# Patient Record
Sex: FEMALE | Race: WHITE | Employment: OTHER | URBAN - METROPOLITAN AREA
[De-identification: names, ages, dates, MRNs, and addresses within clinical notes are randomized per-mention and may not be internally consistent; named-entity substitution may affect disease eponyms.]

---

## 2017-05-24 ENCOUNTER — HOSPITAL ENCOUNTER (OUTPATIENT)
Dept: OTHER | Age: 67
Discharge: HOME | End: 2017-05-24
Attending: FAMILY MEDICINE | Admitting: FAMILY MEDICINE

## 2017-05-24 LAB
A/G RATIO: 1.3 (ref 1.1–2.2)
ALBUMIN SERPL-MCNC: 4.1 G/DL (ref 3.4–5)
ALP BLD-CCNC: 51 U/L (ref 40–129)
ALT SERPL-CCNC: 13 U/L (ref 10–40)
ANION GAP SERPL CALCULATED.3IONS-SCNC: 12 MMOL/L (ref 3–16)
AST SERPL-CCNC: 15 U/L (ref 15–37)
BASOPHILS ABSOLUTE: 0.1 K/UL (ref 0–0.2)
BASOPHILS RELATIVE PERCENT: 1 %
BILIRUB SERPL-MCNC: 0.3 MG/DL (ref 0–1)
BUN BLDV-MCNC: 18 MG/DL (ref 7–20)
CALCIUM SERPL-MCNC: 9 MG/DL (ref 8.3–10.6)
CHLORIDE BLD-SCNC: 99 MMOL/L (ref 99–110)
CHOLESTEROL, FASTING: 169 MG/DL (ref 0–199)
CO2: 29 MMOL/L (ref 21–32)
CREAT SERPL-MCNC: 0.5 MG/DL (ref 0.6–1.2)
CREATININE URINE: 88.9 MG/DL (ref 28–259)
EOSINOPHILS ABSOLUTE: 0.1 K/UL (ref 0–0.6)
EOSINOPHILS RELATIVE PERCENT: 2.3 %
ESTIMATED AVERAGE GLUCOSE: 142.7 MG/DL
GFR AFRICAN AMERICAN: >60
GFR NON-AFRICAN AMERICAN: >60
GLOBULIN: 3.1 G/DL
GLUCOSE BLD-MCNC: 119 MG/DL (ref 70–99)
HBA1C MFR BLD: 6.6 %
HCT VFR BLD CALC: 42 % (ref 36–48)
HDLC SERPL-MCNC: 81 MG/DL (ref 40–60)
HEMOGLOBIN: 13.6 G/DL (ref 12–16)
LDL CHOLESTEROL CALCULATED: 67 MG/DL
LYMPHOCYTES ABSOLUTE: 2.3 K/UL (ref 1–5.1)
LYMPHOCYTES RELATIVE PERCENT: 40.9 %
MCH RBC QN AUTO: 30.4 PG (ref 26–34)
MCHC RBC AUTO-ENTMCNC: 32.3 G/DL (ref 31–36)
MCV RBC AUTO: 94.2 FL (ref 80–100)
MICROALBUMIN UR-MCNC: <1.2 MG/DL
MICROALBUMIN/CREAT UR-RTO: NORMAL MG/G (ref 0–30)
MONOCYTES ABSOLUTE: 0.3 K/UL (ref 0–1.3)
MONOCYTES RELATIVE PERCENT: 5.8 %
NEUTROPHILS ABSOLUTE: 2.8 K/UL (ref 1.7–7.7)
NEUTROPHILS RELATIVE PERCENT: 50 %
PDW BLD-RTO: 13 % (ref 12.4–15.4)
PLATELET # BLD: 208 K/UL (ref 135–450)
PMV BLD AUTO: 8.7 FL (ref 5–10.5)
POTASSIUM SERPL-SCNC: 4.5 MMOL/L (ref 3.5–5.1)
RBC # BLD: 4.46 M/UL (ref 4–5.2)
SODIUM BLD-SCNC: 140 MMOL/L (ref 136–145)
TOTAL PROTEIN: 7.2 G/DL (ref 6.4–8.2)
TRIGLYCERIDE, FASTING: 104 MG/DL (ref 0–150)
TSH REFLEX: 3.56 UIU/ML (ref 0.27–4.2)
VLDLC SERPL CALC-MCNC: 21 MG/DL
WBC # BLD: 5.5 K/UL (ref 4–11)

## 2017-09-22 ENCOUNTER — HOSPITAL ENCOUNTER (OUTPATIENT)
Dept: MAMMOGRAPHY | Age: 67
Discharge: HOME | End: 2017-09-22
Attending: FAMILY MEDICINE | Admitting: FAMILY MEDICINE

## 2017-09-22 DIAGNOSIS — Z12.31 SCREENING MAMMOGRAM, ENCOUNTER FOR: ICD-10-CM

## 2017-11-22 ENCOUNTER — HOSPITAL ENCOUNTER (OUTPATIENT)
Dept: OTHER | Age: 67
Discharge: HOME | End: 2017-11-22
Attending: FAMILY MEDICINE | Admitting: FAMILY MEDICINE

## 2017-11-22 LAB
A/G RATIO: 1.5 (ref 1.1–2.2)
ALBUMIN SERPL-MCNC: 4.4 G/DL (ref 3.4–5)
ALP BLD-CCNC: 60 U/L (ref 40–129)
ALT SERPL-CCNC: 15 U/L (ref 10–40)
ANION GAP SERPL CALCULATED.3IONS-SCNC: 13 MMOL/L (ref 3–16)
AST SERPL-CCNC: 14 U/L (ref 15–37)
BASOPHILS ABSOLUTE: 0.1 K/UL (ref 0–0.2)
BASOPHILS RELATIVE PERCENT: 1.1 %
BILIRUB SERPL-MCNC: <0.2 MG/DL (ref 0–1)
BUN BLDV-MCNC: 19 MG/DL (ref 7–20)
CALCIUM SERPL-MCNC: 9.7 MG/DL (ref 8.3–10.6)
CHLORIDE BLD-SCNC: 101 MMOL/L (ref 99–110)
CHOLESTEROL, FASTING: 173 MG/DL (ref 0–199)
CO2: 29 MMOL/L (ref 21–32)
CREAT SERPL-MCNC: 0.6 MG/DL (ref 0.6–1.2)
CREATININE URINE: 167.7 MG/DL (ref 28–259)
EOSINOPHILS ABSOLUTE: 0.1 K/UL (ref 0–0.6)
EOSINOPHILS RELATIVE PERCENT: 2.2 %
ESTIMATED AVERAGE GLUCOSE: 134.1 MG/DL
GFR AFRICAN AMERICAN: >60
GFR NON-AFRICAN AMERICAN: >60
GLOBULIN: 2.9 G/DL
GLUCOSE FASTING: 124 MG/DL (ref 70–99)
HBA1C MFR BLD: 6.3 %
HCT VFR BLD CALC: 42.1 % (ref 36–48)
HDLC SERPL-MCNC: 84 MG/DL (ref 40–60)
HEMOGLOBIN: 14 G/DL (ref 12–16)
LDL CHOLESTEROL CALCULATED: 71 MG/DL
LYMPHOCYTES ABSOLUTE: 1.9 K/UL (ref 1–5.1)
LYMPHOCYTES RELATIVE PERCENT: 32.5 %
MCH RBC QN AUTO: 31.3 PG (ref 26–34)
MCHC RBC AUTO-ENTMCNC: 33.3 G/DL (ref 31–36)
MCV RBC AUTO: 93.9 FL (ref 80–100)
MICROALBUMIN UR-MCNC: 1.3 MG/DL
MICROALBUMIN/CREAT UR-RTO: 7.8 MG/G (ref 0–30)
MONOCYTES ABSOLUTE: 0.3 K/UL (ref 0–1.3)
MONOCYTES RELATIVE PERCENT: 5.6 %
NEUTROPHILS ABSOLUTE: 3.4 K/UL (ref 1.7–7.7)
NEUTROPHILS RELATIVE PERCENT: 58.6 %
PDW BLD-RTO: 13.4 % (ref 12.4–15.4)
PLATELET # BLD: 204 K/UL (ref 135–450)
PMV BLD AUTO: 8.6 FL (ref 5–10.5)
POTASSIUM SERPL-SCNC: 4.3 MMOL/L (ref 3.5–5.1)
RBC # BLD: 4.48 M/UL (ref 4–5.2)
SODIUM BLD-SCNC: 143 MMOL/L (ref 136–145)
TOTAL PROTEIN: 7.3 G/DL (ref 6.4–8.2)
TRIGLYCERIDE, FASTING: 89 MG/DL (ref 0–150)
TSH SERPL DL<=0.05 MIU/L-ACNC: 4.41 UIU/ML (ref 0.27–4.2)
VLDLC SERPL CALC-MCNC: 18 MG/DL
WBC # BLD: 5.8 K/UL (ref 4–11)

## 2018-06-14 ENCOUNTER — HOSPITAL ENCOUNTER (OUTPATIENT)
Dept: OTHER | Age: 68
Discharge: HOME | End: 2018-06-14
Attending: FAMILY MEDICINE | Admitting: FAMILY MEDICINE

## 2018-06-14 LAB
A/G RATIO: 1.4 (ref 1.1–2.2)
ALBUMIN SERPL-MCNC: 4 G/DL (ref 3.4–5)
ALP BLD-CCNC: 64 U/L (ref 40–129)
ALT SERPL-CCNC: 13 U/L (ref 10–40)
ANION GAP SERPL CALCULATED.3IONS-SCNC: 14 MMOL/L (ref 3–16)
AST SERPL-CCNC: 15 U/L (ref 15–37)
BASOPHILS ABSOLUTE: 0 K/UL (ref 0–0.2)
BASOPHILS RELATIVE PERCENT: 1 %
BILIRUB SERPL-MCNC: 0.3 MG/DL (ref 0–1)
BUN BLDV-MCNC: 15 MG/DL (ref 7–20)
CALCIUM SERPL-MCNC: 9.3 MG/DL (ref 8.3–10.6)
CHLORIDE BLD-SCNC: 102 MMOL/L (ref 99–110)
CHOLESTEROL, TOTAL: 154 MG/DL (ref 0–199)
CO2: 30 MMOL/L (ref 21–32)
CREAT SERPL-MCNC: 0.5 MG/DL (ref 0.6–1.2)
EOSINOPHILS ABSOLUTE: 0.2 K/UL (ref 0–0.6)
EOSINOPHILS RELATIVE PERCENT: 3.7 %
ESTIMATED AVERAGE GLUCOSE: 154.2 MG/DL
GFR AFRICAN AMERICAN: >60
GFR NON-AFRICAN AMERICAN: >60
GLOBULIN: 2.9 G/DL
GLUCOSE BLD-MCNC: 138 MG/DL (ref 70–99)
HBA1C MFR BLD: 7 %
HCT VFR BLD CALC: 39.9 % (ref 36–48)
HDLC SERPL-MCNC: 69 MG/DL (ref 40–60)
HEMOGLOBIN: 13.8 G/DL (ref 12–16)
LDL CHOLESTEROL CALCULATED: 52 MG/DL
LYMPHOCYTES ABSOLUTE: 1.6 K/UL (ref 1–5.1)
LYMPHOCYTES RELATIVE PERCENT: 35.4 %
MCH RBC QN AUTO: 31 PG (ref 26–34)
MCHC RBC AUTO-ENTMCNC: 34.5 G/DL (ref 31–36)
MCV RBC AUTO: 89.8 FL (ref 80–100)
MONOCYTES ABSOLUTE: 0.2 K/UL (ref 0–1.3)
MONOCYTES RELATIVE PERCENT: 5.3 %
NEUTROPHILS ABSOLUTE: 2.5 K/UL (ref 1.7–7.7)
NEUTROPHILS RELATIVE PERCENT: 54.6 %
PDW BLD-RTO: 12.6 % (ref 12.4–15.4)
PLATELET # BLD: 191 K/UL (ref 135–450)
PMV BLD AUTO: 8.3 FL (ref 5–10.5)
POTASSIUM SERPL-SCNC: 4.6 MMOL/L (ref 3.5–5.1)
RBC # BLD: 4.45 M/UL (ref 4–5.2)
SODIUM BLD-SCNC: 146 MMOL/L (ref 136–145)
TOTAL PROTEIN: 6.9 G/DL (ref 6.4–8.2)
TRIGL SERPL-MCNC: 163 MG/DL (ref 0–150)
TSH SERPL DL<=0.05 MIU/L-ACNC: 2.88 UIU/ML (ref 0.27–4.2)
VLDLC SERPL CALC-MCNC: 33 MG/DL
WBC # BLD: 4.5 K/UL (ref 4–11)

## 2018-09-25 ENCOUNTER — HOSPITAL ENCOUNTER (OUTPATIENT)
Dept: MAMMOGRAPHY | Age: 68
Discharge: HOME | End: 2018-09-25
Payer: MEDICARE

## 2018-09-25 DIAGNOSIS — Z12.39 SCREENING BREAST EXAMINATION: ICD-10-CM

## 2018-09-25 PROCEDURE — 77067 SCR MAMMO BI INCL CAD: CPT

## 2018-11-01 ENCOUNTER — HOSPITAL ENCOUNTER (OUTPATIENT)
Age: 68
Discharge: HOME | End: 2018-11-01
Payer: MEDICARE

## 2018-11-01 LAB
A/G RATIO: 1.7 (ref 1.1–2.2)
ALBUMIN SERPL-MCNC: 4.3 G/DL (ref 3.4–5)
ALP BLD-CCNC: 61 U/L (ref 40–129)
ALT SERPL-CCNC: 15 U/L (ref 10–40)
ANION GAP SERPL CALCULATED.3IONS-SCNC: 12 MMOL/L (ref 3–16)
AST SERPL-CCNC: 19 U/L (ref 15–37)
BASOPHILS ABSOLUTE: 0.1 K/UL (ref 0–0.2)
BASOPHILS RELATIVE PERCENT: 1.5 %
BILIRUB SERPL-MCNC: 0.3 MG/DL (ref 0–1)
BUN BLDV-MCNC: 13 MG/DL (ref 7–20)
CALCIUM SERPL-MCNC: 9.4 MG/DL (ref 8.3–10.6)
CHLORIDE BLD-SCNC: 103 MMOL/L (ref 99–110)
CHOLESTEROL, TOTAL: 178 MG/DL (ref 0–199)
CO2: 29 MMOL/L (ref 21–32)
CREAT SERPL-MCNC: 0.6 MG/DL (ref 0.6–1.2)
CREATININE URINE: 135.9 MG/DL (ref 28–259)
EOSINOPHILS ABSOLUTE: 0.1 K/UL (ref 0–0.6)
EOSINOPHILS RELATIVE PERCENT: 3.2 %
GFR AFRICAN AMERICAN: >60
GFR NON-AFRICAN AMERICAN: >60
GLOBULIN: 2.5 G/DL
GLUCOSE BLD-MCNC: 156 MG/DL (ref 70–99)
HCT VFR BLD CALC: 41.6 % (ref 36–48)
HDLC SERPL-MCNC: 72 MG/DL (ref 40–60)
HEMOGLOBIN: 13.9 G/DL (ref 12–16)
LDL CHOLESTEROL CALCULATED: 92 MG/DL
LYMPHOCYTES ABSOLUTE: 1.5 K/UL (ref 1–5.1)
LYMPHOCYTES RELATIVE PERCENT: 35.7 %
MCH RBC QN AUTO: 30.7 PG (ref 26–34)
MCHC RBC AUTO-ENTMCNC: 33.4 G/DL (ref 31–36)
MCV RBC AUTO: 91.8 FL (ref 80–100)
MICROALBUMIN UR-MCNC: 3.1 MG/DL
MICROALBUMIN/CREAT UR-RTO: 22.8 MG/G (ref 0–30)
MONOCYTES ABSOLUTE: 0.3 K/UL (ref 0–1.3)
MONOCYTES RELATIVE PERCENT: 7.4 %
NEUTROPHILS ABSOLUTE: 2.2 K/UL (ref 1.7–7.7)
NEUTROPHILS RELATIVE PERCENT: 52.2 %
PDW BLD-RTO: 13.4 % (ref 12.4–15.4)
PLATELET # BLD: 183 K/UL (ref 135–450)
PMV BLD AUTO: 9 FL (ref 5–10.5)
POTASSIUM SERPL-SCNC: 4.4 MMOL/L (ref 3.5–5.1)
RBC # BLD: 4.53 M/UL (ref 4–5.2)
SODIUM BLD-SCNC: 144 MMOL/L (ref 136–145)
TOTAL PROTEIN: 6.8 G/DL (ref 6.4–8.2)
TRIGL SERPL-MCNC: 69 MG/DL (ref 0–150)
VLDLC SERPL CALC-MCNC: 14 MG/DL
WBC # BLD: 4.2 K/UL (ref 4–11)

## 2018-11-01 PROCEDURE — 82043 UR ALBUMIN QUANTITATIVE: CPT

## 2018-11-01 PROCEDURE — 82570 ASSAY OF URINE CREATININE: CPT

## 2018-11-01 PROCEDURE — 80061 LIPID PANEL: CPT

## 2018-11-01 PROCEDURE — 36415 COLL VENOUS BLD VENIPUNCTURE: CPT

## 2018-11-01 PROCEDURE — 85025 COMPLETE CBC W/AUTO DIFF WBC: CPT

## 2018-11-01 PROCEDURE — 83036 HEMOGLOBIN GLYCOSYLATED A1C: CPT

## 2018-11-01 PROCEDURE — 80053 COMPREHEN METABOLIC PANEL: CPT

## 2018-11-02 LAB
ESTIMATED AVERAGE GLUCOSE: 151.3 MG/DL
HBA1C MFR BLD: 6.9 %

## 2019-05-04 ENCOUNTER — HOSPITAL ENCOUNTER (OUTPATIENT)
Age: 69
Discharge: HOME | End: 2019-05-04
Payer: MEDICARE

## 2019-05-04 LAB
A/G RATIO: 1.6 (ref 1.1–2.2)
ALBUMIN SERPL-MCNC: 4.2 G/DL (ref 3.4–5)
ALP BLD-CCNC: 53 U/L (ref 40–129)
ALT SERPL-CCNC: 15 U/L (ref 10–40)
ANION GAP SERPL CALCULATED.3IONS-SCNC: 9 MMOL/L (ref 3–16)
AST SERPL-CCNC: 18 U/L (ref 15–37)
BASOPHILS ABSOLUTE: 0.1 K/UL (ref 0–0.2)
BASOPHILS RELATIVE PERCENT: 1.4 %
BILIRUB SERPL-MCNC: 0.4 MG/DL (ref 0–1)
BUN BLDV-MCNC: 17 MG/DL (ref 7–20)
CALCIUM SERPL-MCNC: 9.1 MG/DL (ref 8.3–10.6)
CHLORIDE BLD-SCNC: 102 MMOL/L (ref 99–110)
CHOLESTEROL, TOTAL: 199 MG/DL (ref 0–199)
CO2: 30 MMOL/L (ref 21–32)
CREAT SERPL-MCNC: 0.5 MG/DL (ref 0.6–1.2)
CREATININE URINE: 138.3 MG/DL (ref 28–259)
EOSINOPHILS ABSOLUTE: 0.2 K/UL (ref 0–0.6)
EOSINOPHILS RELATIVE PERCENT: 3.9 %
GFR AFRICAN AMERICAN: >60
GFR NON-AFRICAN AMERICAN: >60
GLOBULIN: 2.7 G/DL
GLUCOSE BLD-MCNC: 128 MG/DL (ref 70–99)
HCT VFR BLD CALC: 40.8 % (ref 36–48)
HDLC SERPL-MCNC: 93 MG/DL (ref 40–60)
HEMOGLOBIN: 13.7 G/DL (ref 12–16)
LDL CHOLESTEROL CALCULATED: 97 MG/DL
LYMPHOCYTES ABSOLUTE: 1.8 K/UL (ref 1–5.1)
LYMPHOCYTES RELATIVE PERCENT: 42.2 %
MCH RBC QN AUTO: 31.4 PG (ref 26–34)
MCHC RBC AUTO-ENTMCNC: 33.5 G/DL (ref 31–36)
MCV RBC AUTO: 93.6 FL (ref 80–100)
MICROALBUMIN UR-MCNC: <1.2 MG/DL
MICROALBUMIN/CREAT UR-RTO: NORMAL MG/G (ref 0–30)
MONOCYTES ABSOLUTE: 0.3 K/UL (ref 0–1.3)
MONOCYTES RELATIVE PERCENT: 8 %
NEUTROPHILS ABSOLUTE: 1.9 K/UL (ref 1.7–7.7)
NEUTROPHILS RELATIVE PERCENT: 44.5 %
PDW BLD-RTO: 13.8 % (ref 12.4–15.4)
PLATELET # BLD: 193 K/UL (ref 135–450)
PMV BLD AUTO: 9.1 FL (ref 5–10.5)
POTASSIUM SERPL-SCNC: 4.4 MMOL/L (ref 3.5–5.1)
RBC # BLD: 4.35 M/UL (ref 4–5.2)
SODIUM BLD-SCNC: 141 MMOL/L (ref 136–145)
TOTAL PROTEIN: 6.9 G/DL (ref 6.4–8.2)
TRIGL SERPL-MCNC: 45 MG/DL (ref 0–150)
VLDLC SERPL CALC-MCNC: 9 MG/DL
WBC # BLD: 4.2 K/UL (ref 4–11)

## 2019-05-04 PROCEDURE — 80053 COMPREHEN METABOLIC PANEL: CPT

## 2019-05-04 PROCEDURE — 83036 HEMOGLOBIN GLYCOSYLATED A1C: CPT

## 2019-05-04 PROCEDURE — 85025 COMPLETE CBC W/AUTO DIFF WBC: CPT

## 2019-05-04 PROCEDURE — 36415 COLL VENOUS BLD VENIPUNCTURE: CPT

## 2019-05-04 PROCEDURE — 82570 ASSAY OF URINE CREATININE: CPT

## 2019-05-04 PROCEDURE — 80061 LIPID PANEL: CPT

## 2019-05-04 PROCEDURE — 82043 UR ALBUMIN QUANTITATIVE: CPT

## 2019-05-05 LAB
ESTIMATED AVERAGE GLUCOSE: 134.1 MG/DL
HBA1C MFR BLD: 6.3 %

## 2019-06-27 ENCOUNTER — HOSPITAL ENCOUNTER (EMERGENCY)
Age: 69
Discharge: HOME | End: 2019-06-27
Attending: EMERGENCY MEDICINE
Payer: MEDICARE

## 2019-06-27 ENCOUNTER — APPOINTMENT (OUTPATIENT)
Dept: GENERAL RADIOLOGY | Age: 69
End: 2019-06-27
Payer: MEDICARE

## 2019-06-27 VITALS
BODY MASS INDEX: 25.24 KG/M2 | OXYGEN SATURATION: 99 % | WEIGHT: 138 LBS | SYSTOLIC BLOOD PRESSURE: 119 MMHG | HEART RATE: 58 BPM | RESPIRATION RATE: 13 BRPM | TEMPERATURE: 97.9 F | DIASTOLIC BLOOD PRESSURE: 66 MMHG

## 2019-06-27 DIAGNOSIS — R42 LIGHTHEADEDNESS: Primary | ICD-10-CM

## 2019-06-27 DIAGNOSIS — F41.0 ANXIETY ATTACK: ICD-10-CM

## 2019-06-27 LAB
A/G RATIO: 1.7 (ref 1.1–2.2)
ALBUMIN SERPL-MCNC: 4.5 G/DL (ref 3.4–5)
ALP BLD-CCNC: 60 U/L (ref 40–129)
ALT SERPL-CCNC: 15 U/L (ref 10–40)
ANION GAP SERPL CALCULATED.3IONS-SCNC: 16 MMOL/L (ref 3–16)
AST SERPL-CCNC: 18 U/L (ref 15–37)
BASOPHILS ABSOLUTE: 0.1 K/UL (ref 0–0.2)
BASOPHILS RELATIVE PERCENT: 1.4 %
BILIRUB SERPL-MCNC: 0.4 MG/DL (ref 0–1)
BUN BLDV-MCNC: 13 MG/DL (ref 7–20)
CALCIUM SERPL-MCNC: 9.8 MG/DL (ref 8.3–10.6)
CHLORIDE BLD-SCNC: 100 MMOL/L (ref 99–110)
CO2: 24 MMOL/L (ref 21–32)
CREAT SERPL-MCNC: 0.6 MG/DL (ref 0.6–1.2)
EKG ATRIAL RATE: 70 BPM
EKG DIAGNOSIS: NORMAL
EKG P AXIS: 74 DEGREES
EKG P-R INTERVAL: 148 MS
EKG Q-T INTERVAL: 376 MS
EKG QRS DURATION: 74 MS
EKG QTC CALCULATION (BAZETT): 406 MS
EKG R AXIS: 41 DEGREES
EKG T AXIS: 41 DEGREES
EKG VENTRICULAR RATE: 70 BPM
EOSINOPHILS ABSOLUTE: 0.1 K/UL (ref 0–0.6)
EOSINOPHILS RELATIVE PERCENT: 1.8 %
GFR AFRICAN AMERICAN: >60
GFR NON-AFRICAN AMERICAN: >60
GLOBULIN: 2.7 G/DL
GLUCOSE BLD-MCNC: 122 MG/DL (ref 70–99)
GLUCOSE BLD-MCNC: 124 MG/DL (ref 70–99)
HCT VFR BLD CALC: 42.1 % (ref 36–48)
HEMOGLOBIN: 14.1 G/DL (ref 12–16)
LYMPHOCYTES ABSOLUTE: 2.5 K/UL (ref 1–5.1)
LYMPHOCYTES RELATIVE PERCENT: 51.2 %
MCH RBC QN AUTO: 31.1 PG (ref 26–34)
MCHC RBC AUTO-ENTMCNC: 33.5 G/DL (ref 31–36)
MCV RBC AUTO: 92.8 FL (ref 80–100)
MONOCYTES ABSOLUTE: 0.3 K/UL (ref 0–1.3)
MONOCYTES RELATIVE PERCENT: 6.5 %
NEUTROPHILS ABSOLUTE: 1.9 K/UL (ref 1.7–7.7)
NEUTROPHILS RELATIVE PERCENT: 39.1 %
PDW BLD-RTO: 13.8 % (ref 12.4–15.4)
PERFORMED ON: ABNORMAL
PLATELET # BLD: 198 K/UL (ref 135–450)
PMV BLD AUTO: 8.3 FL (ref 5–10.5)
POTASSIUM SERPL-SCNC: 3.3 MMOL/L (ref 3.5–5.1)
PRO-BNP: 100 PG/ML (ref 0–124)
RBC # BLD: 4.54 M/UL (ref 4–5.2)
SODIUM BLD-SCNC: 140 MMOL/L (ref 136–145)
TOTAL PROTEIN: 7.2 G/DL (ref 6.4–8.2)
TROPONIN: <0.01 NG/ML
WBC # BLD: 4.8 K/UL (ref 4–11)

## 2019-06-27 PROCEDURE — 80053 COMPREHEN METABOLIC PANEL: CPT

## 2019-06-27 PROCEDURE — 83880 ASSAY OF NATRIURETIC PEPTIDE: CPT

## 2019-06-27 PROCEDURE — 84484 ASSAY OF TROPONIN QUANT: CPT

## 2019-06-27 PROCEDURE — 85025 COMPLETE CBC W/AUTO DIFF WBC: CPT

## 2019-06-27 PROCEDURE — 99284 EMERGENCY DEPT VISIT MOD MDM: CPT

## 2019-06-27 PROCEDURE — 6370000000 HC RX 637 (ALT 250 FOR IP): Performed by: EMERGENCY MEDICINE

## 2019-06-27 PROCEDURE — 93010 ELECTROCARDIOGRAM REPORT: CPT | Performed by: INTERNAL MEDICINE

## 2019-06-27 PROCEDURE — 93005 ELECTROCARDIOGRAM TRACING: CPT | Performed by: EMERGENCY MEDICINE

## 2019-06-27 PROCEDURE — 71046 X-RAY EXAM CHEST 2 VIEWS: CPT

## 2019-06-27 RX ORDER — MELOXICAM 15 MG/1
15 TABLET ORAL DAILY
COMMUNITY
Start: 2019-05-17

## 2019-06-27 RX ORDER — LORAZEPAM 0.5 MG/1
0.5 TABLET ORAL 3 TIMES DAILY PRN
Qty: 12 TABLET | Refills: 0 | Status: SHIPPED | OUTPATIENT
Start: 2019-06-27 | End: 2019-07-27

## 2019-06-27 RX ORDER — LORAZEPAM 1 MG/1
1 TABLET ORAL ONCE
Status: COMPLETED | OUTPATIENT
Start: 2019-06-27 | End: 2019-06-27

## 2019-06-27 RX ADMIN — LORAZEPAM 1 MG: 1 TABLET ORAL at 09:14

## 2019-06-27 NOTE — ED PROVIDER NOTES
cardiologist shows. normal sinus rhythm with a rate of 70  Axis is   Normal  QTc is  normal  Intervals and Durations are unremarkable. No specific ST-T wave changes appreciated. No evidence of acute ischemia. No previous EKGs available for comparison    Radiology  XR CHEST STANDARD (2 VW)   Final Result   Mild diffuse interstitial prominence which may be related to mild edema.            Labs  Results for orders placed or performed during the hospital encounter of 06/27/19   CBC auto differential   Result Value Ref Range    WBC 4.8 4.0 - 11.0 K/uL    RBC 4.54 4.00 - 5.20 M/uL    Hemoglobin 14.1 12.0 - 16.0 g/dL    Hematocrit 42.1 36.0 - 48.0 %    MCV 92.8 80.0 - 100.0 fL    MCH 31.1 26.0 - 34.0 pg    MCHC 33.5 31.0 - 36.0 g/dL    RDW 13.8 12.4 - 15.4 %    Platelets 887 782 - 174 K/uL    MPV 8.3 5.0 - 10.5 fL    Neutrophils % 39.1 %    Lymphocytes % 51.2 %    Monocytes % 6.5 %    Eosinophils % 1.8 %    Basophils % 1.4 %    Neutrophils # 1.9 1.7 - 7.7 K/uL    Lymphocytes # 2.5 1.0 - 5.1 K/uL    Monocytes # 0.3 0.0 - 1.3 K/uL    Eosinophils # 0.1 0.0 - 0.6 K/uL    Basophils # 0.1 0.0 - 0.2 K/uL   Comprehensive metabolic panel   Result Value Ref Range    Sodium 140 136 - 145 mmol/L    Potassium 3.3 (L) 3.5 - 5.1 mmol/L    Chloride 100 99 - 110 mmol/L    CO2 24 21 - 32 mmol/L    Anion Gap 16 3 - 16    Glucose 122 (H) 70 - 99 mg/dL    BUN 13 7 - 20 mg/dL    CREATININE 0.6 0.6 - 1.2 mg/dL    GFR Non-African American >60 >60    GFR African American >60 >60    Calcium 9.8 8.3 - 10.6 mg/dL    Total Protein 7.2 6.4 - 8.2 g/dL    Alb 4.5 3.4 - 5.0 g/dL    Albumin/Globulin Ratio 1.7 1.1 - 2.2    Total Bilirubin 0.4 0.0 - 1.0 mg/dL    Alkaline Phosphatase 60 40 - 129 U/L    ALT 15 10 - 40 U/L    AST 18 15 - 37 U/L    Globulin 2.7 g/dL   Troponin   Result Value Ref Range    Troponin <0.01 <0.01 ng/mL   Brain Natriuretic Peptide   Result Value Ref Range    Pro- 0 - 124 pg/mL   POCT Glucose   Result Value Ref Range 138 lb (62.6 kg), SpO2 99 %. Disposition:  DISPOSITION Decision To Discharge 06/27/2019 10:36:48 AM      Patient Referrals:  Mary Ware DO  [facility] [address]  [address] [address]  [phone]    In 3 days  for re-evaluation    [facility]  [address] [facility]  [phone]    As needed, If symptoms worsen or new symptoms develop      Discharge Medications:  Discharge Medication List as of 6/27/2019 10:39 AM      START taking these medications    Details   LORazepam (ATIVAN) 0.5 MG tablet Take 1 tablet by mouth 3 times daily as needed for Anxiety for up to 30 days. , Disp-12 tablet, R-0Print             Discontinued Medications:  Discharge Medication List as of 6/27/2019 10:39 AM          This chart was generated using the [employer] dictation system. I created this record but it may contain dictation errors given the limitations of this technology.         Emmy Sanon MD  06/27/19 5296

## 2019-09-25 ENCOUNTER — HOSPITAL ENCOUNTER (OUTPATIENT)
Dept: MAMMOGRAPHY | Age: 69
Discharge: HOME | End: 2019-09-25
Payer: MEDICARE

## 2019-09-25 DIAGNOSIS — Z12.31 SCREENING MAMMOGRAM, ENCOUNTER FOR: ICD-10-CM

## 2019-09-25 PROCEDURE — 77063 BREAST TOMOSYNTHESIS BI: CPT

## 2019-10-24 ENCOUNTER — HOSPITAL ENCOUNTER (OUTPATIENT)
Age: 69
Discharge: HOME | End: 2019-10-24
Payer: MEDICARE

## 2019-10-24 LAB
A/G RATIO: 1.5 (ref 1.1–2.2)
ALBUMIN SERPL-MCNC: 4.2 G/DL (ref 3.4–5)
ALP BLD-CCNC: 54 U/L (ref 40–129)
ALT SERPL-CCNC: 14 U/L (ref 10–40)
ANION GAP SERPL CALCULATED.3IONS-SCNC: 12 MMOL/L (ref 3–16)
AST SERPL-CCNC: 18 U/L (ref 15–37)
BASOPHILS ABSOLUTE: 0.1 K/UL (ref 0–0.2)
BASOPHILS RELATIVE PERCENT: 1.3 %
BILIRUB SERPL-MCNC: 0.5 MG/DL (ref 0–1)
BUN BLDV-MCNC: 17 MG/DL (ref 7–20)
CALCIUM SERPL-MCNC: 9.5 MG/DL (ref 8.3–10.6)
CHLORIDE BLD-SCNC: 103 MMOL/L (ref 99–110)
CHOLESTEROL, TOTAL: 217 MG/DL (ref 0–199)
CO2: 30 MMOL/L (ref 21–32)
CREAT SERPL-MCNC: 0.6 MG/DL (ref 0.6–1.2)
EOSINOPHILS ABSOLUTE: 0.2 K/UL (ref 0–0.6)
EOSINOPHILS RELATIVE PERCENT: 3.8 %
ESTIMATED AVERAGE GLUCOSE: 125.5 MG/DL
GFR AFRICAN AMERICAN: >60
GFR NON-AFRICAN AMERICAN: >60
GLOBULIN: 2.8 G/DL
GLUCOSE BLD-MCNC: 101 MG/DL (ref 70–99)
HBA1C MFR BLD: 6 %
HCT VFR BLD CALC: 40.3 % (ref 36–48)
HDLC SERPL-MCNC: 116 MG/DL (ref 40–60)
HEMOGLOBIN: 13.6 G/DL (ref 12–16)
LDL CHOLESTEROL CALCULATED: 91 MG/DL
LYMPHOCYTES ABSOLUTE: 1.9 K/UL (ref 1–5.1)
LYMPHOCYTES RELATIVE PERCENT: 42.4 %
MCH RBC QN AUTO: 31.5 PG (ref 26–34)
MCHC RBC AUTO-ENTMCNC: 33.6 G/DL (ref 31–36)
MCV RBC AUTO: 93.7 FL (ref 80–100)
MONOCYTES ABSOLUTE: 0.3 K/UL (ref 0–1.3)
MONOCYTES RELATIVE PERCENT: 7.6 %
NEUTROPHILS ABSOLUTE: 2 K/UL (ref 1.7–7.7)
NEUTROPHILS RELATIVE PERCENT: 44.9 %
PDW BLD-RTO: 13.2 % (ref 12.4–15.4)
PLATELET # BLD: 184 K/UL (ref 135–450)
PMV BLD AUTO: 8.3 FL (ref 5–10.5)
POTASSIUM SERPL-SCNC: 4 MMOL/L (ref 3.5–5.1)
RBC # BLD: 4.3 M/UL (ref 4–5.2)
SODIUM BLD-SCNC: 145 MMOL/L (ref 136–145)
TOTAL PROTEIN: 7 G/DL (ref 6.4–8.2)
TRIGL SERPL-MCNC: 50 MG/DL (ref 0–150)
VLDLC SERPL CALC-MCNC: 10 MG/DL
WBC # BLD: 4.5 K/UL (ref 4–11)

## 2019-10-24 PROCEDURE — 83036 HEMOGLOBIN GLYCOSYLATED A1C: CPT

## 2019-10-24 PROCEDURE — 36415 COLL VENOUS BLD VENIPUNCTURE: CPT

## 2019-10-24 PROCEDURE — 80053 COMPREHEN METABOLIC PANEL: CPT

## 2019-10-24 PROCEDURE — 80061 LIPID PANEL: CPT

## 2019-10-24 PROCEDURE — 85025 COMPLETE CBC W/AUTO DIFF WBC: CPT

## 2020-03-25 ENCOUNTER — HOSPITAL ENCOUNTER (OUTPATIENT)
Age: 70
Discharge: HOME | End: 2020-03-25
Payer: MEDICARE

## 2020-03-25 LAB
A/G RATIO: 1.6 (ref 1.1–2.2)
ALBUMIN SERPL-MCNC: 4.2 G/DL (ref 3.4–5)
ALP BLD-CCNC: 53 U/L (ref 40–129)
ALT SERPL-CCNC: 16 U/L (ref 10–40)
ANION GAP SERPL CALCULATED.3IONS-SCNC: 10 MMOL/L (ref 3–16)
AST SERPL-CCNC: 18 U/L (ref 15–37)
BASOPHILS ABSOLUTE: 0 K/UL (ref 0–0.2)
BASOPHILS RELATIVE PERCENT: 1 %
BILIRUB SERPL-MCNC: 0.4 MG/DL (ref 0–1)
BUN BLDV-MCNC: 15 MG/DL (ref 7–20)
CALCIUM SERPL-MCNC: 9.4 MG/DL (ref 8.3–10.6)
CHLORIDE BLD-SCNC: 103 MMOL/L (ref 99–110)
CHOLESTEROL, TOTAL: 157 MG/DL (ref 0–199)
CO2: 30 MMOL/L (ref 21–32)
CREAT SERPL-MCNC: 0.6 MG/DL (ref 0.6–1.2)
EOSINOPHILS ABSOLUTE: 0.1 K/UL (ref 0–0.6)
EOSINOPHILS RELATIVE PERCENT: 3.7 %
ESTIMATED AVERAGE GLUCOSE: 128.4 MG/DL
GFR AFRICAN AMERICAN: >60
GFR NON-AFRICAN AMERICAN: >60
GLOBULIN: 2.6 G/DL
GLUCOSE BLD-MCNC: 115 MG/DL (ref 70–99)
HBA1C MFR BLD: 6.1 %
HCT VFR BLD CALC: 41.3 % (ref 36–48)
HDLC SERPL-MCNC: 93 MG/DL (ref 40–60)
HEMOGLOBIN: 13.8 G/DL (ref 12–16)
LDL CHOLESTEROL CALCULATED: 55 MG/DL
LYMPHOCYTES ABSOLUTE: 1.7 K/UL (ref 1–5.1)
LYMPHOCYTES RELATIVE PERCENT: 42.4 %
MCH RBC QN AUTO: 31.3 PG (ref 26–34)
MCHC RBC AUTO-ENTMCNC: 33.3 G/DL (ref 31–36)
MCV RBC AUTO: 93.9 FL (ref 80–100)
MONOCYTES ABSOLUTE: 0.3 K/UL (ref 0–1.3)
MONOCYTES RELATIVE PERCENT: 7.3 %
NEUTROPHILS ABSOLUTE: 1.8 K/UL (ref 1.7–7.7)
NEUTROPHILS RELATIVE PERCENT: 45.6 %
PDW BLD-RTO: 13.3 % (ref 12.4–15.4)
PLATELET # BLD: 180 K/UL (ref 135–450)
PMV BLD AUTO: 9.1 FL (ref 5–10.5)
POTASSIUM SERPL-SCNC: 3.9 MMOL/L (ref 3.5–5.1)
RBC # BLD: 4.4 M/UL (ref 4–5.2)
SODIUM BLD-SCNC: 143 MMOL/L (ref 136–145)
TOTAL PROTEIN: 6.8 G/DL (ref 6.4–8.2)
TRIGL SERPL-MCNC: 44 MG/DL (ref 0–150)
VITAMIN D 25-HYDROXY: 54.7 NG/ML
VLDLC SERPL CALC-MCNC: 9 MG/DL
WBC # BLD: 3.9 K/UL (ref 4–11)

## 2020-03-25 PROCEDURE — 80053 COMPREHEN METABOLIC PANEL: CPT

## 2020-03-25 PROCEDURE — 80061 LIPID PANEL: CPT

## 2020-03-25 PROCEDURE — 82306 VITAMIN D 25 HYDROXY: CPT

## 2020-03-25 PROCEDURE — 85025 COMPLETE CBC W/AUTO DIFF WBC: CPT

## 2020-03-25 PROCEDURE — 83036 HEMOGLOBIN GLYCOSYLATED A1C: CPT

## 2020-03-25 PROCEDURE — 36415 COLL VENOUS BLD VENIPUNCTURE: CPT

## 2020-08-22 ENCOUNTER — HOSPITAL ENCOUNTER (OUTPATIENT)
Age: 70
Discharge: HOME | End: 2020-08-22
Payer: MEDICARE

## 2020-08-22 LAB
A/G RATIO: 1.6 (ref 1.1–2.2)
ALBUMIN SERPL-MCNC: 4.5 G/DL (ref 3.4–5)
ALP BLD-CCNC: 53 U/L (ref 40–129)
ALT SERPL-CCNC: 9 U/L (ref 10–40)
ANION GAP SERPL CALCULATED.3IONS-SCNC: 6 MMOL/L (ref 3–16)
AST SERPL-CCNC: 21 U/L (ref 15–37)
BASOPHILS ABSOLUTE: 0.1 K/UL (ref 0–0.2)
BASOPHILS RELATIVE PERCENT: 1.2 %
BILIRUB SERPL-MCNC: 0.6 MG/DL (ref 0–1)
BUN BLDV-MCNC: 18 MG/DL (ref 7–20)
CALCIUM SERPL-MCNC: 9.7 MG/DL (ref 8.3–10.6)
CHLORIDE BLD-SCNC: 101 MMOL/L (ref 99–110)
CHOLESTEROL, TOTAL: 168 MG/DL (ref 0–199)
CO2: 33 MMOL/L (ref 21–32)
CREAT SERPL-MCNC: 0.6 MG/DL (ref 0.6–1.2)
CREATININE URINE: 133.5 MG/DL (ref 28–259)
EOSINOPHILS ABSOLUTE: 0.1 K/UL (ref 0–0.6)
EOSINOPHILS RELATIVE PERCENT: 2.9 %
GFR AFRICAN AMERICAN: >60
GFR NON-AFRICAN AMERICAN: >60
GLOBULIN: 2.8 G/DL
GLUCOSE BLD-MCNC: 128 MG/DL (ref 70–99)
HCT VFR BLD CALC: 40.6 % (ref 36–48)
HDLC SERPL-MCNC: 100 MG/DL (ref 40–60)
HEMOGLOBIN: 13.8 G/DL (ref 12–16)
LDL CHOLESTEROL CALCULATED: 57 MG/DL
LYMPHOCYTES ABSOLUTE: 1.6 K/UL (ref 1–5.1)
LYMPHOCYTES RELATIVE PERCENT: 33.3 %
MCH RBC QN AUTO: 31.6 PG (ref 26–34)
MCHC RBC AUTO-ENTMCNC: 33.9 G/DL (ref 31–36)
MCV RBC AUTO: 93.2 FL (ref 80–100)
MICROALBUMIN UR-MCNC: <1.2 MG/DL
MICROALBUMIN/CREAT UR-RTO: NORMAL MG/G (ref 0–30)
MONOCYTES ABSOLUTE: 0.3 K/UL (ref 0–1.3)
MONOCYTES RELATIVE PERCENT: 6.6 %
NEUTROPHILS ABSOLUTE: 2.6 K/UL (ref 1.7–7.7)
NEUTROPHILS RELATIVE PERCENT: 56 %
PDW BLD-RTO: 13.1 % (ref 12.4–15.4)
PLATELET # BLD: 169 K/UL (ref 135–450)
PMV BLD AUTO: 8.5 FL (ref 5–10.5)
POTASSIUM SERPL-SCNC: 4.1 MMOL/L (ref 3.5–5.1)
RBC # BLD: 4.35 M/UL (ref 4–5.2)
SODIUM BLD-SCNC: 140 MMOL/L (ref 136–145)
TOTAL PROTEIN: 7.3 G/DL (ref 6.4–8.2)
TRIGL SERPL-MCNC: 53 MG/DL (ref 0–150)
VITAMIN D 25-HYDROXY: 54.1 NG/ML
VLDLC SERPL CALC-MCNC: 11 MG/DL
WBC # BLD: 4.7 K/UL (ref 4–11)

## 2020-08-22 PROCEDURE — 85025 COMPLETE CBC W/AUTO DIFF WBC: CPT

## 2020-08-22 PROCEDURE — 80053 COMPREHEN METABOLIC PANEL: CPT

## 2020-08-22 PROCEDURE — 83036 HEMOGLOBIN GLYCOSYLATED A1C: CPT

## 2020-08-22 PROCEDURE — 82043 UR ALBUMIN QUANTITATIVE: CPT

## 2020-08-22 PROCEDURE — 36415 COLL VENOUS BLD VENIPUNCTURE: CPT

## 2020-08-22 PROCEDURE — 82570 ASSAY OF URINE CREATININE: CPT

## 2020-08-22 PROCEDURE — 80061 LIPID PANEL: CPT

## 2020-08-22 PROCEDURE — 82306 VITAMIN D 25 HYDROXY: CPT

## 2020-08-23 LAB
ESTIMATED AVERAGE GLUCOSE: 139.9 MG/DL
HBA1C MFR BLD: 6.5 %

## 2020-09-29 ENCOUNTER — HOSPITAL ENCOUNTER (OUTPATIENT)
Dept: MAMMOGRAPHY | Age: 70
Discharge: HOME | End: 2020-09-29
Payer: MEDICARE

## 2020-09-29 ENCOUNTER — TELEPHONE (OUTPATIENT)
Dept: MAMMOGRAPHY | Age: 70
End: 2020-09-29

## 2020-09-29 PROCEDURE — 77067 SCR MAMMO BI INCL CAD: CPT

## 2021-01-01 ENCOUNTER — HOSPITAL ENCOUNTER (OUTPATIENT)
Dept: MAMMOGRAPHY | Age: 71
Discharge: HOME | End: 2021-09-08
Payer: MEDICARE

## 2021-01-01 ENCOUNTER — HOSPITAL ENCOUNTER (OUTPATIENT)
Age: 71
Discharge: HOME | End: 2021-03-08
Payer: MEDICARE

## 2021-01-01 ENCOUNTER — HOSPITAL ENCOUNTER (OUTPATIENT)
Age: 71
Discharge: HOME | End: 2021-10-02
Payer: MEDICARE

## 2021-01-01 ENCOUNTER — HOSPITAL ENCOUNTER (OUTPATIENT)
Dept: GENERAL RADIOLOGY | Age: 71
Discharge: HOME | End: 2021-05-26
Payer: MEDICARE

## 2021-01-01 DIAGNOSIS — Z78.0 POST-MENOPAUSAL: ICD-10-CM

## 2021-01-01 DIAGNOSIS — Z12.31 SCREENING MAMMOGRAM, ENCOUNTER FOR: ICD-10-CM

## 2021-01-01 LAB
A/G RATIO: 1.5 (ref 1.1–2.2)
A/G RATIO: 1.6 (ref 1.1–2.2)
ALBUMIN SERPL-MCNC: 4.1 G/DL (ref 3.4–5)
ALBUMIN SERPL-MCNC: 4.4 G/DL (ref 3.4–5)
ALP BLD-CCNC: 53 U/L (ref 40–129)
ALP BLD-CCNC: 62 U/L (ref 40–129)
ALT SERPL-CCNC: 13 U/L (ref 10–40)
ALT SERPL-CCNC: 15 U/L (ref 10–40)
ANION GAP SERPL CALCULATED.3IONS-SCNC: 19 MMOL/L (ref 3–16)
ANION GAP SERPL CALCULATED.3IONS-SCNC: 9 MMOL/L (ref 3–16)
AST SERPL-CCNC: 19 U/L (ref 15–37)
AST SERPL-CCNC: 24 U/L (ref 15–37)
BASOPHILS ABSOLUTE: 0.1 K/UL (ref 0–0.2)
BASOPHILS RELATIVE PERCENT: 1.3 %
BILIRUB SERPL-MCNC: 0.4 MG/DL (ref 0–1)
BILIRUB SERPL-MCNC: 0.4 MG/DL (ref 0–1)
BUN BLDV-MCNC: 13 MG/DL (ref 7–20)
BUN BLDV-MCNC: 14 MG/DL (ref 7–20)
CALCIUM SERPL-MCNC: 9.5 MG/DL (ref 8.3–10.6)
CALCIUM SERPL-MCNC: 9.5 MG/DL (ref 8.3–10.6)
CHLORIDE BLD-SCNC: 103 MMOL/L (ref 99–110)
CHLORIDE BLD-SCNC: 105 MMOL/L (ref 99–110)
CHOLESTEROL, TOTAL: 155 MG/DL (ref 0–199)
CHOLESTEROL, TOTAL: 157 MG/DL (ref 0–199)
CO2: 24 MMOL/L (ref 21–32)
CO2: 29 MMOL/L (ref 21–32)
CREAT SERPL-MCNC: 0.6 MG/DL (ref 0.6–1.2)
CREAT SERPL-MCNC: 0.6 MG/DL (ref 0.6–1.2)
EOSINOPHILS ABSOLUTE: 0.2 K/UL (ref 0–0.6)
EOSINOPHILS RELATIVE PERCENT: 3.1 %
ESTIMATED AVERAGE GLUCOSE: 134.1 MG/DL
ESTIMATED AVERAGE GLUCOSE: 134.1 MG/DL
GFR AFRICAN AMERICAN: >60
GFR AFRICAN AMERICAN: >60
GFR NON-AFRICAN AMERICAN: >60
GFR NON-AFRICAN AMERICAN: >60
GLOBULIN: 2.8 G/DL
GLOBULIN: 2.8 G/DL
GLUCOSE BLD-MCNC: 108 MG/DL (ref 70–99)
GLUCOSE BLD-MCNC: 117 MG/DL (ref 70–99)
HBA1C MFR BLD: 6.3 %
HBA1C MFR BLD: 6.3 %
HCT VFR BLD CALC: 42.6 % (ref 36–48)
HDLC SERPL-MCNC: 83 MG/DL (ref 40–60)
HDLC SERPL-MCNC: 91 MG/DL (ref 40–60)
HEMOGLOBIN: 14.2 G/DL (ref 12–16)
LDL CHOLESTEROL CALCULATED: 52 MG/DL
LDL CHOLESTEROL CALCULATED: 62 MG/DL
LYMPHOCYTES ABSOLUTE: 1.5 K/UL (ref 1–5.1)
LYMPHOCYTES RELATIVE PERCENT: 31.7 %
MCH RBC QN AUTO: 30.5 PG (ref 26–34)
MCHC RBC AUTO-ENTMCNC: 33.3 G/DL (ref 31–36)
MCV RBC AUTO: 91.8 FL (ref 80–100)
MONOCYTES ABSOLUTE: 0.3 K/UL (ref 0–1.3)
MONOCYTES RELATIVE PERCENT: 6.6 %
NEUTROPHILS ABSOLUTE: 2.8 K/UL (ref 1.7–7.7)
NEUTROPHILS RELATIVE PERCENT: 57.3 %
PDW BLD-RTO: 13.1 % (ref 12.4–15.4)
PLATELET # BLD: 197 K/UL (ref 135–450)
PMV BLD AUTO: 9.6 FL (ref 5–10.5)
POTASSIUM SERPL-SCNC: 4.1 MMOL/L (ref 3.5–5.1)
POTASSIUM SERPL-SCNC: 4.3 MMOL/L (ref 3.5–5.1)
RBC # BLD: 4.64 M/UL (ref 4–5.2)
SODIUM BLD-SCNC: 141 MMOL/L (ref 136–145)
SODIUM BLD-SCNC: 148 MMOL/L (ref 136–145)
TOTAL PROTEIN: 6.9 G/DL (ref 6.4–8.2)
TOTAL PROTEIN: 7.2 G/DL (ref 6.4–8.2)
TRIGL SERPL-MCNC: 59 MG/DL (ref 0–150)
TRIGL SERPL-MCNC: 60 MG/DL (ref 0–150)
TSH REFLEX FT4: 2.4 UIU/ML (ref 0.27–4.2)
VITAMIN D 25-HYDROXY: 50.8 NG/ML
VITAMIN D 25-HYDROXY: 51.4 NG/ML
VLDLC SERPL CALC-MCNC: 12 MG/DL
VLDLC SERPL CALC-MCNC: 12 MG/DL
WBC # BLD: 4.9 K/UL (ref 4–11)

## 2021-01-01 PROCEDURE — 80061 LIPID PANEL: CPT

## 2021-01-01 PROCEDURE — 83036 HEMOGLOBIN GLYCOSYLATED A1C: CPT

## 2021-01-01 PROCEDURE — 36415 COLL VENOUS BLD VENIPUNCTURE: CPT

## 2021-01-01 PROCEDURE — 80053 COMPREHEN METABOLIC PANEL: CPT

## 2021-01-01 PROCEDURE — 82306 VITAMIN D 25 HYDROXY: CPT

## 2021-01-01 PROCEDURE — 84443 ASSAY THYROID STIM HORMONE: CPT

## 2021-01-01 PROCEDURE — 77063 BREAST TOMOSYNTHESIS BI: CPT

## 2021-01-01 PROCEDURE — 77080 DXA BONE DENSITY AXIAL: CPT

## 2021-01-01 PROCEDURE — 85025 COMPLETE CBC W/AUTO DIFF WBC: CPT

## 2022-01-01 ENCOUNTER — APPOINTMENT (OUTPATIENT)
Dept: GENERAL RADIOLOGY | Age: 72
DRG: 004 | End: 2022-01-01
Payer: MEDICARE

## 2022-01-01 ENCOUNTER — APPOINTMENT (OUTPATIENT)
Dept: VASCULAR LAB | Age: 72
DRG: 004 | End: 2022-01-01
Payer: MEDICARE

## 2022-01-01 ENCOUNTER — HOSPITAL ENCOUNTER (INPATIENT)
Age: 72
LOS: 38 days | DRG: 004 | End: 2022-02-19
Attending: STUDENT IN AN ORGANIZED HEALTH CARE EDUCATION/TRAINING PROGRAM | Admitting: INTERNAL MEDICINE
Payer: MEDICARE

## 2022-01-01 ENCOUNTER — ANESTHESIA EVENT (OUTPATIENT)
Dept: OPERATING ROOM | Age: 72
DRG: 004 | End: 2022-01-01
Payer: MEDICARE

## 2022-01-01 ENCOUNTER — APPOINTMENT (OUTPATIENT)
Dept: CT IMAGING | Age: 72
DRG: 004 | End: 2022-01-01
Payer: MEDICARE

## 2022-01-01 ENCOUNTER — APPOINTMENT (OUTPATIENT)
Dept: INTERVENTIONAL RADIOLOGY/VASCULAR | Age: 72
DRG: 004 | End: 2022-01-01
Payer: MEDICARE

## 2022-01-01 ENCOUNTER — ANESTHESIA (OUTPATIENT)
Dept: OPERATING ROOM | Age: 72
DRG: 004 | End: 2022-01-01
Payer: MEDICARE

## 2022-01-01 VITALS — OXYGEN SATURATION: 98 % | SYSTOLIC BLOOD PRESSURE: 123 MMHG | DIASTOLIC BLOOD PRESSURE: 59 MMHG

## 2022-01-01 VITALS
WEIGHT: 240.3 LBS | HEIGHT: 63 IN | BODY MASS INDEX: 42.58 KG/M2 | TEMPERATURE: 99.2 F | DIASTOLIC BLOOD PRESSURE: 68 MMHG | OXYGEN SATURATION: 93 % | SYSTOLIC BLOOD PRESSURE: 149 MMHG

## 2022-01-01 DIAGNOSIS — U07.1 PNEUMONIA DUE TO COVID-19 VIRUS: ICD-10-CM

## 2022-01-01 DIAGNOSIS — J12.82 PNEUMONIA DUE TO COVID-19 VIRUS: ICD-10-CM

## 2022-01-01 DIAGNOSIS — J96.01 ACUTE RESPIRATORY FAILURE WITH HYPOXIA (HCC): Primary | ICD-10-CM

## 2022-01-01 LAB
A/G RATIO: 0.8 (ref 1.1–2.2)
A/G RATIO: 0.9 (ref 1.1–2.2)
A/G RATIO: 1 (ref 1.1–2.2)
A/G RATIO: 1.2 (ref 1.1–2.2)
ABO/RH: NORMAL
ALBUMIN SERPL-MCNC: 1.7 G/DL (ref 3.4–5)
ALBUMIN SERPL-MCNC: 2.1 G/DL (ref 3.4–5)
ALBUMIN SERPL-MCNC: 2.4 G/DL (ref 3.4–5)
ALBUMIN SERPL-MCNC: 3.1 G/DL (ref 3.4–5)
ALBUMIN SERPL-MCNC: 3.2 G/DL (ref 3.4–5)
ALBUMIN SERPL-MCNC: 3.2 G/DL (ref 3.4–5)
ALBUMIN SERPL-MCNC: 3.4 G/DL (ref 3.4–5)
ALBUMIN SERPL-MCNC: 3.5 G/DL (ref 3.4–5)
ALBUMIN SERPL-MCNC: 3.6 G/DL (ref 3.4–5)
ALP BLD-CCNC: 46 U/L (ref 40–129)
ALP BLD-CCNC: 47 U/L (ref 40–129)
ALP BLD-CCNC: 50 U/L (ref 40–129)
ALP BLD-CCNC: 56 U/L (ref 40–129)
ALP BLD-CCNC: 76 U/L (ref 40–129)
ALT SERPL-CCNC: 13 U/L (ref 10–40)
ALT SERPL-CCNC: 14 U/L (ref 10–40)
ALT SERPL-CCNC: 15 U/L (ref 10–40)
ALT SERPL-CCNC: 15 U/L (ref 10–40)
ALT SERPL-CCNC: 16 U/L (ref 10–40)
ALT SERPL-CCNC: 17 U/L (ref 10–40)
ALT SERPL-CCNC: 18 U/L (ref 10–40)
AMORPHOUS: ABNORMAL /HPF
ANION GAP SERPL CALCULATED.3IONS-SCNC: 10 MMOL/L (ref 3–16)
ANION GAP SERPL CALCULATED.3IONS-SCNC: 11 MMOL/L (ref 3–16)
ANION GAP SERPL CALCULATED.3IONS-SCNC: 12 MMOL/L (ref 3–16)
ANION GAP SERPL CALCULATED.3IONS-SCNC: 13 MMOL/L (ref 3–16)
ANION GAP SERPL CALCULATED.3IONS-SCNC: 13 MMOL/L (ref 3–16)
ANION GAP SERPL CALCULATED.3IONS-SCNC: 5 MMOL/L (ref 3–16)
ANION GAP SERPL CALCULATED.3IONS-SCNC: 6 MMOL/L (ref 3–16)
ANION GAP SERPL CALCULATED.3IONS-SCNC: 7 MMOL/L (ref 3–16)
ANION GAP SERPL CALCULATED.3IONS-SCNC: 8 MMOL/L (ref 3–16)
ANION GAP SERPL CALCULATED.3IONS-SCNC: 9 MMOL/L (ref 3–16)
ANISOCYTOSIS: ABNORMAL
ANTI-XA UNFRAC HEPARIN: 0.08 IU/ML (ref 0.3–0.7)
ANTI-XA UNFRAC HEPARIN: 0.08 IU/ML (ref 0.3–0.7)
ANTI-XA UNFRAC HEPARIN: 0.14 IU/ML (ref 0.3–0.7)
ANTI-XA UNFRAC HEPARIN: 0.15 IU/ML (ref 0.3–0.7)
ANTI-XA UNFRAC HEPARIN: 0.19 IU/ML (ref 0.3–0.7)
ANTI-XA UNFRAC HEPARIN: 0.19 IU/ML (ref 0.3–0.7)
ANTI-XA UNFRAC HEPARIN: 0.22 IU/ML (ref 0.3–0.7)
ANTI-XA UNFRAC HEPARIN: 0.25 IU/ML (ref 0.3–0.7)
ANTI-XA UNFRAC HEPARIN: 0.29 IU/ML (ref 0.3–0.7)
ANTI-XA UNFRAC HEPARIN: 0.29 IU/ML (ref 0.3–0.7)
ANTI-XA UNFRAC HEPARIN: 0.35 IU/ML (ref 0.3–0.7)
ANTI-XA UNFRAC HEPARIN: 0.4 IU/ML (ref 0.3–0.7)
ANTI-XA UNFRAC HEPARIN: <0.04 IU/ML (ref 0.3–0.7)
ANTI-XA UNFRAC HEPARIN: <0.04 IU/ML (ref 0.3–0.7)
ANTIBODY SCREEN: NORMAL
AST SERPL-CCNC: 17 U/L (ref 15–37)
AST SERPL-CCNC: 18 U/L (ref 15–37)
AST SERPL-CCNC: 21 U/L (ref 15–37)
AST SERPL-CCNC: 30 U/L (ref 15–37)
AST SERPL-CCNC: 31 U/L (ref 15–37)
AST SERPL-CCNC: 31 U/L (ref 15–37)
AST SERPL-CCNC: 37 U/L (ref 15–37)
ATYPICAL LYMPHOCYTE RELATIVE PERCENT: 1 % (ref 0–6)
ATYPICAL LYMPHOCYTE RELATIVE PERCENT: 1 % (ref 0–6)
ATYPICAL LYMPHOCYTE RELATIVE PERCENT: 3 % (ref 0–6)
ATYPICAL LYMPHOCYTE RELATIVE PERCENT: 5 % (ref 0–6)
ATYPICAL LYMPHOCYTE RELATIVE PERCENT: 6 % (ref 0–6)
BACTERIA: ABNORMAL /HPF
BANDED NEUTROPHILS RELATIVE PERCENT: 13 % (ref 0–7)
BANDED NEUTROPHILS RELATIVE PERCENT: 2 % (ref 0–7)
BANDED NEUTROPHILS RELATIVE PERCENT: 21 % (ref 0–7)
BANDED NEUTROPHILS RELATIVE PERCENT: 23 % (ref 0–7)
BANDED NEUTROPHILS RELATIVE PERCENT: 3 % (ref 0–7)
BANDED NEUTROPHILS RELATIVE PERCENT: 4 % (ref 0–7)
BANDED NEUTROPHILS RELATIVE PERCENT: 5 % (ref 0–7)
BANDED NEUTROPHILS RELATIVE PERCENT: 6 % (ref 0–7)
BANDED NEUTROPHILS RELATIVE PERCENT: 6 % (ref 0–7)
BANDED NEUTROPHILS RELATIVE PERCENT: 8 % (ref 0–7)
BANDED NEUTROPHILS RELATIVE PERCENT: 9 % (ref 0–7)
BASE EXCESS ARTERIAL: -2.2 MMOL/L (ref -3–3)
BASE EXCESS ARTERIAL: -2.3 MMOL/L (ref -3–3)
BASE EXCESS ARTERIAL: -3.2 MMOL/L (ref -3–3)
BASE EXCESS ARTERIAL: -3.9 MMOL/L (ref -3–3)
BASE EXCESS ARTERIAL: -4.7 MMOL/L (ref -3–3)
BASE EXCESS ARTERIAL: -5.4 MMOL/L (ref -3–3)
BASE EXCESS ARTERIAL: -5.9 MMOL/L (ref -3–3)
BASE EXCESS ARTERIAL: 0 MMOL/L (ref -3–3)
BASE EXCESS ARTERIAL: 1 MMOL/L (ref -3–3)
BASE EXCESS ARTERIAL: 1 MMOL/L (ref -3–3)
BASE EXCESS ARTERIAL: 1.1 MMOL/L (ref -3–3)
BASE EXCESS ARTERIAL: 1.6 MMOL/L (ref -3–3)
BASE EXCESS ARTERIAL: 10.5 MMOL/L (ref -3–3)
BASE EXCESS ARTERIAL: 10.6 MMOL/L (ref -3–3)
BASE EXCESS ARTERIAL: 10.7 MMOL/L (ref -3–3)
BASE EXCESS ARTERIAL: 10.8 MMOL/L (ref -3–3)
BASE EXCESS ARTERIAL: 11.2 MMOL/L (ref -3–3)
BASE EXCESS ARTERIAL: 13.5 MMOL/L (ref -3–3)
BASE EXCESS ARTERIAL: 13.9 MMOL/L (ref -3–3)
BASE EXCESS ARTERIAL: 2.2 MMOL/L (ref -3–3)
BASE EXCESS ARTERIAL: 2.3 MMOL/L (ref -3–3)
BASE EXCESS ARTERIAL: 2.5 MMOL/L (ref -3–3)
BASE EXCESS ARTERIAL: 3.5 MMOL/L (ref -3–3)
BASE EXCESS ARTERIAL: 4 MMOL/L (ref -3–3)
BASE EXCESS ARTERIAL: 5.1 MMOL/L (ref -3–3)
BASE EXCESS ARTERIAL: 5.3 MMOL/L (ref -3–3)
BASE EXCESS ARTERIAL: 6.1 MMOL/L (ref -3–3)
BASE EXCESS ARTERIAL: 6.5 MMOL/L (ref -3–3)
BASE EXCESS ARTERIAL: 6.7 MMOL/L (ref -3–3)
BASE EXCESS ARTERIAL: 7 MMOL/L (ref -3–3)
BASE EXCESS ARTERIAL: 7.5 MMOL/L (ref -3–3)
BASE EXCESS ARTERIAL: 7.6 MMOL/L (ref -3–3)
BASE EXCESS ARTERIAL: 8.6 MMOL/L (ref -3–3)
BASE EXCESS ARTERIAL: 8.9 MMOL/L (ref -3–3)
BASE EXCESS ARTERIAL: 8.9 MMOL/L (ref -3–3)
BASE EXCESS ARTERIAL: 9.1 MMOL/L (ref -3–3)
BASE EXCESS ARTERIAL: 9.6 MMOL/L (ref -3–3)
BASE EXCESS ARTERIAL: 9.7 MMOL/L (ref -3–3)
BASE EXCESS ARTERIAL: 9.8 MMOL/L (ref -3–3)
BASOPHILS ABSOLUTE: 0 K/UL (ref 0–0.2)
BASOPHILS ABSOLUTE: 0.1 K/UL (ref 0–0.2)
BASOPHILS ABSOLUTE: 0.2 K/UL (ref 0–0.2)
BASOPHILS RELATIVE PERCENT: 0 %
BASOPHILS RELATIVE PERCENT: 0.1 %
BASOPHILS RELATIVE PERCENT: 0.2 %
BASOPHILS RELATIVE PERCENT: 0.2 %
BASOPHILS RELATIVE PERCENT: 0.3 %
BASOPHILS RELATIVE PERCENT: 0.4 %
BASOPHILS RELATIVE PERCENT: 0.4 %
BASOPHILS RELATIVE PERCENT: 0.5 %
BASOPHILS RELATIVE PERCENT: 0.6 %
BASOPHILS RELATIVE PERCENT: 0.8 %
BASOPHILS RELATIVE PERCENT: 1 %
BASOPHILS RELATIVE PERCENT: 1.2 %
BASOPHILS RELATIVE PERCENT: 1.2 %
BASOPHILS RELATIVE PERCENT: 1.3 %
BASOPHILS RELATIVE PERCENT: 3 %
BILIRUB SERPL-MCNC: 0.3 MG/DL (ref 0–1)
BILIRUB SERPL-MCNC: 0.4 MG/DL (ref 0–1)
BILIRUB SERPL-MCNC: 0.5 MG/DL (ref 0–1)
BILIRUB SERPL-MCNC: 0.6 MG/DL (ref 0–1)
BILIRUB SERPL-MCNC: 0.6 MG/DL (ref 0–1)
BILIRUBIN DIRECT: <0.2 MG/DL (ref 0–0.3)
BILIRUBIN URINE: NEGATIVE
BILIRUBIN, INDIRECT: ABNORMAL MG/DL (ref 0–1)
BLOOD BANK DISPENSE STATUS: NORMAL
BLOOD BANK PRODUCT CODE: NORMAL
BLOOD CULTURE, ROUTINE: NORMAL
BLOOD, URINE: ABNORMAL
BPU ID: NORMAL
BUN BLDV-MCNC: 12 MG/DL (ref 7–20)
BUN BLDV-MCNC: 13 MG/DL (ref 7–20)
BUN BLDV-MCNC: 13 MG/DL (ref 7–20)
BUN BLDV-MCNC: 15 MG/DL (ref 7–20)
BUN BLDV-MCNC: 15 MG/DL (ref 7–20)
BUN BLDV-MCNC: 16 MG/DL (ref 7–20)
BUN BLDV-MCNC: 17 MG/DL (ref 7–20)
BUN BLDV-MCNC: 18 MG/DL (ref 7–20)
BUN BLDV-MCNC: 19 MG/DL (ref 7–20)
BUN BLDV-MCNC: 20 MG/DL (ref 7–20)
BUN BLDV-MCNC: 21 MG/DL (ref 7–20)
BUN BLDV-MCNC: 22 MG/DL (ref 7–20)
BUN BLDV-MCNC: 22 MG/DL (ref 7–20)
BUN BLDV-MCNC: 23 MG/DL (ref 7–20)
BUN BLDV-MCNC: 25 MG/DL (ref 7–20)
BUN BLDV-MCNC: 25 MG/DL (ref 7–20)
BUN BLDV-MCNC: 27 MG/DL (ref 7–20)
BUN BLDV-MCNC: 28 MG/DL (ref 7–20)
BUN BLDV-MCNC: 28 MG/DL (ref 7–20)
BUN BLDV-MCNC: 34 MG/DL (ref 7–20)
BUN BLDV-MCNC: 41 MG/DL (ref 7–20)
BUN BLDV-MCNC: 48 MG/DL (ref 7–20)
BUN BLDV-MCNC: 51 MG/DL (ref 7–20)
BUN BLDV-MCNC: 51 MG/DL (ref 7–20)
BUN BLDV-MCNC: 52 MG/DL (ref 7–20)
BUN BLDV-MCNC: 52 MG/DL (ref 7–20)
BUN BLDV-MCNC: 56 MG/DL (ref 7–20)
C-REACTIVE PROTEIN: 52.9 MG/L (ref 0–5.1)
C-REACTIVE PROTEIN: 80.8 MG/L (ref 0–5.1)
CALCIUM SERPL-MCNC: 7.5 MG/DL (ref 8.3–10.6)
CALCIUM SERPL-MCNC: 7.6 MG/DL (ref 8.3–10.6)
CALCIUM SERPL-MCNC: 7.9 MG/DL (ref 8.3–10.6)
CALCIUM SERPL-MCNC: 8 MG/DL (ref 8.3–10.6)
CALCIUM SERPL-MCNC: 8.1 MG/DL (ref 8.3–10.6)
CALCIUM SERPL-MCNC: 8.2 MG/DL (ref 8.3–10.6)
CALCIUM SERPL-MCNC: 8.2 MG/DL (ref 8.3–10.6)
CALCIUM SERPL-MCNC: 8.3 MG/DL (ref 8.3–10.6)
CALCIUM SERPL-MCNC: 8.3 MG/DL (ref 8.3–10.6)
CALCIUM SERPL-MCNC: 8.4 MG/DL (ref 8.3–10.6)
CALCIUM SERPL-MCNC: 8.5 MG/DL (ref 8.3–10.6)
CALCIUM SERPL-MCNC: 8.6 MG/DL (ref 8.3–10.6)
CALCIUM SERPL-MCNC: 8.6 MG/DL (ref 8.3–10.6)
CALCIUM SERPL-MCNC: 8.7 MG/DL (ref 8.3–10.6)
CALCIUM SERPL-MCNC: 8.8 MG/DL (ref 8.3–10.6)
CALCIUM SERPL-MCNC: 8.9 MG/DL (ref 8.3–10.6)
CALCIUM SERPL-MCNC: 8.9 MG/DL (ref 8.3–10.6)
CALCIUM SERPL-MCNC: 9 MG/DL (ref 8.3–10.6)
CALCIUM SERPL-MCNC: 9.1 MG/DL (ref 8.3–10.6)
CALCIUM SERPL-MCNC: 9.3 MG/DL (ref 8.3–10.6)
CALCIUM SERPL-MCNC: 9.4 MG/DL (ref 8.3–10.6)
CARBOXYHEMOGLOBIN ARTERIAL: 0 % (ref 0–1.5)
CARBOXYHEMOGLOBIN ARTERIAL: 0.1 % (ref 0–1.5)
CARBOXYHEMOGLOBIN ARTERIAL: 0.2 % (ref 0–1.5)
CARBOXYHEMOGLOBIN ARTERIAL: 0.3 % (ref 0–1.5)
CARBOXYHEMOGLOBIN ARTERIAL: 0.4 % (ref 0–1.5)
CARBOXYHEMOGLOBIN ARTERIAL: 0.5 % (ref 0–1.5)
CARBOXYHEMOGLOBIN ARTERIAL: 0.6 % (ref 0–1.5)
CARBOXYHEMOGLOBIN ARTERIAL: 0.7 % (ref 0–1.5)
CARBOXYHEMOGLOBIN ARTERIAL: 1 % (ref 0–1.5)
CHLORIDE BLD-SCNC: 100 MMOL/L (ref 99–110)
CHLORIDE BLD-SCNC: 102 MMOL/L (ref 99–110)
CHLORIDE BLD-SCNC: 102 MMOL/L (ref 99–110)
CHLORIDE BLD-SCNC: 103 MMOL/L (ref 99–110)
CHLORIDE BLD-SCNC: 89 MMOL/L (ref 99–110)
CHLORIDE BLD-SCNC: 90 MMOL/L (ref 99–110)
CHLORIDE BLD-SCNC: 91 MMOL/L (ref 99–110)
CHLORIDE BLD-SCNC: 92 MMOL/L (ref 99–110)
CHLORIDE BLD-SCNC: 92 MMOL/L (ref 99–110)
CHLORIDE BLD-SCNC: 93 MMOL/L (ref 99–110)
CHLORIDE BLD-SCNC: 93 MMOL/L (ref 99–110)
CHLORIDE BLD-SCNC: 94 MMOL/L (ref 99–110)
CHLORIDE BLD-SCNC: 95 MMOL/L (ref 99–110)
CHLORIDE BLD-SCNC: 95 MMOL/L (ref 99–110)
CHLORIDE BLD-SCNC: 96 MMOL/L (ref 99–110)
CHLORIDE BLD-SCNC: 97 MMOL/L (ref 99–110)
CHLORIDE BLD-SCNC: 98 MMOL/L (ref 99–110)
CHLORIDE BLD-SCNC: 98 MMOL/L (ref 99–110)
CHLORIDE BLD-SCNC: 99 MMOL/L (ref 99–110)
CHLORIDE BLD-SCNC: 99 MMOL/L (ref 99–110)
CHLORIDE URINE RANDOM: 35 MMOL/L
CLARITY: ABNORMAL
CLARITY: ABNORMAL
CLARITY: CLEAR
CLARITY: CLEAR
CO2: 23 MMOL/L (ref 21–32)
CO2: 23 MMOL/L (ref 21–32)
CO2: 24 MMOL/L (ref 21–32)
CO2: 24 MMOL/L (ref 21–32)
CO2: 25 MMOL/L (ref 21–32)
CO2: 25 MMOL/L (ref 21–32)
CO2: 26 MMOL/L (ref 21–32)
CO2: 27 MMOL/L (ref 21–32)
CO2: 28 MMOL/L (ref 21–32)
CO2: 28 MMOL/L (ref 21–32)
CO2: 29 MMOL/L (ref 21–32)
CO2: 30 MMOL/L (ref 21–32)
CO2: 31 MMOL/L (ref 21–32)
CO2: 32 MMOL/L (ref 21–32)
CO2: 33 MMOL/L (ref 21–32)
CO2: 34 MMOL/L (ref 21–32)
CO2: 35 MMOL/L (ref 21–32)
CO2: 36 MMOL/L (ref 21–32)
CO2: 36 MMOL/L (ref 21–32)
CO2: 37 MMOL/L (ref 21–32)
COARSE CASTS, UA: ABNORMAL /LPF (ref 0–2)
COLOR: YELLOW
CORTISOL 30 MIN: 22.2 UG/DL
CORTISOL 60 MIN: 30.4 UG/DL
CORTISOL BASE: 18.1 UG/DL
CREAT SERPL-MCNC: 0.6 MG/DL (ref 0.6–1.2)
CREAT SERPL-MCNC: 0.7 MG/DL (ref 0.6–1.2)
CREAT SERPL-MCNC: 0.9 MG/DL (ref 0.6–1.2)
CREAT SERPL-MCNC: 1.4 MG/DL (ref 0.6–1.2)
CREAT SERPL-MCNC: 1.6 MG/DL (ref 0.6–1.2)
CREAT SERPL-MCNC: 1.8 MG/DL (ref 0.6–1.2)
CREAT SERPL-MCNC: 2 MG/DL (ref 0.6–1.2)
CREAT SERPL-MCNC: 2.1 MG/DL (ref 0.6–1.2)
CREAT SERPL-MCNC: 2.1 MG/DL (ref 0.6–1.2)
CREAT SERPL-MCNC: <0.5 MG/DL (ref 0.6–1.2)
CREATININE URINE: 52.3 MG/DL (ref 28–259)
CULTURE, BLOOD 2: NORMAL
CULTURE, RESPIRATORY: ABNORMAL
DESCRIPTION BLOOD BANK: NORMAL
EKG ATRIAL RATE: 72 BPM
EKG ATRIAL RATE: 80 BPM
EKG DIAGNOSIS: NORMAL
EKG DIAGNOSIS: NORMAL
EKG P AXIS: 44 DEGREES
EKG P AXIS: 60 DEGREES
EKG P-R INTERVAL: 140 MS
EKG P-R INTERVAL: 142 MS
EKG Q-T INTERVAL: 348 MS
EKG Q-T INTERVAL: 382 MS
EKG QRS DURATION: 72 MS
EKG QRS DURATION: 78 MS
EKG QTC CALCULATION (BAZETT): 401 MS
EKG QTC CALCULATION (BAZETT): 418 MS
EKG R AXIS: 30 DEGREES
EKG R AXIS: 57 DEGREES
EKG T AXIS: 40 DEGREES
EKG T AXIS: 53 DEGREES
EKG VENTRICULAR RATE: 72 BPM
EKG VENTRICULAR RATE: 80 BPM
EOSINOPHILS ABSOLUTE: 0 K/UL (ref 0–0.6)
EOSINOPHILS ABSOLUTE: 0.1 K/UL (ref 0–0.6)
EOSINOPHILS ABSOLUTE: 0.2 K/UL (ref 0–0.6)
EOSINOPHILS ABSOLUTE: 0.3 K/UL (ref 0–0.6)
EOSINOPHILS ABSOLUTE: 0.4 K/UL (ref 0–0.6)
EOSINOPHILS ABSOLUTE: 0.5 K/UL (ref 0–0.6)
EOSINOPHILS ABSOLUTE: 0.6 K/UL (ref 0–0.6)
EOSINOPHILS RELATIVE PERCENT: 0 %
EOSINOPHILS RELATIVE PERCENT: 0.1 %
EOSINOPHILS RELATIVE PERCENT: 0.1 %
EOSINOPHILS RELATIVE PERCENT: 0.2 %
EOSINOPHILS RELATIVE PERCENT: 0.2 %
EOSINOPHILS RELATIVE PERCENT: 0.3 %
EOSINOPHILS RELATIVE PERCENT: 0.6 %
EOSINOPHILS RELATIVE PERCENT: 0.7 %
EOSINOPHILS RELATIVE PERCENT: 0.7 %
EOSINOPHILS RELATIVE PERCENT: 0.9 %
EOSINOPHILS RELATIVE PERCENT: 1 %
EOSINOPHILS RELATIVE PERCENT: 1 %
EOSINOPHILS RELATIVE PERCENT: 1.1 %
EOSINOPHILS RELATIVE PERCENT: 1.3 %
EOSINOPHILS RELATIVE PERCENT: 1.7 %
EOSINOPHILS RELATIVE PERCENT: 2 %
EOSINOPHILS RELATIVE PERCENT: 2 %
EOSINOPHILS RELATIVE PERCENT: 3 %
EOSINOPHILS RELATIVE PERCENT: 3.3 %
EOSINOPHILS RELATIVE PERCENT: 3.5 %
EOSINOPHILS RELATIVE PERCENT: 3.5 %
EOSINOPHILS RELATIVE PERCENT: 3.7 %
EOSINOPHILS RELATIVE PERCENT: 4 %
EOSINOPHILS RELATIVE PERCENT: 4.6 %
EOSINOPHILS RELATIVE PERCENT: 5 %
EOSINOPHILS RELATIVE PERCENT: 5 %
EOSINOPHILS RELATIVE PERCENT: 5.2 %
EOSINOPHILS RELATIVE PERCENT: 6 %
EPITHELIAL CELLS, UA: ABNORMAL /HPF (ref 0–5)
GFR AFRICAN AMERICAN: 28
GFR AFRICAN AMERICAN: 28
GFR AFRICAN AMERICAN: 30
GFR AFRICAN AMERICAN: 34
GFR AFRICAN AMERICAN: 38
GFR AFRICAN AMERICAN: 45
GFR AFRICAN AMERICAN: >60
GFR NON-AFRICAN AMERICAN: 23
GFR NON-AFRICAN AMERICAN: 23
GFR NON-AFRICAN AMERICAN: 25
GFR NON-AFRICAN AMERICAN: 28
GFR NON-AFRICAN AMERICAN: 32
GFR NON-AFRICAN AMERICAN: 37
GFR NON-AFRICAN AMERICAN: >60
GLUCOSE BLD-MCNC: 103 MG/DL (ref 70–99)
GLUCOSE BLD-MCNC: 109 MG/DL (ref 70–99)
GLUCOSE BLD-MCNC: 109 MG/DL (ref 70–99)
GLUCOSE BLD-MCNC: 112 MG/DL (ref 70–99)
GLUCOSE BLD-MCNC: 113 MG/DL (ref 70–99)
GLUCOSE BLD-MCNC: 114 MG/DL (ref 70–99)
GLUCOSE BLD-MCNC: 115 MG/DL (ref 70–99)
GLUCOSE BLD-MCNC: 116 MG/DL (ref 70–99)
GLUCOSE BLD-MCNC: 117 MG/DL (ref 70–99)
GLUCOSE BLD-MCNC: 117 MG/DL (ref 70–99)
GLUCOSE BLD-MCNC: 120 MG/DL (ref 70–99)
GLUCOSE BLD-MCNC: 121 MG/DL (ref 70–99)
GLUCOSE BLD-MCNC: 123 MG/DL (ref 70–99)
GLUCOSE BLD-MCNC: 124 MG/DL (ref 70–99)
GLUCOSE BLD-MCNC: 125 MG/DL (ref 70–99)
GLUCOSE BLD-MCNC: 127 MG/DL (ref 70–99)
GLUCOSE BLD-MCNC: 131 MG/DL (ref 70–99)
GLUCOSE BLD-MCNC: 132 MG/DL (ref 70–99)
GLUCOSE BLD-MCNC: 132 MG/DL (ref 70–99)
GLUCOSE BLD-MCNC: 134 MG/DL (ref 70–99)
GLUCOSE BLD-MCNC: 134 MG/DL (ref 70–99)
GLUCOSE BLD-MCNC: 135 MG/DL (ref 70–99)
GLUCOSE BLD-MCNC: 136 MG/DL (ref 70–99)
GLUCOSE BLD-MCNC: 137 MG/DL (ref 70–99)
GLUCOSE BLD-MCNC: 137 MG/DL (ref 70–99)
GLUCOSE BLD-MCNC: 139 MG/DL (ref 70–99)
GLUCOSE BLD-MCNC: 140 MG/DL (ref 70–99)
GLUCOSE BLD-MCNC: 142 MG/DL (ref 70–99)
GLUCOSE BLD-MCNC: 143 MG/DL (ref 70–99)
GLUCOSE BLD-MCNC: 144 MG/DL (ref 70–99)
GLUCOSE BLD-MCNC: 147 MG/DL (ref 70–99)
GLUCOSE BLD-MCNC: 147 MG/DL (ref 70–99)
GLUCOSE BLD-MCNC: 148 MG/DL (ref 70–99)
GLUCOSE BLD-MCNC: 149 MG/DL (ref 70–99)
GLUCOSE BLD-MCNC: 150 MG/DL (ref 70–99)
GLUCOSE BLD-MCNC: 151 MG/DL (ref 70–99)
GLUCOSE BLD-MCNC: 153 MG/DL (ref 70–99)
GLUCOSE BLD-MCNC: 153 MG/DL (ref 70–99)
GLUCOSE BLD-MCNC: 154 MG/DL (ref 70–99)
GLUCOSE BLD-MCNC: 154 MG/DL (ref 70–99)
GLUCOSE BLD-MCNC: 155 MG/DL (ref 70–99)
GLUCOSE BLD-MCNC: 156 MG/DL (ref 70–99)
GLUCOSE BLD-MCNC: 157 MG/DL (ref 70–99)
GLUCOSE BLD-MCNC: 158 MG/DL (ref 70–99)
GLUCOSE BLD-MCNC: 159 MG/DL (ref 70–99)
GLUCOSE BLD-MCNC: 159 MG/DL (ref 70–99)
GLUCOSE BLD-MCNC: 160 MG/DL (ref 70–99)
GLUCOSE BLD-MCNC: 161 MG/DL (ref 70–99)
GLUCOSE BLD-MCNC: 162 MG/DL (ref 70–99)
GLUCOSE BLD-MCNC: 163 MG/DL (ref 70–99)
GLUCOSE BLD-MCNC: 164 MG/DL (ref 70–99)
GLUCOSE BLD-MCNC: 164 MG/DL (ref 70–99)
GLUCOSE BLD-MCNC: 165 MG/DL (ref 70–99)
GLUCOSE BLD-MCNC: 166 MG/DL (ref 70–99)
GLUCOSE BLD-MCNC: 167 MG/DL (ref 70–99)
GLUCOSE BLD-MCNC: 167 MG/DL (ref 70–99)
GLUCOSE BLD-MCNC: 168 MG/DL (ref 70–99)
GLUCOSE BLD-MCNC: 169 MG/DL (ref 70–99)
GLUCOSE BLD-MCNC: 170 MG/DL (ref 70–99)
GLUCOSE BLD-MCNC: 170 MG/DL (ref 70–99)
GLUCOSE BLD-MCNC: 171 MG/DL (ref 70–99)
GLUCOSE BLD-MCNC: 172 MG/DL (ref 70–99)
GLUCOSE BLD-MCNC: 173 MG/DL (ref 70–99)
GLUCOSE BLD-MCNC: 174 MG/DL (ref 70–99)
GLUCOSE BLD-MCNC: 175 MG/DL (ref 70–99)
GLUCOSE BLD-MCNC: 175 MG/DL (ref 70–99)
GLUCOSE BLD-MCNC: 176 MG/DL (ref 70–99)
GLUCOSE BLD-MCNC: 176 MG/DL (ref 70–99)
GLUCOSE BLD-MCNC: 177 MG/DL (ref 70–99)
GLUCOSE BLD-MCNC: 178 MG/DL (ref 70–99)
GLUCOSE BLD-MCNC: 178 MG/DL (ref 70–99)
GLUCOSE BLD-MCNC: 179 MG/DL (ref 70–99)
GLUCOSE BLD-MCNC: 180 MG/DL (ref 70–99)
GLUCOSE BLD-MCNC: 180 MG/DL (ref 70–99)
GLUCOSE BLD-MCNC: 181 MG/DL (ref 70–99)
GLUCOSE BLD-MCNC: 182 MG/DL (ref 70–99)
GLUCOSE BLD-MCNC: 183 MG/DL (ref 70–99)
GLUCOSE BLD-MCNC: 184 MG/DL (ref 70–99)
GLUCOSE BLD-MCNC: 185 MG/DL (ref 70–99)
GLUCOSE BLD-MCNC: 185 MG/DL (ref 70–99)
GLUCOSE BLD-MCNC: 186 MG/DL (ref 70–99)
GLUCOSE BLD-MCNC: 189 MG/DL (ref 70–99)
GLUCOSE BLD-MCNC: 190 MG/DL (ref 70–99)
GLUCOSE BLD-MCNC: 191 MG/DL (ref 70–99)
GLUCOSE BLD-MCNC: 192 MG/DL (ref 70–99)
GLUCOSE BLD-MCNC: 193 MG/DL (ref 70–99)
GLUCOSE BLD-MCNC: 194 MG/DL (ref 70–99)
GLUCOSE BLD-MCNC: 195 MG/DL (ref 70–99)
GLUCOSE BLD-MCNC: 196 MG/DL (ref 70–99)
GLUCOSE BLD-MCNC: 197 MG/DL (ref 70–99)
GLUCOSE BLD-MCNC: 198 MG/DL (ref 70–99)
GLUCOSE BLD-MCNC: 199 MG/DL (ref 70–99)
GLUCOSE BLD-MCNC: 200 MG/DL (ref 70–99)
GLUCOSE BLD-MCNC: 201 MG/DL (ref 70–99)
GLUCOSE BLD-MCNC: 202 MG/DL (ref 70–99)
GLUCOSE BLD-MCNC: 204 MG/DL (ref 70–99)
GLUCOSE BLD-MCNC: 204 MG/DL (ref 70–99)
GLUCOSE BLD-MCNC: 205 MG/DL (ref 70–99)
GLUCOSE BLD-MCNC: 205 MG/DL (ref 70–99)
GLUCOSE BLD-MCNC: 208 MG/DL (ref 70–99)
GLUCOSE BLD-MCNC: 210 MG/DL (ref 70–99)
GLUCOSE BLD-MCNC: 212 MG/DL (ref 70–99)
GLUCOSE BLD-MCNC: 212 MG/DL (ref 70–99)
GLUCOSE BLD-MCNC: 213 MG/DL (ref 70–99)
GLUCOSE BLD-MCNC: 215 MG/DL (ref 70–99)
GLUCOSE BLD-MCNC: 216 MG/DL (ref 70–99)
GLUCOSE BLD-MCNC: 217 MG/DL (ref 70–99)
GLUCOSE BLD-MCNC: 218 MG/DL (ref 70–99)
GLUCOSE BLD-MCNC: 222 MG/DL (ref 70–99)
GLUCOSE BLD-MCNC: 225 MG/DL (ref 70–99)
GLUCOSE BLD-MCNC: 225 MG/DL (ref 70–99)
GLUCOSE BLD-MCNC: 228 MG/DL (ref 70–99)
GLUCOSE BLD-MCNC: 229 MG/DL (ref 70–99)
GLUCOSE BLD-MCNC: 231 MG/DL (ref 70–99)
GLUCOSE BLD-MCNC: 232 MG/DL (ref 70–99)
GLUCOSE BLD-MCNC: 232 MG/DL (ref 70–99)
GLUCOSE BLD-MCNC: 235 MG/DL (ref 70–99)
GLUCOSE BLD-MCNC: 235 MG/DL (ref 70–99)
GLUCOSE BLD-MCNC: 238 MG/DL (ref 70–99)
GLUCOSE BLD-MCNC: 241 MG/DL (ref 70–99)
GLUCOSE BLD-MCNC: 246 MG/DL (ref 70–99)
GLUCOSE BLD-MCNC: 246 MG/DL (ref 70–99)
GLUCOSE BLD-MCNC: 249 MG/DL (ref 70–99)
GLUCOSE BLD-MCNC: 249 MG/DL (ref 70–99)
GLUCOSE BLD-MCNC: 254 MG/DL (ref 70–99)
GLUCOSE BLD-MCNC: 259 MG/DL (ref 70–99)
GLUCOSE BLD-MCNC: 262 MG/DL (ref 70–99)
GLUCOSE BLD-MCNC: 264 MG/DL (ref 70–99)
GLUCOSE BLD-MCNC: 266 MG/DL (ref 70–99)
GLUCOSE BLD-MCNC: 268 MG/DL (ref 70–99)
GLUCOSE BLD-MCNC: 275 MG/DL (ref 70–99)
GLUCOSE BLD-MCNC: 280 MG/DL (ref 70–99)
GLUCOSE BLD-MCNC: 287 MG/DL (ref 70–99)
GLUCOSE BLD-MCNC: 319 MG/DL (ref 70–99)
GLUCOSE BLD-MCNC: 58 MG/DL (ref 70–99)
GLUCOSE BLD-MCNC: 73 MG/DL (ref 70–99)
GLUCOSE BLD-MCNC: 76 MG/DL (ref 70–99)
GLUCOSE URINE: 100 MG/DL
GLUCOSE URINE: NEGATIVE MG/DL
GRAM STAIN RESULT: ABNORMAL
HCO3 ARTERIAL: 21.7 MMOL/L (ref 21–29)
HCO3 ARTERIAL: 22.6 MMOL/L (ref 21–29)
HCO3 ARTERIAL: 22.9 MMOL/L (ref 21–29)
HCO3 ARTERIAL: 23.4 MMOL/L (ref 21–29)
HCO3 ARTERIAL: 23.5 MMOL/L (ref 21–29)
HCO3 ARTERIAL: 24.3 MMOL/L (ref 21–29)
HCO3 ARTERIAL: 24.7 MMOL/L (ref 21–29)
HCO3 ARTERIAL: 24.8 MMOL/L (ref 21–29)
HCO3 ARTERIAL: 24.9 MMOL/L (ref 21–29)
HCO3 ARTERIAL: 25.3 MMOL/L (ref 21–29)
HCO3 ARTERIAL: 25.3 MMOL/L (ref 21–29)
HCO3 ARTERIAL: 25.8 MMOL/L (ref 21–29)
HCO3 ARTERIAL: 26.4 MMOL/L (ref 21–29)
HCO3 ARTERIAL: 26.6 MMOL/L (ref 21–29)
HCO3 ARTERIAL: 28.1 MMOL/L (ref 21–29)
HCO3 ARTERIAL: 29.2 MMOL/L (ref 21–29)
HCO3 ARTERIAL: 29.7 MMOL/L (ref 21–29)
HCO3 ARTERIAL: 30 MMOL/L (ref 21–29)
HCO3 ARTERIAL: 30.2 MMOL/L (ref 21–29)
HCO3 ARTERIAL: 31.2 MMOL/L (ref 21–29)
HCO3 ARTERIAL: 31.7 MMOL/L (ref 21–29)
HCO3 ARTERIAL: 32.2 MMOL/L (ref 21–29)
HCO3 ARTERIAL: 32.4 MMOL/L (ref 21–29)
HCO3 ARTERIAL: 32.4 MMOL/L (ref 21–29)
HCO3 ARTERIAL: 33 MMOL/L (ref 21–29)
HCO3 ARTERIAL: 33.1 MMOL/L (ref 21–29)
HCO3 ARTERIAL: 33.2 MMOL/L (ref 21–29)
HCO3 ARTERIAL: 33.3 MMOL/L (ref 21–29)
HCO3 ARTERIAL: 33.4 MMOL/L (ref 21–29)
HCO3 ARTERIAL: 34.3 MMOL/L (ref 21–29)
HCO3 ARTERIAL: 34.4 MMOL/L (ref 21–29)
HCO3 ARTERIAL: 34.4 MMOL/L (ref 21–29)
HCO3 ARTERIAL: 34.6 MMOL/L (ref 21–29)
HCO3 ARTERIAL: 35 MMOL/L (ref 21–29)
HCO3 ARTERIAL: 35.7 MMOL/L (ref 21–29)
HCO3 ARTERIAL: 36.1 MMOL/L (ref 21–29)
HCO3 ARTERIAL: 36.2 MMOL/L (ref 21–29)
HCO3 ARTERIAL: 36.2 MMOL/L (ref 21–29)
HCO3 ARTERIAL: 36.5 MMOL/L (ref 21–29)
HCO3 ARTERIAL: 38.5 MMOL/L (ref 21–29)
HCO3 ARTERIAL: 39.8 MMOL/L (ref 21–29)
HCT VFR BLD CALC: 20.7 % (ref 36–48)
HCT VFR BLD CALC: 21.8 % (ref 36–48)
HCT VFR BLD CALC: 21.8 % (ref 36–48)
HCT VFR BLD CALC: 23 % (ref 36–48)
HCT VFR BLD CALC: 23.1 % (ref 36–48)
HCT VFR BLD CALC: 23.1 % (ref 36–48)
HCT VFR BLD CALC: 23.2 % (ref 36–48)
HCT VFR BLD CALC: 24 % (ref 36–48)
HCT VFR BLD CALC: 24.5 % (ref 36–48)
HCT VFR BLD CALC: 24.6 % (ref 36–48)
HCT VFR BLD CALC: 24.8 % (ref 36–48)
HCT VFR BLD CALC: 24.9 % (ref 36–48)
HCT VFR BLD CALC: 25 % (ref 36–48)
HCT VFR BLD CALC: 25.3 % (ref 36–48)
HCT VFR BLD CALC: 25.3 % (ref 36–48)
HCT VFR BLD CALC: 25.6 % (ref 36–48)
HCT VFR BLD CALC: 25.9 % (ref 36–48)
HCT VFR BLD CALC: 26.3 % (ref 36–48)
HCT VFR BLD CALC: 26.8 % (ref 36–48)
HCT VFR BLD CALC: 26.8 % (ref 36–48)
HCT VFR BLD CALC: 27.4 % (ref 36–48)
HCT VFR BLD CALC: 27.9 % (ref 36–48)
HCT VFR BLD CALC: 30.5 % (ref 36–48)
HCT VFR BLD CALC: 30.7 % (ref 36–48)
HCT VFR BLD CALC: 30.7 % (ref 36–48)
HCT VFR BLD CALC: 30.9 % (ref 36–48)
HCT VFR BLD CALC: 31.1 % (ref 36–48)
HCT VFR BLD CALC: 31.6 % (ref 36–48)
HCT VFR BLD CALC: 31.9 % (ref 36–48)
HCT VFR BLD CALC: 32.9 % (ref 36–48)
HCT VFR BLD CALC: 33.9 % (ref 36–48)
HCT VFR BLD CALC: 35.2 % (ref 36–48)
HCT VFR BLD CALC: 35.4 % (ref 36–48)
HCT VFR BLD CALC: 35.9 % (ref 36–48)
HCT VFR BLD CALC: 36.5 % (ref 36–48)
HCT VFR BLD CALC: 37.5 % (ref 36–48)
HCT VFR BLD CALC: 37.7 % (ref 36–48)
HCT VFR BLD CALC: 38.3 % (ref 36–48)
HCT VFR BLD CALC: 39.1 % (ref 36–48)
HCT VFR BLD CALC: 39.3 % (ref 36–48)
HCT VFR BLD CALC: 39.6 % (ref 36–48)
HEMOGLOBIN, ART, EXTENDED: 10 G/DL (ref 12–16)
HEMOGLOBIN, ART, EXTENDED: 10.2 G/DL (ref 12–16)
HEMOGLOBIN, ART, EXTENDED: 10.3 G/DL (ref 12–16)
HEMOGLOBIN, ART, EXTENDED: 10.4 G/DL (ref 12–16)
HEMOGLOBIN, ART, EXTENDED: 10.5 G/DL (ref 12–16)
HEMOGLOBIN, ART, EXTENDED: 11.1 G/DL (ref 12–16)
HEMOGLOBIN, ART, EXTENDED: 11.3 G/DL (ref 12–16)
HEMOGLOBIN, ART, EXTENDED: 11.5 G/DL (ref 12–16)
HEMOGLOBIN, ART, EXTENDED: 11.5 G/DL (ref 12–16)
HEMOGLOBIN, ART, EXTENDED: 11.7 G/DL (ref 12–16)
HEMOGLOBIN, ART, EXTENDED: 12.1 G/DL (ref 12–16)
HEMOGLOBIN, ART, EXTENDED: 12.3 G/DL (ref 12–16)
HEMOGLOBIN, ART, EXTENDED: 12.3 G/DL (ref 12–16)
HEMOGLOBIN, ART, EXTENDED: 13.2 G/DL (ref 12–16)
HEMOGLOBIN, ART, EXTENDED: 13.3 G/DL (ref 12–16)
HEMOGLOBIN, ART, EXTENDED: 13.3 G/DL (ref 12–16)
HEMOGLOBIN, ART, EXTENDED: 13.7 G/DL (ref 12–16)
HEMOGLOBIN, ART, EXTENDED: 14 G/DL (ref 12–16)
HEMOGLOBIN, ART, EXTENDED: 14.1 G/DL (ref 12–16)
HEMOGLOBIN, ART, EXTENDED: 14.1 G/DL (ref 12–16)
HEMOGLOBIN, ART, EXTENDED: 18.8 G/DL (ref 12–16)
HEMOGLOBIN, ART, EXTENDED: 7.4 G/DL (ref 12–16)
HEMOGLOBIN, ART, EXTENDED: 8.1 G/DL (ref 12–16)
HEMOGLOBIN, ART, EXTENDED: 8.2 G/DL (ref 12–16)
HEMOGLOBIN, ART, EXTENDED: 8.2 G/DL (ref 12–16)
HEMOGLOBIN, ART, EXTENDED: 8.3 G/DL (ref 12–16)
HEMOGLOBIN, ART, EXTENDED: 8.4 G/DL (ref 12–16)
HEMOGLOBIN, ART, EXTENDED: 8.6 G/DL (ref 12–16)
HEMOGLOBIN, ART, EXTENDED: 8.8 G/DL (ref 12–16)
HEMOGLOBIN, ART, EXTENDED: 8.9 G/DL (ref 12–16)
HEMOGLOBIN, ART, EXTENDED: 8.9 G/DL (ref 12–16)
HEMOGLOBIN, ART, EXTENDED: 9 G/DL (ref 12–16)
HEMOGLOBIN, ART, EXTENDED: 9 G/DL (ref 12–16)
HEMOGLOBIN, ART, EXTENDED: 9.3 G/DL (ref 12–16)
HEMOGLOBIN, ART, EXTENDED: 9.4 G/DL (ref 12–16)
HEMOGLOBIN, ART, EXTENDED: 9.6 G/DL (ref 12–16)
HEMOGLOBIN, ART, EXTENDED: 9.7 G/DL (ref 12–16)
HEMOGLOBIN, ART, EXTENDED: 9.7 G/DL (ref 12–16)
HEMOGLOBIN, ART, EXTENDED: 9.9 G/DL (ref 12–16)
HEMOGLOBIN: 10.3 G/DL (ref 12–16)
HEMOGLOBIN: 10.4 G/DL (ref 12–16)
HEMOGLOBIN: 10.6 G/DL (ref 12–16)
HEMOGLOBIN: 10.9 G/DL (ref 12–16)
HEMOGLOBIN: 11 G/DL (ref 12–16)
HEMOGLOBIN: 11.1 G/DL (ref 12–16)
HEMOGLOBIN: 11.4 G/DL (ref 12–16)
HEMOGLOBIN: 11.7 G/DL (ref 12–16)
HEMOGLOBIN: 11.9 G/DL (ref 12–16)
HEMOGLOBIN: 12 G/DL (ref 12–16)
HEMOGLOBIN: 12.3 G/DL (ref 12–16)
HEMOGLOBIN: 12.6 G/DL (ref 12–16)
HEMOGLOBIN: 12.6 G/DL (ref 12–16)
HEMOGLOBIN: 12.9 G/DL (ref 12–16)
HEMOGLOBIN: 13.1 G/DL (ref 12–16)
HEMOGLOBIN: 13.1 G/DL (ref 12–16)
HEMOGLOBIN: 13.3 G/DL (ref 12–16)
HEMOGLOBIN: 6.7 G/DL (ref 12–16)
HEMOGLOBIN: 7.2 G/DL (ref 12–16)
HEMOGLOBIN: 7.2 G/DL (ref 12–16)
HEMOGLOBIN: 7.3 G/DL (ref 12–16)
HEMOGLOBIN: 7.5 G/DL (ref 12–16)
HEMOGLOBIN: 7.5 G/DL (ref 12–16)
HEMOGLOBIN: 7.6 G/DL (ref 12–16)
HEMOGLOBIN: 7.9 G/DL (ref 12–16)
HEMOGLOBIN: 8.1 G/DL (ref 12–16)
HEMOGLOBIN: 8.2 G/DL (ref 12–16)
HEMOGLOBIN: 8.3 G/DL (ref 12–16)
HEMOGLOBIN: 8.4 G/DL (ref 12–16)
HEMOGLOBIN: 8.4 G/DL (ref 12–16)
HEMOGLOBIN: 8.6 G/DL (ref 12–16)
HEMOGLOBIN: 8.7 G/DL (ref 12–16)
HEMOGLOBIN: 8.8 G/DL (ref 12–16)
HEMOGLOBIN: 8.9 G/DL (ref 12–16)
HEMOGLOBIN: 9 G/DL (ref 12–16)
HEMOGLOBIN: 9 G/DL (ref 12–16)
HEMOGLOBIN: 9.1 G/DL (ref 12–16)
HEMOGLOBIN: 9.1 G/DL (ref 12–16)
HEMOGLOBIN: 9.2 G/DL (ref 12–16)
HYPOCHROMIA: ABNORMAL
INR BLD: 1.08 (ref 0.88–1.12)
KETONES, URINE: NEGATIVE MG/DL
LEUKOCYTE ESTERASE, URINE: ABNORMAL
LEUKOCYTE ESTERASE, URINE: NEGATIVE
LYMPHOCYTES ABSOLUTE: 0.3 K/UL (ref 1–5.1)
LYMPHOCYTES ABSOLUTE: 0.3 K/UL (ref 1–5.1)
LYMPHOCYTES ABSOLUTE: 0.5 K/UL (ref 1–5.1)
LYMPHOCYTES ABSOLUTE: 0.5 K/UL (ref 1–5.1)
LYMPHOCYTES ABSOLUTE: 0.6 K/UL (ref 1–5.1)
LYMPHOCYTES ABSOLUTE: 0.7 K/UL (ref 1–5.1)
LYMPHOCYTES ABSOLUTE: 0.8 K/UL (ref 1–5.1)
LYMPHOCYTES ABSOLUTE: 0.9 K/UL (ref 1–5.1)
LYMPHOCYTES ABSOLUTE: 1 K/UL (ref 1–5.1)
LYMPHOCYTES ABSOLUTE: 1.1 K/UL (ref 1–5.1)
LYMPHOCYTES ABSOLUTE: 1.1 K/UL (ref 1–5.1)
LYMPHOCYTES ABSOLUTE: 1.2 K/UL (ref 1–5.1)
LYMPHOCYTES ABSOLUTE: 1.2 K/UL (ref 1–5.1)
LYMPHOCYTES ABSOLUTE: 1.4 K/UL (ref 1–5.1)
LYMPHOCYTES ABSOLUTE: 1.5 K/UL (ref 1–5.1)
LYMPHOCYTES ABSOLUTE: 1.6 K/UL (ref 1–5.1)
LYMPHOCYTES ABSOLUTE: 1.6 K/UL (ref 1–5.1)
LYMPHOCYTES ABSOLUTE: 1.7 K/UL (ref 1–5.1)
LYMPHOCYTES ABSOLUTE: 1.9 K/UL (ref 1–5.1)
LYMPHOCYTES ABSOLUTE: 2 K/UL (ref 1–5.1)
LYMPHOCYTES ABSOLUTE: 2.1 K/UL (ref 1–5.1)
LYMPHOCYTES ABSOLUTE: 2.1 K/UL (ref 1–5.1)
LYMPHOCYTES RELATIVE PERCENT: 10 %
LYMPHOCYTES RELATIVE PERCENT: 11.4 %
LYMPHOCYTES RELATIVE PERCENT: 11.7 %
LYMPHOCYTES RELATIVE PERCENT: 13.1 %
LYMPHOCYTES RELATIVE PERCENT: 13.5 %
LYMPHOCYTES RELATIVE PERCENT: 14 %
LYMPHOCYTES RELATIVE PERCENT: 14 %
LYMPHOCYTES RELATIVE PERCENT: 15.7 %
LYMPHOCYTES RELATIVE PERCENT: 16 %
LYMPHOCYTES RELATIVE PERCENT: 16.8 %
LYMPHOCYTES RELATIVE PERCENT: 17 %
LYMPHOCYTES RELATIVE PERCENT: 17 %
LYMPHOCYTES RELATIVE PERCENT: 17.1 %
LYMPHOCYTES RELATIVE PERCENT: 17.3 %
LYMPHOCYTES RELATIVE PERCENT: 17.3 %
LYMPHOCYTES RELATIVE PERCENT: 17.7 %
LYMPHOCYTES RELATIVE PERCENT: 19 %
LYMPHOCYTES RELATIVE PERCENT: 19 %
LYMPHOCYTES RELATIVE PERCENT: 2.3 %
LYMPHOCYTES RELATIVE PERCENT: 20 %
LYMPHOCYTES RELATIVE PERCENT: 21 %
LYMPHOCYTES RELATIVE PERCENT: 26 %
LYMPHOCYTES RELATIVE PERCENT: 3.7 %
LYMPHOCYTES RELATIVE PERCENT: 5.4 %
LYMPHOCYTES RELATIVE PERCENT: 5.6 %
LYMPHOCYTES RELATIVE PERCENT: 5.9 %
LYMPHOCYTES RELATIVE PERCENT: 5.9 %
LYMPHOCYTES RELATIVE PERCENT: 6 %
LYMPHOCYTES RELATIVE PERCENT: 6 %
LYMPHOCYTES RELATIVE PERCENT: 7.4 %
LYMPHOCYTES RELATIVE PERCENT: 8 %
LYMPHOCYTES RELATIVE PERCENT: 8.4 %
LYMPHOCYTES RELATIVE PERCENT: 9 %
LYMPHOCYTES RELATIVE PERCENT: 9 %
MAGNESIUM: 1.9 MG/DL (ref 1.8–2.4)
MAGNESIUM: 2 MG/DL (ref 1.8–2.4)
MAGNESIUM: 2.1 MG/DL (ref 1.8–2.4)
MAGNESIUM: 2.4 MG/DL (ref 1.8–2.4)
MCH RBC QN AUTO: 28.7 PG (ref 26–34)
MCH RBC QN AUTO: 28.9 PG (ref 26–34)
MCH RBC QN AUTO: 29.1 PG (ref 26–34)
MCH RBC QN AUTO: 29.1 PG (ref 26–34)
MCH RBC QN AUTO: 29.2 PG (ref 26–34)
MCH RBC QN AUTO: 29.3 PG (ref 26–34)
MCH RBC QN AUTO: 29.5 PG (ref 26–34)
MCH RBC QN AUTO: 29.5 PG (ref 26–34)
MCH RBC QN AUTO: 29.7 PG (ref 26–34)
MCH RBC QN AUTO: 29.8 PG (ref 26–34)
MCH RBC QN AUTO: 29.8 PG (ref 26–34)
MCH RBC QN AUTO: 29.9 PG (ref 26–34)
MCH RBC QN AUTO: 29.9 PG (ref 26–34)
MCH RBC QN AUTO: 30 PG (ref 26–34)
MCH RBC QN AUTO: 30.1 PG (ref 26–34)
MCH RBC QN AUTO: 30.2 PG (ref 26–34)
MCH RBC QN AUTO: 30.3 PG (ref 26–34)
MCH RBC QN AUTO: 30.4 PG (ref 26–34)
MCH RBC QN AUTO: 30.4 PG (ref 26–34)
MCH RBC QN AUTO: 30.5 PG (ref 26–34)
MCH RBC QN AUTO: 30.5 PG (ref 26–34)
MCH RBC QN AUTO: 30.6 PG (ref 26–34)
MCH RBC QN AUTO: 30.8 PG (ref 26–34)
MCH RBC QN AUTO: 30.9 PG (ref 26–34)
MCH RBC QN AUTO: 31 PG (ref 26–34)
MCH RBC QN AUTO: 31.8 PG (ref 26–34)
MCHC RBC AUTO-ENTMCNC: 32.2 G/DL (ref 31–36)
MCHC RBC AUTO-ENTMCNC: 32.6 G/DL (ref 31–36)
MCHC RBC AUTO-ENTMCNC: 32.8 G/DL (ref 31–36)
MCHC RBC AUTO-ENTMCNC: 32.8 G/DL (ref 31–36)
MCHC RBC AUTO-ENTMCNC: 32.9 G/DL (ref 31–36)
MCHC RBC AUTO-ENTMCNC: 33 G/DL (ref 31–36)
MCHC RBC AUTO-ENTMCNC: 33.1 G/DL (ref 31–36)
MCHC RBC AUTO-ENTMCNC: 33.2 G/DL (ref 31–36)
MCHC RBC AUTO-ENTMCNC: 33.3 G/DL (ref 31–36)
MCHC RBC AUTO-ENTMCNC: 33.3 G/DL (ref 31–36)
MCHC RBC AUTO-ENTMCNC: 33.4 G/DL (ref 31–36)
MCHC RBC AUTO-ENTMCNC: 33.5 G/DL (ref 31–36)
MCHC RBC AUTO-ENTMCNC: 33.6 G/DL (ref 31–36)
MCHC RBC AUTO-ENTMCNC: 33.6 G/DL (ref 31–36)
MCHC RBC AUTO-ENTMCNC: 33.7 G/DL (ref 31–36)
MCHC RBC AUTO-ENTMCNC: 33.7 G/DL (ref 31–36)
MCHC RBC AUTO-ENTMCNC: 33.9 G/DL (ref 31–36)
MCHC RBC AUTO-ENTMCNC: 33.9 G/DL (ref 31–36)
MCHC RBC AUTO-ENTMCNC: 34 G/DL (ref 31–36)
MCHC RBC AUTO-ENTMCNC: 34.2 G/DL (ref 31–36)
MCHC RBC AUTO-ENTMCNC: 34.5 G/DL (ref 31–36)
MCHC RBC AUTO-ENTMCNC: 34.5 G/DL (ref 31–36)
MCHC RBC AUTO-ENTMCNC: 34.7 G/DL (ref 31–36)
MCHC RBC AUTO-ENTMCNC: 34.8 G/DL (ref 31–36)
MCHC RBC AUTO-ENTMCNC: 34.8 G/DL (ref 31–36)
MCHC RBC AUTO-ENTMCNC: 35.4 G/DL (ref 31–36)
MCV RBC AUTO: 87.8 FL (ref 80–100)
MCV RBC AUTO: 88 FL (ref 80–100)
MCV RBC AUTO: 88.1 FL (ref 80–100)
MCV RBC AUTO: 88.3 FL (ref 80–100)
MCV RBC AUTO: 88.4 FL (ref 80–100)
MCV RBC AUTO: 88.5 FL (ref 80–100)
MCV RBC AUTO: 88.6 FL (ref 80–100)
MCV RBC AUTO: 88.6 FL (ref 80–100)
MCV RBC AUTO: 88.7 FL (ref 80–100)
MCV RBC AUTO: 88.7 FL (ref 80–100)
MCV RBC AUTO: 88.8 FL (ref 80–100)
MCV RBC AUTO: 88.8 FL (ref 80–100)
MCV RBC AUTO: 88.9 FL (ref 80–100)
MCV RBC AUTO: 89.1 FL (ref 80–100)
MCV RBC AUTO: 89.2 FL (ref 80–100)
MCV RBC AUTO: 89.2 FL (ref 80–100)
MCV RBC AUTO: 89.3 FL (ref 80–100)
MCV RBC AUTO: 89.4 FL (ref 80–100)
MCV RBC AUTO: 89.6 FL (ref 80–100)
MCV RBC AUTO: 89.6 FL (ref 80–100)
MCV RBC AUTO: 89.8 FL (ref 80–100)
MCV RBC AUTO: 89.9 FL (ref 80–100)
MCV RBC AUTO: 90.3 FL (ref 80–100)
MCV RBC AUTO: 90.4 FL (ref 80–100)
MCV RBC AUTO: 90.5 FL (ref 80–100)
MCV RBC AUTO: 91.2 FL (ref 80–100)
MCV RBC AUTO: 91.3 FL (ref 80–100)
MCV RBC AUTO: 91.3 FL (ref 80–100)
MCV RBC AUTO: 92.7 FL (ref 80–100)
METAMYELOCYTES RELATIVE PERCENT: 1 %
METAMYELOCYTES RELATIVE PERCENT: 2 %
METAMYELOCYTES RELATIVE PERCENT: 4 %
METHEMOGLOBIN ARTERIAL: 0 %
METHEMOGLOBIN ARTERIAL: 0.1 %
METHEMOGLOBIN ARTERIAL: 0.2 %
METHEMOGLOBIN ARTERIAL: 0.3 %
METHEMOGLOBIN ARTERIAL: 0.4 %
MICROSCOPIC EXAMINATION: YES
MONOCYTES ABSOLUTE: 0 K/UL (ref 0–1.3)
MONOCYTES ABSOLUTE: 0 K/UL (ref 0–1.3)
MONOCYTES ABSOLUTE: 0.1 K/UL (ref 0–1.3)
MONOCYTES ABSOLUTE: 0.1 K/UL (ref 0–1.3)
MONOCYTES ABSOLUTE: 0.2 K/UL (ref 0–1.3)
MONOCYTES ABSOLUTE: 0.3 K/UL (ref 0–1.3)
MONOCYTES ABSOLUTE: 0.4 K/UL (ref 0–1.3)
MONOCYTES ABSOLUTE: 0.5 K/UL (ref 0–1.3)
MONOCYTES ABSOLUTE: 0.6 K/UL (ref 0–1.3)
MONOCYTES ABSOLUTE: 0.7 K/UL (ref 0–1.3)
MONOCYTES ABSOLUTE: 0.8 K/UL (ref 0–1.3)
MONOCYTES ABSOLUTE: 0.9 K/UL (ref 0–1.3)
MONOCYTES ABSOLUTE: 0.9 K/UL (ref 0–1.3)
MONOCYTES RELATIVE PERCENT: 0 %
MONOCYTES RELATIVE PERCENT: 0 %
MONOCYTES RELATIVE PERCENT: 1 %
MONOCYTES RELATIVE PERCENT: 1 %
MONOCYTES RELATIVE PERCENT: 2 %
MONOCYTES RELATIVE PERCENT: 2 %
MONOCYTES RELATIVE PERCENT: 2.2 %
MONOCYTES RELATIVE PERCENT: 2.7 %
MONOCYTES RELATIVE PERCENT: 3 %
MONOCYTES RELATIVE PERCENT: 3 %
MONOCYTES RELATIVE PERCENT: 3.7 %
MONOCYTES RELATIVE PERCENT: 3.8 %
MONOCYTES RELATIVE PERCENT: 4 %
MONOCYTES RELATIVE PERCENT: 4.3 %
MONOCYTES RELATIVE PERCENT: 5 %
MONOCYTES RELATIVE PERCENT: 5 %
MONOCYTES RELATIVE PERCENT: 5.7 %
MONOCYTES RELATIVE PERCENT: 6 %
MONOCYTES RELATIVE PERCENT: 6 %
MONOCYTES RELATIVE PERCENT: 6.1 %
MONOCYTES RELATIVE PERCENT: 6.2 %
MONOCYTES RELATIVE PERCENT: 6.3 %
MONOCYTES RELATIVE PERCENT: 6.8 %
MONOCYTES RELATIVE PERCENT: 7 %
MONOCYTES RELATIVE PERCENT: 7 %
MONOCYTES RELATIVE PERCENT: 7.1 %
MONOCYTES RELATIVE PERCENT: 7.2 %
MONOCYTES RELATIVE PERCENT: 7.4 %
MONOCYTES RELATIVE PERCENT: 7.7 %
MONOCYTES RELATIVE PERCENT: 7.9 %
MONOCYTES RELATIVE PERCENT: 8 %
MONOCYTES RELATIVE PERCENT: 8.8 %
MYELOCYTE PERCENT: 1 %
MYELOCYTE PERCENT: 1 %
MYELOCYTE PERCENT: 2 %
MYELOCYTE PERCENT: 2 %
MYELOCYTE PERCENT: 5 %
NEUTROPHILS ABSOLUTE: 11.2 K/UL (ref 1.7–7.7)
NEUTROPHILS ABSOLUTE: 12.1 K/UL (ref 1.7–7.7)
NEUTROPHILS ABSOLUTE: 12.9 K/UL (ref 1.7–7.7)
NEUTROPHILS ABSOLUTE: 13.2 K/UL (ref 1.7–7.7)
NEUTROPHILS ABSOLUTE: 2.9 K/UL (ref 1.7–7.7)
NEUTROPHILS ABSOLUTE: 21.2 K/UL (ref 1.7–7.7)
NEUTROPHILS ABSOLUTE: 3.3 K/UL (ref 1.7–7.7)
NEUTROPHILS ABSOLUTE: 3.5 K/UL (ref 1.7–7.7)
NEUTROPHILS ABSOLUTE: 4.5 K/UL (ref 1.7–7.7)
NEUTROPHILS ABSOLUTE: 5.2 K/UL (ref 1.7–7.7)
NEUTROPHILS ABSOLUTE: 5.2 K/UL (ref 1.7–7.7)
NEUTROPHILS ABSOLUTE: 5.3 K/UL (ref 1.7–7.7)
NEUTROPHILS ABSOLUTE: 5.5 K/UL (ref 1.7–7.7)
NEUTROPHILS ABSOLUTE: 5.6 K/UL (ref 1.7–7.7)
NEUTROPHILS ABSOLUTE: 5.9 K/UL (ref 1.7–7.7)
NEUTROPHILS ABSOLUTE: 5.9 K/UL (ref 1.7–7.7)
NEUTROPHILS ABSOLUTE: 6 K/UL (ref 1.7–7.7)
NEUTROPHILS ABSOLUTE: 6.2 K/UL (ref 1.7–7.7)
NEUTROPHILS ABSOLUTE: 6.3 K/UL (ref 1.7–7.7)
NEUTROPHILS ABSOLUTE: 6.3 K/UL (ref 1.7–7.7)
NEUTROPHILS ABSOLUTE: 6.8 K/UL (ref 1.7–7.7)
NEUTROPHILS ABSOLUTE: 7 K/UL (ref 1.7–7.7)
NEUTROPHILS ABSOLUTE: 7.1 K/UL (ref 1.7–7.7)
NEUTROPHILS ABSOLUTE: 7.2 K/UL (ref 1.7–7.7)
NEUTROPHILS ABSOLUTE: 7.3 K/UL (ref 1.7–7.7)
NEUTROPHILS ABSOLUTE: 7.4 K/UL (ref 1.7–7.7)
NEUTROPHILS ABSOLUTE: 7.8 K/UL (ref 1.7–7.7)
NEUTROPHILS ABSOLUTE: 8.1 K/UL (ref 1.7–7.7)
NEUTROPHILS ABSOLUTE: 8.5 K/UL (ref 1.7–7.7)
NEUTROPHILS ABSOLUTE: 8.7 K/UL (ref 1.7–7.7)
NEUTROPHILS ABSOLUTE: 9 K/UL (ref 1.7–7.7)
NEUTROPHILS ABSOLUTE: 9.2 K/UL (ref 1.7–7.7)
NEUTROPHILS ABSOLUTE: 9.4 K/UL (ref 1.7–7.7)
NEUTROPHILS ABSOLUTE: 9.9 K/UL (ref 1.7–7.7)
NEUTROPHILS RELATIVE PERCENT: 52 %
NEUTROPHILS RELATIVE PERCENT: 54 %
NEUTROPHILS RELATIVE PERCENT: 60 %
NEUTROPHILS RELATIVE PERCENT: 62 %
NEUTROPHILS RELATIVE PERCENT: 64 %
NEUTROPHILS RELATIVE PERCENT: 65 %
NEUTROPHILS RELATIVE PERCENT: 67 %
NEUTROPHILS RELATIVE PERCENT: 67 %
NEUTROPHILS RELATIVE PERCENT: 70.4 %
NEUTROPHILS RELATIVE PERCENT: 70.7 %
NEUTROPHILS RELATIVE PERCENT: 71.5 %
NEUTROPHILS RELATIVE PERCENT: 72 %
NEUTROPHILS RELATIVE PERCENT: 72.9 %
NEUTROPHILS RELATIVE PERCENT: 73.7 %
NEUTROPHILS RELATIVE PERCENT: 74 %
NEUTROPHILS RELATIVE PERCENT: 74 %
NEUTROPHILS RELATIVE PERCENT: 76 %
NEUTROPHILS RELATIVE PERCENT: 76.4 %
NEUTROPHILS RELATIVE PERCENT: 76.6 %
NEUTROPHILS RELATIVE PERCENT: 77 %
NEUTROPHILS RELATIVE PERCENT: 79 %
NEUTROPHILS RELATIVE PERCENT: 79.2 %
NEUTROPHILS RELATIVE PERCENT: 80.4 %
NEUTROPHILS RELATIVE PERCENT: 82.9 %
NEUTROPHILS RELATIVE PERCENT: 85.4 %
NEUTROPHILS RELATIVE PERCENT: 85.6 %
NEUTROPHILS RELATIVE PERCENT: 86.1 %
NEUTROPHILS RELATIVE PERCENT: 86.8 %
NEUTROPHILS RELATIVE PERCENT: 87.9 %
NEUTROPHILS RELATIVE PERCENT: 88.1 %
NEUTROPHILS RELATIVE PERCENT: 88.7 %
NEUTROPHILS RELATIVE PERCENT: 90.3 %
NEUTROPHILS RELATIVE PERCENT: 91.9 %
NEUTROPHILS RELATIVE PERCENT: 95.1 %
NITRITE, URINE: NEGATIVE
NUCLEATED RED BLOOD CELLS: 1 /100 WBC
NUCLEATED RED BLOOD CELLS: 1 /100 WBC
O2 SAT, ARTERIAL: 76.7 %
O2 SAT, ARTERIAL: 82.4 %
O2 SAT, ARTERIAL: 87.5 %
O2 SAT, ARTERIAL: 88.5 %
O2 SAT, ARTERIAL: 89.4 %
O2 SAT, ARTERIAL: 89.5 %
O2 SAT, ARTERIAL: 90.7 %
O2 SAT, ARTERIAL: 91.2 %
O2 SAT, ARTERIAL: 91.3 %
O2 SAT, ARTERIAL: 91.5 %
O2 SAT, ARTERIAL: 91.6 %
O2 SAT, ARTERIAL: 91.6 %
O2 SAT, ARTERIAL: 91.9 %
O2 SAT, ARTERIAL: 92.4 %
O2 SAT, ARTERIAL: 92.7 %
O2 SAT, ARTERIAL: 92.8 %
O2 SAT, ARTERIAL: 92.8 %
O2 SAT, ARTERIAL: 93 %
O2 SAT, ARTERIAL: 93 %
O2 SAT, ARTERIAL: 93.1 %
O2 SAT, ARTERIAL: 93.3 %
O2 SAT, ARTERIAL: 93.4 %
O2 SAT, ARTERIAL: 93.4 %
O2 SAT, ARTERIAL: 94.1 %
O2 SAT, ARTERIAL: 95 %
O2 SAT, ARTERIAL: 95 %
O2 SAT, ARTERIAL: 95.1 %
O2 SAT, ARTERIAL: 95.3 %
O2 SAT, ARTERIAL: 95.3 %
O2 SAT, ARTERIAL: 95.5 %
O2 SAT, ARTERIAL: 95.5 %
O2 SAT, ARTERIAL: 95.8 %
O2 SAT, ARTERIAL: 95.8 %
O2 SAT, ARTERIAL: 96.3 %
O2 SAT, ARTERIAL: 97 %
O2 SAT, ARTERIAL: 98 %
O2 SAT, ARTERIAL: 98.1 %
O2 SAT, ARTERIAL: 98.3 %
O2 SAT, ARTERIAL: 98.7 %
O2 THERAPY: ABNORMAL
O2 THERAPY: NORMAL
ORGANISM: ABNORMAL
PCO2 ARTERIAL: 29.5 MMHG (ref 35–45)
PCO2 ARTERIAL: 33.5 MMHG (ref 35–45)
PCO2 ARTERIAL: 34.2 MMHG (ref 35–45)
PCO2 ARTERIAL: 37.8 MMHG (ref 35–45)
PCO2 ARTERIAL: 38.1 MMHG (ref 35–45)
PCO2 ARTERIAL: 38.9 MMHG (ref 35–45)
PCO2 ARTERIAL: 39 MMHG (ref 35–45)
PCO2 ARTERIAL: 40.3 MMHG (ref 35–45)
PCO2 ARTERIAL: 40.5 MMHG (ref 35–45)
PCO2 ARTERIAL: 40.8 MMHG (ref 35–45)
PCO2 ARTERIAL: 41.7 MMHG (ref 35–45)
PCO2 ARTERIAL: 41.8 MMHG (ref 35–45)
PCO2 ARTERIAL: 43.8 MMHG (ref 35–45)
PCO2 ARTERIAL: 44.9 MMHG (ref 35–45)
PCO2 ARTERIAL: 46.2 MMHG (ref 35–45)
PCO2 ARTERIAL: 47.9 MMHG (ref 35–45)
PCO2 ARTERIAL: 50.1 MMHG (ref 35–45)
PCO2 ARTERIAL: 50.4 MMHG (ref 35–45)
PCO2 ARTERIAL: 50.6 MMHG (ref 35–45)
PCO2 ARTERIAL: 50.8 MMHG (ref 35–45)
PCO2 ARTERIAL: 50.8 MMHG (ref 35–45)
PCO2 ARTERIAL: 51.5 MMHG (ref 35–45)
PCO2 ARTERIAL: 51.8 MMHG (ref 35–45)
PCO2 ARTERIAL: 52 MMHG (ref 35–45)
PCO2 ARTERIAL: 52.1 MMHG (ref 35–45)
PCO2 ARTERIAL: 52.3 MMHG (ref 35–45)
PCO2 ARTERIAL: 52.5 MMHG (ref 35–45)
PCO2 ARTERIAL: 53.6 MMHG (ref 35–45)
PCO2 ARTERIAL: 53.7 MMHG (ref 35–45)
PCO2 ARTERIAL: 53.8 MMHG (ref 35–45)
PCO2 ARTERIAL: 54 MMHG (ref 35–45)
PCO2 ARTERIAL: 54.2 MMHG (ref 35–45)
PCO2 ARTERIAL: 54.7 MMHG (ref 35–45)
PCO2 ARTERIAL: 56.1 MMHG (ref 35–45)
PCO2 ARTERIAL: 56.2 MMHG (ref 35–45)
PCO2 ARTERIAL: 56.2 MMHG (ref 35–45)
PCO2 ARTERIAL: 58.6 MMHG (ref 35–45)
PCO2 ARTERIAL: 60.7 MMHG (ref 35–45)
PCO2 ARTERIAL: 61.1 MMHG (ref 35–45)
PCO2 ARTERIAL: 64.9 MMHG (ref 35–45)
PCO2 ARTERIAL: 66.8 MMHG (ref 35–45)
PDW BLD-RTO: 12.7 % (ref 12.4–15.4)
PDW BLD-RTO: 12.7 % (ref 12.4–15.4)
PDW BLD-RTO: 12.8 % (ref 12.4–15.4)
PDW BLD-RTO: 12.9 % (ref 12.4–15.4)
PDW BLD-RTO: 13 % (ref 12.4–15.4)
PDW BLD-RTO: 13.1 % (ref 12.4–15.4)
PDW BLD-RTO: 13.2 % (ref 12.4–15.4)
PDW BLD-RTO: 13.3 % (ref 12.4–15.4)
PDW BLD-RTO: 13.4 % (ref 12.4–15.4)
PDW BLD-RTO: 13.5 % (ref 12.4–15.4)
PDW BLD-RTO: 13.5 % (ref 12.4–15.4)
PDW BLD-RTO: 13.6 % (ref 12.4–15.4)
PDW BLD-RTO: 13.7 % (ref 12.4–15.4)
PDW BLD-RTO: 14 % (ref 12.4–15.4)
PDW BLD-RTO: 14.1 % (ref 12.4–15.4)
PDW BLD-RTO: 14.1 % (ref 12.4–15.4)
PDW BLD-RTO: 14.4 % (ref 12.4–15.4)
PDW BLD-RTO: 14.5 % (ref 12.4–15.4)
PDW BLD-RTO: 14.7 % (ref 12.4–15.4)
PDW BLD-RTO: 15 % (ref 12.4–15.4)
PERFORMED ON: ABNORMAL
PERFORMED ON: NORMAL
PERFORMED ON: NORMAL
PH ARTERIAL: 7.2 (ref 7.35–7.45)
PH ARTERIAL: 7.2 (ref 7.35–7.45)
PH ARTERIAL: 7.22 (ref 7.35–7.45)
PH ARTERIAL: 7.26 (ref 7.35–7.45)
PH ARTERIAL: 7.28 (ref 7.35–7.45)
PH ARTERIAL: 7.28 (ref 7.35–7.45)
PH ARTERIAL: 7.29 (ref 7.35–7.45)
PH ARTERIAL: 7.31 (ref 7.35–7.45)
PH ARTERIAL: 7.31 (ref 7.35–7.45)
PH ARTERIAL: 7.33 (ref 7.35–7.45)
PH ARTERIAL: 7.36 (ref 7.35–7.45)
PH ARTERIAL: 7.38 (ref 7.35–7.45)
PH ARTERIAL: 7.38 (ref 7.35–7.45)
PH ARTERIAL: 7.39 (ref 7.35–7.45)
PH ARTERIAL: 7.41 (ref 7.35–7.45)
PH ARTERIAL: 7.42 (ref 7.35–7.45)
PH ARTERIAL: 7.43 (ref 7.35–7.45)
PH ARTERIAL: 7.44 (ref 7.35–7.45)
PH ARTERIAL: 7.44 (ref 7.35–7.45)
PH ARTERIAL: 7.45 (ref 7.35–7.45)
PH ARTERIAL: 7.45 (ref 7.35–7.45)
PH ARTERIAL: 7.46 (ref 7.35–7.45)
PH ARTERIAL: 7.46 (ref 7.35–7.45)
PH ARTERIAL: 7.48 (ref 7.35–7.45)
PH ARTERIAL: 7.49 (ref 7.35–7.45)
PH ARTERIAL: 7.5 (ref 7.35–7.45)
PH ARTERIAL: 7.51 (ref 7.35–7.45)
PH ARTERIAL: 7.51 (ref 7.35–7.45)
PH ARTERIAL: 7.52 (ref 7.35–7.45)
PH ARTERIAL: 7.53 (ref 7.35–7.45)
PH ARTERIAL: 7.53 (ref 7.35–7.45)
PH ARTERIAL: 7.54 (ref 7.35–7.45)
PH ARTERIAL: 7.54 (ref 7.35–7.45)
PH ARTERIAL: 7.55 (ref 7.35–7.45)
PH UA: 5 (ref 5–8)
PH UA: 5.5 (ref 5–8)
PH UA: 6 (ref 5–8)
PH UA: 6 (ref 5–8)
PHOSPHORUS: 4.5 MG/DL (ref 2.5–4.9)
PLATELET # BLD: 156 K/UL (ref 135–450)
PLATELET # BLD: 171 K/UL (ref 135–450)
PLATELET # BLD: 201 K/UL (ref 135–450)
PLATELET # BLD: 237 K/UL (ref 135–450)
PLATELET # BLD: 237 K/UL (ref 135–450)
PLATELET # BLD: 247 K/UL (ref 135–450)
PLATELET # BLD: 252 K/UL (ref 135–450)
PLATELET # BLD: 254 K/UL (ref 135–450)
PLATELET # BLD: 257 K/UL (ref 135–450)
PLATELET # BLD: 263 K/UL (ref 135–450)
PLATELET # BLD: 266 K/UL (ref 135–450)
PLATELET # BLD: 267 K/UL (ref 135–450)
PLATELET # BLD: 269 K/UL (ref 135–450)
PLATELET # BLD: 272 K/UL (ref 135–450)
PLATELET # BLD: 275 K/UL (ref 135–450)
PLATELET # BLD: 281 K/UL (ref 135–450)
PLATELET # BLD: 284 K/UL (ref 135–450)
PLATELET # BLD: 287 K/UL (ref 135–450)
PLATELET # BLD: 288 K/UL (ref 135–450)
PLATELET # BLD: 291 K/UL (ref 135–450)
PLATELET # BLD: 293 K/UL (ref 135–450)
PLATELET # BLD: 294 K/UL (ref 135–450)
PLATELET # BLD: 297 K/UL (ref 135–450)
PLATELET # BLD: 299 K/UL (ref 135–450)
PLATELET # BLD: 313 K/UL (ref 135–450)
PLATELET # BLD: 340 K/UL (ref 135–450)
PLATELET # BLD: 347 K/UL (ref 135–450)
PLATELET # BLD: 351 K/UL (ref 135–450)
PLATELET # BLD: 360 K/UL (ref 135–450)
PLATELET # BLD: 366 K/UL (ref 135–450)
PLATELET # BLD: 370 K/UL (ref 135–450)
PLATELET # BLD: 372 K/UL (ref 135–450)
PLATELET # BLD: 384 K/UL (ref 135–450)
PLATELET # BLD: 434 K/UL (ref 135–450)
PLATELET # BLD: 476 K/UL (ref 135–450)
PLATELET # BLD: 95 K/UL (ref 135–450)
PLATELET # BLD: 98 K/UL (ref 135–450)
PLATELET SLIDE REVIEW: ABNORMAL
PLATELET SLIDE REVIEW: ABNORMAL
PLATELET SLIDE REVIEW: ADEQUATE
PMV BLD AUTO: 6.3 FL (ref 5–10.5)
PMV BLD AUTO: 6.4 FL (ref 5–10.5)
PMV BLD AUTO: 6.5 FL (ref 5–10.5)
PMV BLD AUTO: 6.5 FL (ref 5–10.5)
PMV BLD AUTO: 6.6 FL (ref 5–10.5)
PMV BLD AUTO: 6.7 FL (ref 5–10.5)
PMV BLD AUTO: 6.8 FL (ref 5–10.5)
PMV BLD AUTO: 7 FL (ref 5–10.5)
PMV BLD AUTO: 7.1 FL (ref 5–10.5)
PMV BLD AUTO: 7.1 FL (ref 5–10.5)
PMV BLD AUTO: 7.2 FL (ref 5–10.5)
PMV BLD AUTO: 7.4 FL (ref 5–10.5)
PMV BLD AUTO: 7.5 FL (ref 5–10.5)
PMV BLD AUTO: 7.6 FL (ref 5–10.5)
PMV BLD AUTO: 7.7 FL (ref 5–10.5)
PMV BLD AUTO: 7.8 FL (ref 5–10.5)
PMV BLD AUTO: 7.8 FL (ref 5–10.5)
PMV BLD AUTO: 7.9 FL (ref 5–10.5)
PMV BLD AUTO: 8 FL (ref 5–10.5)
PMV BLD AUTO: 8 FL (ref 5–10.5)
PMV BLD AUTO: 8.2 FL (ref 5–10.5)
PMV BLD AUTO: 8.3 FL (ref 5–10.5)
PMV BLD AUTO: 8.3 FL (ref 5–10.5)
PO2 ARTERIAL: 102.5 MMHG (ref 75–108)
PO2 ARTERIAL: 134.8 MMHG (ref 75–108)
PO2 ARTERIAL: 135.5 MMHG (ref 75–108)
PO2 ARTERIAL: 46.5 MMHG (ref 75–108)
PO2 ARTERIAL: 47.5 MMHG (ref 75–108)
PO2 ARTERIAL: 54.5 MMHG (ref 75–108)
PO2 ARTERIAL: 55.4 MMHG (ref 75–108)
PO2 ARTERIAL: 56.2 MMHG (ref 75–108)
PO2 ARTERIAL: 57.4 MMHG (ref 75–108)
PO2 ARTERIAL: 58 MMHG (ref 75–108)
PO2 ARTERIAL: 58.8 MMHG (ref 75–108)
PO2 ARTERIAL: 59.2 MMHG (ref 75–108)
PO2 ARTERIAL: 59.6 MMHG (ref 75–108)
PO2 ARTERIAL: 60 MMHG (ref 75–108)
PO2 ARTERIAL: 60.7 MMHG (ref 75–108)
PO2 ARTERIAL: 61.1 MMHG (ref 75–108)
PO2 ARTERIAL: 61.2 MMHG (ref 75–108)
PO2 ARTERIAL: 61.6 MMHG (ref 75–108)
PO2 ARTERIAL: 62.3 MMHG (ref 75–108)
PO2 ARTERIAL: 63.1 MMHG (ref 75–108)
PO2 ARTERIAL: 65.6 MMHG (ref 75–108)
PO2 ARTERIAL: 65.7 MMHG (ref 75–108)
PO2 ARTERIAL: 66.3 MMHG (ref 75–108)
PO2 ARTERIAL: 66.8 MMHG (ref 75–108)
PO2 ARTERIAL: 66.9 MMHG (ref 75–108)
PO2 ARTERIAL: 67.4 MMHG (ref 75–108)
PO2 ARTERIAL: 67.5 MMHG (ref 75–108)
PO2 ARTERIAL: 69.6 MMHG (ref 75–108)
PO2 ARTERIAL: 69.9 MMHG (ref 75–108)
PO2 ARTERIAL: 73.2 MMHG (ref 75–108)
PO2 ARTERIAL: 73.9 MMHG (ref 75–108)
PO2 ARTERIAL: 75.1 MMHG (ref 75–108)
PO2 ARTERIAL: 75.2 MMHG (ref 75–108)
PO2 ARTERIAL: 77.6 MMHG (ref 75–108)
PO2 ARTERIAL: 78.9 MMHG (ref 75–108)
PO2 ARTERIAL: 79.6 MMHG (ref 75–108)
PO2 ARTERIAL: 82.5 MMHG (ref 75–108)
PO2 ARTERIAL: 85.1 MMHG (ref 75–108)
PO2 ARTERIAL: 86.7 MMHG (ref 75–108)
PO2 ARTERIAL: 94 MMHG (ref 75–108)
PO2 ARTERIAL: 97.8 MMHG (ref 75–108)
POIKILOCYTES: ABNORMAL
POLYCHROMASIA: ABNORMAL
POTASSIUM REFLEX MAGNESIUM: 3.5 MMOL/L (ref 3.5–5.1)
POTASSIUM REFLEX MAGNESIUM: 3.9 MMOL/L (ref 3.5–5.1)
POTASSIUM REFLEX MAGNESIUM: 3.9 MMOL/L (ref 3.5–5.1)
POTASSIUM REFLEX MAGNESIUM: 4.1 MMOL/L (ref 3.5–5.1)
POTASSIUM REFLEX MAGNESIUM: 4.5 MMOL/L (ref 3.5–5.1)
POTASSIUM SERPL-SCNC: 3.1 MMOL/L (ref 3.5–5.1)
POTASSIUM SERPL-SCNC: 3.1 MMOL/L (ref 3.5–5.1)
POTASSIUM SERPL-SCNC: 3.3 MMOL/L (ref 3.5–5.1)
POTASSIUM SERPL-SCNC: 3.3 MMOL/L (ref 3.5–5.1)
POTASSIUM SERPL-SCNC: 3.4 MMOL/L (ref 3.5–5.1)
POTASSIUM SERPL-SCNC: 3.5 MMOL/L (ref 3.5–5.1)
POTASSIUM SERPL-SCNC: 3.5 MMOL/L (ref 3.5–5.1)
POTASSIUM SERPL-SCNC: 3.6 MMOL/L (ref 3.5–5.1)
POTASSIUM SERPL-SCNC: 3.7 MMOL/L (ref 3.5–5.1)
POTASSIUM SERPL-SCNC: 3.8 MMOL/L (ref 3.5–5.1)
POTASSIUM SERPL-SCNC: 3.8 MMOL/L (ref 3.5–5.1)
POTASSIUM SERPL-SCNC: 4 MMOL/L (ref 3.5–5.1)
POTASSIUM SERPL-SCNC: 4.1 MMOL/L (ref 3.5–5.1)
POTASSIUM SERPL-SCNC: 4.2 MMOL/L (ref 3.5–5.1)
POTASSIUM SERPL-SCNC: 4.2 MMOL/L (ref 3.5–5.1)
POTASSIUM SERPL-SCNC: 4.3 MMOL/L (ref 3.5–5.1)
POTASSIUM SERPL-SCNC: 4.3 MMOL/L (ref 3.5–5.1)
POTASSIUM SERPL-SCNC: 4.6 MMOL/L (ref 3.5–5.1)
POTASSIUM SERPL-SCNC: 4.7 MMOL/L (ref 3.5–5.1)
POTASSIUM SERPL-SCNC: 4.8 MMOL/L (ref 3.5–5.1)
POTASSIUM SERPL-SCNC: 5.3 MMOL/L (ref 3.5–5.1)
POTASSIUM SERPL-SCNC: 5.4 MMOL/L (ref 3.5–5.1)
POTASSIUM SERPL-SCNC: 5.6 MMOL/L (ref 3.5–5.1)
POTASSIUM, UR: 26.9 MMOL/L
PROCALCITONIN: 0.1 NG/ML (ref 0–0.15)
PROCALCITONIN: 0.26 NG/ML (ref 0–0.15)
PROTEIN UA: 30 MG/DL
PROTEIN UA: ABNORMAL MG/DL
PROTEIN UA: ABNORMAL MG/DL
PROTEIN UA: NEGATIVE MG/DL
PROTHROMBIN TIME: 12.3 SEC (ref 9.9–12.7)
RBC # BLD: 2.32 M/UL (ref 4–5.2)
RBC # BLD: 2.47 M/UL (ref 4–5.2)
RBC # BLD: 2.48 M/UL (ref 4–5.2)
RBC # BLD: 2.56 M/UL (ref 4–5.2)
RBC # BLD: 2.62 M/UL (ref 4–5.2)
RBC # BLD: 2.76 M/UL (ref 4–5.2)
RBC # BLD: 2.76 M/UL (ref 4–5.2)
RBC # BLD: 2.81 M/UL (ref 4–5.2)
RBC # BLD: 2.81 M/UL (ref 4–5.2)
RBC # BLD: 2.84 M/UL (ref 4–5.2)
RBC # BLD: 2.85 M/UL (ref 4–5.2)
RBC # BLD: 2.85 M/UL (ref 4–5.2)
RBC # BLD: 2.89 M/UL (ref 4–5.2)
RBC # BLD: 2.97 M/UL (ref 4–5.2)
RBC # BLD: 3.01 M/UL (ref 4–5.2)
RBC # BLD: 3.04 M/UL (ref 4–5.2)
RBC # BLD: 3.09 M/UL (ref 4–5.2)
RBC # BLD: 3.11 M/UL (ref 4–5.2)
RBC # BLD: 3.4 M/UL (ref 4–5.2)
RBC # BLD: 3.43 M/UL (ref 4–5.2)
RBC # BLD: 3.44 M/UL (ref 4–5.2)
RBC # BLD: 3.44 M/UL (ref 4–5.2)
RBC # BLD: 3.52 M/UL (ref 4–5.2)
RBC # BLD: 3.55 M/UL (ref 4–5.2)
RBC # BLD: 3.6 M/UL (ref 4–5.2)
RBC # BLD: 3.66 M/UL (ref 4–5.2)
RBC # BLD: 3.79 M/UL (ref 4–5.2)
RBC # BLD: 3.87 M/UL (ref 4–5.2)
RBC # BLD: 3.94 M/UL (ref 4–5.2)
RBC # BLD: 3.98 M/UL (ref 4–5.2)
RBC # BLD: 4.07 M/UL (ref 4–5.2)
RBC # BLD: 4.12 M/UL (ref 4–5.2)
RBC # BLD: 4.16 M/UL (ref 4–5.2)
RBC # BLD: 4.24 M/UL (ref 4–5.2)
RBC # BLD: 4.31 M/UL (ref 4–5.2)
RBC # BLD: 4.38 M/UL (ref 4–5.2)
RBC # BLD: 4.38 M/UL (ref 4–5.2)
RBC UA: ABNORMAL /HPF (ref 0–4)
SARS-COV-2, NAAT: DETECTED
SLIDE REVIEW: ABNORMAL
SODIUM BLD-SCNC: 129 MMOL/L (ref 136–145)
SODIUM BLD-SCNC: 130 MMOL/L (ref 136–145)
SODIUM BLD-SCNC: 130 MMOL/L (ref 136–145)
SODIUM BLD-SCNC: 131 MMOL/L (ref 136–145)
SODIUM BLD-SCNC: 132 MMOL/L (ref 136–145)
SODIUM BLD-SCNC: 133 MMOL/L (ref 136–145)
SODIUM BLD-SCNC: 134 MMOL/L (ref 136–145)
SODIUM BLD-SCNC: 135 MMOL/L (ref 136–145)
SODIUM BLD-SCNC: 135 MMOL/L (ref 136–145)
SODIUM BLD-SCNC: 136 MMOL/L (ref 136–145)
SODIUM BLD-SCNC: 137 MMOL/L (ref 136–145)
SODIUM BLD-SCNC: 137 MMOL/L (ref 136–145)
SODIUM BLD-SCNC: 138 MMOL/L (ref 136–145)
SODIUM BLD-SCNC: 139 MMOL/L (ref 136–145)
SODIUM BLD-SCNC: 141 MMOL/L (ref 136–145)
SODIUM BLD-SCNC: 142 MMOL/L (ref 136–145)
SODIUM URINE: 25 MMOL/L
SPECIFIC GRAVITY UA: 1.02 (ref 1–1.03)
SPECIFIC GRAVITY UA: <=1.005 (ref 1–1.03)
TCO2 ARTERIAL: 23.3 MMOL/L
TCO2 ARTERIAL: 24.4 MMOL/L
TCO2 ARTERIAL: 24.4 MMOL/L
TCO2 ARTERIAL: 25 MMOL/L
TCO2 ARTERIAL: 25.2 MMOL/L
TCO2 ARTERIAL: 25.3 MMOL/L
TCO2 ARTERIAL: 25.8 MMOL/L
TCO2 ARTERIAL: 26.4 MMOL/L
TCO2 ARTERIAL: 26.4 MMOL/L
TCO2 ARTERIAL: 26.7 MMOL/L
TCO2 ARTERIAL: 26.7 MMOL/L
TCO2 ARTERIAL: 27 MMOL/L
TCO2 ARTERIAL: 27.8 MMOL/L
TCO2 ARTERIAL: 28.1 MMOL/L
TCO2 ARTERIAL: 29.7 MMOL/L
TCO2 ARTERIAL: 30.7 MMOL/L
TCO2 ARTERIAL: 31.2 MMOL/L
TCO2 ARTERIAL: 31.2 MMOL/L
TCO2 ARTERIAL: 31.5 MMOL/L
TCO2 ARTERIAL: 32.5 MMOL/L
TCO2 ARTERIAL: 32.8 MMOL/L
TCO2 ARTERIAL: 33.6 MMOL/L
TCO2 ARTERIAL: 34 MMOL/L
TCO2 ARTERIAL: 34.2 MMOL/L
TCO2 ARTERIAL: 34.3 MMOL/L
TCO2 ARTERIAL: 34.6 MMOL/L
TCO2 ARTERIAL: 34.7 MMOL/L
TCO2 ARTERIAL: 34.9 MMOL/L
TCO2 ARTERIAL: 35.2 MMOL/L
TCO2 ARTERIAL: 35.8 MMOL/L
TCO2 ARTERIAL: 35.8 MMOL/L
TCO2 ARTERIAL: 35.9 MMOL/L
TCO2 ARTERIAL: 36 MMOL/L
TCO2 ARTERIAL: 36.5 MMOL/L
TCO2 ARTERIAL: 37.4 MMOL/L
TCO2 ARTERIAL: 37.7 MMOL/L
TCO2 ARTERIAL: 37.8 MMOL/L
TCO2 ARTERIAL: 37.9 MMOL/L
TCO2 ARTERIAL: 38.3 MMOL/L
TCO2 ARTERIAL: 40 MMOL/L
TCO2 ARTERIAL: 41.7 MMOL/L
TOTAL CK: 32 U/L (ref 26–192)
TOTAL PROTEIN: 5.9 G/DL (ref 6.4–8.2)
TOTAL PROTEIN: 6.5 G/DL (ref 6.4–8.2)
TOTAL PROTEIN: 6.6 G/DL (ref 6.4–8.2)
TOTAL PROTEIN: 6.6 G/DL (ref 6.4–8.2)
TOTAL PROTEIN: 6.9 G/DL (ref 6.4–8.2)
TOTAL PROTEIN: 6.9 G/DL (ref 6.4–8.2)
TOTAL PROTEIN: 7.2 G/DL (ref 6.4–8.2)
TOXIC GRANULATION: PRESENT
TRIGL SERPL-MCNC: 115 MG/DL (ref 0–150)
TRIGL SERPL-MCNC: 116 MG/DL (ref 0–150)
TRIGL SERPL-MCNC: 126 MG/DL (ref 0–150)
TRIGL SERPL-MCNC: 134 MG/DL (ref 0–150)
TRIGL SERPL-MCNC: 138 MG/DL (ref 0–150)
TRIGL SERPL-MCNC: 170 MG/DL (ref 0–150)
TRIGL SERPL-MCNC: 187 MG/DL (ref 0–150)
TRIGL SERPL-MCNC: 188 MG/DL (ref 0–150)
TRIGL SERPL-MCNC: 245 MG/DL (ref 0–150)
TRIGL SERPL-MCNC: 353 MG/DL (ref 0–150)
TRIGL SERPL-MCNC: 399 MG/DL (ref 0–150)
TRIGL SERPL-MCNC: 469 MG/DL (ref 0–150)
TRIGL SERPL-MCNC: 831 MG/DL (ref 0–150)
TROPONIN: <0.01 NG/ML
TROPONIN: <0.01 NG/ML
URINE CULTURE, ROUTINE: NORMAL
URINE CULTURE, ROUTINE: NORMAL
URINE REFLEX TO CULTURE: ABNORMAL
URINE REFLEX TO CULTURE: ABNORMAL
URINE TYPE: ABNORMAL
UROBILINOGEN, URINE: 0.2 E.U./DL
VANCOMYCIN RANDOM: 22.6 UG/ML
VANCOMYCIN TROUGH: 11 UG/ML (ref 10–20)
VANCOMYCIN TROUGH: 15.5 UG/ML (ref 10–20)
VANCOMYCIN TROUGH: 15.8 UG/ML (ref 10–20)
VANCOMYCIN TROUGH: 9.2 UG/ML (ref 10–20)
WBC # BLD: 10.6 K/UL (ref 4–11)
WBC # BLD: 10.6 K/UL (ref 4–11)
WBC # BLD: 10.7 K/UL (ref 4–11)
WBC # BLD: 10.7 K/UL (ref 4–11)
WBC # BLD: 11.8 K/UL (ref 4–11)
WBC # BLD: 11.8 K/UL (ref 4–11)
WBC # BLD: 12.6 K/UL (ref 4–11)
WBC # BLD: 13.6 K/UL (ref 4–11)
WBC # BLD: 13.6 K/UL (ref 4–11)
WBC # BLD: 14.8 K/UL (ref 4–11)
WBC # BLD: 15.3 K/UL (ref 4–11)
WBC # BLD: 23.1 K/UL (ref 4–11)
WBC # BLD: 3.7 K/UL (ref 4–11)
WBC # BLD: 4 K/UL (ref 4–11)
WBC # BLD: 4.7 K/UL (ref 4–11)
WBC # BLD: 5.9 K/UL (ref 4–11)
WBC # BLD: 7.2 K/UL (ref 4–11)
WBC # BLD: 7.2 K/UL (ref 4–11)
WBC # BLD: 7.6 K/UL (ref 4–11)
WBC # BLD: 7.7 K/UL (ref 4–11)
WBC # BLD: 7.8 K/UL (ref 4–11)
WBC # BLD: 7.9 K/UL (ref 4–11)
WBC # BLD: 8 K/UL (ref 4–11)
WBC # BLD: 8.4 K/UL (ref 4–11)
WBC # BLD: 8.6 K/UL (ref 4–11)
WBC # BLD: 8.6 K/UL (ref 4–11)
WBC # BLD: 8.9 K/UL (ref 4–11)
WBC # BLD: 9 K/UL (ref 4–11)
WBC # BLD: 9.1 K/UL (ref 4–11)
WBC # BLD: 9.1 K/UL (ref 4–11)
WBC # BLD: 9.3 K/UL (ref 4–11)
WBC # BLD: 9.6 K/UL (ref 4–11)
WBC # BLD: 9.8 K/UL (ref 4–11)
WBC # BLD: 9.8 K/UL (ref 4–11)
WBC # BLD: 9.9 K/UL (ref 4–11)
WBC UA: ABNORMAL /HPF (ref 0–5)

## 2022-01-01 PROCEDURE — 2580000003 HC RX 258

## 2022-01-01 PROCEDURE — 6360000002 HC RX W HCPCS: Performed by: INTERNAL MEDICINE

## 2022-01-01 PROCEDURE — 6370000000 HC RX 637 (ALT 250 FOR IP): Performed by: SURGERY

## 2022-01-01 PROCEDURE — 6370000000 HC RX 637 (ALT 250 FOR IP): Performed by: INTERNAL MEDICINE

## 2022-01-01 PROCEDURE — 6370000000 HC RX 637 (ALT 250 FOR IP): Performed by: NURSE PRACTITIONER

## 2022-01-01 PROCEDURE — 85520 HEPARIN ASSAY: CPT

## 2022-01-01 PROCEDURE — 2500000003 HC RX 250 WO HCPCS: Performed by: SURGERY

## 2022-01-01 PROCEDURE — 71045 X-RAY EXAM CHEST 1 VIEW: CPT

## 2022-01-01 PROCEDURE — 99221 1ST HOSP IP/OBS SF/LOW 40: CPT | Performed by: NURSE PRACTITIONER

## 2022-01-01 PROCEDURE — 2580000003 HC RX 258: Performed by: INTERNAL MEDICINE

## 2022-01-01 PROCEDURE — 87186 SC STD MICRODIL/AGAR DIL: CPT

## 2022-01-01 PROCEDURE — 80048 BASIC METABOLIC PNL TOTAL CA: CPT

## 2022-01-01 PROCEDURE — 6360000004 HC RX CONTRAST MEDICATION: Performed by: INTERNAL MEDICINE

## 2022-01-01 PROCEDURE — 36592 COLLECT BLOOD FROM PICC: CPT

## 2022-01-01 PROCEDURE — 2000000000 HC ICU R&B

## 2022-01-01 PROCEDURE — 93005 ELECTROCARDIOGRAM TRACING: CPT | Performed by: INTERNAL MEDICINE

## 2022-01-01 PROCEDURE — 6370000000 HC RX 637 (ALT 250 FOR IP)

## 2022-01-01 PROCEDURE — 2700000000 HC OXYGEN THERAPY PER DAY

## 2022-01-01 PROCEDURE — 2500000003 HC RX 250 WO HCPCS: Performed by: INTERNAL MEDICINE

## 2022-01-01 PROCEDURE — 6360000002 HC RX W HCPCS

## 2022-01-01 PROCEDURE — 94761 N-INVAS EAR/PLS OXIMETRY MLT: CPT

## 2022-01-01 PROCEDURE — 94003 VENT MGMT INPAT SUBQ DAY: CPT

## 2022-01-01 PROCEDURE — 31600 PLANNED TRACHEOSTOMY: CPT | Performed by: SURGERY

## 2022-01-01 PROCEDURE — P9016 RBC LEUKOCYTES REDUCED: HCPCS

## 2022-01-01 PROCEDURE — 31645 BRNCHSC W/THER ASPIR 1ST: CPT | Performed by: INTERNAL MEDICINE

## 2022-01-01 PROCEDURE — 94640 AIRWAY INHALATION TREATMENT: CPT

## 2022-01-01 PROCEDURE — 6360000002 HC RX W HCPCS: Performed by: SURGERY

## 2022-01-01 PROCEDURE — 80053 COMPREHEN METABOLIC PANEL: CPT

## 2022-01-01 PROCEDURE — 2580000003 HC RX 258: Performed by: STUDENT IN AN ORGANIZED HEALTH CARE EDUCATION/TRAINING PROGRAM

## 2022-01-01 PROCEDURE — 85025 COMPLETE CBC W/AUTO DIFF WBC: CPT

## 2022-01-01 PROCEDURE — 99291 CRITICAL CARE FIRST HOUR: CPT | Performed by: INTERNAL MEDICINE

## 2022-01-01 PROCEDURE — 84478 ASSAY OF TRIGLYCERIDES: CPT

## 2022-01-01 PROCEDURE — 0BH17EZ INSERTION OF ENDOTRACHEAL AIRWAY INTO TRACHEA, VIA NATURAL OR ARTIFICIAL OPENING: ICD-10-PCS | Performed by: INTERNAL MEDICINE

## 2022-01-01 PROCEDURE — 87077 CULTURE AEROBIC IDENTIFY: CPT

## 2022-01-01 PROCEDURE — 99233 SBSQ HOSP IP/OBS HIGH 50: CPT | Performed by: INTERNAL MEDICINE

## 2022-01-01 PROCEDURE — 84145 PROCALCITONIN (PCT): CPT

## 2022-01-01 PROCEDURE — 97165 OT EVAL LOW COMPLEX 30 MIN: CPT

## 2022-01-01 PROCEDURE — 3600000003 HC SURGERY LEVEL 3 BASE: Performed by: SURGERY

## 2022-01-01 PROCEDURE — 3700000001 HC ADD 15 MINUTES (ANESTHESIA): Performed by: SURGERY

## 2022-01-01 PROCEDURE — 2580000003 HC RX 258: Performed by: NURSE PRACTITIONER

## 2022-01-01 PROCEDURE — 4A03X5D MEASUREMENT OF ARTERIAL FLOW, INTRACRANIAL, EXTERNAL APPROACH: ICD-10-PCS | Performed by: INTERNAL MEDICINE

## 2022-01-01 PROCEDURE — 82803 BLOOD GASES ANY COMBINATION: CPT

## 2022-01-01 PROCEDURE — 36600 WITHDRAWAL OF ARTERIAL BLOOD: CPT

## 2022-01-01 PROCEDURE — 84484 ASSAY OF TROPONIN QUANT: CPT

## 2022-01-01 PROCEDURE — 36556 INSERT NON-TUNNEL CV CATH: CPT | Performed by: INTERNAL MEDICINE

## 2022-01-01 PROCEDURE — 94660 CPAP INITIATION&MGMT: CPT

## 2022-01-01 PROCEDURE — 84133 ASSAY OF URINE POTASSIUM: CPT

## 2022-01-01 PROCEDURE — 83735 ASSAY OF MAGNESIUM: CPT

## 2022-01-01 PROCEDURE — 85018 HEMOGLOBIN: CPT

## 2022-01-01 PROCEDURE — 97535 SELF CARE MNGMENT TRAINING: CPT

## 2022-01-01 PROCEDURE — 82040 ASSAY OF SERUM ALBUMIN: CPT

## 2022-01-01 PROCEDURE — 86900 BLOOD TYPING SEROLOGIC ABO: CPT

## 2022-01-01 PROCEDURE — 80202 ASSAY OF VANCOMYCIN: CPT

## 2022-01-01 PROCEDURE — 0B110F4 BYPASS TRACHEA TO CUTANEOUS WITH TRACHEOSTOMY DEVICE, OPEN APPROACH: ICD-10-PCS | Performed by: SURGERY

## 2022-01-01 PROCEDURE — 87205 SMEAR GRAM STAIN: CPT

## 2022-01-01 PROCEDURE — 97116 GAIT TRAINING THERAPY: CPT

## 2022-01-01 PROCEDURE — 85014 HEMATOCRIT: CPT

## 2022-01-01 PROCEDURE — 51702 INSERT TEMP BLADDER CATH: CPT

## 2022-01-01 PROCEDURE — 97110 THERAPEUTIC EXERCISES: CPT

## 2022-01-01 PROCEDURE — 1200000000 HC SEMI PRIVATE

## 2022-01-01 PROCEDURE — 36415 COLL VENOUS BLD VENIPUNCTURE: CPT

## 2022-01-01 PROCEDURE — 82550 ASSAY OF CK (CPK): CPT

## 2022-01-01 PROCEDURE — 86403 PARTICLE AGGLUT ANTBDY SCRN: CPT

## 2022-01-01 PROCEDURE — 82436 ASSAY OF URINE CHLORIDE: CPT

## 2022-01-01 PROCEDURE — 32551 INSERTION OF CHEST TUBE: CPT | Performed by: INTERNAL MEDICINE

## 2022-01-01 PROCEDURE — 02HV33Z INSERTION OF INFUSION DEVICE INTO SUPERIOR VENA CAVA, PERCUTANEOUS APPROACH: ICD-10-PCS | Performed by: INTERNAL MEDICINE

## 2022-01-01 PROCEDURE — 87040 BLOOD CULTURE FOR BACTERIA: CPT

## 2022-01-01 PROCEDURE — 86901 BLOOD TYPING SEROLOGIC RH(D): CPT

## 2022-01-01 PROCEDURE — 6360000002 HC RX W HCPCS: Performed by: STUDENT IN AN ORGANIZED HEALTH CARE EDUCATION/TRAINING PROGRAM

## 2022-01-01 PROCEDURE — 87070 CULTURE OTHR SPECIMN AEROBIC: CPT

## 2022-01-01 PROCEDURE — 2580000003 HC RX 258: Performed by: SURGERY

## 2022-01-01 PROCEDURE — 2060000000 HC ICU INTERMEDIATE R&B

## 2022-01-01 PROCEDURE — 86140 C-REACTIVE PROTEIN: CPT

## 2022-01-01 PROCEDURE — 6360000002 HC RX W HCPCS: Performed by: NURSE PRACTITIONER

## 2022-01-01 PROCEDURE — 6360000002 HC RX W HCPCS: Performed by: NURSE ANESTHETIST, CERTIFIED REGISTERED

## 2022-01-01 PROCEDURE — 81001 URINALYSIS AUTO W/SCOPE: CPT

## 2022-01-01 PROCEDURE — 93005 ELECTROCARDIOGRAM TRACING: CPT | Performed by: STUDENT IN AN ORGANIZED HEALTH CARE EDUCATION/TRAINING PROGRAM

## 2022-01-01 PROCEDURE — 0W9930Z DRAINAGE OF RIGHT PLEURAL CAVITY WITH DRAINAGE DEVICE, PERCUTANEOUS APPROACH: ICD-10-PCS | Performed by: INTERNAL MEDICINE

## 2022-01-01 PROCEDURE — 80400 ACTH STIMULATION PANEL: CPT

## 2022-01-01 PROCEDURE — 85027 COMPLETE CBC AUTOMATED: CPT

## 2022-01-01 PROCEDURE — 93970 EXTREMITY STUDY: CPT

## 2022-01-01 PROCEDURE — 80069 RENAL FUNCTION PANEL: CPT

## 2022-01-01 PROCEDURE — 5A1955Z RESPIRATORY VENTILATION, GREATER THAN 96 CONSECUTIVE HOURS: ICD-10-PCS | Performed by: INTERNAL MEDICINE

## 2022-01-01 PROCEDURE — 51798 US URINE CAPACITY MEASURE: CPT

## 2022-01-01 PROCEDURE — 99285 EMERGENCY DEPT VISIT HI MDM: CPT

## 2022-01-01 PROCEDURE — XW0DXM6 INTRODUCTION OF BARICITINIB INTO MOUTH AND PHARYNX, EXTERNAL APPROACH, NEW TECHNOLOGY GROUP 6: ICD-10-PCS | Performed by: INTERNAL MEDICINE

## 2022-01-01 PROCEDURE — 93010 ELECTROCARDIOGRAM REPORT: CPT | Performed by: INTERNAL MEDICINE

## 2022-01-01 PROCEDURE — 0DH63UZ INSERTION OF FEEDING DEVICE INTO STOMACH, PERCUTANEOUS APPROACH: ICD-10-PCS | Performed by: SURGERY

## 2022-01-01 PROCEDURE — 32551 INSERTION OF CHEST TUBE: CPT

## 2022-01-01 PROCEDURE — 82570 ASSAY OF URINE CREATININE: CPT

## 2022-01-01 PROCEDURE — 99223 1ST HOSP IP/OBS HIGH 75: CPT | Performed by: INTERNAL MEDICINE

## 2022-01-01 PROCEDURE — 2500000003 HC RX 250 WO HCPCS: Performed by: NURSE ANESTHETIST, CERTIFIED REGISTERED

## 2022-01-01 PROCEDURE — 94002 VENT MGMT INPAT INIT DAY: CPT

## 2022-01-01 PROCEDURE — 93971 EXTREMITY STUDY: CPT

## 2022-01-01 PROCEDURE — 85610 PROTHROMBIN TIME: CPT

## 2022-01-01 PROCEDURE — 89220 SPUTUM SPECIMEN COLLECTION: CPT

## 2022-01-01 PROCEDURE — 87635 SARS-COV-2 COVID-19 AMP PRB: CPT

## 2022-01-01 PROCEDURE — 36573 INSJ PICC RS&I 5 YR+: CPT

## 2022-01-01 PROCEDURE — 43246 EGD PLACE GASTROSTOMY TUBE: CPT | Performed by: SURGERY

## 2022-01-01 PROCEDURE — 0BDM8ZX EXTRACTION OF BILATERAL LUNGS, VIA NATURAL OR ARTIFICIAL OPENING ENDOSCOPIC, DIAGNOSTIC: ICD-10-PCS | Performed by: INTERNAL MEDICINE

## 2022-01-01 PROCEDURE — P9047 ALBUMIN (HUMAN), 25%, 50ML: HCPCS | Performed by: INTERNAL MEDICINE

## 2022-01-01 PROCEDURE — 87086 URINE CULTURE/COLONY COUNT: CPT

## 2022-01-01 PROCEDURE — 97530 THERAPEUTIC ACTIVITIES: CPT

## 2022-01-01 PROCEDURE — 80076 HEPATIC FUNCTION PANEL: CPT

## 2022-01-01 PROCEDURE — 02HV33Z INSERTION OF INFUSION DEVICE INTO SUPERIOR VENA CAVA, PERCUTANEOUS APPROACH: ICD-10-PCS | Performed by: RADIOLOGY

## 2022-01-01 PROCEDURE — 86850 RBC ANTIBODY SCREEN: CPT

## 2022-01-01 PROCEDURE — 97164 PT RE-EVAL EST PLAN CARE: CPT

## 2022-01-01 PROCEDURE — 3700000000 HC ANESTHESIA ATTENDED CARE: Performed by: SURGERY

## 2022-01-01 PROCEDURE — 70450 CT HEAD/BRAIN W/O DYE: CPT

## 2022-01-01 PROCEDURE — 3600000013 HC SURGERY LEVEL 3 ADDTL 15MIN: Performed by: SURGERY

## 2022-01-01 PROCEDURE — 0B9C8ZZ DRAINAGE OF RIGHT UPPER LUNG LOBE, VIA NATURAL OR ARTIFICIAL OPENING ENDOSCOPIC: ICD-10-PCS | Performed by: INTERNAL MEDICINE

## 2022-01-01 PROCEDURE — 36430 TRANSFUSION BLD/BLD COMPNT: CPT

## 2022-01-01 PROCEDURE — 3E0G76Z INTRODUCTION OF NUTRITIONAL SUBSTANCE INTO UPPER GI, VIA NATURAL OR ARTIFICIAL OPENING: ICD-10-PCS | Performed by: SURGERY

## 2022-01-01 PROCEDURE — 99232 SBSQ HOSP IP/OBS MODERATE 35: CPT | Performed by: INTERNAL MEDICINE

## 2022-01-01 PROCEDURE — 97161 PT EVAL LOW COMPLEX 20 MIN: CPT

## 2022-01-01 PROCEDURE — 86923 COMPATIBILITY TEST ELECTRIC: CPT

## 2022-01-01 PROCEDURE — 2580000003 HC RX 258: Performed by: NURSE ANESTHETIST, CERTIFIED REGISTERED

## 2022-01-01 PROCEDURE — C1751 CATH, INF, PER/CENT/MIDLINE: HCPCS

## 2022-01-01 PROCEDURE — 0B9J8ZX DRAINAGE OF LEFT LOWER LUNG LOBE, VIA NATURAL OR ARTIFICIAL OPENING ENDOSCOPIC, DIAGNOSTIC: ICD-10-PCS | Performed by: INTERNAL MEDICINE

## 2022-01-01 PROCEDURE — 84300 ASSAY OF URINE SODIUM: CPT

## 2022-01-01 PROCEDURE — 31622 DX BRONCHOSCOPE/WASH: CPT

## 2022-01-01 PROCEDURE — 97168 OT RE-EVAL EST PLAN CARE: CPT

## 2022-01-01 PROCEDURE — 99222 1ST HOSP IP/OBS MODERATE 55: CPT | Performed by: SURGERY

## 2022-01-01 PROCEDURE — 36556 INSERT NON-TUNNEL CV CATH: CPT

## 2022-01-01 PROCEDURE — 76937 US GUIDE VASCULAR ACCESS: CPT | Performed by: INTERNAL MEDICINE

## 2022-01-01 PROCEDURE — 31624 DX BRONCHOSCOPE/LAVAGE: CPT | Performed by: INTERNAL MEDICINE

## 2022-01-01 PROCEDURE — 2709999900 HC NON-CHARGEABLE SUPPLY: Performed by: SURGERY

## 2022-01-01 PROCEDURE — 0B9F8ZZ DRAINAGE OF RIGHT LOWER LUNG LOBE, VIA NATURAL OR ARTIFICIAL OPENING ENDOSCOPIC: ICD-10-PCS | Performed by: INTERNAL MEDICINE

## 2022-01-01 PROCEDURE — 96374 THER/PROPH/DIAG INJ IV PUSH: CPT

## 2022-01-01 PROCEDURE — 70498 CT ANGIOGRAPHY NECK: CPT

## 2022-01-01 RX ORDER — MIDAZOLAM HYDROCHLORIDE 1 MG/ML
INJECTION INTRAMUSCULAR; INTRAVENOUS PRN
Status: DISCONTINUED | OUTPATIENT
Start: 2022-01-01 | End: 2022-01-01 | Stop reason: SDUPTHER

## 2022-01-01 RX ORDER — SODIUM CHLORIDE 9 MG/ML
INJECTION, SOLUTION INTRAVENOUS PRN
Status: DISCONTINUED | OUTPATIENT
Start: 2022-01-01 | End: 2022-01-01

## 2022-01-01 RX ORDER — ONDANSETRON 2 MG/ML
4 INJECTION INTRAMUSCULAR; INTRAVENOUS EVERY 6 HOURS PRN
Status: DISCONTINUED | OUTPATIENT
Start: 2022-01-01 | End: 2022-01-01 | Stop reason: HOSPADM

## 2022-01-01 RX ORDER — DEXAMETHASONE SODIUM PHOSPHATE 4 MG/ML
2 INJECTION, SOLUTION INTRA-ARTICULAR; INTRALESIONAL; INTRAMUSCULAR; INTRAVENOUS; SOFT TISSUE EVERY 24 HOURS
Status: DISCONTINUED | OUTPATIENT
Start: 2022-01-01 | End: 2022-01-01

## 2022-01-01 RX ORDER — LINEZOLID 2 MG/ML
600 INJECTION, SOLUTION INTRAVENOUS EVERY 12 HOURS
Status: DISCONTINUED | OUTPATIENT
Start: 2022-01-01 | End: 2022-01-01

## 2022-01-01 RX ORDER — DEXAMETHASONE SODIUM PHOSPHATE 4 MG/ML
1 INJECTION, SOLUTION INTRA-ARTICULAR; INTRALESIONAL; INTRAMUSCULAR; INTRAVENOUS; SOFT TISSUE EVERY 24 HOURS
Status: DISCONTINUED | OUTPATIENT
Start: 2022-01-01 | End: 2022-01-01

## 2022-01-01 RX ORDER — HEPARIN SODIUM 1000 [USP'U]/ML
4880 INJECTION, SOLUTION INTRAVENOUS; SUBCUTANEOUS ONCE
Status: COMPLETED | OUTPATIENT
Start: 2022-01-01 | End: 2022-01-01

## 2022-01-01 RX ORDER — ACETAMINOPHEN 325 MG/1
650 TABLET ORAL EVERY 6 HOURS PRN
Status: DISCONTINUED | OUTPATIENT
Start: 2022-01-01 | End: 2022-01-01 | Stop reason: HOSPADM

## 2022-01-01 RX ORDER — VITAMIN B COMPLEX
2000 TABLET ORAL DAILY
Status: DISCONTINUED | OUTPATIENT
Start: 2022-01-01 | End: 2022-01-01

## 2022-01-01 RX ORDER — ALBUTEROL SULFATE 2.5 MG/3ML
2.5 SOLUTION RESPIRATORY (INHALATION)
Status: DISCONTINUED | OUTPATIENT
Start: 2022-01-01 | End: 2022-01-01 | Stop reason: HOSPADM

## 2022-01-01 RX ORDER — HEPARIN SODIUM 1000 [USP'U]/ML
40 INJECTION, SOLUTION INTRAVENOUS; SUBCUTANEOUS PRN
Status: DISCONTINUED | OUTPATIENT
Start: 2022-01-01 | End: 2022-01-01

## 2022-01-01 RX ORDER — SODIUM CHLORIDE 9 MG/ML
INJECTION, SOLUTION INTRAVENOUS CONTINUOUS
Status: DISCONTINUED | OUTPATIENT
Start: 2022-01-01 | End: 2022-01-01

## 2022-01-01 RX ORDER — DEXMEDETOMIDINE HYDROCHLORIDE 4 UG/ML
.2-1.2 INJECTION, SOLUTION INTRAVENOUS CONTINUOUS
Status: DISCONTINUED | OUTPATIENT
Start: 2022-01-01 | End: 2022-01-01

## 2022-01-01 RX ORDER — ATORVASTATIN CALCIUM 10 MG/1
10 TABLET, FILM COATED ORAL NIGHTLY
Status: DISCONTINUED | OUTPATIENT
Start: 2022-01-01 | End: 2022-01-01

## 2022-01-01 RX ORDER — BENZONATATE 100 MG/1
100 CAPSULE ORAL 3 TIMES DAILY PRN
Status: DISCONTINUED | OUTPATIENT
Start: 2022-01-01 | End: 2022-01-01

## 2022-01-01 RX ORDER — SODIUM CHLORIDE 0.9 % (FLUSH) 0.9 %
5-40 SYRINGE (ML) INJECTION PRN
Status: DISCONTINUED | OUTPATIENT
Start: 2022-01-01 | End: 2022-01-01 | Stop reason: HOSPADM

## 2022-01-01 RX ORDER — PROPOFOL 10 MG/ML
5-20 INJECTION, EMULSION INTRAVENOUS
Status: DISCONTINUED | OUTPATIENT
Start: 2022-01-01 | End: 2022-01-01

## 2022-01-01 RX ORDER — DEXTROSE MONOHYDRATE 50 MG/ML
100 INJECTION, SOLUTION INTRAVENOUS PRN
Status: DISCONTINUED | OUTPATIENT
Start: 2022-01-01 | End: 2022-01-01

## 2022-01-01 RX ORDER — NICOTINE POLACRILEX 4 MG
15 LOZENGE BUCCAL PRN
Status: DISCONTINUED | OUTPATIENT
Start: 2022-01-01 | End: 2022-01-01

## 2022-01-01 RX ORDER — FUROSEMIDE 10 MG/ML
20 INJECTION INTRAMUSCULAR; INTRAVENOUS ONCE
Status: COMPLETED | OUTPATIENT
Start: 2022-01-01 | End: 2022-01-01

## 2022-01-01 RX ORDER — FENTANYL CITRATE 50 UG/ML
50 INJECTION, SOLUTION INTRAMUSCULAR; INTRAVENOUS ONCE
Status: COMPLETED | OUTPATIENT
Start: 2022-01-01 | End: 2022-01-01

## 2022-01-01 RX ORDER — SIMVASTATIN 20 MG
TABLET ORAL
COMMUNITY
Start: 2021-01-01

## 2022-01-01 RX ORDER — SODIUM CHLORIDE 0.9 % (FLUSH) 0.9 %
5-40 SYRINGE (ML) INJECTION EVERY 12 HOURS SCHEDULED
Status: DISCONTINUED | OUTPATIENT
Start: 2022-01-01 | End: 2022-01-01 | Stop reason: SDUPTHER

## 2022-01-01 RX ORDER — DEXAMETHASONE SODIUM PHOSPHATE 4 MG/ML
4 INJECTION, SOLUTION INTRA-ARTICULAR; INTRALESIONAL; INTRAMUSCULAR; INTRAVENOUS; SOFT TISSUE EVERY 24 HOURS
Status: DISCONTINUED | OUTPATIENT
Start: 2022-01-01 | End: 2022-01-01

## 2022-01-01 RX ORDER — FENTANYL CITRATE 50 UG/ML
INJECTION, SOLUTION INTRAMUSCULAR; INTRAVENOUS PRN
Status: DISCONTINUED | OUTPATIENT
Start: 2022-01-01 | End: 2022-01-01 | Stop reason: SDUPTHER

## 2022-01-01 RX ORDER — CLOPIDOGREL BISULFATE 75 MG/1
75 TABLET ORAL DAILY
Status: DISCONTINUED | OUTPATIENT
Start: 2022-01-01 | End: 2022-01-01

## 2022-01-01 RX ORDER — ALBUTEROL SULFATE 90 UG/1
2 AEROSOL, METERED RESPIRATORY (INHALATION) EVERY 4 HOURS PRN
Status: DISCONTINUED | OUTPATIENT
Start: 2022-01-01 | End: 2022-01-01

## 2022-01-01 RX ORDER — POLYETHYLENE GLYCOL 3350 17 G/17G
17 POWDER, FOR SOLUTION ORAL DAILY PRN
Status: DISCONTINUED | OUTPATIENT
Start: 2022-01-01 | End: 2022-01-01

## 2022-01-01 RX ORDER — FLUTICASONE PROPIONATE 50 MCG
1 SPRAY, SUSPENSION (ML) NASAL DAILY
Status: DISCONTINUED | OUTPATIENT
Start: 2022-01-01 | End: 2022-01-01

## 2022-01-01 RX ORDER — PROPOFOL 10 MG/ML
5-50 INJECTION, EMULSION INTRAVENOUS
Status: DISCONTINUED | OUTPATIENT
Start: 2022-01-01 | End: 2022-01-01

## 2022-01-01 RX ORDER — 0.9 % SODIUM CHLORIDE 0.9 %
250 INTRAVENOUS SOLUTION INTRAVENOUS ONCE
Status: COMPLETED | OUTPATIENT
Start: 2022-01-01 | End: 2022-01-01

## 2022-01-01 RX ORDER — LIDOCAINE HYDROCHLORIDE 10 MG/ML
5 INJECTION, SOLUTION INFILTRATION; PERINEURAL ONCE
Status: DISCONTINUED | OUTPATIENT
Start: 2022-01-01 | End: 2022-01-01

## 2022-01-01 RX ORDER — HEPARIN SODIUM 1000 [USP'U]/ML
40 INJECTION, SOLUTION INTRAVENOUS; SUBCUTANEOUS ONCE
Status: COMPLETED | OUTPATIENT
Start: 2022-01-01 | End: 2022-01-01

## 2022-01-01 RX ORDER — HEPARIN SODIUM 10000 [USP'U]/100ML
1100 INJECTION, SOLUTION INTRAVENOUS CONTINUOUS
Status: DISCONTINUED | OUTPATIENT
Start: 2022-01-01 | End: 2022-01-01 | Stop reason: SDUPTHER

## 2022-01-01 RX ORDER — DIAZEPAM 5 MG/1
5 TABLET ORAL 3 TIMES DAILY
Status: DISCONTINUED | OUTPATIENT
Start: 2022-01-01 | End: 2022-01-01

## 2022-01-01 RX ORDER — FLUTICASONE PROPIONATE 50 MCG
SPRAY, SUSPENSION (ML) NASAL
COMMUNITY
Start: 2021-01-01

## 2022-01-01 RX ORDER — ONDANSETRON 4 MG/1
4 TABLET, ORALLY DISINTEGRATING ORAL EVERY 8 HOURS PRN
Status: DISCONTINUED | OUTPATIENT
Start: 2022-01-01 | End: 2022-01-01 | Stop reason: HOSPADM

## 2022-01-01 RX ORDER — MIDAZOLAM HYDROCHLORIDE 1 MG/ML
2 INJECTION INTRAMUSCULAR; INTRAVENOUS
Status: DISCONTINUED | OUTPATIENT
Start: 2022-01-01 | End: 2022-01-01

## 2022-01-01 RX ORDER — DEXMEDETOMIDINE HYDROCHLORIDE 4 UG/ML
.2-1.4 INJECTION, SOLUTION INTRAVENOUS CONTINUOUS
Status: DISCONTINUED | OUTPATIENT
Start: 2022-01-01 | End: 2022-01-01

## 2022-01-01 RX ORDER — MAGNESIUM SULFATE 1 G/100ML
1000 INJECTION INTRAVENOUS PRN
Status: DISCONTINUED | OUTPATIENT
Start: 2022-01-01 | End: 2022-01-01

## 2022-01-01 RX ORDER — HEPARIN SODIUM 1000 [USP'U]/ML
80 INJECTION, SOLUTION INTRAVENOUS; SUBCUTANEOUS PRN
Status: DISCONTINUED | OUTPATIENT
Start: 2022-01-01 | End: 2022-01-01

## 2022-01-01 RX ORDER — FENTANYL CITRATE-0.9 % NACL/PF 10 MCG/ML
12.5-2 PLASTIC BAG, INJECTION (ML) INTRAVENOUS CONTINUOUS
Status: DISCONTINUED | OUTPATIENT
Start: 2022-01-01 | End: 2022-01-01

## 2022-01-01 RX ORDER — ALBUMIN (HUMAN) 12.5 G/50ML
25 SOLUTION INTRAVENOUS ONCE
Status: COMPLETED | OUTPATIENT
Start: 2022-01-01 | End: 2022-01-01

## 2022-01-01 RX ORDER — FUROSEMIDE 10 MG/ML
40 INJECTION INTRAMUSCULAR; INTRAVENOUS ONCE
Status: COMPLETED | OUTPATIENT
Start: 2022-01-01 | End: 2022-01-01

## 2022-01-01 RX ORDER — DEXAMETHASONE SODIUM PHOSPHATE 4 MG/ML
3 INJECTION, SOLUTION INTRA-ARTICULAR; INTRALESIONAL; INTRAMUSCULAR; INTRAVENOUS; SOFT TISSUE EVERY 24 HOURS
Status: DISCONTINUED | OUTPATIENT
Start: 2022-01-01 | End: 2022-01-01

## 2022-01-01 RX ORDER — QUETIAPINE FUMARATE 25 MG/1
25 TABLET, FILM COATED ORAL 2 TIMES DAILY
Status: DISCONTINUED | OUTPATIENT
Start: 2022-01-01 | End: 2022-01-01

## 2022-01-01 RX ORDER — SODIUM CHLORIDE, SODIUM LACTATE, POTASSIUM CHLORIDE, CALCIUM CHLORIDE 600; 310; 30; 20 MG/100ML; MG/100ML; MG/100ML; MG/100ML
INJECTION, SOLUTION INTRAVENOUS CONTINUOUS PRN
Status: DISCONTINUED | OUTPATIENT
Start: 2022-01-01 | End: 2022-01-01 | Stop reason: SDUPTHER

## 2022-01-01 RX ORDER — LEVOFLOXACIN 5 MG/ML
500 INJECTION, SOLUTION INTRAVENOUS EVERY 24 HOURS
Status: COMPLETED | OUTPATIENT
Start: 2022-01-01 | End: 2022-01-01

## 2022-01-01 RX ORDER — LIDOCAINE HYDROCHLORIDE 40 MG/ML
SOLUTION TOPICAL
Status: DISPENSED
Start: 2022-01-01 | End: 2022-01-01

## 2022-01-01 RX ORDER — HEPARIN SODIUM 1000 [USP'U]/ML
80 INJECTION, SOLUTION INTRAVENOUS; SUBCUTANEOUS ONCE
Status: COMPLETED | OUTPATIENT
Start: 2022-01-01 | End: 2022-01-01

## 2022-01-01 RX ORDER — HYDRALAZINE HYDROCHLORIDE 20 MG/ML
10 INJECTION INTRAMUSCULAR; INTRAVENOUS EVERY 6 HOURS PRN
Status: DISCONTINUED | OUTPATIENT
Start: 2022-01-01 | End: 2022-01-01

## 2022-01-01 RX ORDER — DEXAMETHASONE SODIUM PHOSPHATE 10 MG/ML
6 INJECTION, SOLUTION INTRAMUSCULAR; INTRAVENOUS DAILY
Status: COMPLETED | OUTPATIENT
Start: 2022-01-01 | End: 2022-01-01

## 2022-01-01 RX ORDER — FENTANYL CITRATE 50 UG/ML
25 INJECTION, SOLUTION INTRAMUSCULAR; INTRAVENOUS
Status: DISCONTINUED | OUTPATIENT
Start: 2022-01-01 | End: 2022-01-01

## 2022-01-01 RX ORDER — POTASSIUM CHLORIDE 29.8 MG/ML
20 INJECTION INTRAVENOUS PRN
Status: DISCONTINUED | OUTPATIENT
Start: 2022-01-01 | End: 2022-01-01

## 2022-01-01 RX ORDER — 0.9 % SODIUM CHLORIDE 0.9 %
1000 INTRAVENOUS SOLUTION INTRAVENOUS ONCE
Status: COMPLETED | OUTPATIENT
Start: 2022-01-01 | End: 2022-01-01

## 2022-01-01 RX ORDER — CARBOXYMETHYLCELLULOSE SODIUM 10 MG/ML
1 GEL OPHTHALMIC 4 TIMES DAILY
Status: DISCONTINUED | OUTPATIENT
Start: 2022-01-01 | End: 2022-01-01 | Stop reason: HOSPADM

## 2022-01-01 RX ORDER — DIMETHICONE, OXYBENZONE, AND PADIMATE O 2; 2.5; 6.6 G/100G; G/100G; G/100G
STICK TOPICAL
Status: COMPLETED
Start: 2022-01-01 | End: 2022-01-01

## 2022-01-01 RX ORDER — FENTANYL CITRATE-0.9 % NACL/PF 10 MCG/ML
12.5-3 PLASTIC BAG, INJECTION (ML) INTRAVENOUS CONTINUOUS
Status: DISCONTINUED | OUTPATIENT
Start: 2022-01-01 | End: 2022-01-01

## 2022-01-01 RX ORDER — ONDANSETRON 2 MG/ML
INJECTION INTRAMUSCULAR; INTRAVENOUS PRN
Status: DISCONTINUED | OUTPATIENT
Start: 2022-01-01 | End: 2022-01-01 | Stop reason: SDUPTHER

## 2022-01-01 RX ORDER — ACETAZOLAMIDE 500 MG/5ML
250 INJECTION, POWDER, LYOPHILIZED, FOR SOLUTION INTRAVENOUS ONCE
Status: COMPLETED | OUTPATIENT
Start: 2022-01-01 | End: 2022-01-01

## 2022-01-01 RX ORDER — CLOPIDOGREL BISULFATE 75 MG/1
600 TABLET ORAL ONCE
Status: COMPLETED | OUTPATIENT
Start: 2022-01-01 | End: 2022-01-01

## 2022-01-01 RX ORDER — MELOXICAM 15 MG/1
15 TABLET ORAL DAILY PRN
Status: DISCONTINUED | OUTPATIENT
Start: 2022-01-01 | End: 2022-01-01

## 2022-01-01 RX ORDER — ALBUMIN (HUMAN) 12.5 G/50ML
25 SOLUTION INTRAVENOUS EVERY 8 HOURS
Status: COMPLETED | OUTPATIENT
Start: 2022-01-01 | End: 2022-01-01

## 2022-01-01 RX ORDER — 0.9 % SODIUM CHLORIDE 0.9 %
500 INTRAVENOUS SOLUTION INTRAVENOUS ONCE
Status: DISCONTINUED | OUTPATIENT
Start: 2022-01-01 | End: 2022-01-01

## 2022-01-01 RX ORDER — FUROSEMIDE 10 MG/ML
80 INJECTION INTRAMUSCULAR; INTRAVENOUS ONCE
Status: COMPLETED | OUTPATIENT
Start: 2022-01-01 | End: 2022-01-01

## 2022-01-01 RX ORDER — CHLORHEXIDINE GLUCONATE 0.12 MG/ML
15 RINSE ORAL 2 TIMES DAILY
Status: DISCONTINUED | OUTPATIENT
Start: 2022-01-01 | End: 2022-01-01

## 2022-01-01 RX ORDER — QUETIAPINE FUMARATE 25 MG/1
12.5 TABLET, FILM COATED ORAL 2 TIMES DAILY
Status: DISCONTINUED | OUTPATIENT
Start: 2022-01-01 | End: 2022-01-01

## 2022-01-01 RX ORDER — ACETAZOLAMIDE 500 MG/5ML
250 INJECTION, POWDER, LYOPHILIZED, FOR SOLUTION INTRAVENOUS EVERY 6 HOURS
Status: COMPLETED | OUTPATIENT
Start: 2022-01-01 | End: 2022-01-01

## 2022-01-01 RX ORDER — ALBUTEROL SULFATE 2.5 MG/3ML
2.5 SOLUTION RESPIRATORY (INHALATION) EVERY 4 HOURS
Status: DISCONTINUED | OUTPATIENT
Start: 2022-01-01 | End: 2022-01-01 | Stop reason: HOSPADM

## 2022-01-01 RX ORDER — LORAZEPAM 2 MG/ML
1 INJECTION INTRAMUSCULAR EVERY 4 HOURS
Status: DISCONTINUED | OUTPATIENT
Start: 2022-01-01 | End: 2022-01-01

## 2022-01-01 RX ORDER — FUROSEMIDE 10 MG/ML
40 INJECTION INTRAMUSCULAR; INTRAVENOUS DAILY
Status: DISCONTINUED | OUTPATIENT
Start: 2022-01-01 | End: 2022-01-01

## 2022-01-01 RX ORDER — FENTANYL CITRATE 50 UG/ML
50 INJECTION, SOLUTION INTRAMUSCULAR; INTRAVENOUS
Status: DISCONTINUED | OUTPATIENT
Start: 2022-01-01 | End: 2022-01-01

## 2022-01-01 RX ORDER — PROPOFOL 10 MG/ML
INJECTION, EMULSION INTRAVENOUS
Status: COMPLETED
Start: 2022-01-01 | End: 2022-01-01

## 2022-01-01 RX ORDER — DIAZEPAM 10 MG/1
10 TABLET ORAL 3 TIMES DAILY
Status: DISCONTINUED | OUTPATIENT
Start: 2022-01-01 | End: 2022-01-01

## 2022-01-01 RX ORDER — SODIUM CHLORIDE 0.9 % (FLUSH) 0.9 %
5-40 SYRINGE (ML) INJECTION PRN
Status: DISCONTINUED | OUTPATIENT
Start: 2022-01-01 | End: 2022-01-01 | Stop reason: SDUPTHER

## 2022-01-01 RX ORDER — ALBUTEROL SULFATE 90 UG/1
4 AEROSOL, METERED RESPIRATORY (INHALATION) EVERY 4 HOURS PRN
Status: DISCONTINUED | OUTPATIENT
Start: 2022-01-01 | End: 2022-01-01

## 2022-01-01 RX ORDER — DEXAMETHASONE SODIUM PHOSPHATE 10 MG/ML
6 INJECTION, SOLUTION INTRAMUSCULAR; INTRAVENOUS ONCE
Status: COMPLETED | OUTPATIENT
Start: 2022-01-01 | End: 2022-01-01

## 2022-01-01 RX ORDER — QUETIAPINE FUMARATE 25 MG/1
25 TABLET, FILM COATED ORAL ONCE
Status: COMPLETED | OUTPATIENT
Start: 2022-01-01 | End: 2022-01-01

## 2022-01-01 RX ORDER — HEPARIN SODIUM 1000 [USP'U]/ML
2400 INJECTION, SOLUTION INTRAVENOUS; SUBCUTANEOUS ONCE
Status: COMPLETED | OUTPATIENT
Start: 2022-01-01 | End: 2022-01-01

## 2022-01-01 RX ORDER — MELOXICAM 15 MG/1
15 TABLET ORAL DAILY
Status: DISCONTINUED | OUTPATIENT
Start: 2022-01-01 | End: 2022-01-01

## 2022-01-01 RX ORDER — ASPIRIN 81 MG/1
81 TABLET, CHEWABLE ORAL DAILY
Status: DISCONTINUED | OUTPATIENT
Start: 2022-01-01 | End: 2022-01-01

## 2022-01-01 RX ORDER — ASCORBIC ACID 500 MG
1000 TABLET ORAL DAILY
Status: DISCONTINUED | OUTPATIENT
Start: 2022-01-01 | End: 2022-01-01

## 2022-01-01 RX ORDER — COSYNTROPIN 0.25 MG/ML
250 INJECTION, POWDER, FOR SOLUTION INTRAMUSCULAR; INTRAVENOUS ONCE
Status: COMPLETED | OUTPATIENT
Start: 2022-01-01 | End: 2022-01-01

## 2022-01-01 RX ORDER — ACETAMINOPHEN 650 MG/1
650 SUPPOSITORY RECTAL EVERY 6 HOURS PRN
Status: DISCONTINUED | OUTPATIENT
Start: 2022-01-01 | End: 2022-01-01 | Stop reason: HOSPADM

## 2022-01-01 RX ORDER — SODIUM CHLORIDE 9 MG/ML
25 INJECTION, SOLUTION INTRAVENOUS PRN
Status: DISCONTINUED | OUTPATIENT
Start: 2022-01-01 | End: 2022-01-01

## 2022-01-01 RX ORDER — SODIUM CHLORIDE 0.9 % (FLUSH) 0.9 %
5-40 SYRINGE (ML) INJECTION EVERY 12 HOURS SCHEDULED
Status: DISCONTINUED | OUTPATIENT
Start: 2022-01-01 | End: 2022-01-01 | Stop reason: HOSPADM

## 2022-01-01 RX ORDER — MORPHINE SULFATE 4 MG/ML
4 INJECTION, SOLUTION INTRAMUSCULAR; INTRAVENOUS EVERY 4 HOURS PRN
Status: DISCONTINUED | OUTPATIENT
Start: 2022-01-01 | End: 2022-01-01 | Stop reason: HOSPADM

## 2022-01-01 RX ORDER — LORAZEPAM 2 MG/ML
1 INJECTION INTRAMUSCULAR EVERY 4 HOURS PRN
Status: DISCONTINUED | OUTPATIENT
Start: 2022-01-01 | End: 2022-01-01 | Stop reason: HOSPADM

## 2022-01-01 RX ORDER — SODIUM CHLORIDE 9 MG/ML
25 INJECTION, SOLUTION INTRAVENOUS PRN
Status: DISCONTINUED | OUTPATIENT
Start: 2022-01-01 | End: 2022-01-01 | Stop reason: SDUPTHER

## 2022-01-01 RX ORDER — GUAIFENESIN/DEXTROMETHORPHAN 100-10MG/5
5 SYRUP ORAL EVERY 4 HOURS PRN
Status: DISCONTINUED | OUTPATIENT
Start: 2022-01-01 | End: 2022-01-01

## 2022-01-01 RX ORDER — ROCURONIUM BROMIDE 10 MG/ML
INJECTION, SOLUTION INTRAVENOUS PRN
Status: DISCONTINUED | OUTPATIENT
Start: 2022-01-01 | End: 2022-01-01 | Stop reason: SDUPTHER

## 2022-01-01 RX ADMIN — VANCOMYCIN HYDROCHLORIDE 750 MG: 750 INJECTION, POWDER, LYOPHILIZED, FOR SOLUTION INTRAVENOUS at 14:57

## 2022-01-01 RX ADMIN — ATORVASTATIN CALCIUM 10 MG: 10 TABLET, FILM COATED ORAL at 20:22

## 2022-01-01 RX ADMIN — ACETAMINOPHEN 650 MG: 325 TABLET ORAL at 07:02

## 2022-01-01 RX ADMIN — CARBOXYMETHYLCELLULOSE SODIUM 1 DROP: 10 GEL OPHTHALMIC at 13:02

## 2022-01-01 RX ADMIN — CARBOXYMETHYLCELLULOSE SODIUM 1 DROP: 10 GEL OPHTHALMIC at 16:46

## 2022-01-01 RX ADMIN — PROPOFOL 50 MCG/KG/MIN: 10 INJECTION, EMULSION INTRAVENOUS at 01:10

## 2022-01-01 RX ADMIN — ENOXAPARIN SODIUM 30 MG: 100 INJECTION SUBCUTANEOUS at 08:32

## 2022-01-01 RX ADMIN — INSULIN LISPRO 6 UNITS: 100 INJECTION, SOLUTION INTRAVENOUS; SUBCUTANEOUS at 14:13

## 2022-01-01 RX ADMIN — INSULIN LISPRO 1 UNITS: 100 INJECTION, SOLUTION INTRAVENOUS; SUBCUTANEOUS at 12:00

## 2022-01-01 RX ADMIN — CHLORHEXIDINE GLUCONATE 0.12% ORAL RINSE 15 ML: 1.2 LIQUID ORAL at 19:58

## 2022-01-01 RX ADMIN — Medication 1.6 MCG/KG/HR: at 16:02

## 2022-01-01 RX ADMIN — FAMOTIDINE 20 MG: 10 INJECTION, SOLUTION INTRAVENOUS at 09:53

## 2022-01-01 RX ADMIN — INSULIN LISPRO 3 UNITS: 100 INJECTION, SOLUTION INTRAVENOUS; SUBCUTANEOUS at 20:48

## 2022-01-01 RX ADMIN — CARBOXYMETHYLCELLULOSE SODIUM 1 DROP: 10 GEL OPHTHALMIC at 12:09

## 2022-01-01 RX ADMIN — NOREPINEPHRINE BITARTRATE 5 MCG/MIN: 1 INJECTION, SOLUTION, CONCENTRATE INTRAVENOUS at 06:32

## 2022-01-01 RX ADMIN — Medication 1.4 MCG/KG/HR: at 20:53

## 2022-01-01 RX ADMIN — ENOXAPARIN SODIUM 30 MG: 100 INJECTION SUBCUTANEOUS at 08:22

## 2022-01-01 RX ADMIN — INSULIN LISPRO 1 UNITS: 100 INJECTION, SOLUTION INTRAVENOUS; SUBCUTANEOUS at 03:44

## 2022-01-01 RX ADMIN — GUAIFENESIN AND DEXTROMETHORPHAN 5 ML: 100; 10 SYRUP ORAL at 20:20

## 2022-01-01 RX ADMIN — FENTANYL CITRATE 50 MCG: 50 INJECTION INTRAMUSCULAR; INTRAVENOUS at 18:06

## 2022-01-01 RX ADMIN — FAMOTIDINE 20 MG: 10 INJECTION, SOLUTION INTRAVENOUS at 09:48

## 2022-01-01 RX ADMIN — DEXAMETHASONE SODIUM PHOSPHATE 1 MG: 4 INJECTION, SOLUTION INTRA-ARTICULAR; INTRALESIONAL; INTRAMUSCULAR; INTRAVENOUS; SOFT TISSUE at 09:45

## 2022-01-01 RX ADMIN — HEPARIN SODIUM 4880 UNITS: 1000 INJECTION INTRAVENOUS; SUBCUTANEOUS at 15:25

## 2022-01-01 RX ADMIN — METOPROLOL TARTRATE 25 MG: 25 TABLET, FILM COATED ORAL at 19:57

## 2022-01-01 RX ADMIN — CARBOXYMETHYLCELLULOSE SODIUM 1 DROP: 10 GEL OPHTHALMIC at 14:47

## 2022-01-01 RX ADMIN — Medication 175 MCG/HR: at 16:11

## 2022-01-01 RX ADMIN — CHOLECALCIFEROL (VITAMIN D3) 25 MCG (1,000 UNIT) TABLET 2000 UNITS: at 07:58

## 2022-01-01 RX ADMIN — ALBUTEROL SULFATE 2.5 MG: 2.5 SOLUTION RESPIRATORY (INHALATION) at 19:59

## 2022-01-01 RX ADMIN — Medication 1.4 MCG/KG/HR: at 00:20

## 2022-01-01 RX ADMIN — ENOXAPARIN SODIUM 30 MG: 100 INJECTION SUBCUTANEOUS at 20:03

## 2022-01-01 RX ADMIN — ALBUTEROL SULFATE 2.5 MG: 2.5 SOLUTION RESPIRATORY (INHALATION) at 23:03

## 2022-01-01 RX ADMIN — Medication 0.6 MCG/KG/HR: at 03:40

## 2022-01-01 RX ADMIN — Medication 2 MCG/KG/HR: at 02:10

## 2022-01-01 RX ADMIN — CLOPIDOGREL BISULFATE 75 MG: 75 TABLET ORAL at 08:51

## 2022-01-01 RX ADMIN — FAMOTIDINE 20 MG: 10 INJECTION, SOLUTION INTRAVENOUS at 07:44

## 2022-01-01 RX ADMIN — CLOPIDOGREL BISULFATE 75 MG: 75 TABLET ORAL at 07:29

## 2022-01-01 RX ADMIN — MIDAZOLAM HYDROCHLORIDE 2 MG: 2 INJECTION, SOLUTION INTRAMUSCULAR; INTRAVENOUS at 12:20

## 2022-01-01 RX ADMIN — MIDAZOLAM HYDROCHLORIDE 2 MG: 2 INJECTION, SOLUTION INTRAMUSCULAR; INTRAVENOUS at 11:31

## 2022-01-01 RX ADMIN — FAMOTIDINE 20 MG: 10 INJECTION, SOLUTION INTRAVENOUS at 09:08

## 2022-01-01 RX ADMIN — SODIUM CHLORIDE, PRESERVATIVE FREE 10 ML: 5 INJECTION INTRAVENOUS at 08:11

## 2022-01-01 RX ADMIN — Medication 200 MCG/HR: at 10:08

## 2022-01-01 RX ADMIN — VANCOMYCIN HYDROCHLORIDE 750 MG: 750 INJECTION, POWDER, LYOPHILIZED, FOR SOLUTION INTRAVENOUS at 15:09

## 2022-01-01 RX ADMIN — ASPIRIN 81 MG: 81 TABLET, CHEWABLE ORAL at 08:22

## 2022-01-01 RX ADMIN — CLOPIDOGREL BISULFATE 75 MG: 75 TABLET ORAL at 13:00

## 2022-01-01 RX ADMIN — VANCOMYCIN HYDROCHLORIDE 1000 MG: 1 INJECTION, POWDER, LYOPHILIZED, FOR SOLUTION INTRAVENOUS at 00:00

## 2022-01-01 RX ADMIN — CARBOXYMETHYLCELLULOSE SODIUM 1 DROP: 10 GEL OPHTHALMIC at 13:00

## 2022-01-01 RX ADMIN — INSULIN LISPRO 6 UNITS: 100 INJECTION, SOLUTION INTRAVENOUS; SUBCUTANEOUS at 11:36

## 2022-01-01 RX ADMIN — ALBUTEROL SULFATE 2.5 MG: 2.5 SOLUTION RESPIRATORY (INHALATION) at 09:06

## 2022-01-01 RX ADMIN — CHOLECALCIFEROL (VITAMIN D3) 25 MCG (1,000 UNIT) TABLET 2000 UNITS: at 08:27

## 2022-01-01 RX ADMIN — ENOXAPARIN SODIUM 40 MG: 100 INJECTION SUBCUTANEOUS at 20:36

## 2022-01-01 RX ADMIN — PROPOFOL 50 MCG/KG/MIN: 10 INJECTION, EMULSION INTRAVENOUS at 00:46

## 2022-01-01 RX ADMIN — MIDAZOLAM HYDROCHLORIDE 2 MG: 2 INJECTION, SOLUTION INTRAMUSCULAR; INTRAVENOUS at 07:46

## 2022-01-01 RX ADMIN — PHENYLEPHRINE HYDROCHLORIDE 200 MCG: 10 INJECTION INTRAVENOUS at 15:22

## 2022-01-01 RX ADMIN — VANCOMYCIN HYDROCHLORIDE 1000 MG: 1 INJECTION, POWDER, LYOPHILIZED, FOR SOLUTION INTRAVENOUS at 11:31

## 2022-01-01 RX ADMIN — ATORVASTATIN CALCIUM 10 MG: 10 TABLET, FILM COATED ORAL at 20:07

## 2022-01-01 RX ADMIN — SODIUM CHLORIDE, PRESERVATIVE FREE 10 ML: 5 INJECTION INTRAVENOUS at 20:47

## 2022-01-01 RX ADMIN — CHOLECALCIFEROL (VITAMIN D3) 25 MCG (1,000 UNIT) TABLET 2000 UNITS: at 07:29

## 2022-01-01 RX ADMIN — QUETIAPINE FUMARATE 25 MG: 25 TABLET ORAL at 08:31

## 2022-01-01 RX ADMIN — ATORVASTATIN CALCIUM 10 MG: 10 TABLET, FILM COATED ORAL at 20:37

## 2022-01-01 RX ADMIN — DIAZEPAM 10 MG: 10 TABLET ORAL at 14:10

## 2022-01-01 RX ADMIN — CHOLECALCIFEROL (VITAMIN D3) 25 MCG (1,000 UNIT) TABLET 2000 UNITS: at 08:47

## 2022-01-01 RX ADMIN — NOREPINEPHRINE BITARTRATE 9 MCG/MIN: 1 INJECTION, SOLUTION, CONCENTRATE INTRAVENOUS at 14:45

## 2022-01-01 RX ADMIN — BENZONATATE 100 MG: 100 CAPSULE ORAL at 07:02

## 2022-01-01 RX ADMIN — FLUTICASONE PROPIONATE 1 SPRAY: 50 SPRAY, METERED NASAL at 08:11

## 2022-01-01 RX ADMIN — NOREPINEPHRINE BITARTRATE 2 MCG/MIN: 1 INJECTION, SOLUTION, CONCENTRATE INTRAVENOUS at 12:18

## 2022-01-01 RX ADMIN — INSULIN LISPRO 3 UNITS: 100 INJECTION, SOLUTION INTRAVENOUS; SUBCUTANEOUS at 05:56

## 2022-01-01 RX ADMIN — Medication 175 MCG/HR: at 05:24

## 2022-01-01 RX ADMIN — QUETIAPINE FUMARATE 25 MG: 25 TABLET ORAL at 21:10

## 2022-01-01 RX ADMIN — CARBOXYMETHYLCELLULOSE SODIUM 1 DROP: 10 GEL OPHTHALMIC at 20:48

## 2022-01-01 RX ADMIN — CEFEPIME 2000 MG: 2 INJECTION, POWDER, FOR SOLUTION INTRAVENOUS at 15:58

## 2022-01-01 RX ADMIN — BENZONATATE 100 MG: 100 CAPSULE ORAL at 09:17

## 2022-01-01 RX ADMIN — SODIUM CHLORIDE, PRESERVATIVE FREE 10 ML: 5 INJECTION INTRAVENOUS at 08:29

## 2022-01-01 RX ADMIN — FAMOTIDINE 20 MG: 10 INJECTION, SOLUTION INTRAVENOUS at 19:58

## 2022-01-01 RX ADMIN — CHOLECALCIFEROL (VITAMIN D3) 25 MCG (1,000 UNIT) TABLET 2000 UNITS: at 08:11

## 2022-01-01 RX ADMIN — CHLORHEXIDINE GLUCONATE 0.12% ORAL RINSE 15 ML: 1.2 LIQUID ORAL at 20:07

## 2022-01-01 RX ADMIN — SODIUM CHLORIDE, PRESERVATIVE FREE 10 ML: 5 INJECTION INTRAVENOUS at 21:11

## 2022-01-01 RX ADMIN — SODIUM CHLORIDE, PRESERVATIVE FREE 10 ML: 5 INJECTION INTRAVENOUS at 20:20

## 2022-01-01 RX ADMIN — CLOPIDOGREL BISULFATE 75 MG: 75 TABLET ORAL at 08:40

## 2022-01-01 RX ADMIN — MIDAZOLAM HYDROCHLORIDE 2 MG: 2 INJECTION, SOLUTION INTRAMUSCULAR; INTRAVENOUS at 07:33

## 2022-01-01 RX ADMIN — INSULIN LISPRO 1 UNITS: 100 INJECTION, SOLUTION INTRAVENOUS; SUBCUTANEOUS at 04:09

## 2022-01-01 RX ADMIN — ASPIRIN 81 MG: 81 TABLET, CHEWABLE ORAL at 09:08

## 2022-01-01 RX ADMIN — Medication 1.8 MCG/KG/HR: at 06:33

## 2022-01-01 RX ADMIN — DIAZEPAM 10 MG: 10 TABLET ORAL at 07:54

## 2022-01-01 RX ADMIN — ALBUTEROL SULFATE 2.5 MG: 2.5 SOLUTION RESPIRATORY (INHALATION) at 08:06

## 2022-01-01 RX ADMIN — CHLORHEXIDINE GLUCONATE 0.12% ORAL RINSE 15 ML: 1.2 LIQUID ORAL at 08:22

## 2022-01-01 RX ADMIN — CARBOXYMETHYLCELLULOSE SODIUM 1 DROP: 10 GEL OPHTHALMIC at 20:15

## 2022-01-01 RX ADMIN — FAMOTIDINE 20 MG: 10 INJECTION, SOLUTION INTRAVENOUS at 21:05

## 2022-01-01 RX ADMIN — CHOLECALCIFEROL (VITAMIN D3) 25 MCG (1,000 UNIT) TABLET 2000 UNITS: at 08:32

## 2022-01-01 RX ADMIN — PROPOFOL 50 MCG/KG/MIN: 10 INJECTION, EMULSION INTRAVENOUS at 03:54

## 2022-01-01 RX ADMIN — ENOXAPARIN SODIUM 30 MG: 100 INJECTION SUBCUTANEOUS at 19:46

## 2022-01-01 RX ADMIN — QUETIAPINE FUMARATE 25 MG: 25 TABLET ORAL at 20:12

## 2022-01-01 RX ADMIN — CLOPIDOGREL BISULFATE 75 MG: 75 TABLET ORAL at 09:08

## 2022-01-01 RX ADMIN — ATORVASTATIN CALCIUM 10 MG: 10 TABLET, FILM COATED ORAL at 21:10

## 2022-01-01 RX ADMIN — CLOPIDOGREL BISULFATE 75 MG: 75 TABLET ORAL at 08:28

## 2022-01-01 RX ADMIN — ALBUTEROL SULFATE 2.5 MG: 2.5 SOLUTION RESPIRATORY (INHALATION) at 14:56

## 2022-01-01 RX ADMIN — CARBOXYMETHYLCELLULOSE SODIUM 1 DROP: 10 GEL OPHTHALMIC at 21:09

## 2022-01-01 RX ADMIN — MIDAZOLAM HYDROCHLORIDE 2 MG: 2 INJECTION, SOLUTION INTRAMUSCULAR; INTRAVENOUS at 12:38

## 2022-01-01 RX ADMIN — ATORVASTATIN CALCIUM 10 MG: 10 TABLET, FILM COATED ORAL at 20:20

## 2022-01-01 RX ADMIN — SODIUM CHLORIDE, PRESERVATIVE FREE 10 ML: 5 INJECTION INTRAVENOUS at 20:06

## 2022-01-01 RX ADMIN — DIAZEPAM 10 MG: 10 TABLET ORAL at 20:02

## 2022-01-01 RX ADMIN — MIDAZOLAM HYDROCHLORIDE 2 MG: 2 INJECTION, SOLUTION INTRAMUSCULAR; INTRAVENOUS at 12:25

## 2022-01-01 RX ADMIN — FENTANYL CITRATE 50 MCG: 50 INJECTION INTRAMUSCULAR; INTRAVENOUS at 06:35

## 2022-01-01 RX ADMIN — CARBOXYMETHYLCELLULOSE SODIUM 1 DROP: 10 GEL OPHTHALMIC at 20:08

## 2022-01-01 RX ADMIN — CHOLECALCIFEROL (VITAMIN D3) 25 MCG (1,000 UNIT) TABLET 2000 UNITS: at 08:04

## 2022-01-01 RX ADMIN — Medication 175 MCG/HR: at 21:42

## 2022-01-01 RX ADMIN — MIDAZOLAM HYDROCHLORIDE 2 MG: 2 INJECTION, SOLUTION INTRAMUSCULAR; INTRAVENOUS at 08:19

## 2022-01-01 RX ADMIN — MIDAZOLAM HYDROCHLORIDE 2 MG: 2 INJECTION, SOLUTION INTRAMUSCULAR; INTRAVENOUS at 22:47

## 2022-01-01 RX ADMIN — ACETAMINOPHEN 650 MG: 325 TABLET ORAL at 02:52

## 2022-01-01 RX ADMIN — VANCOMYCIN HYDROCHLORIDE 750 MG: 750 INJECTION, POWDER, LYOPHILIZED, FOR SOLUTION INTRAVENOUS at 01:35

## 2022-01-01 RX ADMIN — SODIUM CHLORIDE, PRESERVATIVE FREE 10 ML: 5 INJECTION INTRAVENOUS at 07:54

## 2022-01-01 RX ADMIN — SODIUM CHLORIDE, PRESERVATIVE FREE 10 ML: 5 INJECTION INTRAVENOUS at 08:30

## 2022-01-01 RX ADMIN — DEXAMETHASONE SODIUM PHOSPHATE 1 MG: 4 INJECTION, SOLUTION INTRA-ARTICULAR; INTRALESIONAL; INTRAMUSCULAR; INTRAVENOUS; SOFT TISSUE at 10:39

## 2022-01-01 RX ADMIN — ALBUTEROL SULFATE 2.5 MG: 2.5 SOLUTION RESPIRATORY (INHALATION) at 19:26

## 2022-01-01 RX ADMIN — FAMOTIDINE 20 MG: 10 INJECTION, SOLUTION INTRAVENOUS at 21:08

## 2022-01-01 RX ADMIN — ENOXAPARIN SODIUM 40 MG: 100 INJECTION SUBCUTANEOUS at 23:33

## 2022-01-01 RX ADMIN — SODIUM CHLORIDE, PRESERVATIVE FREE 10 ML: 5 INJECTION INTRAVENOUS at 09:00

## 2022-01-01 RX ADMIN — CHLORHEXIDINE GLUCONATE 0.12% ORAL RINSE 15 ML: 1.2 LIQUID ORAL at 20:43

## 2022-01-01 RX ADMIN — PROPOFOL INJECTABLE EMULSION 50 MCG/KG/MIN: 10 INJECTION, EMULSION INTRAVENOUS at 08:28

## 2022-01-01 RX ADMIN — DEXAMETHASONE SODIUM PHOSPHATE 3 MG: 4 INJECTION, SOLUTION INTRA-ARTICULAR; INTRALESIONAL; INTRAMUSCULAR; INTRAVENOUS; SOFT TISSUE at 13:08

## 2022-01-01 RX ADMIN — SODIUM CHLORIDE, PRESERVATIVE FREE 10 ML: 5 INJECTION INTRAVENOUS at 08:47

## 2022-01-01 RX ADMIN — FAMOTIDINE 20 MG: 10 INJECTION, SOLUTION INTRAVENOUS at 08:42

## 2022-01-01 RX ADMIN — DIAZEPAM 10 MG: 10 TABLET ORAL at 09:35

## 2022-01-01 RX ADMIN — INSULIN LISPRO 1 UNITS: 100 INJECTION, SOLUTION INTRAVENOUS; SUBCUTANEOUS at 08:46

## 2022-01-01 RX ADMIN — ENOXAPARIN SODIUM 30 MG: 100 INJECTION SUBCUTANEOUS at 20:12

## 2022-01-01 RX ADMIN — DIAZEPAM 5 MG: 5 TABLET ORAL at 13:01

## 2022-01-01 RX ADMIN — PROPOFOL INJECTABLE EMULSION 15 MCG/KG/MIN: 10 INJECTION, EMULSION INTRAVENOUS at 02:20

## 2022-01-01 RX ADMIN — INSULIN LISPRO 3 UNITS: 100 INJECTION, SOLUTION INTRAVENOUS; SUBCUTANEOUS at 11:34

## 2022-01-01 RX ADMIN — ENOXAPARIN SODIUM 30 MG: 100 INJECTION SUBCUTANEOUS at 08:47

## 2022-01-01 RX ADMIN — ALBUTEROL SULFATE 2.5 MG: 2.5 SOLUTION RESPIRATORY (INHALATION) at 22:31

## 2022-01-01 RX ADMIN — Medication 1.4 MCG/KG/HR: at 22:36

## 2022-01-01 RX ADMIN — Medication 1.4 MCG/KG/HR: at 00:29

## 2022-01-01 RX ADMIN — Medication 2 MCG/KG/HR: at 18:13

## 2022-01-01 RX ADMIN — ASPIRIN 81 MG: 81 TABLET, CHEWABLE ORAL at 08:32

## 2022-01-01 RX ADMIN — OXYCODONE HYDROCHLORIDE AND ACETAMINOPHEN 1000 MG: 500 TABLET ORAL at 12:51

## 2022-01-01 RX ADMIN — PROPOFOL 20 MCG/KG/MIN: 10 INJECTION, EMULSION INTRAVENOUS at 06:41

## 2022-01-01 RX ADMIN — INSULIN LISPRO 2 UNITS: 100 INJECTION, SOLUTION INTRAVENOUS; SUBCUTANEOUS at 08:10

## 2022-01-01 RX ADMIN — ASPIRIN 325 MG: 325 TABLET, COATED ORAL at 04:00

## 2022-01-01 RX ADMIN — VANCOMYCIN HYDROCHLORIDE 1000 MG: 1 INJECTION, POWDER, LYOPHILIZED, FOR SOLUTION INTRAVENOUS at 12:31

## 2022-01-01 RX ADMIN — ONDANSETRON 4 MG: 2 INJECTION INTRAMUSCULAR; INTRAVENOUS at 07:14

## 2022-01-01 RX ADMIN — SODIUM CHLORIDE: 9 INJECTION, SOLUTION INTRAVENOUS at 08:02

## 2022-01-01 RX ADMIN — ALBUTEROL SULFATE 2.5 MG: 2.5 SOLUTION RESPIRATORY (INHALATION) at 14:25

## 2022-01-01 RX ADMIN — DEXAMETHASONE SODIUM PHOSPHATE 3 MG: 4 INJECTION, SOLUTION INTRA-ARTICULAR; INTRALESIONAL; INTRAMUSCULAR; INTRAVENOUS; SOFT TISSUE at 10:15

## 2022-01-01 RX ADMIN — Medication 1.5 MCG/KG/HR: at 08:09

## 2022-01-01 RX ADMIN — Medication 175 MCG/HR: at 11:15

## 2022-01-01 RX ADMIN — OXYCODONE HYDROCHLORIDE AND ACETAMINOPHEN 1000 MG: 500 TABLET ORAL at 08:31

## 2022-01-01 RX ADMIN — PROPOFOL INJECTABLE EMULSION 10 MCG/KG/MIN: 10 INJECTION, EMULSION INTRAVENOUS at 03:38

## 2022-01-01 RX ADMIN — MIDAZOLAM HYDROCHLORIDE 2 MG: 2 INJECTION, SOLUTION INTRAMUSCULAR; INTRAVENOUS at 10:14

## 2022-01-01 RX ADMIN — OXYCODONE HYDROCHLORIDE AND ACETAMINOPHEN 1000 MG: 500 TABLET ORAL at 08:51

## 2022-01-01 RX ADMIN — CHOLECALCIFEROL (VITAMIN D3) 25 MCG (1,000 UNIT) TABLET 2000 UNITS: at 09:05

## 2022-01-01 RX ADMIN — DEXAMETHASONE SODIUM PHOSPHATE 3 MG: 4 INJECTION, SOLUTION INTRA-ARTICULAR; INTRALESIONAL; INTRAMUSCULAR; INTRAVENOUS; SOFT TISSUE at 10:36

## 2022-01-01 RX ADMIN — MIDAZOLAM HYDROCHLORIDE 2 MG: 2 INJECTION, SOLUTION INTRAMUSCULAR; INTRAVENOUS at 00:22

## 2022-01-01 RX ADMIN — SODIUM CHLORIDE, PRESERVATIVE FREE 10 ML: 5 INJECTION INTRAVENOUS at 21:12

## 2022-01-01 RX ADMIN — ACETAZOLAMIDE 250 MG: 500 INJECTION, POWDER, LYOPHILIZED, FOR SOLUTION INTRAVENOUS at 09:15

## 2022-01-01 RX ADMIN — CHOLECALCIFEROL (VITAMIN D3) 25 MCG (1,000 UNIT) TABLET 2000 UNITS: at 09:35

## 2022-01-01 RX ADMIN — Medication 1.4 MCG/KG/HR: at 11:57

## 2022-01-01 RX ADMIN — PROPOFOL 50 MCG/KG/MIN: 10 INJECTION, EMULSION INTRAVENOUS at 11:38

## 2022-01-01 RX ADMIN — Medication 250 MCG/HR: at 03:13

## 2022-01-01 RX ADMIN — Medication 25 MCG/HR: at 04:53

## 2022-01-01 RX ADMIN — CHLORHEXIDINE GLUCONATE 0.12% ORAL RINSE 15 ML: 1.2 LIQUID ORAL at 19:46

## 2022-01-01 RX ADMIN — FAMOTIDINE 20 MG: 10 INJECTION, SOLUTION INTRAVENOUS at 20:35

## 2022-01-01 RX ADMIN — FAMOTIDINE 20 MG: 10 INJECTION, SOLUTION INTRAVENOUS at 20:36

## 2022-01-01 RX ADMIN — CHLORHEXIDINE GLUCONATE 0.12% ORAL RINSE 15 ML: 1.2 LIQUID ORAL at 20:20

## 2022-01-01 RX ADMIN — MIDAZOLAM HYDROCHLORIDE 2 MG: 2 INJECTION, SOLUTION INTRAMUSCULAR; INTRAVENOUS at 09:02

## 2022-01-01 RX ADMIN — FAMOTIDINE 20 MG: 10 INJECTION, SOLUTION INTRAVENOUS at 09:01

## 2022-01-01 RX ADMIN — ACETAMINOPHEN 650 MG: 325 TABLET ORAL at 16:26

## 2022-01-01 RX ADMIN — ENOXAPARIN SODIUM 30 MG: 100 INJECTION SUBCUTANEOUS at 20:48

## 2022-01-01 RX ADMIN — INSULIN LISPRO 3 UNITS: 100 INJECTION, SOLUTION INTRAVENOUS; SUBCUTANEOUS at 19:29

## 2022-01-01 RX ADMIN — ACETAZOLAMIDE 250 MG: 500 INJECTION, POWDER, LYOPHILIZED, FOR SOLUTION INTRAVENOUS at 08:47

## 2022-01-01 RX ADMIN — DEXAMETHASONE SODIUM PHOSPHATE 2 MG: 4 INJECTION, SOLUTION INTRA-ARTICULAR; INTRALESIONAL; INTRAMUSCULAR; INTRAVENOUS; SOFT TISSUE at 11:20

## 2022-01-01 RX ADMIN — CHOLECALCIFEROL (VITAMIN D3) 25 MCG (1,000 UNIT) TABLET 2000 UNITS: at 07:44

## 2022-01-01 RX ADMIN — INSULIN LISPRO 3 UNITS: 100 INJECTION, SOLUTION INTRAVENOUS; SUBCUTANEOUS at 02:02

## 2022-01-01 RX ADMIN — Medication 1.8 MCG/KG/HR: at 22:22

## 2022-01-01 RX ADMIN — Medication 175 MCG/HR: at 22:53

## 2022-01-01 RX ADMIN — MIDAZOLAM HYDROCHLORIDE 2 MG: 2 INJECTION, SOLUTION INTRAMUSCULAR; INTRAVENOUS at 11:52

## 2022-01-01 RX ADMIN — Medication 225 MCG/HR: at 19:02

## 2022-01-01 RX ADMIN — INSULIN LISPRO 1 UNITS: 100 INJECTION, SOLUTION INTRAVENOUS; SUBCUTANEOUS at 00:37

## 2022-01-01 RX ADMIN — ASPIRIN 81 MG: 81 TABLET, CHEWABLE ORAL at 07:33

## 2022-01-01 RX ADMIN — LINEZOLID 600 MG: 600 INJECTION, SOLUTION INTRAVENOUS at 23:17

## 2022-01-01 RX ADMIN — CARBOXYMETHYLCELLULOSE SODIUM 1 DROP: 10 GEL OPHTHALMIC at 09:54

## 2022-01-01 RX ADMIN — QUETIAPINE FUMARATE 25 MG: 25 TABLET ORAL at 20:02

## 2022-01-01 RX ADMIN — HEPARIN SODIUM 2400 UNITS: 1000 INJECTION INTRAVENOUS; SUBCUTANEOUS at 13:22

## 2022-01-01 RX ADMIN — ASPIRIN 81 MG: 81 TABLET, CHEWABLE ORAL at 08:28

## 2022-01-01 RX ADMIN — ASPIRIN 81 MG: 81 TABLET, CHEWABLE ORAL at 09:36

## 2022-01-01 RX ADMIN — CLOPIDOGREL BISULFATE 75 MG: 75 TABLET ORAL at 09:05

## 2022-01-01 RX ADMIN — VANCOMYCIN HYDROCHLORIDE 750 MG: 750 INJECTION, POWDER, LYOPHILIZED, FOR SOLUTION INTRAVENOUS at 15:55

## 2022-01-01 RX ADMIN — NOREPINEPHRINE BITARTRATE 5 MCG/MIN: 1 INJECTION, SOLUTION, CONCENTRATE INTRAVENOUS at 07:01

## 2022-01-01 RX ADMIN — Medication 50 MCG/HR: at 11:13

## 2022-01-01 RX ADMIN — QUETIAPINE FUMARATE 25 MG: 25 TABLET ORAL at 20:55

## 2022-01-01 RX ADMIN — FLUTICASONE PROPIONATE 1 SPRAY: 50 SPRAY, METERED NASAL at 08:07

## 2022-01-01 RX ADMIN — Medication 250 MCG/HR: at 18:40

## 2022-01-01 RX ADMIN — Medication 1.8 MCG/KG/HR: at 01:33

## 2022-01-01 RX ADMIN — HEPARIN SODIUM 2440 UNITS: 1000 INJECTION INTRAVENOUS; SUBCUTANEOUS at 16:20

## 2022-01-01 RX ADMIN — Medication 2 MCG/KG/HR: at 15:38

## 2022-01-01 RX ADMIN — SODIUM CHLORIDE, PRESERVATIVE FREE 10 ML: 5 INJECTION INTRAVENOUS at 20:13

## 2022-01-01 RX ADMIN — CHOLECALCIFEROL (VITAMIN D3) 25 MCG (1,000 UNIT) TABLET 2000 UNITS: at 08:41

## 2022-01-01 RX ADMIN — INSULIN LISPRO 3 UNITS: 100 INJECTION, SOLUTION INTRAVENOUS; SUBCUTANEOUS at 23:25

## 2022-01-01 RX ADMIN — MIDAZOLAM HYDROCHLORIDE 2 MG: 2 INJECTION, SOLUTION INTRAMUSCULAR; INTRAVENOUS at 08:56

## 2022-01-01 RX ADMIN — PROPOFOL 50 MCG/KG/MIN: 10 INJECTION, EMULSION INTRAVENOUS at 12:28

## 2022-01-01 RX ADMIN — SODIUM CHLORIDE, PRESERVATIVE FREE 10 ML: 5 INJECTION INTRAVENOUS at 20:03

## 2022-01-01 RX ADMIN — CLOPIDOGREL BISULFATE 75 MG: 75 TABLET ORAL at 07:33

## 2022-01-01 RX ADMIN — INSULIN LISPRO 3 UNITS: 100 INJECTION, SOLUTION INTRAVENOUS; SUBCUTANEOUS at 10:54

## 2022-01-01 RX ADMIN — DEXAMETHASONE SODIUM PHOSPHATE 6 MG: 10 INJECTION INTRAMUSCULAR; INTRAVENOUS at 08:51

## 2022-01-01 RX ADMIN — GUAIFENESIN AND DEXTROMETHORPHAN 5 ML: 100; 10 SYRUP ORAL at 07:02

## 2022-01-01 RX ADMIN — INSULIN LISPRO 6 UNITS: 100 INJECTION, SOLUTION INTRAVENOUS; SUBCUTANEOUS at 16:15

## 2022-01-01 RX ADMIN — INSULIN LISPRO 3 UNITS: 100 INJECTION, SOLUTION INTRAVENOUS; SUBCUTANEOUS at 22:36

## 2022-01-01 RX ADMIN — ENOXAPARIN SODIUM 30 MG: 100 INJECTION SUBCUTANEOUS at 21:05

## 2022-01-01 RX ADMIN — WATER: 1 INJECTION INTRAMUSCULAR; INTRAVENOUS; SUBCUTANEOUS at 18:00

## 2022-01-01 RX ADMIN — ACETAMINOPHEN 650 MG: 325 TABLET ORAL at 08:11

## 2022-01-01 RX ADMIN — Medication 2 MCG/KG/HR: at 21:20

## 2022-01-01 RX ADMIN — INSULIN LISPRO 1 UNITS: 100 INJECTION, SOLUTION INTRAVENOUS; SUBCUTANEOUS at 21:08

## 2022-01-01 RX ADMIN — CHOLECALCIFEROL (VITAMIN D3) 25 MCG (1,000 UNIT) TABLET 2000 UNITS: at 10:54

## 2022-01-01 RX ADMIN — DEXAMETHASONE SODIUM PHOSPHATE 3 MG: 4 INJECTION, SOLUTION INTRA-ARTICULAR; INTRALESIONAL; INTRAMUSCULAR; INTRAVENOUS; SOFT TISSUE at 11:31

## 2022-01-01 RX ADMIN — Medication 250 MCG/HR: at 22:09

## 2022-01-01 RX ADMIN — FLUTICASONE PROPIONATE 1 SPRAY: 50 SPRAY, METERED NASAL at 12:05

## 2022-01-01 RX ADMIN — INSULIN LISPRO 3 UNITS: 100 INJECTION, SOLUTION INTRAVENOUS; SUBCUTANEOUS at 19:59

## 2022-01-01 RX ADMIN — CARBOXYMETHYLCELLULOSE SODIUM 1 DROP: 10 GEL OPHTHALMIC at 08:31

## 2022-01-01 RX ADMIN — INSULIN LISPRO 1 UNITS: 100 INJECTION, SOLUTION INTRAVENOUS; SUBCUTANEOUS at 08:24

## 2022-01-01 RX ADMIN — CARBOXYMETHYLCELLULOSE SODIUM 1 DROP: 10 GEL OPHTHALMIC at 08:42

## 2022-01-01 RX ADMIN — PROPOFOL 50 MCG/KG/MIN: 10 INJECTION, EMULSION INTRAVENOUS at 15:59

## 2022-01-01 RX ADMIN — Medication 1.4 MCG/KG/HR: at 01:52

## 2022-01-01 RX ADMIN — Medication 175 MCG/HR: at 22:43

## 2022-01-01 RX ADMIN — Medication 175 MCG/HR: at 22:50

## 2022-01-01 RX ADMIN — POTASSIUM BICARBONATE 60 MEQ: 782 TABLET, EFFERVESCENT ORAL at 17:20

## 2022-01-01 RX ADMIN — INSULIN LISPRO 1 UNITS: 100 INJECTION, SOLUTION INTRAVENOUS; SUBCUTANEOUS at 11:30

## 2022-01-01 RX ADMIN — ALBUTEROL SULFATE 2.5 MG: 2.5 SOLUTION RESPIRATORY (INHALATION) at 11:32

## 2022-01-01 RX ADMIN — CHLORHEXIDINE GLUCONATE 0.12% ORAL RINSE 15 ML: 1.2 LIQUID ORAL at 09:37

## 2022-01-01 RX ADMIN — SODIUM CHLORIDE, PRESERVATIVE FREE 10 ML: 5 INJECTION INTRAVENOUS at 20:21

## 2022-01-01 RX ADMIN — MIDAZOLAM HYDROCHLORIDE 2 MG: 2 INJECTION, SOLUTION INTRAMUSCULAR; INTRAVENOUS at 12:35

## 2022-01-01 RX ADMIN — FAMOTIDINE 20 MG: 10 INJECTION, SOLUTION INTRAVENOUS at 09:05

## 2022-01-01 RX ADMIN — MIDAZOLAM HYDROCHLORIDE 2 MG: 2 INJECTION, SOLUTION INTRAMUSCULAR; INTRAVENOUS at 16:20

## 2022-01-01 RX ADMIN — CHOLECALCIFEROL (VITAMIN D3) 25 MCG (1,000 UNIT) TABLET 2000 UNITS: at 08:28

## 2022-01-01 RX ADMIN — SODIUM CHLORIDE, PRESERVATIVE FREE 10 ML: 5 INJECTION INTRAVENOUS at 09:54

## 2022-01-01 RX ADMIN — INSULIN LISPRO 3 UNITS: 100 INJECTION, SOLUTION INTRAVENOUS; SUBCUTANEOUS at 15:23

## 2022-01-01 RX ADMIN — INSULIN LISPRO 3 UNITS: 100 INJECTION, SOLUTION INTRAVENOUS; SUBCUTANEOUS at 22:18

## 2022-01-01 RX ADMIN — FAMOTIDINE 20 MG: 10 INJECTION, SOLUTION INTRAVENOUS at 08:27

## 2022-01-01 RX ADMIN — PROPOFOL 20 MCG/KG/MIN: 10 INJECTION, EMULSION INTRAVENOUS at 09:59

## 2022-01-01 RX ADMIN — INSULIN LISPRO 1 UNITS: 100 INJECTION, SOLUTION INTRAVENOUS; SUBCUTANEOUS at 19:49

## 2022-01-01 RX ADMIN — PROPOFOL 10 MCG/KG/MIN: 10 INJECTION, EMULSION INTRAVENOUS at 12:55

## 2022-01-01 RX ADMIN — PROPOFOL INJECTABLE EMULSION 50 MCG/KG/MIN: 10 INJECTION, EMULSION INTRAVENOUS at 15:45

## 2022-01-01 RX ADMIN — INSULIN LISPRO 3 UNITS: 100 INJECTION, SOLUTION INTRAVENOUS; SUBCUTANEOUS at 23:51

## 2022-01-01 RX ADMIN — PROPOFOL 20 MCG/KG/MIN: 10 INJECTION, EMULSION INTRAVENOUS at 05:35

## 2022-01-01 RX ADMIN — INSULIN LISPRO 3 UNITS: 100 INJECTION, SOLUTION INTRAVENOUS; SUBCUTANEOUS at 13:02

## 2022-01-01 RX ADMIN — Medication 1.5 MCG/KG/HR: at 15:46

## 2022-01-01 RX ADMIN — INSULIN LISPRO 1 UNITS: 100 INJECTION, SOLUTION INTRAVENOUS; SUBCUTANEOUS at 00:28

## 2022-01-01 RX ADMIN — CHLORHEXIDINE GLUCONATE 0.12% ORAL RINSE 15 ML: 1.2 LIQUID ORAL at 09:02

## 2022-01-01 RX ADMIN — FAMOTIDINE 20 MG: 10 INJECTION, SOLUTION INTRAVENOUS at 19:59

## 2022-01-01 RX ADMIN — Medication 225 MCG/HR: at 15:57

## 2022-01-01 RX ADMIN — FENTANYL CITRATE 25 MCG: 50 INJECTION INTRAMUSCULAR; INTRAVENOUS at 21:30

## 2022-01-01 RX ADMIN — MIDAZOLAM HYDROCHLORIDE 2 MG: 2 INJECTION, SOLUTION INTRAMUSCULAR; INTRAVENOUS at 23:15

## 2022-01-01 RX ADMIN — INSULIN LISPRO 6 UNITS: 100 INJECTION, SOLUTION INTRAVENOUS; SUBCUTANEOUS at 17:25

## 2022-01-01 RX ADMIN — FENTANYL CITRATE 50 MCG: 50 INJECTION, SOLUTION INTRAMUSCULAR; INTRAVENOUS at 14:23

## 2022-01-01 RX ADMIN — ACETAZOLAMIDE 250 MG: 500 INJECTION, POWDER, LYOPHILIZED, FOR SOLUTION INTRAVENOUS at 09:44

## 2022-01-01 RX ADMIN — QUETIAPINE FUMARATE 25 MG: 25 TABLET ORAL at 08:07

## 2022-01-01 RX ADMIN — ACETAMINOPHEN 650 MG: 325 TABLET ORAL at 08:59

## 2022-01-01 RX ADMIN — ACETAZOLAMIDE 250 MG: 500 INJECTION, POWDER, LYOPHILIZED, FOR SOLUTION INTRAVENOUS at 20:20

## 2022-01-01 RX ADMIN — SODIUM CHLORIDE, PRESERVATIVE FREE 10 ML: 5 INJECTION INTRAVENOUS at 09:59

## 2022-01-01 RX ADMIN — SODIUM CHLORIDE, PRESERVATIVE FREE 10 ML: 5 INJECTION INTRAVENOUS at 08:03

## 2022-01-01 RX ADMIN — ASPIRIN 81 MG: 81 TABLET, CHEWABLE ORAL at 09:00

## 2022-01-01 RX ADMIN — HEPARIN SODIUM 2440 UNITS: 1000 INJECTION INTRAVENOUS; SUBCUTANEOUS at 23:09

## 2022-01-01 RX ADMIN — ATORVASTATIN CALCIUM 10 MG: 10 TABLET, FILM COATED ORAL at 23:33

## 2022-01-01 RX ADMIN — QUETIAPINE FUMARATE 25 MG: 25 TABLET ORAL at 20:04

## 2022-01-01 RX ADMIN — ALBUTEROL SULFATE 2.5 MG: 2.5 SOLUTION RESPIRATORY (INHALATION) at 23:34

## 2022-01-01 RX ADMIN — ENOXAPARIN SODIUM 30 MG: 100 INJECTION SUBCUTANEOUS at 20:27

## 2022-01-01 RX ADMIN — OXYCODONE HYDROCHLORIDE AND ACETAMINOPHEN 1000 MG: 500 TABLET ORAL at 09:08

## 2022-01-01 RX ADMIN — SODIUM CHLORIDE, PRESERVATIVE FREE 10 ML: 5 INJECTION INTRAVENOUS at 09:49

## 2022-01-01 RX ADMIN — NOREPINEPHRINE BITARTRATE 4 MCG/MIN: 1 INJECTION, SOLUTION, CONCENTRATE INTRAVENOUS at 11:01

## 2022-01-01 RX ADMIN — ATORVASTATIN CALCIUM 10 MG: 10 TABLET, FILM COATED ORAL at 19:56

## 2022-01-01 RX ADMIN — OXYCODONE HYDROCHLORIDE AND ACETAMINOPHEN 1000 MG: 500 TABLET ORAL at 08:12

## 2022-01-01 RX ADMIN — DIAZEPAM 10 MG: 10 TABLET ORAL at 14:09

## 2022-01-01 RX ADMIN — ENOXAPARIN SODIUM 30 MG: 100 INJECTION SUBCUTANEOUS at 20:35

## 2022-01-01 RX ADMIN — SODIUM CHLORIDE, PRESERVATIVE FREE 10 ML: 5 INJECTION INTRAVENOUS at 21:09

## 2022-01-01 RX ADMIN — QUETIAPINE FUMARATE 12.5 MG: 25 TABLET ORAL at 08:32

## 2022-01-01 RX ADMIN — CHLORHEXIDINE GLUCONATE 0.12% ORAL RINSE 15 ML: 1.2 LIQUID ORAL at 20:02

## 2022-01-01 RX ADMIN — Medication 50 MCG/HR: at 08:29

## 2022-01-01 RX ADMIN — FAMOTIDINE 20 MG: 10 INJECTION, SOLUTION INTRAVENOUS at 09:36

## 2022-01-01 RX ADMIN — INSULIN LISPRO 3 UNITS: 100 INJECTION, SOLUTION INTRAVENOUS; SUBCUTANEOUS at 02:00

## 2022-01-01 RX ADMIN — CARBOXYMETHYLCELLULOSE SODIUM 1 DROP: 10 GEL OPHTHALMIC at 08:35

## 2022-01-01 RX ADMIN — DEXAMETHASONE SODIUM PHOSPHATE 6 MG: 10 INJECTION INTRAMUSCULAR; INTRAVENOUS at 09:18

## 2022-01-01 RX ADMIN — LEVOFLOXACIN 500 MG: 500 INJECTION, SOLUTION INTRAVENOUS at 10:23

## 2022-01-01 RX ADMIN — QUETIAPINE FUMARATE 25 MG: 25 TABLET ORAL at 07:44

## 2022-01-01 RX ADMIN — SODIUM CHLORIDE, PRESERVATIVE FREE 10 ML: 5 INJECTION INTRAVENOUS at 19:47

## 2022-01-01 RX ADMIN — CHOLECALCIFEROL (VITAMIN D3) 25 MCG (1,000 UNIT) TABLET 2000 UNITS: at 08:35

## 2022-01-01 RX ADMIN — ENOXAPARIN SODIUM 30 MG: 100 INJECTION SUBCUTANEOUS at 09:02

## 2022-01-01 RX ADMIN — ATORVASTATIN CALCIUM 10 MG: 10 TABLET, FILM COATED ORAL at 20:23

## 2022-01-01 RX ADMIN — DIAZEPAM 10 MG: 10 TABLET ORAL at 20:55

## 2022-01-01 RX ADMIN — LINEZOLID 600 MG: 600 INJECTION, SOLUTION INTRAVENOUS at 23:19

## 2022-01-01 RX ADMIN — INSULIN LISPRO 3 UNITS: 100 INJECTION, SOLUTION INTRAVENOUS; SUBCUTANEOUS at 09:15

## 2022-01-01 RX ADMIN — OXYCODONE HYDROCHLORIDE AND ACETAMINOPHEN 1000 MG: 500 TABLET ORAL at 08:11

## 2022-01-01 RX ADMIN — Medication 250 MCG/HR: at 14:09

## 2022-01-01 RX ADMIN — Medication 100 MCG/HR: at 16:52

## 2022-01-01 RX ADMIN — INSULIN LISPRO 3 UNITS: 100 INJECTION, SOLUTION INTRAVENOUS; SUBCUTANEOUS at 08:47

## 2022-01-01 RX ADMIN — INSULIN LISPRO 3 UNITS: 100 INJECTION, SOLUTION INTRAVENOUS; SUBCUTANEOUS at 08:17

## 2022-01-01 RX ADMIN — Medication 200 MCG/HR: at 09:38

## 2022-01-01 RX ADMIN — PROPOFOL INJECTABLE EMULSION 50 MCG/KG/MIN: 10 INJECTION, EMULSION INTRAVENOUS at 07:50

## 2022-01-01 RX ADMIN — CEFEPIME 2000 MG: 2 INJECTION, POWDER, FOR SOLUTION INTRAVENOUS at 05:12

## 2022-01-01 RX ADMIN — BARICITINIB 4 MG: 2 TABLET, FILM COATED ORAL at 08:31

## 2022-01-01 RX ADMIN — OXYCODONE HYDROCHLORIDE AND ACETAMINOPHEN 1000 MG: 500 TABLET ORAL at 07:44

## 2022-01-01 RX ADMIN — ENOXAPARIN SODIUM 30 MG: 100 INJECTION SUBCUTANEOUS at 19:58

## 2022-01-01 RX ADMIN — Medication 50 MCG/HR: at 16:12

## 2022-01-01 RX ADMIN — INSULIN LISPRO 2 UNITS: 100 INJECTION, SOLUTION INTRAVENOUS; SUBCUTANEOUS at 20:54

## 2022-01-01 RX ADMIN — POTASSIUM BICARBONATE 20 MEQ: 782 TABLET, EFFERVESCENT ORAL at 09:49

## 2022-01-01 RX ADMIN — DIAZEPAM 10 MG: 10 TABLET ORAL at 07:29

## 2022-01-01 RX ADMIN — CHOLECALCIFEROL (VITAMIN D3) 25 MCG (1,000 UNIT) TABLET 2000 UNITS: at 08:22

## 2022-01-01 RX ADMIN — CARBOXYMETHYLCELLULOSE SODIUM 1 DROP: 10 GEL OPHTHALMIC at 12:50

## 2022-01-01 RX ADMIN — SODIUM CHLORIDE, PRESERVATIVE FREE 10 ML: 5 INJECTION INTRAVENOUS at 07:49

## 2022-01-01 RX ADMIN — ATORVASTATIN CALCIUM 10 MG: 10 TABLET, FILM COATED ORAL at 21:16

## 2022-01-01 RX ADMIN — CARBOXYMETHYLCELLULOSE SODIUM 1 DROP: 10 GEL OPHTHALMIC at 19:57

## 2022-01-01 RX ADMIN — SODIUM CHLORIDE, PRESERVATIVE FREE 5 ML: 5 INJECTION INTRAVENOUS at 10:28

## 2022-01-01 RX ADMIN — CHLORHEXIDINE GLUCONATE 0.12% ORAL RINSE 15 ML: 1.2 LIQUID ORAL at 08:13

## 2022-01-01 RX ADMIN — DIAZEPAM 10 MG: 10 TABLET ORAL at 14:20

## 2022-01-01 RX ADMIN — PROPOFOL 50 MCG/KG/MIN: 10 INJECTION, EMULSION INTRAVENOUS at 13:28

## 2022-01-01 RX ADMIN — Medication 200 MCG/HR: at 17:39

## 2022-01-01 RX ADMIN — FUROSEMIDE 40 MG: 10 INJECTION, SOLUTION INTRAMUSCULAR; INTRAVENOUS at 09:53

## 2022-01-01 RX ADMIN — ALBUTEROL SULFATE 2.5 MG: 2.5 SOLUTION RESPIRATORY (INHALATION) at 19:40

## 2022-01-01 RX ADMIN — POTASSIUM BICARBONATE 40 MEQ: 782 TABLET, EFFERVESCENT ORAL at 09:54

## 2022-01-01 RX ADMIN — POTASSIUM CHLORIDE 20 MEQ: 29.8 INJECTION, SOLUTION INTRAVENOUS at 07:26

## 2022-01-01 RX ADMIN — QUETIAPINE FUMARATE 25 MG: 25 TABLET ORAL at 10:12

## 2022-01-01 RX ADMIN — DIAZEPAM 10 MG: 10 TABLET ORAL at 13:18

## 2022-01-01 RX ADMIN — Medication 1.8 MCG/KG/HR: at 13:39

## 2022-01-01 RX ADMIN — QUETIAPINE FUMARATE 25 MG: 25 TABLET ORAL at 08:27

## 2022-01-01 RX ADMIN — ASPIRIN 81 MG: 81 TABLET, CHEWABLE ORAL at 07:29

## 2022-01-01 RX ADMIN — PROPOFOL 35 MCG/KG/MIN: 10 INJECTION, EMULSION INTRAVENOUS at 13:18

## 2022-01-01 RX ADMIN — DIAZEPAM 10 MG: 10 TABLET ORAL at 20:19

## 2022-01-01 RX ADMIN — ATORVASTATIN CALCIUM 10 MG: 10 TABLET, FILM COATED ORAL at 20:18

## 2022-01-01 RX ADMIN — ACETAMINOPHEN 650 MG: 325 TABLET ORAL at 04:45

## 2022-01-01 RX ADMIN — DIAZEPAM 10 MG: 10 TABLET ORAL at 08:07

## 2022-01-01 RX ADMIN — INSULIN LISPRO 3 UNITS: 100 INJECTION, SOLUTION INTRAVENOUS; SUBCUTANEOUS at 08:31

## 2022-01-01 RX ADMIN — MUPIROCIN: 20 OINTMENT TOPICAL at 20:39

## 2022-01-01 RX ADMIN — LINEZOLID 600 MG: 600 INJECTION, SOLUTION INTRAVENOUS at 12:24

## 2022-01-01 RX ADMIN — Medication 75 MCG/HR: at 03:55

## 2022-01-01 RX ADMIN — Medication 0.8 MCG/KG/HR: at 13:00

## 2022-01-01 RX ADMIN — PROPOFOL INJECTABLE EMULSION 45 MCG/KG/MIN: 10 INJECTION, EMULSION INTRAVENOUS at 12:20

## 2022-01-01 RX ADMIN — CHLORHEXIDINE GLUCONATE 0.12% ORAL RINSE 15 ML: 1.2 LIQUID ORAL at 19:43

## 2022-01-01 RX ADMIN — POTASSIUM CHLORIDE 20 MEQ: 29.8 INJECTION, SOLUTION INTRAVENOUS at 06:26

## 2022-01-01 RX ADMIN — ASPIRIN 81 MG: 81 TABLET, CHEWABLE ORAL at 07:44

## 2022-01-01 RX ADMIN — ATORVASTATIN CALCIUM 10 MG: 10 TABLET, FILM COATED ORAL at 20:19

## 2022-01-01 RX ADMIN — PROPOFOL INJECTABLE EMULSION 50 MCG/KG/MIN: 10 INJECTION, EMULSION INTRAVENOUS at 02:54

## 2022-01-01 RX ADMIN — QUETIAPINE FUMARATE 25 MG: 25 TABLET ORAL at 08:14

## 2022-01-01 RX ADMIN — INSULIN LISPRO 3 UNITS: 100 INJECTION, SOLUTION INTRAVENOUS; SUBCUTANEOUS at 06:24

## 2022-01-01 RX ADMIN — PROPOFOL 40 MCG/KG/MIN: 10 INJECTION, EMULSION INTRAVENOUS at 14:43

## 2022-01-01 RX ADMIN — MIDAZOLAM 1 MG: 1 INJECTION INTRAMUSCULAR; INTRAVENOUS at 14:48

## 2022-01-01 RX ADMIN — QUETIAPINE FUMARATE 25 MG: 25 TABLET ORAL at 09:36

## 2022-01-01 RX ADMIN — VANCOMYCIN HYDROCHLORIDE 750 MG: 750 INJECTION, POWDER, LYOPHILIZED, FOR SOLUTION INTRAVENOUS at 04:10

## 2022-01-01 RX ADMIN — Medication 0.6 MCG/KG/HR: at 17:39

## 2022-01-01 RX ADMIN — ALBUTEROL SULFATE 2.5 MG: 2.5 SOLUTION RESPIRATORY (INHALATION) at 11:53

## 2022-01-01 RX ADMIN — BARICITINIB 4 MG: 2 TABLET, FILM COATED ORAL at 08:05

## 2022-01-01 RX ADMIN — BARICITINIB 4 MG: 2 TABLET, FILM COATED ORAL at 10:06

## 2022-01-01 RX ADMIN — OXYCODONE HYDROCHLORIDE AND ACETAMINOPHEN 1000 MG: 500 TABLET ORAL at 07:29

## 2022-01-01 RX ADMIN — ENOXAPARIN SODIUM 30 MG: 100 INJECTION SUBCUTANEOUS at 20:02

## 2022-01-01 RX ADMIN — Medication 175 MCG/HR: at 04:07

## 2022-01-01 RX ADMIN — DIAZEPAM 10 MG: 10 TABLET ORAL at 20:04

## 2022-01-01 RX ADMIN — ENOXAPARIN SODIUM 30 MG: 100 INJECTION SUBCUTANEOUS at 21:11

## 2022-01-01 RX ADMIN — Medication 1.4 MCG/KG/HR: at 17:17

## 2022-01-01 RX ADMIN — ALBUTEROL SULFATE 2.5 MG: 2.5 SOLUTION RESPIRATORY (INHALATION) at 20:16

## 2022-01-01 RX ADMIN — BENZONATATE 100 MG: 100 CAPSULE ORAL at 05:09

## 2022-01-01 RX ADMIN — MIDAZOLAM HYDROCHLORIDE 2 MG: 2 INJECTION, SOLUTION INTRAMUSCULAR; INTRAVENOUS at 18:52

## 2022-01-01 RX ADMIN — INSULIN LISPRO 1 UNITS: 100 INJECTION, SOLUTION INTRAVENOUS; SUBCUTANEOUS at 04:26

## 2022-01-01 RX ADMIN — ACETAZOLAMIDE 250 MG: 500 INJECTION, POWDER, LYOPHILIZED, FOR SOLUTION INTRAVENOUS at 11:25

## 2022-01-01 RX ADMIN — MIDAZOLAM HYDROCHLORIDE 2 MG: 2 INJECTION, SOLUTION INTRAMUSCULAR; INTRAVENOUS at 14:54

## 2022-01-01 RX ADMIN — LINEZOLID 600 MG: 600 INJECTION, SOLUTION INTRAVENOUS at 23:54

## 2022-01-01 RX ADMIN — LORAZEPAM 1 MG: 2 INJECTION INTRAMUSCULAR; INTRAVENOUS at 17:55

## 2022-01-01 RX ADMIN — ACETAMINOPHEN 650 MG: 325 TABLET ORAL at 20:25

## 2022-01-01 RX ADMIN — Medication 200 MCG/HR: at 02:03

## 2022-01-01 RX ADMIN — INSULIN LISPRO 3 UNITS: 100 INJECTION, SOLUTION INTRAVENOUS; SUBCUTANEOUS at 19:33

## 2022-01-01 RX ADMIN — CEFEPIME 2000 MG: 2 INJECTION, POWDER, FOR SOLUTION INTRAVENOUS at 20:38

## 2022-01-01 RX ADMIN — MIDAZOLAM HYDROCHLORIDE 2 MG: 2 INJECTION, SOLUTION INTRAMUSCULAR; INTRAVENOUS at 17:23

## 2022-01-01 RX ADMIN — DEXAMETHASONE SODIUM PHOSPHATE 6 MG: 10 INJECTION INTRAMUSCULAR; INTRAVENOUS at 08:58

## 2022-01-01 RX ADMIN — FENTANYL CITRATE 50 MCG: 50 INJECTION INTRAMUSCULAR; INTRAVENOUS at 15:10

## 2022-01-01 RX ADMIN — ENOXAPARIN SODIUM 40 MG: 100 INJECTION SUBCUTANEOUS at 20:22

## 2022-01-01 RX ADMIN — FENTANYL CITRATE 50 MCG: 50 INJECTION INTRAMUSCULAR; INTRAVENOUS at 12:55

## 2022-01-01 RX ADMIN — CHLORHEXIDINE GLUCONATE 0.12% ORAL RINSE 15 ML: 1.2 LIQUID ORAL at 07:48

## 2022-01-01 RX ADMIN — ATORVASTATIN CALCIUM 10 MG: 10 TABLET, FILM COATED ORAL at 19:57

## 2022-01-01 RX ADMIN — QUETIAPINE FUMARATE 25 MG: 25 TABLET ORAL at 20:16

## 2022-01-01 RX ADMIN — QUETIAPINE FUMARATE 25 MG: 25 TABLET ORAL at 20:47

## 2022-01-01 RX ADMIN — MIDAZOLAM HYDROCHLORIDE 2 MG: 2 INJECTION, SOLUTION INTRAMUSCULAR; INTRAVENOUS at 10:55

## 2022-01-01 RX ADMIN — ALBUTEROL SULFATE 2.5 MG: 2.5 SOLUTION RESPIRATORY (INHALATION) at 19:17

## 2022-01-01 RX ADMIN — Medication 300 MCG/HR: at 05:08

## 2022-01-01 RX ADMIN — SODIUM CHLORIDE, PRESERVATIVE FREE 10 ML: 5 INJECTION INTRAVENOUS at 20:14

## 2022-01-01 RX ADMIN — QUETIAPINE FUMARATE 25 MG: 25 TABLET ORAL at 09:05

## 2022-01-01 RX ADMIN — ASPIRIN 81 MG: 81 TABLET, CHEWABLE ORAL at 10:06

## 2022-01-01 RX ADMIN — SODIUM CHLORIDE, PRESERVATIVE FREE 10 ML: 5 INJECTION INTRAVENOUS at 20:28

## 2022-01-01 RX ADMIN — FENTANYL CITRATE 50 MCG: 50 INJECTION INTRAMUSCULAR; INTRAVENOUS at 17:37

## 2022-01-01 RX ADMIN — CLOPIDOGREL BISULFATE 75 MG: 75 TABLET ORAL at 08:05

## 2022-01-01 RX ADMIN — INSULIN LISPRO 4 UNITS: 100 INJECTION, SOLUTION INTRAVENOUS; SUBCUTANEOUS at 11:24

## 2022-01-01 RX ADMIN — CHOLECALCIFEROL (VITAMIN D3) 25 MCG (1,000 UNIT) TABLET 2000 UNITS: at 09:48

## 2022-01-01 RX ADMIN — Medication 225 MCG/HR: at 01:43

## 2022-01-01 RX ADMIN — MUPIROCIN: 20 OINTMENT TOPICAL at 10:48

## 2022-01-01 RX ADMIN — Medication 1.8 MCG/KG/HR: at 16:13

## 2022-01-01 RX ADMIN — CHLORHEXIDINE GLUCONATE 0.12% ORAL RINSE 15 ML: 1.2 LIQUID ORAL at 08:32

## 2022-01-01 RX ADMIN — QUETIAPINE FUMARATE 25 MG: 25 TABLET ORAL at 08:12

## 2022-01-01 RX ADMIN — MIDAZOLAM HYDROCHLORIDE 2 MG: 2 INJECTION, SOLUTION INTRAMUSCULAR; INTRAVENOUS at 22:50

## 2022-01-01 RX ADMIN — SODIUM CHLORIDE, PRESERVATIVE FREE 10 ML: 5 INJECTION INTRAVENOUS at 08:31

## 2022-01-01 RX ADMIN — CLOPIDOGREL BISULFATE 75 MG: 75 TABLET ORAL at 10:06

## 2022-01-01 RX ADMIN — DIMETHICONE, OXYBENZONE, AND PADIMATE O: 2; 2.5; 6.6 STICK TOPICAL at 02:35

## 2022-01-01 RX ADMIN — BARICITINIB 4 MG: 2 TABLET, FILM COATED ORAL at 08:51

## 2022-01-01 RX ADMIN — CLOPIDOGREL BISULFATE 75 MG: 75 TABLET ORAL at 08:07

## 2022-01-01 RX ADMIN — OXYCODONE HYDROCHLORIDE AND ACETAMINOPHEN 1000 MG: 500 TABLET ORAL at 09:35

## 2022-01-01 RX ADMIN — ENOXAPARIN SODIUM 30 MG: 100 INJECTION SUBCUTANEOUS at 19:48

## 2022-01-01 RX ADMIN — ENOXAPARIN SODIUM 30 MG: 100 INJECTION SUBCUTANEOUS at 20:37

## 2022-01-01 RX ADMIN — Medication 2 MCG/KG/HR: at 23:07

## 2022-01-01 RX ADMIN — CARBOXYMETHYLCELLULOSE SODIUM 1 DROP: 10 GEL OPHTHALMIC at 16:45

## 2022-01-01 RX ADMIN — PROPOFOL 20 MCG/KG/MIN: 10 INJECTION, EMULSION INTRAVENOUS at 04:04

## 2022-01-01 RX ADMIN — CHOLECALCIFEROL (VITAMIN D3) 25 MCG (1,000 UNIT) TABLET 2000 UNITS: at 10:06

## 2022-01-01 RX ADMIN — CHOLECALCIFEROL (VITAMIN D3) 25 MCG (1,000 UNIT) TABLET 2000 UNITS: at 08:31

## 2022-01-01 RX ADMIN — Medication 225 MCG/HR: at 06:40

## 2022-01-01 RX ADMIN — DEXAMETHASONE SODIUM PHOSPHATE 4 MG: 4 INJECTION, SOLUTION INTRAMUSCULAR; INTRAVENOUS at 11:31

## 2022-01-01 RX ADMIN — MELOXICAM 15 MG: 15 TABLET ORAL at 08:59

## 2022-01-01 RX ADMIN — FENTANYL CITRATE 25 MCG: 50 INJECTION INTRAMUSCULAR; INTRAVENOUS at 14:45

## 2022-01-01 RX ADMIN — MIDAZOLAM HYDROCHLORIDE 2 MG: 2 INJECTION, SOLUTION INTRAMUSCULAR; INTRAVENOUS at 15:30

## 2022-01-01 RX ADMIN — CLOPIDOGREL BISULFATE 75 MG: 75 TABLET ORAL at 07:49

## 2022-01-01 RX ADMIN — ALBUMIN (HUMAN) 25 G: 0.25 INJECTION, SOLUTION INTRAVENOUS at 19:58

## 2022-01-01 RX ADMIN — PROPOFOL INJECTABLE EMULSION 50 MCG/KG/MIN: 10 INJECTION, EMULSION INTRAVENOUS at 21:26

## 2022-01-01 RX ADMIN — MIDAZOLAM HYDROCHLORIDE 2 MG: 2 INJECTION, SOLUTION INTRAMUSCULAR; INTRAVENOUS at 14:12

## 2022-01-01 RX ADMIN — MIDAZOLAM HYDROCHLORIDE 2 MG: 2 INJECTION, SOLUTION INTRAMUSCULAR; INTRAVENOUS at 15:17

## 2022-01-01 RX ADMIN — MIDAZOLAM HYDROCHLORIDE 2 MG: 2 INJECTION, SOLUTION INTRAMUSCULAR; INTRAVENOUS at 19:01

## 2022-01-01 RX ADMIN — ALBUTEROL SULFATE 2.5 MG: 2.5 SOLUTION RESPIRATORY (INHALATION) at 14:37

## 2022-01-01 RX ADMIN — CHLORHEXIDINE GLUCONATE 0.12% ORAL RINSE 15 ML: 1.2 LIQUID ORAL at 10:06

## 2022-01-01 RX ADMIN — ENOXAPARIN SODIUM 40 MG: 100 INJECTION SUBCUTANEOUS at 08:21

## 2022-01-01 RX ADMIN — QUETIAPINE FUMARATE 25 MG: 25 TABLET ORAL at 07:29

## 2022-01-01 RX ADMIN — INSULIN LISPRO 6 UNITS: 100 INJECTION, SOLUTION INTRAVENOUS; SUBCUTANEOUS at 05:50

## 2022-01-01 RX ADMIN — SODIUM CHLORIDE, PRESERVATIVE FREE 10 ML: 5 INJECTION INTRAVENOUS at 20:40

## 2022-01-01 RX ADMIN — CHOLECALCIFEROL (VITAMIN D3) 25 MCG (1,000 UNIT) TABLET 2000 UNITS: at 17:16

## 2022-01-01 RX ADMIN — ENOXAPARIN SODIUM 40 MG: 100 INJECTION SUBCUTANEOUS at 10:54

## 2022-01-01 RX ADMIN — CHLORHEXIDINE GLUCONATE 0.12% ORAL RINSE 15 ML: 1.2 LIQUID ORAL at 20:19

## 2022-01-01 RX ADMIN — INSULIN LISPRO 3 UNITS: 100 INJECTION, SOLUTION INTRAVENOUS; SUBCUTANEOUS at 04:56

## 2022-01-01 RX ADMIN — QUETIAPINE FUMARATE 25 MG: 25 TABLET ORAL at 19:59

## 2022-01-01 RX ADMIN — Medication 1.8 MCG/KG/HR: at 16:08

## 2022-01-01 RX ADMIN — INSULIN LISPRO 3 UNITS: 100 INJECTION, SOLUTION INTRAVENOUS; SUBCUTANEOUS at 19:51

## 2022-01-01 RX ADMIN — Medication 1.4 MCG/KG/HR: at 04:44

## 2022-01-01 RX ADMIN — INSULIN LISPRO 1 UNITS: 100 INJECTION, SOLUTION INTRAVENOUS; SUBCUTANEOUS at 15:09

## 2022-01-01 RX ADMIN — FAMOTIDINE 20 MG: 10 INJECTION, SOLUTION INTRAVENOUS at 08:32

## 2022-01-01 RX ADMIN — INSULIN LISPRO 9 UNITS: 100 INJECTION, SOLUTION INTRAVENOUS; SUBCUTANEOUS at 14:15

## 2022-01-01 RX ADMIN — MUPIROCIN: 20 OINTMENT TOPICAL at 07:50

## 2022-01-01 RX ADMIN — ALBUTEROL SULFATE 2.5 MG: 2.5 SOLUTION RESPIRATORY (INHALATION) at 19:33

## 2022-01-01 RX ADMIN — DIAZEPAM 10 MG: 10 TABLET ORAL at 19:46

## 2022-01-01 RX ADMIN — INSULIN LISPRO 1 UNITS: 100 INJECTION, SOLUTION INTRAVENOUS; SUBCUTANEOUS at 00:05

## 2022-01-01 RX ADMIN — PROPOFOL 45 MCG/KG/MIN: 10 INJECTION, EMULSION INTRAVENOUS at 09:55

## 2022-01-01 RX ADMIN — Medication 225 MCG/HR: at 14:54

## 2022-01-01 RX ADMIN — Medication 1.4 MCG/KG/HR: at 12:15

## 2022-01-01 RX ADMIN — ENOXAPARIN SODIUM 30 MG: 100 INJECTION SUBCUTANEOUS at 20:20

## 2022-01-01 RX ADMIN — CHOLECALCIFEROL (VITAMIN D3) 25 MCG (1,000 UNIT) TABLET 2000 UNITS: at 08:20

## 2022-01-01 RX ADMIN — MUPIROCIN: 20 OINTMENT TOPICAL at 21:05

## 2022-01-01 RX ADMIN — CHLORHEXIDINE GLUCONATE 0.12% ORAL RINSE 15 ML: 1.2 LIQUID ORAL at 08:27

## 2022-01-01 RX ADMIN — CHLORHEXIDINE GLUCONATE 0.12% ORAL RINSE 15 ML: 1.2 LIQUID ORAL at 21:07

## 2022-01-01 RX ADMIN — INSULIN LISPRO 1 UNITS: 100 INJECTION, SOLUTION INTRAVENOUS; SUBCUTANEOUS at 00:27

## 2022-01-01 RX ADMIN — MIDAZOLAM HYDROCHLORIDE 2 MG: 2 INJECTION, SOLUTION INTRAMUSCULAR; INTRAVENOUS at 13:12

## 2022-01-01 RX ADMIN — CARBOXYMETHYLCELLULOSE SODIUM 1 DROP: 10 GEL OPHTHALMIC at 08:23

## 2022-01-01 RX ADMIN — BARICITINIB 4 MG: 2 TABLET, FILM COATED ORAL at 08:33

## 2022-01-01 RX ADMIN — FAMOTIDINE 20 MG: 10 INJECTION, SOLUTION INTRAVENOUS at 10:11

## 2022-01-01 RX ADMIN — FAMOTIDINE 20 MG: 10 INJECTION, SOLUTION INTRAVENOUS at 19:46

## 2022-01-01 RX ADMIN — INSULIN LISPRO 1 UNITS: 100 INJECTION, SOLUTION INTRAVENOUS; SUBCUTANEOUS at 11:41

## 2022-01-01 RX ADMIN — Medication 200 MCG/HR: at 15:52

## 2022-01-01 RX ADMIN — ATORVASTATIN CALCIUM 10 MG: 10 TABLET, FILM COATED ORAL at 19:59

## 2022-01-01 RX ADMIN — Medication 1.4 MCG/KG/HR: at 04:09

## 2022-01-01 RX ADMIN — SODIUM CHLORIDE, PRESERVATIVE FREE 10 ML: 5 INJECTION INTRAVENOUS at 07:44

## 2022-01-01 RX ADMIN — ENOXAPARIN SODIUM 30 MG: 100 INJECTION SUBCUTANEOUS at 20:00

## 2022-01-01 RX ADMIN — LINEZOLID 600 MG: 600 INJECTION, SOLUTION INTRAVENOUS at 12:08

## 2022-01-01 RX ADMIN — ENOXAPARIN SODIUM 30 MG: 100 INJECTION SUBCUTANEOUS at 20:04

## 2022-01-01 RX ADMIN — CLOPIDOGREL BISULFATE 75 MG: 75 TABLET ORAL at 09:59

## 2022-01-01 RX ADMIN — SODIUM CHLORIDE, PRESERVATIVE FREE 10 ML: 5 INJECTION INTRAVENOUS at 08:34

## 2022-01-01 RX ADMIN — PROPOFOL 40 MCG/KG/MIN: 10 INJECTION, EMULSION INTRAVENOUS at 23:05

## 2022-01-01 RX ADMIN — Medication 2 MCG/KG/HR: at 09:01

## 2022-01-01 RX ADMIN — FAMOTIDINE 20 MG: 10 INJECTION, SOLUTION INTRAVENOUS at 20:40

## 2022-01-01 RX ADMIN — ENOXAPARIN SODIUM 40 MG: 100 INJECTION SUBCUTANEOUS at 20:20

## 2022-01-01 RX ADMIN — CLOPIDOGREL BISULFATE 75 MG: 75 TABLET ORAL at 09:35

## 2022-01-01 RX ADMIN — Medication 1.8 MCG/KG/HR: at 19:03

## 2022-01-01 RX ADMIN — VANCOMYCIN HYDROCHLORIDE 1000 MG: 1 INJECTION, POWDER, LYOPHILIZED, FOR SOLUTION INTRAVENOUS at 11:48

## 2022-01-01 RX ADMIN — ATORVASTATIN CALCIUM 10 MG: 10 TABLET, FILM COATED ORAL at 20:47

## 2022-01-01 RX ADMIN — ALBUTEROL SULFATE 2.5 MG: 2.5 SOLUTION RESPIRATORY (INHALATION) at 14:17

## 2022-01-01 RX ADMIN — ENOXAPARIN SODIUM 40 MG: 100 INJECTION SUBCUTANEOUS at 08:51

## 2022-01-01 RX ADMIN — Medication 175 MCG/HR: at 16:31

## 2022-01-01 RX ADMIN — Medication 1.8 MCG/KG/HR: at 11:22

## 2022-01-01 RX ADMIN — CHLORHEXIDINE GLUCONATE 0.12% ORAL RINSE 15 ML: 1.2 LIQUID ORAL at 20:00

## 2022-01-01 RX ADMIN — QUETIAPINE FUMARATE 25 MG: 25 TABLET ORAL at 09:08

## 2022-01-01 RX ADMIN — FAMOTIDINE 20 MG: 10 INJECTION, SOLUTION INTRAVENOUS at 07:32

## 2022-01-01 RX ADMIN — SODIUM CHLORIDE: 9 INJECTION, SOLUTION INTRAVENOUS at 18:22

## 2022-01-01 RX ADMIN — OXYCODONE HYDROCHLORIDE AND ACETAMINOPHEN 1000 MG: 500 TABLET ORAL at 10:12

## 2022-01-01 RX ADMIN — DEXAMETHASONE SODIUM PHOSPHATE 4 MG: 4 INJECTION, SOLUTION INTRAMUSCULAR; INTRAVENOUS at 11:23

## 2022-01-01 RX ADMIN — ENOXAPARIN SODIUM 30 MG: 100 INJECTION SUBCUTANEOUS at 07:43

## 2022-01-01 RX ADMIN — OXYCODONE HYDROCHLORIDE AND ACETAMINOPHEN 1000 MG: 500 TABLET ORAL at 10:06

## 2022-01-01 RX ADMIN — INSULIN LISPRO 1 UNITS: 100 INJECTION, SOLUTION INTRAVENOUS; SUBCUTANEOUS at 09:14

## 2022-01-01 RX ADMIN — PROPOFOL 50 MCG/KG/MIN: 10 INJECTION, EMULSION INTRAVENOUS at 04:17

## 2022-01-01 RX ADMIN — INSULIN LISPRO 2 UNITS: 100 INJECTION, SOLUTION INTRAVENOUS; SUBCUTANEOUS at 12:51

## 2022-01-01 RX ADMIN — CHLORHEXIDINE GLUCONATE 0.12% ORAL RINSE 15 ML: 1.2 LIQUID ORAL at 20:13

## 2022-01-01 RX ADMIN — FAMOTIDINE 20 MG: 10 INJECTION, SOLUTION INTRAVENOUS at 20:47

## 2022-01-01 RX ADMIN — POTASSIUM BICARBONATE 40 MEQ: 782 TABLET, EFFERVESCENT ORAL at 09:59

## 2022-01-01 RX ADMIN — FAMOTIDINE 20 MG: 10 INJECTION, SOLUTION INTRAVENOUS at 08:31

## 2022-01-01 RX ADMIN — CARBOXYMETHYLCELLULOSE SODIUM 1 DROP: 10 GEL OPHTHALMIC at 12:43

## 2022-01-01 RX ADMIN — ALBUTEROL SULFATE 2.5 MG: 2.5 SOLUTION RESPIRATORY (INHALATION) at 19:21

## 2022-01-01 RX ADMIN — CARBOXYMETHYLCELLULOSE SODIUM 1 DROP: 10 GEL OPHTHALMIC at 20:47

## 2022-01-01 RX ADMIN — INSULIN LISPRO 1 UNITS: 100 INJECTION, SOLUTION INTRAVENOUS; SUBCUTANEOUS at 08:04

## 2022-01-01 RX ADMIN — QUETIAPINE FUMARATE 25 MG: 25 TABLET ORAL at 20:20

## 2022-01-01 RX ADMIN — MIDAZOLAM HYDROCHLORIDE 2 MG: 2 INJECTION, SOLUTION INTRAMUSCULAR; INTRAVENOUS at 09:51

## 2022-01-01 RX ADMIN — SODIUM CHLORIDE, PRESERVATIVE FREE 10 ML: 5 INJECTION INTRAVENOUS at 21:10

## 2022-01-01 RX ADMIN — Medication 1.2 MCG/KG/HR: at 19:57

## 2022-01-01 RX ADMIN — ATORVASTATIN CALCIUM 10 MG: 10 TABLET, FILM COATED ORAL at 20:33

## 2022-01-01 RX ADMIN — ALBUTEROL SULFATE 2.5 MG: 2.5 SOLUTION RESPIRATORY (INHALATION) at 23:23

## 2022-01-01 RX ADMIN — Medication 1.8 MCG/KG/HR: at 14:39

## 2022-01-01 RX ADMIN — INSULIN LISPRO 1 UNITS: 100 INJECTION, SOLUTION INTRAVENOUS; SUBCUTANEOUS at 09:20

## 2022-01-01 RX ADMIN — INSULIN LISPRO 3 UNITS: 100 INJECTION, SOLUTION INTRAVENOUS; SUBCUTANEOUS at 11:30

## 2022-01-01 RX ADMIN — FENTANYL CITRATE 25 MCG: 50 INJECTION INTRAMUSCULAR; INTRAVENOUS at 09:57

## 2022-01-01 RX ADMIN — Medication 2 MCG/KG/HR: at 19:49

## 2022-01-01 RX ADMIN — VANCOMYCIN HYDROCHLORIDE 1000 MG: 1 INJECTION, POWDER, LYOPHILIZED, FOR SOLUTION INTRAVENOUS at 00:30

## 2022-01-01 RX ADMIN — QUETIAPINE FUMARATE 25 MG: 25 TABLET ORAL at 09:35

## 2022-01-01 RX ADMIN — VANCOMYCIN HYDROCHLORIDE 750 MG: 750 INJECTION, POWDER, LYOPHILIZED, FOR SOLUTION INTRAVENOUS at 03:49

## 2022-01-01 RX ADMIN — CHOLECALCIFEROL (VITAMIN D3) 25 MCG (1,000 UNIT) TABLET 2000 UNITS: at 08:12

## 2022-01-01 RX ADMIN — ASPIRIN 81 MG: 81 TABLET, CHEWABLE ORAL at 08:51

## 2022-01-01 RX ADMIN — ENOXAPARIN SODIUM 40 MG: 100 INJECTION SUBCUTANEOUS at 17:16

## 2022-01-01 RX ADMIN — Medication 1.4 MCG/KG/HR: at 12:07

## 2022-01-01 RX ADMIN — QUETIAPINE FUMARATE 25 MG: 25 TABLET ORAL at 19:57

## 2022-01-01 RX ADMIN — CEFEPIME 2000 MG: 2 INJECTION, POWDER, FOR SOLUTION INTRAVENOUS at 02:05

## 2022-01-01 RX ADMIN — LINEZOLID 600 MG: 600 INJECTION, SOLUTION INTRAVENOUS at 11:45

## 2022-01-01 RX ADMIN — POTASSIUM BICARBONATE 20 MEQ: 782 TABLET, EFFERVESCENT ORAL at 08:12

## 2022-01-01 RX ADMIN — SODIUM CHLORIDE, POTASSIUM CHLORIDE, SODIUM LACTATE AND CALCIUM CHLORIDE: 600; 310; 30; 20 INJECTION, SOLUTION INTRAVENOUS at 14:45

## 2022-01-01 RX ADMIN — SODIUM CHLORIDE, PRESERVATIVE FREE 10 ML: 5 INJECTION INTRAVENOUS at 08:35

## 2022-01-01 RX ADMIN — OXYCODONE HYDROCHLORIDE AND ACETAMINOPHEN 1000 MG: 500 TABLET ORAL at 08:27

## 2022-01-01 RX ADMIN — INSULIN LISPRO 1 UNITS: 100 INJECTION, SOLUTION INTRAVENOUS; SUBCUTANEOUS at 23:56

## 2022-01-01 RX ADMIN — QUETIAPINE FUMARATE 12.5 MG: 25 TABLET ORAL at 11:32

## 2022-01-01 RX ADMIN — MIDAZOLAM HYDROCHLORIDE 2 MG: 2 INJECTION, SOLUTION INTRAMUSCULAR; INTRAVENOUS at 16:49

## 2022-01-01 RX ADMIN — FAMOTIDINE 20 MG: 10 INJECTION, SOLUTION INTRAVENOUS at 08:29

## 2022-01-01 RX ADMIN — CHOLECALCIFEROL (VITAMIN D3) 25 MCG (1,000 UNIT) TABLET 2000 UNITS: at 08:14

## 2022-01-01 RX ADMIN — Medication 0.4 MCG/KG/HR: at 04:08

## 2022-01-01 RX ADMIN — PROPOFOL 50 MCG/KG/MIN: 10 INJECTION, EMULSION INTRAVENOUS at 22:17

## 2022-01-01 RX ADMIN — INSULIN LISPRO 1 UNITS: 100 INJECTION, SOLUTION INTRAVENOUS; SUBCUTANEOUS at 18:19

## 2022-01-01 RX ADMIN — FUROSEMIDE 80 MG: 10 INJECTION, SOLUTION INTRAMUSCULAR; INTRAVENOUS at 09:04

## 2022-01-01 RX ADMIN — CHLORHEXIDINE GLUCONATE 0.12% ORAL RINSE 15 ML: 1.2 LIQUID ORAL at 07:54

## 2022-01-01 RX ADMIN — ALBUTEROL SULFATE 2.5 MG: 2.5 SOLUTION RESPIRATORY (INHALATION) at 07:01

## 2022-01-01 RX ADMIN — CARBOXYMETHYLCELLULOSE SODIUM 1 DROP: 10 GEL OPHTHALMIC at 13:40

## 2022-01-01 RX ADMIN — CLOPIDOGREL BISULFATE 600 MG: 75 TABLET ORAL at 04:00

## 2022-01-01 RX ADMIN — Medication 2 MCG/KG/HR: at 05:30

## 2022-01-01 RX ADMIN — INSULIN LISPRO 3 UNITS: 100 INJECTION, SOLUTION INTRAVENOUS; SUBCUTANEOUS at 20:50

## 2022-01-01 RX ADMIN — INSULIN LISPRO 3 UNITS: 100 INJECTION, SOLUTION INTRAVENOUS; SUBCUTANEOUS at 16:29

## 2022-01-01 RX ADMIN — CHLORHEXIDINE GLUCONATE 0.12% ORAL RINSE 15 ML: 1.2 LIQUID ORAL at 08:28

## 2022-01-01 RX ADMIN — ATORVASTATIN CALCIUM 10 MG: 10 TABLET, FILM COATED ORAL at 21:11

## 2022-01-01 RX ADMIN — MIDAZOLAM HYDROCHLORIDE 2 MG: 2 INJECTION, SOLUTION INTRAMUSCULAR; INTRAVENOUS at 03:06

## 2022-01-01 RX ADMIN — VANCOMYCIN HYDROCHLORIDE 1000 MG: 1 INJECTION, POWDER, LYOPHILIZED, FOR SOLUTION INTRAVENOUS at 23:54

## 2022-01-01 RX ADMIN — BARICITINIB 4 MG: 2 TABLET, FILM COATED ORAL at 09:18

## 2022-01-01 RX ADMIN — FAMOTIDINE 20 MG: 10 INJECTION, SOLUTION INTRAVENOUS at 07:58

## 2022-01-01 RX ADMIN — INSULIN LISPRO 1 UNITS: 100 INJECTION, SOLUTION INTRAVENOUS; SUBCUTANEOUS at 04:38

## 2022-01-01 RX ADMIN — MIDAZOLAM HYDROCHLORIDE 2 MG: 2 INJECTION, SOLUTION INTRAMUSCULAR; INTRAVENOUS at 16:25

## 2022-01-01 RX ADMIN — INSULIN LISPRO 3 UNITS: 100 INJECTION, SOLUTION INTRAVENOUS; SUBCUTANEOUS at 23:52

## 2022-01-01 RX ADMIN — Medication 225 MCG/HR: at 03:00

## 2022-01-01 RX ADMIN — PROPOFOL 50 MCG/KG/MIN: 10 INJECTION, EMULSION INTRAVENOUS at 16:51

## 2022-01-01 RX ADMIN — COSYNTROPIN 250 MCG: 0.25 INJECTION, POWDER, LYOPHILIZED, FOR SOLUTION INTRAVENOUS at 08:17

## 2022-01-01 RX ADMIN — ENOXAPARIN SODIUM 30 MG: 100 INJECTION SUBCUTANEOUS at 21:08

## 2022-01-01 RX ADMIN — FAMOTIDINE 20 MG: 10 INJECTION, SOLUTION INTRAVENOUS at 07:29

## 2022-01-01 RX ADMIN — FENTANYL CITRATE 50 MCG: 50 INJECTION INTRAMUSCULAR; INTRAVENOUS at 12:19

## 2022-01-01 RX ADMIN — ALBUTEROL SULFATE 2.5 MG: 2.5 SOLUTION RESPIRATORY (INHALATION) at 09:20

## 2022-01-01 RX ADMIN — FAMOTIDINE 20 MG: 10 INJECTION, SOLUTION INTRAVENOUS at 08:07

## 2022-01-01 RX ADMIN — BARICITINIB 4 MG: 2 TABLET, FILM COATED ORAL at 09:02

## 2022-01-01 RX ADMIN — OXYCODONE HYDROCHLORIDE AND ACETAMINOPHEN 1000 MG: 500 TABLET ORAL at 07:59

## 2022-01-01 RX ADMIN — CLOPIDOGREL BISULFATE 75 MG: 75 TABLET ORAL at 08:12

## 2022-01-01 RX ADMIN — INSULIN LISPRO 3 UNITS: 100 INJECTION, SOLUTION INTRAVENOUS; SUBCUTANEOUS at 04:31

## 2022-01-01 RX ADMIN — DIAZEPAM 10 MG: 10 TABLET ORAL at 19:49

## 2022-01-01 RX ADMIN — Medication 1.8 MCG/KG/HR: at 13:10

## 2022-01-01 RX ADMIN — ASPIRIN 81 MG: 81 TABLET, CHEWABLE ORAL at 08:40

## 2022-01-01 RX ADMIN — ACETAMINOPHEN 650 MG: 325 TABLET ORAL at 17:51

## 2022-01-01 RX ADMIN — ALBUTEROL SULFATE 2.5 MG: 2.5 SOLUTION RESPIRATORY (INHALATION) at 03:11

## 2022-01-01 RX ADMIN — ALBUTEROL SULFATE 2.5 MG: 2.5 SOLUTION RESPIRATORY (INHALATION) at 23:49

## 2022-01-01 RX ADMIN — QUETIAPINE FUMARATE 25 MG: 25 TABLET ORAL at 19:56

## 2022-01-01 RX ADMIN — LINEZOLID 600 MG: 600 INJECTION, SOLUTION INTRAVENOUS at 23:28

## 2022-01-01 RX ADMIN — HEPARIN SODIUM 4880 UNITS: 1000 INJECTION INTRAVENOUS; SUBCUTANEOUS at 14:19

## 2022-01-01 RX ADMIN — ATORVASTATIN CALCIUM 10 MG: 10 TABLET, FILM COATED ORAL at 20:55

## 2022-01-01 RX ADMIN — MIDAZOLAM HYDROCHLORIDE 2 MG: 2 INJECTION, SOLUTION INTRAMUSCULAR; INTRAVENOUS at 07:49

## 2022-01-01 RX ADMIN — ENOXAPARIN SODIUM 30 MG: 100 INJECTION SUBCUTANEOUS at 08:13

## 2022-01-01 RX ADMIN — INSULIN LISPRO 1 UNITS: 100 INJECTION, SOLUTION INTRAVENOUS; SUBCUTANEOUS at 00:41

## 2022-01-01 RX ADMIN — DIAZEPAM 10 MG: 10 TABLET ORAL at 19:59

## 2022-01-01 RX ADMIN — Medication 175 MCG/HR: at 17:05

## 2022-01-01 RX ADMIN — MIDAZOLAM HYDROCHLORIDE 2 MG: 2 INJECTION, SOLUTION INTRAMUSCULAR; INTRAVENOUS at 05:34

## 2022-01-01 RX ADMIN — QUETIAPINE FUMARATE 25 MG: 25 TABLET ORAL at 20:19

## 2022-01-01 RX ADMIN — SODIUM CHLORIDE, PRESERVATIVE FREE 10 ML: 5 INJECTION INTRAVENOUS at 19:58

## 2022-01-01 RX ADMIN — ALBUTEROL SULFATE 2.5 MG: 2.5 SOLUTION RESPIRATORY (INHALATION) at 08:21

## 2022-01-01 RX ADMIN — OXYCODONE HYDROCHLORIDE AND ACETAMINOPHEN 1000 MG: 500 TABLET ORAL at 08:40

## 2022-01-01 RX ADMIN — ENOXAPARIN SODIUM 30 MG: 100 INJECTION SUBCUTANEOUS at 07:54

## 2022-01-01 RX ADMIN — SODIUM CHLORIDE: 9 INJECTION, SOLUTION INTRAVENOUS at 10:39

## 2022-01-01 RX ADMIN — MIDAZOLAM HYDROCHLORIDE 2 MG: 2 INJECTION, SOLUTION INTRAMUSCULAR; INTRAVENOUS at 09:43

## 2022-01-01 RX ADMIN — VANCOMYCIN HYDROCHLORIDE 1000 MG: 1 INJECTION, POWDER, LYOPHILIZED, FOR SOLUTION INTRAVENOUS at 23:59

## 2022-01-01 RX ADMIN — CHLORHEXIDINE GLUCONATE 0.12% ORAL RINSE 15 ML: 1.2 LIQUID ORAL at 09:54

## 2022-01-01 RX ADMIN — INSULIN LISPRO 3 UNITS: 100 INJECTION, SOLUTION INTRAVENOUS; SUBCUTANEOUS at 16:04

## 2022-01-01 RX ADMIN — INSULIN LISPRO 3 UNITS: 100 INJECTION, SOLUTION INTRAVENOUS; SUBCUTANEOUS at 12:08

## 2022-01-01 RX ADMIN — MIDAZOLAM HYDROCHLORIDE 2 MG: 2 INJECTION, SOLUTION INTRAMUSCULAR; INTRAVENOUS at 12:54

## 2022-01-01 RX ADMIN — ACETAMINOPHEN 650 MG: 325 TABLET ORAL at 04:30

## 2022-01-01 RX ADMIN — SODIUM CHLORIDE, PRESERVATIVE FREE 10 ML: 5 INJECTION INTRAVENOUS at 22:11

## 2022-01-01 RX ADMIN — ATORVASTATIN CALCIUM 10 MG: 10 TABLET, FILM COATED ORAL at 20:39

## 2022-01-01 RX ADMIN — FUROSEMIDE 40 MG: 10 INJECTION, SOLUTION INTRAMUSCULAR; INTRAVENOUS at 11:47

## 2022-01-01 RX ADMIN — Medication 1 MG/HR: at 11:29

## 2022-01-01 RX ADMIN — FAMOTIDINE 20 MG: 10 INJECTION, SOLUTION INTRAVENOUS at 09:59

## 2022-01-01 RX ADMIN — HEPARIN SODIUM 2440 UNITS: 1000 INJECTION INTRAVENOUS; SUBCUTANEOUS at 15:55

## 2022-01-01 RX ADMIN — SODIUM ZIRCONIUM CYCLOSILICATE 5 G: 5 POWDER, FOR SUSPENSION ORAL at 09:05

## 2022-01-01 RX ADMIN — OXYCODONE HYDROCHLORIDE AND ACETAMINOPHEN 1000 MG: 500 TABLET ORAL at 08:30

## 2022-01-01 RX ADMIN — MIDAZOLAM HYDROCHLORIDE 2 MG: 2 INJECTION, SOLUTION INTRAMUSCULAR; INTRAVENOUS at 06:59

## 2022-01-01 RX ADMIN — INSULIN LISPRO 1 UNITS: 100 INJECTION, SOLUTION INTRAVENOUS; SUBCUTANEOUS at 16:48

## 2022-01-01 RX ADMIN — CARBOXYMETHYLCELLULOSE SODIUM 1 DROP: 10 GEL OPHTHALMIC at 16:34

## 2022-01-01 RX ADMIN — FAMOTIDINE 20 MG: 10 INJECTION, SOLUTION INTRAVENOUS at 08:11

## 2022-01-01 RX ADMIN — DIAZEPAM 10 MG: 10 TABLET ORAL at 15:17

## 2022-01-01 RX ADMIN — FAMOTIDINE 20 MG: 10 INJECTION, SOLUTION INTRAVENOUS at 20:20

## 2022-01-01 RX ADMIN — Medication 250 MCG/HR: at 18:08

## 2022-01-01 RX ADMIN — DIAZEPAM 10 MG: 10 TABLET ORAL at 07:43

## 2022-01-01 RX ADMIN — MIDAZOLAM HYDROCHLORIDE 2 MG: 2 INJECTION, SOLUTION INTRAMUSCULAR; INTRAVENOUS at 18:17

## 2022-01-01 RX ADMIN — ALBUTEROL SULFATE 2.5 MG: 2.5 SOLUTION RESPIRATORY (INHALATION) at 11:15

## 2022-01-01 RX ADMIN — PROPOFOL INJECTABLE EMULSION 50 MCG/KG/MIN: 10 INJECTION, EMULSION INTRAVENOUS at 17:19

## 2022-01-01 RX ADMIN — FAMOTIDINE 20 MG: 10 INJECTION, SOLUTION INTRAVENOUS at 20:02

## 2022-01-01 RX ADMIN — Medication 150 MCG/HR: at 09:58

## 2022-01-01 RX ADMIN — DEXAMETHASONE SODIUM PHOSPHATE 6 MG: 10 INJECTION INTRAMUSCULAR; INTRAVENOUS at 12:25

## 2022-01-01 RX ADMIN — ALBUTEROL SULFATE 2.5 MG: 2.5 SOLUTION RESPIRATORY (INHALATION) at 15:04

## 2022-01-01 RX ADMIN — ATORVASTATIN CALCIUM 10 MG: 10 TABLET, FILM COATED ORAL at 20:36

## 2022-01-01 RX ADMIN — SODIUM ZIRCONIUM CYCLOSILICATE 10 G: 10 POWDER, FOR SUSPENSION ORAL at 09:51

## 2022-01-01 RX ADMIN — ATORVASTATIN CALCIUM 10 MG: 10 TABLET, FILM COATED ORAL at 19:46

## 2022-01-01 RX ADMIN — FAMOTIDINE 20 MG: 10 INJECTION, SOLUTION INTRAVENOUS at 08:30

## 2022-01-01 RX ADMIN — SODIUM CHLORIDE, PRESERVATIVE FREE 10 ML: 5 INJECTION INTRAVENOUS at 09:08

## 2022-01-01 RX ADMIN — SODIUM CHLORIDE: 9 INJECTION, SOLUTION INTRAVENOUS at 17:23

## 2022-01-01 RX ADMIN — INSULIN LISPRO 3 UNITS: 100 INJECTION, SOLUTION INTRAVENOUS; SUBCUTANEOUS at 16:42

## 2022-01-01 RX ADMIN — QUETIAPINE FUMARATE 25 MG: 25 TABLET ORAL at 07:58

## 2022-01-01 RX ADMIN — ATORVASTATIN CALCIUM 10 MG: 10 TABLET, FILM COATED ORAL at 20:04

## 2022-01-01 RX ADMIN — MUPIROCIN: 20 OINTMENT TOPICAL at 21:12

## 2022-01-01 RX ADMIN — OXYCODONE HYDROCHLORIDE AND ACETAMINOPHEN 1000 MG: 500 TABLET ORAL at 08:07

## 2022-01-01 RX ADMIN — FAMOTIDINE 20 MG: 10 INJECTION, SOLUTION INTRAVENOUS at 08:22

## 2022-01-01 RX ADMIN — ALBUTEROL SULFATE 2.5 MG: 2.5 SOLUTION RESPIRATORY (INHALATION) at 23:02

## 2022-01-01 RX ADMIN — SODIUM CHLORIDE, PRESERVATIVE FREE 10 ML: 5 INJECTION INTRAVENOUS at 09:18

## 2022-01-01 RX ADMIN — PROPOFOL 40 MCG/KG/MIN: 10 INJECTION, EMULSION INTRAVENOUS at 15:30

## 2022-01-01 RX ADMIN — Medication 250 MCG/HR: at 00:19

## 2022-01-01 RX ADMIN — FAMOTIDINE 20 MG: 10 INJECTION, SOLUTION INTRAVENOUS at 19:57

## 2022-01-01 RX ADMIN — BARICITINIB 4 MG: 2 TABLET, FILM COATED ORAL at 09:59

## 2022-01-01 RX ADMIN — ALBUTEROL SULFATE 2.5 MG: 2.5 SOLUTION RESPIRATORY (INHALATION) at 22:53

## 2022-01-01 RX ADMIN — INSULIN LISPRO 3 UNITS: 100 INJECTION, SOLUTION INTRAVENOUS; SUBCUTANEOUS at 21:10

## 2022-01-01 RX ADMIN — POTASSIUM BICARBONATE 40 MEQ: 391 TABLET, EFFERVESCENT ORAL at 09:26

## 2022-01-01 RX ADMIN — SODIUM CHLORIDE 1000 ML: 9 INJECTION, SOLUTION INTRAVENOUS at 09:34

## 2022-01-01 RX ADMIN — ENOXAPARIN SODIUM 30 MG: 100 INJECTION SUBCUTANEOUS at 08:11

## 2022-01-01 RX ADMIN — ATORVASTATIN CALCIUM 10 MG: 10 TABLET, FILM COATED ORAL at 20:27

## 2022-01-01 RX ADMIN — SODIUM CHLORIDE, PRESERVATIVE FREE 10 ML: 5 INJECTION INTRAVENOUS at 19:44

## 2022-01-01 RX ADMIN — Medication 100 MCG/HR: at 09:09

## 2022-01-01 RX ADMIN — Medication 250 MCG/HR: at 03:58

## 2022-01-01 RX ADMIN — POTASSIUM CHLORIDE 20 MEQ: 29.8 INJECTION, SOLUTION INTRAVENOUS at 07:31

## 2022-01-01 RX ADMIN — CHOLECALCIFEROL (VITAMIN D3) 25 MCG (1,000 UNIT) TABLET 2000 UNITS: at 08:51

## 2022-01-01 RX ADMIN — INSULIN LISPRO 3 UNITS: 100 INJECTION, SOLUTION INTRAVENOUS; SUBCUTANEOUS at 08:49

## 2022-01-01 RX ADMIN — Medication 1.6 MCG/KG/HR: at 22:51

## 2022-01-01 RX ADMIN — CARBOXYMETHYLCELLULOSE SODIUM 1 DROP: 10 GEL OPHTHALMIC at 10:13

## 2022-01-01 RX ADMIN — Medication 2 MCG/KG/HR: at 11:33

## 2022-01-01 RX ADMIN — SODIUM CHLORIDE, PRESERVATIVE FREE 10 ML: 5 INJECTION INTRAVENOUS at 08:23

## 2022-01-01 RX ADMIN — Medication 125 MCG/HR: at 05:54

## 2022-01-01 RX ADMIN — CHLORHEXIDINE GLUCONATE 0.12% ORAL RINSE 15 ML: 1.2 LIQUID ORAL at 07:58

## 2022-01-01 RX ADMIN — ALBUTEROL SULFATE 2.5 MG: 2.5 SOLUTION RESPIRATORY (INHALATION) at 10:53

## 2022-01-01 RX ADMIN — INSULIN LISPRO 2 UNITS: 100 INJECTION, SOLUTION INTRAVENOUS; SUBCUTANEOUS at 08:21

## 2022-01-01 RX ADMIN — PROPOFOL 45 MCG/KG/MIN: 10 INJECTION, EMULSION INTRAVENOUS at 04:27

## 2022-01-01 RX ADMIN — OXYCODONE HYDROCHLORIDE AND ACETAMINOPHEN 1000 MG: 500 TABLET ORAL at 09:54

## 2022-01-01 RX ADMIN — OXYCODONE HYDROCHLORIDE AND ACETAMINOPHEN 1000 MG: 500 TABLET ORAL at 09:02

## 2022-01-01 RX ADMIN — MIDAZOLAM HYDROCHLORIDE 2 MG: 2 INJECTION, SOLUTION INTRAMUSCULAR; INTRAVENOUS at 20:48

## 2022-01-01 RX ADMIN — Medication 1.4 MCG/KG/HR: at 16:22

## 2022-01-01 RX ADMIN — ALBUTEROL SULFATE 2.5 MG: 2.5 SOLUTION RESPIRATORY (INHALATION) at 03:30

## 2022-01-01 RX ADMIN — INSULIN LISPRO 3 UNITS: 100 INJECTION, SOLUTION INTRAVENOUS; SUBCUTANEOUS at 16:27

## 2022-01-01 RX ADMIN — Medication 225 MCG/HR: at 06:26

## 2022-01-01 RX ADMIN — ENOXAPARIN SODIUM 30 MG: 100 INJECTION SUBCUTANEOUS at 08:31

## 2022-01-01 RX ADMIN — ATORVASTATIN CALCIUM 10 MG: 10 TABLET, FILM COATED ORAL at 20:03

## 2022-01-01 RX ADMIN — SODIUM CHLORIDE, PRESERVATIVE FREE 10 ML: 5 INJECTION INTRAVENOUS at 10:55

## 2022-01-01 RX ADMIN — ATORVASTATIN CALCIUM 10 MG: 10 TABLET, FILM COATED ORAL at 20:02

## 2022-01-01 RX ADMIN — ALBUTEROL SULFATE 2.5 MG: 2.5 SOLUTION RESPIRATORY (INHALATION) at 23:20

## 2022-01-01 RX ADMIN — SODIUM CHLORIDE 1000 ML: 9 INJECTION, SOLUTION INTRAVENOUS at 10:16

## 2022-01-01 RX ADMIN — OXYCODONE HYDROCHLORIDE AND ACETAMINOPHEN 1000 MG: 500 TABLET ORAL at 08:22

## 2022-01-01 RX ADMIN — MIDAZOLAM 1 MG: 1 INJECTION INTRAMUSCULAR; INTRAVENOUS at 14:57

## 2022-01-01 RX ADMIN — INSULIN LISPRO 3 UNITS: 100 INJECTION, SOLUTION INTRAVENOUS; SUBCUTANEOUS at 04:29

## 2022-01-01 RX ADMIN — INSULIN LISPRO 3 UNITS: 100 INJECTION, SOLUTION INTRAVENOUS; SUBCUTANEOUS at 08:06

## 2022-01-01 RX ADMIN — POTASSIUM BICARBONATE 40 MEQ: 782 TABLET, EFFERVESCENT ORAL at 11:26

## 2022-01-01 RX ADMIN — QUETIAPINE FUMARATE 25 MG: 25 TABLET ORAL at 09:54

## 2022-01-01 RX ADMIN — INSULIN LISPRO 3 UNITS: 100 INJECTION, SOLUTION INTRAVENOUS; SUBCUTANEOUS at 23:22

## 2022-01-01 RX ADMIN — LEVOFLOXACIN 500 MG: 500 INJECTION, SOLUTION INTRAVENOUS at 10:00

## 2022-01-01 RX ADMIN — FAMOTIDINE 20 MG: 10 INJECTION, SOLUTION INTRAVENOUS at 08:03

## 2022-01-01 RX ADMIN — INSULIN LISPRO 2 UNITS: 100 INJECTION, SOLUTION INTRAVENOUS; SUBCUTANEOUS at 17:44

## 2022-01-01 RX ADMIN — MIDAZOLAM HYDROCHLORIDE 2 MG: 2 INJECTION, SOLUTION INTRAMUSCULAR; INTRAVENOUS at 20:18

## 2022-01-01 RX ADMIN — HEPARIN SODIUM 1100 UNITS/HR: 10000 INJECTION, SOLUTION INTRAVENOUS; SUBCUTANEOUS at 15:27

## 2022-01-01 RX ADMIN — Medication 0.6 MCG/KG/HR: at 00:12

## 2022-01-01 RX ADMIN — FAMOTIDINE 20 MG: 10 INJECTION, SOLUTION INTRAVENOUS at 08:14

## 2022-01-01 RX ADMIN — OXYCODONE HYDROCHLORIDE AND ACETAMINOPHEN 1000 MG: 500 TABLET ORAL at 08:13

## 2022-01-01 RX ADMIN — MIDAZOLAM HYDROCHLORIDE 2 MG: 2 INJECTION, SOLUTION INTRAMUSCULAR; INTRAVENOUS at 13:22

## 2022-01-01 RX ADMIN — FUROSEMIDE 40 MG: 10 INJECTION, SOLUTION INTRAMUSCULAR; INTRAVENOUS at 11:31

## 2022-01-01 RX ADMIN — MIDAZOLAM HYDROCHLORIDE 2 MG: 2 INJECTION, SOLUTION INTRAMUSCULAR; INTRAVENOUS at 03:47

## 2022-01-01 RX ADMIN — ACETAMINOPHEN 650 MG: 325 TABLET ORAL at 04:04

## 2022-01-01 RX ADMIN — MELOXICAM 15 MG: 15 TABLET ORAL at 17:16

## 2022-01-01 RX ADMIN — OXYCODONE HYDROCHLORIDE AND ACETAMINOPHEN 1000 MG: 500 TABLET ORAL at 07:49

## 2022-01-01 RX ADMIN — CLOPIDOGREL BISULFATE 75 MG: 75 TABLET ORAL at 09:54

## 2022-01-01 RX ADMIN — CLOPIDOGREL BISULFATE 75 MG: 75 TABLET ORAL at 09:02

## 2022-01-01 RX ADMIN — ASPIRIN 81 MG: 81 TABLET, CHEWABLE ORAL at 08:47

## 2022-01-01 RX ADMIN — Medication 100 MCG/HR: at 00:51

## 2022-01-01 RX ADMIN — Medication 1.8 MCG/KG/HR: at 04:51

## 2022-01-01 RX ADMIN — VANCOMYCIN HYDROCHLORIDE 1000 MG: 1 INJECTION, POWDER, LYOPHILIZED, FOR SOLUTION INTRAVENOUS at 11:55

## 2022-01-01 RX ADMIN — INSULIN LISPRO 1 UNITS: 100 INJECTION, SOLUTION INTRAVENOUS; SUBCUTANEOUS at 04:15

## 2022-01-01 RX ADMIN — Medication 200 MCG/HR: at 05:26

## 2022-01-01 RX ADMIN — PROPOFOL 45 MCG/KG/MIN: 10 INJECTION, EMULSION INTRAVENOUS at 16:21

## 2022-01-01 RX ADMIN — CARBOXYMETHYLCELLULOSE SODIUM 1 DROP: 10 GEL OPHTHALMIC at 17:23

## 2022-01-01 RX ADMIN — CARBOXYMETHYLCELLULOSE SODIUM 1 DROP: 10 GEL OPHTHALMIC at 21:10

## 2022-01-01 RX ADMIN — ALBUTEROL SULFATE 2.5 MG: 2.5 SOLUTION RESPIRATORY (INHALATION) at 03:07

## 2022-01-01 RX ADMIN — MUPIROCIN: 20 OINTMENT TOPICAL at 20:55

## 2022-01-01 RX ADMIN — FENTANYL CITRATE 25 MCG: 50 INJECTION INTRAMUSCULAR; INTRAVENOUS at 09:48

## 2022-01-01 RX ADMIN — ATORVASTATIN CALCIUM 10 MG: 10 TABLET, FILM COATED ORAL at 19:43

## 2022-01-01 RX ADMIN — ENOXAPARIN SODIUM 30 MG: 100 INJECTION SUBCUTANEOUS at 19:44

## 2022-01-01 RX ADMIN — INSULIN LISPRO 1 UNITS: 100 INJECTION, SOLUTION INTRAVENOUS; SUBCUTANEOUS at 17:01

## 2022-01-01 RX ADMIN — OXYCODONE HYDROCHLORIDE AND ACETAMINOPHEN 1000 MG: 500 TABLET ORAL at 08:32

## 2022-01-01 RX ADMIN — OXYCODONE HYDROCHLORIDE AND ACETAMINOPHEN 1000 MG: 500 TABLET ORAL at 08:35

## 2022-01-01 RX ADMIN — INSULIN LISPRO 3 UNITS: 100 INJECTION, SOLUTION INTRAVENOUS; SUBCUTANEOUS at 16:17

## 2022-01-01 RX ADMIN — ALBUTEROL SULFATE 2.5 MG: 2.5 SOLUTION RESPIRATORY (INHALATION) at 03:00

## 2022-01-01 RX ADMIN — ASPIRIN 81 MG: 81 TABLET, CHEWABLE ORAL at 08:05

## 2022-01-01 RX ADMIN — Medication 250 MCG/HR: at 23:30

## 2022-01-01 RX ADMIN — ALBUTEROL SULFATE 2.5 MG: 2.5 SOLUTION RESPIRATORY (INHALATION) at 10:26

## 2022-01-01 RX ADMIN — FENTANYL CITRATE 50 MCG: 50 INJECTION INTRAMUSCULAR; INTRAVENOUS at 18:17

## 2022-01-01 RX ADMIN — MELOXICAM 15 MG: 15 TABLET ORAL at 11:08

## 2022-01-01 RX ADMIN — DIAZEPAM 5 MG: 5 TABLET ORAL at 20:02

## 2022-01-01 RX ADMIN — QUETIAPINE FUMARATE 25 MG: 25 TABLET ORAL at 08:29

## 2022-01-01 RX ADMIN — DIAZEPAM 10 MG: 10 TABLET ORAL at 13:09

## 2022-01-01 RX ADMIN — DEXAMETHASONE SODIUM PHOSPHATE 4 MG: 4 INJECTION, SOLUTION INTRAMUSCULAR; INTRAVENOUS at 09:08

## 2022-01-01 RX ADMIN — ASPIRIN 81 MG: 81 TABLET, CHEWABLE ORAL at 09:02

## 2022-01-01 RX ADMIN — FAMOTIDINE 20 MG: 10 INJECTION, SOLUTION INTRAVENOUS at 20:28

## 2022-01-01 RX ADMIN — PROPOFOL 50 MCG/KG/MIN: 10 INJECTION, EMULSION INTRAVENOUS at 20:47

## 2022-01-01 RX ADMIN — ASPIRIN 81 MG: 81 TABLET, CHEWABLE ORAL at 08:14

## 2022-01-01 RX ADMIN — MIDAZOLAM HYDROCHLORIDE 2 MG: 2 INJECTION, SOLUTION INTRAMUSCULAR; INTRAVENOUS at 14:45

## 2022-01-01 RX ADMIN — Medication 275 MCG/HR: at 16:00

## 2022-01-01 RX ADMIN — ENOXAPARIN SODIUM 30 MG: 100 INJECTION SUBCUTANEOUS at 19:56

## 2022-01-01 RX ADMIN — QUETIAPINE FUMARATE 25 MG: 25 TABLET ORAL at 20:34

## 2022-01-01 RX ADMIN — FUROSEMIDE 20 MG: 10 INJECTION, SOLUTION INTRAMUSCULAR; INTRAVENOUS at 09:44

## 2022-01-01 RX ADMIN — ASPIRIN 81 MG: 81 TABLET, CHEWABLE ORAL at 08:11

## 2022-01-01 RX ADMIN — MIDAZOLAM HYDROCHLORIDE 2 MG: 2 INJECTION, SOLUTION INTRAMUSCULAR; INTRAVENOUS at 10:44

## 2022-01-01 RX ADMIN — INSULIN LISPRO 3 UNITS: 100 INJECTION, SOLUTION INTRAVENOUS; SUBCUTANEOUS at 03:57

## 2022-01-01 RX ADMIN — PROPOFOL 50 MCG/KG/MIN: 10 INJECTION, EMULSION INTRAVENOUS at 19:57

## 2022-01-01 RX ADMIN — FENTANYL CITRATE 25 MCG: 50 INJECTION INTRAMUSCULAR; INTRAVENOUS at 09:46

## 2022-01-01 RX ADMIN — Medication 1.2 MCG/KG/HR: at 08:02

## 2022-01-01 RX ADMIN — Medication 0.6 MCG/KG/HR: at 16:51

## 2022-01-01 RX ADMIN — ENOXAPARIN SODIUM 30 MG: 100 INJECTION SUBCUTANEOUS at 07:58

## 2022-01-01 RX ADMIN — Medication 2 MCG/KG/HR: at 00:57

## 2022-01-01 RX ADMIN — FUROSEMIDE 40 MG: 10 INJECTION, SOLUTION INTRAMUSCULAR; INTRAVENOUS at 11:16

## 2022-01-01 RX ADMIN — FUROSEMIDE 40 MG: 10 INJECTION, SOLUTION INTRAMUSCULAR; INTRAVENOUS at 08:31

## 2022-01-01 RX ADMIN — CARBOXYMETHYLCELLULOSE SODIUM 1 DROP: 10 GEL OPHTHALMIC at 20:03

## 2022-01-01 RX ADMIN — MIDAZOLAM HYDROCHLORIDE 2 MG: 2 INJECTION, SOLUTION INTRAMUSCULAR; INTRAVENOUS at 04:46

## 2022-01-01 RX ADMIN — ATORVASTATIN CALCIUM 10 MG: 10 TABLET, FILM COATED ORAL at 21:07

## 2022-01-01 RX ADMIN — INSULIN LISPRO 2 UNITS: 100 INJECTION, SOLUTION INTRAVENOUS; SUBCUTANEOUS at 11:47

## 2022-01-01 RX ADMIN — CHLORHEXIDINE GLUCONATE 0.12% ORAL RINSE 15 ML: 1.2 LIQUID ORAL at 10:12

## 2022-01-01 RX ADMIN — ALBUTEROL SULFATE 2.5 MG: 2.5 SOLUTION RESPIRATORY (INHALATION) at 19:38

## 2022-01-01 RX ADMIN — SODIUM CHLORIDE 25 ML: 9 INJECTION, SOLUTION INTRAVENOUS at 10:22

## 2022-01-01 RX ADMIN — INSULIN LISPRO 3 UNITS: 100 INJECTION, SOLUTION INTRAVENOUS; SUBCUTANEOUS at 12:07

## 2022-01-01 RX ADMIN — Medication 150 MCG/HR: at 21:48

## 2022-01-01 RX ADMIN — PROPOFOL 40 MCG/KG/MIN: 10 INJECTION, EMULSION INTRAVENOUS at 02:25

## 2022-01-01 RX ADMIN — FENTANYL CITRATE 50 MCG: 50 INJECTION INTRAMUSCULAR; INTRAVENOUS at 11:51

## 2022-01-01 RX ADMIN — CHLORHEXIDINE GLUCONATE 0.12% ORAL RINSE 15 ML: 1.2 LIQUID ORAL at 20:23

## 2022-01-01 RX ADMIN — VANCOMYCIN HYDROCHLORIDE 1000 MG: 1 INJECTION, POWDER, LYOPHILIZED, FOR SOLUTION INTRAVENOUS at 00:28

## 2022-01-01 RX ADMIN — CHLORHEXIDINE GLUCONATE 0.12% ORAL RINSE 15 ML: 1.2 LIQUID ORAL at 20:39

## 2022-01-01 RX ADMIN — INSULIN LISPRO 1 UNITS: 100 INJECTION, SOLUTION INTRAVENOUS; SUBCUTANEOUS at 01:25

## 2022-01-01 RX ADMIN — ALBUTEROL SULFATE 2.5 MG: 2.5 SOLUTION RESPIRATORY (INHALATION) at 19:52

## 2022-01-01 RX ADMIN — INSULIN LISPRO 3 UNITS: 100 INJECTION, SOLUTION INTRAVENOUS; SUBCUTANEOUS at 23:46

## 2022-01-01 RX ADMIN — QUETIAPINE FUMARATE 25 MG: 25 TABLET ORAL at 20:03

## 2022-01-01 RX ADMIN — ALBUTEROL SULFATE 2.5 MG: 2.5 SOLUTION RESPIRATORY (INHALATION) at 06:55

## 2022-01-01 RX ADMIN — INSULIN LISPRO 2 UNITS: 100 INJECTION, SOLUTION INTRAVENOUS; SUBCUTANEOUS at 12:31

## 2022-01-01 RX ADMIN — QUETIAPINE FUMARATE 25 MG: 25 TABLET ORAL at 08:30

## 2022-01-01 RX ADMIN — MIDAZOLAM HYDROCHLORIDE 2 MG: 2 INJECTION, SOLUTION INTRAMUSCULAR; INTRAVENOUS at 03:58

## 2022-01-01 RX ADMIN — ACETAMINOPHEN 650 MG: 325 TABLET ORAL at 04:18

## 2022-01-01 RX ADMIN — SODIUM CHLORIDE, PRESERVATIVE FREE 10 ML: 5 INJECTION INTRAVENOUS at 20:02

## 2022-01-01 RX ADMIN — FAMOTIDINE 20 MG: 10 INJECTION, SOLUTION INTRAVENOUS at 07:48

## 2022-01-01 RX ADMIN — SODIUM CHLORIDE, PRESERVATIVE FREE 10 ML: 5 INJECTION INTRAVENOUS at 08:33

## 2022-01-01 RX ADMIN — Medication 250 MCG/HR: at 07:53

## 2022-01-01 RX ADMIN — SODIUM ZIRCONIUM CYCLOSILICATE 10 G: 10 POWDER, FOR SUSPENSION ORAL at 20:15

## 2022-01-01 RX ADMIN — QUETIAPINE FUMARATE 25 MG: 25 TABLET ORAL at 21:07

## 2022-01-01 RX ADMIN — ENOXAPARIN SODIUM 30 MG: 100 INJECTION SUBCUTANEOUS at 09:08

## 2022-01-01 RX ADMIN — MELOXICAM 15 MG: 15 TABLET ORAL at 07:19

## 2022-01-01 RX ADMIN — CLOPIDOGREL BISULFATE 75 MG: 75 TABLET ORAL at 07:44

## 2022-01-01 RX ADMIN — FAMOTIDINE 20 MG: 10 INJECTION, SOLUTION INTRAVENOUS at 20:55

## 2022-01-01 RX ADMIN — ASPIRIN 81 MG: 81 TABLET, CHEWABLE ORAL at 07:49

## 2022-01-01 RX ADMIN — Medication 250 MCG/HR: at 10:01

## 2022-01-01 RX ADMIN — Medication 50 MCG/HR: at 11:58

## 2022-01-01 RX ADMIN — INSULIN LISPRO 3 UNITS: 100 INJECTION, SOLUTION INTRAVENOUS; SUBCUTANEOUS at 20:01

## 2022-01-01 RX ADMIN — Medication 250 MCG/HR: at 19:56

## 2022-01-01 RX ADMIN — Medication 1.8 MCG/KG/HR: at 03:25

## 2022-01-01 RX ADMIN — MUPIROCIN: 20 OINTMENT TOPICAL at 20:27

## 2022-01-01 RX ADMIN — MIDAZOLAM HYDROCHLORIDE 2 MG: 2 INJECTION, SOLUTION INTRAMUSCULAR; INTRAVENOUS at 20:12

## 2022-01-01 RX ADMIN — PROPOFOL 45 MCG/KG/MIN: 10 INJECTION, EMULSION INTRAVENOUS at 08:17

## 2022-01-01 RX ADMIN — DIAZEPAM 10 MG: 10 TABLET ORAL at 08:29

## 2022-01-01 RX ADMIN — CHOLECALCIFEROL (VITAMIN D3) 25 MCG (1,000 UNIT) TABLET 2000 UNITS: at 08:30

## 2022-01-01 RX ADMIN — ATORVASTATIN CALCIUM 10 MG: 10 TABLET, FILM COATED ORAL at 20:12

## 2022-01-01 RX ADMIN — INSULIN LISPRO 2 UNITS: 100 INJECTION, SOLUTION INTRAVENOUS; SUBCUTANEOUS at 20:23

## 2022-01-01 RX ADMIN — CHLORHEXIDINE GLUCONATE 0.12% ORAL RINSE 15 ML: 1.2 LIQUID ORAL at 21:05

## 2022-01-01 RX ADMIN — ACETAMINOPHEN 650 MG: 325 TABLET ORAL at 20:22

## 2022-01-01 RX ADMIN — MUPIROCIN: 20 OINTMENT TOPICAL at 10:06

## 2022-01-01 RX ADMIN — ACETAZOLAMIDE 250 MG: 500 INJECTION, POWDER, LYOPHILIZED, FOR SOLUTION INTRAVENOUS at 13:40

## 2022-01-01 RX ADMIN — MIDAZOLAM HYDROCHLORIDE 2 MG: 2 INJECTION, SOLUTION INTRAMUSCULAR; INTRAVENOUS at 08:39

## 2022-01-01 RX ADMIN — BARICITINIB 4 MG: 2 TABLET, FILM COATED ORAL at 13:56

## 2022-01-01 RX ADMIN — FLUTICASONE PROPIONATE 1 SPRAY: 50 SPRAY, METERED NASAL at 08:51

## 2022-01-01 RX ADMIN — ENOXAPARIN SODIUM 30 MG: 100 INJECTION SUBCUTANEOUS at 10:05

## 2022-01-01 RX ADMIN — CHLORHEXIDINE GLUCONATE 0.12% ORAL RINSE 15 ML: 1.2 LIQUID ORAL at 08:07

## 2022-01-01 RX ADMIN — ENOXAPARIN SODIUM 30 MG: 100 INJECTION SUBCUTANEOUS at 20:18

## 2022-01-01 RX ADMIN — Medication 1.4 MCG/KG/HR: at 04:07

## 2022-01-01 RX ADMIN — VANCOMYCIN HYDROCHLORIDE 1000 MG: 1 INJECTION, POWDER, LYOPHILIZED, FOR SOLUTION INTRAVENOUS at 12:04

## 2022-01-01 RX ADMIN — PROPOFOL 20 MCG/KG/MIN: 10 INJECTION, EMULSION INTRAVENOUS at 03:59

## 2022-01-01 RX ADMIN — SODIUM CHLORIDE, PRESERVATIVE FREE 10 ML: 5 INJECTION INTRAVENOUS at 10:24

## 2022-01-01 RX ADMIN — PROPOFOL 20 MCG/KG/MIN: 10 INJECTION, EMULSION INTRAVENOUS at 05:24

## 2022-01-01 RX ADMIN — ENOXAPARIN SODIUM 40 MG: 100 INJECTION SUBCUTANEOUS at 21:16

## 2022-01-01 RX ADMIN — DIAZEPAM 10 MG: 10 TABLET ORAL at 13:37

## 2022-01-01 RX ADMIN — MIDAZOLAM HYDROCHLORIDE 2 MG: 2 INJECTION, SOLUTION INTRAMUSCULAR; INTRAVENOUS at 08:46

## 2022-01-01 RX ADMIN — METOPROLOL TARTRATE 25 MG: 25 TABLET, FILM COATED ORAL at 09:05

## 2022-01-01 RX ADMIN — FAMOTIDINE 20 MG: 10 INJECTION, SOLUTION INTRAVENOUS at 20:03

## 2022-01-01 RX ADMIN — FUROSEMIDE 40 MG: 10 INJECTION, SOLUTION INTRAMUSCULAR; INTRAVENOUS at 08:30

## 2022-01-01 RX ADMIN — ENOXAPARIN SODIUM 40 MG: 100 INJECTION SUBCUTANEOUS at 20:32

## 2022-01-01 RX ADMIN — Medication 1.4 MCG/KG/HR: at 14:25

## 2022-01-01 RX ADMIN — FUROSEMIDE 20 MG: 10 INJECTION, SOLUTION INTRAMUSCULAR; INTRAVENOUS at 08:46

## 2022-01-01 RX ADMIN — Medication 1.4 MCG/KG/HR: at 12:58

## 2022-01-01 RX ADMIN — CHOLECALCIFEROL (VITAMIN D3) 25 MCG (1,000 UNIT) TABLET 2000 UNITS: at 09:54

## 2022-01-01 RX ADMIN — INSULIN LISPRO 3 UNITS: 100 INJECTION, SOLUTION INTRAVENOUS; SUBCUTANEOUS at 20:06

## 2022-01-01 RX ADMIN — FAMOTIDINE 20 MG: 10 INJECTION, SOLUTION INTRAVENOUS at 08:53

## 2022-01-01 RX ADMIN — MUPIROCIN: 20 OINTMENT TOPICAL at 19:43

## 2022-01-01 RX ADMIN — BARICITINIB 4 MG: 2 TABLET, FILM COATED ORAL at 08:21

## 2022-01-01 RX ADMIN — ACETAMINOPHEN 650 MG: 325 TABLET ORAL at 12:24

## 2022-01-01 RX ADMIN — CHLORHEXIDINE GLUCONATE 0.12% ORAL RINSE 15 ML: 1.2 LIQUID ORAL at 07:44

## 2022-01-01 RX ADMIN — INSULIN LISPRO 1 UNITS: 100 INJECTION, SOLUTION INTRAVENOUS; SUBCUTANEOUS at 23:36

## 2022-01-01 RX ADMIN — DEXAMETHASONE SODIUM PHOSPHATE 6 MG: 10 INJECTION INTRAMUSCULAR; INTRAVENOUS at 10:54

## 2022-01-01 RX ADMIN — ENOXAPARIN SODIUM 40 MG: 100 INJECTION SUBCUTANEOUS at 08:34

## 2022-01-01 RX ADMIN — Medication 275 MCG/HR: at 11:25

## 2022-01-01 RX ADMIN — INSULIN LISPRO 3 UNITS: 100 INJECTION, SOLUTION INTRAVENOUS; SUBCUTANEOUS at 22:20

## 2022-01-01 RX ADMIN — INSULIN LISPRO 3 UNITS: 100 INJECTION, SOLUTION INTRAVENOUS; SUBCUTANEOUS at 00:48

## 2022-01-01 RX ADMIN — Medication 1.6 MCG/KG/HR: at 19:29

## 2022-01-01 RX ADMIN — DIAZEPAM 10 MG: 10 TABLET ORAL at 13:22

## 2022-01-01 RX ADMIN — OXYCODONE HYDROCHLORIDE AND ACETAMINOPHEN 1000 MG: 500 TABLET ORAL at 07:33

## 2022-01-01 RX ADMIN — ALBUMIN (HUMAN) 25 G: 0.25 INJECTION, SOLUTION INTRAVENOUS at 08:44

## 2022-01-01 RX ADMIN — POTASSIUM CHLORIDE 20 MEQ: 29.8 INJECTION, SOLUTION INTRAVENOUS at 06:24

## 2022-01-01 RX ADMIN — SODIUM CHLORIDE, PRESERVATIVE FREE 10 ML: 5 INJECTION INTRAVENOUS at 19:57

## 2022-01-01 RX ADMIN — ENOXAPARIN SODIUM 40 MG: 100 INJECTION SUBCUTANEOUS at 21:11

## 2022-01-01 RX ADMIN — INSULIN LISPRO 1 UNITS: 100 INJECTION, SOLUTION INTRAVENOUS; SUBCUTANEOUS at 11:55

## 2022-01-01 RX ADMIN — INSULIN LISPRO 6 UNITS: 100 INJECTION, SOLUTION INTRAVENOUS; SUBCUTANEOUS at 17:55

## 2022-01-01 RX ADMIN — ROCURONIUM BROMIDE 60 MG: 10 INJECTION, SOLUTION INTRAVENOUS at 14:49

## 2022-01-01 RX ADMIN — DIAZEPAM 10 MG: 10 TABLET ORAL at 08:14

## 2022-01-01 RX ADMIN — SODIUM CHLORIDE 25 ML: 9 INJECTION, SOLUTION INTRAVENOUS at 14:55

## 2022-01-01 RX ADMIN — CHOLECALCIFEROL (VITAMIN D3) 25 MCG (1,000 UNIT) TABLET 2000 UNITS: at 09:08

## 2022-01-01 RX ADMIN — MIDAZOLAM HYDROCHLORIDE 2 MG: 2 INJECTION, SOLUTION INTRAMUSCULAR; INTRAVENOUS at 07:42

## 2022-01-01 RX ADMIN — MORPHINE SULFATE 4 MG: 4 INJECTION, SOLUTION INTRAMUSCULAR; INTRAVENOUS at 18:02

## 2022-01-01 RX ADMIN — OXYCODONE HYDROCHLORIDE AND ACETAMINOPHEN 1000 MG: 500 TABLET ORAL at 08:04

## 2022-01-01 RX ADMIN — CARBOXYMETHYLCELLULOSE SODIUM 1 DROP: 10 GEL OPHTHALMIC at 09:49

## 2022-01-01 RX ADMIN — INSULIN LISPRO 1 UNITS: 100 INJECTION, SOLUTION INTRAVENOUS; SUBCUTANEOUS at 08:48

## 2022-01-01 RX ADMIN — Medication 150 MCG/HR: at 11:39

## 2022-01-01 RX ADMIN — ALBUTEROL SULFATE 2.5 MG: 2.5 SOLUTION RESPIRATORY (INHALATION) at 02:52

## 2022-01-01 RX ADMIN — Medication 2 MCG/KG/HR: at 03:36

## 2022-01-01 RX ADMIN — CARBOXYMETHYLCELLULOSE SODIUM 1 DROP: 10 GEL OPHTHALMIC at 16:29

## 2022-01-01 RX ADMIN — SODIUM CHLORIDE 25 ML: 9 INJECTION, SOLUTION INTRAVENOUS at 14:34

## 2022-01-01 RX ADMIN — DEXAMETHASONE SODIUM PHOSPHATE 2 MG: 4 INJECTION, SOLUTION INTRA-ARTICULAR; INTRALESIONAL; INTRAMUSCULAR; INTRAVENOUS; SOFT TISSUE at 11:47

## 2022-01-01 RX ADMIN — CARBOXYMETHYLCELLULOSE SODIUM 1 DROP: 10 GEL OPHTHALMIC at 15:36

## 2022-01-01 RX ADMIN — SODIUM CHLORIDE, PRESERVATIVE FREE 10 ML: 5 INJECTION INTRAVENOUS at 08:08

## 2022-01-01 RX ADMIN — Medication 225 MCG/HR: at 11:25

## 2022-01-01 RX ADMIN — PROPOFOL INJECTABLE EMULSION 50 MCG/KG/MIN: 10 INJECTION, EMULSION INTRAVENOUS at 07:05

## 2022-01-01 RX ADMIN — INSULIN LISPRO 3 UNITS: 100 INJECTION, SOLUTION INTRAVENOUS; SUBCUTANEOUS at 03:36

## 2022-01-01 RX ADMIN — CHOLECALCIFEROL (VITAMIN D3) 25 MCG (1,000 UNIT) TABLET 2000 UNITS: at 07:49

## 2022-01-01 RX ADMIN — CHLORHEXIDINE GLUCONATE 0.12% ORAL RINSE 15 ML: 1.2 LIQUID ORAL at 20:03

## 2022-01-01 RX ADMIN — PROPOFOL 20 MCG/KG/MIN: 10 INJECTION, EMULSION INTRAVENOUS at 05:26

## 2022-01-01 RX ADMIN — QUETIAPINE FUMARATE 25 MG: 25 TABLET ORAL at 20:07

## 2022-01-01 RX ADMIN — CHLORHEXIDINE GLUCONATE 0.12% ORAL RINSE 15 ML: 1.2 LIQUID ORAL at 08:35

## 2022-01-01 RX ADMIN — Medication 300 MCG/HR: at 22:11

## 2022-01-01 RX ADMIN — QUETIAPINE FUMARATE 25 MG: 25 TABLET ORAL at 08:35

## 2022-01-01 RX ADMIN — DEXAMETHASONE SODIUM PHOSPHATE 6 MG: 10 INJECTION INTRAMUSCULAR; INTRAVENOUS at 08:34

## 2022-01-01 RX ADMIN — INSULIN LISPRO 2 UNITS: 100 INJECTION, SOLUTION INTRAVENOUS; SUBCUTANEOUS at 20:28

## 2022-01-01 RX ADMIN — INSULIN LISPRO 3 UNITS: 100 INJECTION, SOLUTION INTRAVENOUS; SUBCUTANEOUS at 16:02

## 2022-01-01 RX ADMIN — FAMOTIDINE 20 MG: 10 INJECTION, SOLUTION INTRAVENOUS at 19:49

## 2022-01-01 RX ADMIN — ACETAMINOPHEN 650 MG: 325 TABLET ORAL at 00:22

## 2022-01-01 RX ADMIN — ENOXAPARIN SODIUM 30 MG: 100 INJECTION SUBCUTANEOUS at 20:40

## 2022-01-01 RX ADMIN — Medication 1 MG/HR: at 21:54

## 2022-01-01 RX ADMIN — INSULIN LISPRO 3 UNITS: 100 INJECTION, SOLUTION INTRAVENOUS; SUBCUTANEOUS at 08:23

## 2022-01-01 RX ADMIN — INSULIN LISPRO 3 UNITS: 100 INJECTION, SOLUTION INTRAVENOUS; SUBCUTANEOUS at 09:54

## 2022-01-01 RX ADMIN — Medication 300 MCG/HR: at 17:45

## 2022-01-01 RX ADMIN — FAMOTIDINE 20 MG: 10 INJECTION, SOLUTION INTRAVENOUS at 21:10

## 2022-01-01 RX ADMIN — ENOXAPARIN SODIUM 40 MG: 100 INJECTION SUBCUTANEOUS at 08:59

## 2022-01-01 RX ADMIN — CARBOXYMETHYLCELLULOSE SODIUM 1 DROP: 10 GEL OPHTHALMIC at 20:22

## 2022-01-01 RX ADMIN — ENOXAPARIN SODIUM 30 MG: 100 INJECTION SUBCUTANEOUS at 08:30

## 2022-01-01 RX ADMIN — CHOLECALCIFEROL (VITAMIN D3) 25 MCG (1,000 UNIT) TABLET 2000 UNITS: at 07:54

## 2022-01-01 RX ADMIN — ENOXAPARIN SODIUM 40 MG: 100 INJECTION SUBCUTANEOUS at 09:18

## 2022-01-01 RX ADMIN — ASPIRIN 81 MG: 81 TABLET, CHEWABLE ORAL at 09:54

## 2022-01-01 RX ADMIN — Medication 0.7 MCG/KG/HR: at 22:10

## 2022-01-01 RX ADMIN — ALBUTEROL SULFATE 2.5 MG: 2.5 SOLUTION RESPIRATORY (INHALATION) at 14:35

## 2022-01-01 RX ADMIN — Medication 1.8 MCG/KG/HR: at 17:33

## 2022-01-01 RX ADMIN — ALBUTEROL SULFATE 2.5 MG: 2.5 SOLUTION RESPIRATORY (INHALATION) at 16:40

## 2022-01-01 RX ADMIN — SODIUM CHLORIDE 250 ML: 9 INJECTION, SOLUTION INTRAVENOUS at 11:11

## 2022-01-01 RX ADMIN — Medication 12.5 MCG/HR: at 15:49

## 2022-01-01 RX ADMIN — HYDRALAZINE HYDROCHLORIDE 10 MG: 20 INJECTION INTRAMUSCULAR; INTRAVENOUS at 07:04

## 2022-01-01 RX ADMIN — SODIUM CHLORIDE, PRESERVATIVE FREE 10 ML: 5 INJECTION INTRAVENOUS at 10:13

## 2022-01-01 RX ADMIN — CLOPIDOGREL BISULFATE 75 MG: 75 TABLET ORAL at 08:47

## 2022-01-01 RX ADMIN — MIDAZOLAM HYDROCHLORIDE 2 MG: 2 INJECTION, SOLUTION INTRAMUSCULAR; INTRAVENOUS at 05:52

## 2022-01-01 RX ADMIN — LINEZOLID 600 MG: 600 INJECTION, SOLUTION INTRAVENOUS at 13:00

## 2022-01-01 RX ADMIN — SODIUM CHLORIDE, PRESERVATIVE FREE 10 ML: 5 INJECTION INTRAVENOUS at 20:08

## 2022-01-01 RX ADMIN — DIAZEPAM 10 MG: 10 TABLET ORAL at 14:12

## 2022-01-01 RX ADMIN — CARBOXYMETHYLCELLULOSE SODIUM 1 DROP: 10 GEL OPHTHALMIC at 20:20

## 2022-01-01 RX ADMIN — Medication 250 MCG/HR: at 15:40

## 2022-01-01 RX ADMIN — ACETAMINOPHEN 650 MG: 325 TABLET ORAL at 23:24

## 2022-01-01 RX ADMIN — BENZONATATE 100 MG: 100 CAPSULE ORAL at 09:45

## 2022-01-01 RX ADMIN — PROPOFOL 50 MCG/KG/MIN: 10 INJECTION, EMULSION INTRAVENOUS at 01:41

## 2022-01-01 RX ADMIN — INSULIN LISPRO 3 UNITS: 100 INJECTION, SOLUTION INTRAVENOUS; SUBCUTANEOUS at 09:29

## 2022-01-01 RX ADMIN — Medication 300 MCG/HR: at 11:46

## 2022-01-01 RX ADMIN — QUETIAPINE FUMARATE 25 MG: 25 TABLET ORAL at 08:47

## 2022-01-01 RX ADMIN — Medication 1.8 MCG/KG/HR: at 23:48

## 2022-01-01 RX ADMIN — Medication 1.8 MCG/KG/HR: at 20:44

## 2022-01-01 RX ADMIN — CARBOXYMETHYLCELLULOSE SODIUM 1 DROP: 10 GEL OPHTHALMIC at 15:44

## 2022-01-01 RX ADMIN — Medication 1.4 MCG/KG/HR: at 05:55

## 2022-01-01 RX ADMIN — DIAZEPAM 10 MG: 10 TABLET ORAL at 08:28

## 2022-01-01 RX ADMIN — VANCOMYCIN HYDROCHLORIDE 1000 MG: 1 INJECTION, POWDER, LYOPHILIZED, FOR SOLUTION INTRAVENOUS at 10:57

## 2022-01-01 RX ADMIN — CHLORHEXIDINE GLUCONATE 0.12% ORAL RINSE 15 ML: 1.2 LIQUID ORAL at 08:42

## 2022-01-01 RX ADMIN — Medication 175 MCG/HR: at 09:50

## 2022-01-01 RX ADMIN — DIAZEPAM 10 MG: 10 TABLET ORAL at 20:34

## 2022-01-01 RX ADMIN — CHLORHEXIDINE GLUCONATE 0.12% ORAL RINSE 15 ML: 1.2 LIQUID ORAL at 19:57

## 2022-01-01 RX ADMIN — INSULIN LISPRO 2 UNITS: 100 INJECTION, SOLUTION INTRAVENOUS; SUBCUTANEOUS at 16:47

## 2022-01-01 RX ADMIN — CEFEPIME 2000 MG: 2 INJECTION, POWDER, FOR SOLUTION INTRAVENOUS at 21:07

## 2022-01-01 RX ADMIN — INSULIN LISPRO 2 UNITS: 100 INJECTION, SOLUTION INTRAVENOUS; SUBCUTANEOUS at 12:55

## 2022-01-01 RX ADMIN — CLOPIDOGREL BISULFATE 75 MG: 75 TABLET ORAL at 08:22

## 2022-01-01 RX ADMIN — CARBOXYMETHYLCELLULOSE SODIUM 1 DROP: 10 GEL OPHTHALMIC at 19:58

## 2022-01-01 RX ADMIN — PROPOFOL 50 MCG/KG/MIN: 10 INJECTION, EMULSION INTRAVENOUS at 19:00

## 2022-01-01 RX ADMIN — CHLORHEXIDINE GLUCONATE 0.12% ORAL RINSE 15 ML: 1.2 LIQUID ORAL at 09:08

## 2022-01-01 RX ADMIN — INSULIN LISPRO 3 UNITS: 100 INJECTION, SOLUTION INTRAVENOUS; SUBCUTANEOUS at 12:09

## 2022-01-01 RX ADMIN — Medication 50 MCG/HR: at 13:33

## 2022-01-01 RX ADMIN — Medication 1.8 MCG/KG/HR: at 04:31

## 2022-01-01 RX ADMIN — INSULIN LISPRO 1 UNITS: 100 INJECTION, SOLUTION INTRAVENOUS; SUBCUTANEOUS at 04:16

## 2022-01-01 RX ADMIN — INSULIN LISPRO 6 UNITS: 100 INJECTION, SOLUTION INTRAVENOUS; SUBCUTANEOUS at 00:22

## 2022-01-01 RX ADMIN — DIAZEPAM 10 MG: 10 TABLET ORAL at 07:32

## 2022-01-01 RX ADMIN — CHOLECALCIFEROL (VITAMIN D3) 25 MCG (1,000 UNIT) TABLET 2000 UNITS: at 09:18

## 2022-01-01 RX ADMIN — POTASSIUM BICARBONATE 20 MEQ: 782 TABLET, EFFERVESCENT ORAL at 11:43

## 2022-01-01 RX ADMIN — INSULIN LISPRO 3 UNITS: 100 INJECTION, SOLUTION INTRAVENOUS; SUBCUTANEOUS at 19:47

## 2022-01-01 RX ADMIN — FAMOTIDINE 20 MG: 10 INJECTION, SOLUTION INTRAVENOUS at 19:55

## 2022-01-01 RX ADMIN — MIDAZOLAM HYDROCHLORIDE 2 MG: 2 INJECTION, SOLUTION INTRAMUSCULAR; INTRAVENOUS at 09:46

## 2022-01-01 RX ADMIN — CHLORHEXIDINE GLUCONATE 0.12% ORAL RINSE 15 ML: 1.2 LIQUID ORAL at 20:04

## 2022-01-01 RX ADMIN — CHLORHEXIDINE GLUCONATE 0.12% ORAL RINSE 15 ML: 1.2 LIQUID ORAL at 19:49

## 2022-01-01 RX ADMIN — ENOXAPARIN SODIUM 30 MG: 100 INJECTION SUBCUTANEOUS at 07:29

## 2022-01-01 RX ADMIN — INSULIN LISPRO 6 UNITS: 100 INJECTION, SOLUTION INTRAVENOUS; SUBCUTANEOUS at 04:04

## 2022-01-01 RX ADMIN — VANCOMYCIN HYDROCHLORIDE 750 MG: 750 INJECTION, POWDER, LYOPHILIZED, FOR SOLUTION INTRAVENOUS at 02:37

## 2022-01-01 RX ADMIN — DIAZEPAM 10 MG: 10 TABLET ORAL at 07:58

## 2022-01-01 RX ADMIN — LEVOFLOXACIN 500 MG: 500 INJECTION, SOLUTION INTRAVENOUS at 08:51

## 2022-01-01 RX ADMIN — ALBUTEROL SULFATE 2.5 MG: 2.5 SOLUTION RESPIRATORY (INHALATION) at 09:59

## 2022-01-01 RX ADMIN — QUETIAPINE FUMARATE 25 MG: 25 TABLET ORAL at 19:49

## 2022-01-01 RX ADMIN — SODIUM CHLORIDE, PRESERVATIVE FREE 10 ML: 5 INJECTION INTRAVENOUS at 20:35

## 2022-01-01 RX ADMIN — CEFEPIME 2000 MG: 2 INJECTION, POWDER, FOR SOLUTION INTRAVENOUS at 09:57

## 2022-01-01 RX ADMIN — DEXAMETHASONE SODIUM PHOSPHATE 1 MG: 4 INJECTION, SOLUTION INTRA-ARTICULAR; INTRALESIONAL; INTRAMUSCULAR; INTRAVENOUS; SOFT TISSUE at 11:41

## 2022-01-01 RX ADMIN — CARBOXYMETHYLCELLULOSE SODIUM 1 DROP: 10 GEL OPHTHALMIC at 14:08

## 2022-01-01 RX ADMIN — BARICITINIB 4 MG: 2 TABLET, FILM COATED ORAL at 08:12

## 2022-01-01 RX ADMIN — Medication 300 MCG/HR: at 02:03

## 2022-01-01 RX ADMIN — MUPIROCIN: 20 OINTMENT TOPICAL at 08:32

## 2022-01-01 RX ADMIN — SODIUM CHLORIDE, PRESERVATIVE FREE 10 ML: 5 INJECTION INTRAVENOUS at 21:48

## 2022-01-01 RX ADMIN — POTASSIUM CHLORIDE 20 MEQ: 29.8 INJECTION, SOLUTION INTRAVENOUS at 05:56

## 2022-01-01 RX ADMIN — INSULIN LISPRO 1 UNITS: 100 INJECTION, SOLUTION INTRAVENOUS; SUBCUTANEOUS at 12:46

## 2022-01-01 RX ADMIN — MIDAZOLAM HYDROCHLORIDE 2 MG: 2 INJECTION, SOLUTION INTRAMUSCULAR; INTRAVENOUS at 04:50

## 2022-01-01 RX ADMIN — INSULIN LISPRO 6 UNITS: 100 INJECTION, SOLUTION INTRAVENOUS; SUBCUTANEOUS at 16:23

## 2022-01-01 RX ADMIN — ALBUTEROL SULFATE 2.5 MG: 2.5 SOLUTION RESPIRATORY (INHALATION) at 11:21

## 2022-01-01 RX ADMIN — DIAZEPAM 10 MG: 10 TABLET ORAL at 20:18

## 2022-01-01 RX ADMIN — MIDAZOLAM HYDROCHLORIDE 2 MG: 2 INJECTION, SOLUTION INTRAMUSCULAR; INTRAVENOUS at 04:55

## 2022-01-01 RX ADMIN — MELOXICAM 15 MG: 15 TABLET ORAL at 07:02

## 2022-01-01 RX ADMIN — OXYCODONE HYDROCHLORIDE AND ACETAMINOPHEN 1000 MG: 500 TABLET ORAL at 09:00

## 2022-01-01 RX ADMIN — MIDAZOLAM HYDROCHLORIDE 2 MG: 2 INJECTION, SOLUTION INTRAMUSCULAR; INTRAVENOUS at 15:55

## 2022-01-01 RX ADMIN — ACETAMINOPHEN 650 MG: 325 TABLET ORAL at 15:58

## 2022-01-01 RX ADMIN — SODIUM CHLORIDE, PRESERVATIVE FREE 10 ML: 5 INJECTION INTRAVENOUS at 07:33

## 2022-01-01 RX ADMIN — PROPOFOL 50 MCG/KG/MIN: 10 INJECTION, EMULSION INTRAVENOUS at 08:35

## 2022-01-01 RX ADMIN — CHOLECALCIFEROL (VITAMIN D3) 25 MCG (1,000 UNIT) TABLET 2000 UNITS: at 08:59

## 2022-01-01 RX ADMIN — Medication 300 MCG/HR: at 08:39

## 2022-01-01 RX ADMIN — PROPOFOL INJECTABLE EMULSION 50 MCG/KG/MIN: 10 INJECTION, EMULSION INTRAVENOUS at 11:28

## 2022-01-01 RX ADMIN — PROPOFOL 20 MCG/KG/MIN: 10 INJECTION, EMULSION INTRAVENOUS at 19:55

## 2022-01-01 RX ADMIN — MIDAZOLAM HYDROCHLORIDE 2 MG: 2 INJECTION, SOLUTION INTRAMUSCULAR; INTRAVENOUS at 14:13

## 2022-01-01 RX ADMIN — VANCOMYCIN HYDROCHLORIDE 750 MG: 750 INJECTION, POWDER, LYOPHILIZED, FOR SOLUTION INTRAVENOUS at 15:00

## 2022-01-01 RX ADMIN — Medication 1.8 MCG/KG/HR: at 09:51

## 2022-01-01 RX ADMIN — CARBOXYMETHYLCELLULOSE SODIUM 1 DROP: 10 GEL OPHTHALMIC at 07:58

## 2022-01-01 RX ADMIN — VANCOMYCIN HYDROCHLORIDE 1000 MG: 1 INJECTION, POWDER, LYOPHILIZED, FOR SOLUTION INTRAVENOUS at 11:26

## 2022-01-01 RX ADMIN — CHLORHEXIDINE GLUCONATE 0.12% ORAL RINSE 15 ML: 1.2 LIQUID ORAL at 07:59

## 2022-01-01 RX ADMIN — PROPOFOL 10 MCG/KG/MIN: 10 INJECTION, EMULSION INTRAVENOUS at 03:38

## 2022-01-01 RX ADMIN — Medication 1.2 MCG/KG/HR: at 12:51

## 2022-01-01 RX ADMIN — INSULIN LISPRO 3 UNITS: 100 INJECTION, SOLUTION INTRAVENOUS; SUBCUTANEOUS at 16:18

## 2022-01-01 RX ADMIN — CHLORHEXIDINE GLUCONATE 0.12% ORAL RINSE 15 ML: 1.2 LIQUID ORAL at 20:37

## 2022-01-01 RX ADMIN — LEVOFLOXACIN 500 MG: 500 INJECTION, SOLUTION INTRAVENOUS at 10:45

## 2022-01-01 RX ADMIN — MIDAZOLAM HYDROCHLORIDE 2 MG: 2 INJECTION, SOLUTION INTRAMUSCULAR; INTRAVENOUS at 17:45

## 2022-01-01 RX ADMIN — SODIUM CHLORIDE, PRESERVATIVE FREE 10 ML: 5 INJECTION INTRAVENOUS at 21:08

## 2022-01-01 RX ADMIN — ALBUTEROL SULFATE 2.5 MG: 2.5 SOLUTION RESPIRATORY (INHALATION) at 14:46

## 2022-01-01 RX ADMIN — PROPOFOL INJECTABLE EMULSION 45 MCG/KG/MIN: 10 INJECTION, EMULSION INTRAVENOUS at 00:36

## 2022-01-01 RX ADMIN — INSULIN LISPRO 3 UNITS: 100 INJECTION, SOLUTION INTRAVENOUS; SUBCUTANEOUS at 09:47

## 2022-01-01 RX ADMIN — FAMOTIDINE 20 MG: 10 INJECTION, SOLUTION INTRAVENOUS at 07:54

## 2022-01-01 RX ADMIN — HEPARIN SODIUM 2180 UNITS/HR: 10000 INJECTION, SOLUTION INTRAVENOUS; SUBCUTANEOUS at 10:41

## 2022-01-01 RX ADMIN — Medication 50 MCG/HR: at 15:16

## 2022-01-01 RX ADMIN — CARBOXYMETHYLCELLULOSE SODIUM 1 DROP: 10 GEL OPHTHALMIC at 20:35

## 2022-01-01 RX ADMIN — PROPOFOL 20 MCG/KG/MIN: 10 INJECTION, EMULSION INTRAVENOUS at 11:31

## 2022-01-01 RX ADMIN — ALBUTEROL SULFATE 2.5 MG: 2.5 SOLUTION RESPIRATORY (INHALATION) at 11:01

## 2022-01-01 RX ADMIN — DEXAMETHASONE SODIUM PHOSPHATE 6 MG: 10 INJECTION INTRAMUSCULAR; INTRAVENOUS at 08:04

## 2022-01-01 RX ADMIN — INSULIN LISPRO 3 UNITS: 100 INJECTION, SOLUTION INTRAVENOUS; SUBCUTANEOUS at 12:15

## 2022-01-01 RX ADMIN — PROPOFOL 50 MCG/KG/MIN: 10 INJECTION, EMULSION INTRAVENOUS at 06:07

## 2022-01-01 RX ADMIN — FAMOTIDINE 20 MG: 10 INJECTION, SOLUTION INTRAVENOUS at 21:11

## 2022-01-01 RX ADMIN — OXYCODONE HYDROCHLORIDE AND ACETAMINOPHEN 1000 MG: 500 TABLET ORAL at 07:58

## 2022-01-01 RX ADMIN — QUETIAPINE FUMARATE 25 MG: 25 TABLET ORAL at 07:56

## 2022-01-01 RX ADMIN — CARBOXYMETHYLCELLULOSE SODIUM 1 DROP: 10 GEL OPHTHALMIC at 07:59

## 2022-01-01 RX ADMIN — Medication 200 MCG/HR: at 22:55

## 2022-01-01 RX ADMIN — PROPOFOL INJECTABLE EMULSION 45 MCG/KG/MIN: 10 INJECTION, EMULSION INTRAVENOUS at 04:05

## 2022-01-01 RX ADMIN — MIDAZOLAM HYDROCHLORIDE 2 MG: 2 INJECTION, SOLUTION INTRAMUSCULAR; INTRAVENOUS at 13:57

## 2022-01-01 RX ADMIN — BARICITINIB 4 MG: 2 TABLET, FILM COATED ORAL at 07:49

## 2022-01-01 RX ADMIN — POTASSIUM BICARBONATE 40 MEQ: 782 TABLET, EFFERVESCENT ORAL at 11:32

## 2022-01-01 RX ADMIN — Medication 1.5 MCG/KG/HR: at 12:08

## 2022-01-01 RX ADMIN — ENOXAPARIN SODIUM 40 MG: 100 INJECTION SUBCUTANEOUS at 08:05

## 2022-01-01 RX ADMIN — ALBUTEROL SULFATE 2.5 MG: 2.5 SOLUTION RESPIRATORY (INHALATION) at 03:04

## 2022-01-01 RX ADMIN — Medication 1.8 MCG/KG/HR: at 01:37

## 2022-01-01 RX ADMIN — MIDAZOLAM HYDROCHLORIDE 2 MG: 2 INJECTION, SOLUTION INTRAMUSCULAR; INTRAVENOUS at 09:30

## 2022-01-01 RX ADMIN — ASPIRIN 81 MG: 81 TABLET, CHEWABLE ORAL at 08:07

## 2022-01-01 RX ADMIN — Medication 225 MCG/HR: at 21:08

## 2022-01-01 RX ADMIN — OXYCODONE HYDROCHLORIDE AND ACETAMINOPHEN 1000 MG: 500 TABLET ORAL at 08:28

## 2022-01-01 RX ADMIN — PROPOFOL 50 MCG/KG/MIN: 10 INJECTION, EMULSION INTRAVENOUS at 12:50

## 2022-01-01 RX ADMIN — FUROSEMIDE 40 MG: 10 INJECTION, SOLUTION INTRAMUSCULAR; INTRAVENOUS at 09:15

## 2022-01-01 RX ADMIN — ENOXAPARIN SODIUM 30 MG: 100 INJECTION SUBCUTANEOUS at 08:35

## 2022-01-01 RX ADMIN — Medication 50 MCG/HR: at 04:12

## 2022-01-01 RX ADMIN — CARBOXYMETHYLCELLULOSE SODIUM 1 DROP: 10 GEL OPHTHALMIC at 08:30

## 2022-01-01 RX ADMIN — CHLORHEXIDINE GLUCONATE 0.12% ORAL RINSE 15 ML: 1.2 LIQUID ORAL at 20:18

## 2022-01-01 RX ADMIN — CHLORHEXIDINE GLUCONATE 0.12% ORAL RINSE 15 ML: 1.2 LIQUID ORAL at 07:30

## 2022-01-01 RX ADMIN — MIDAZOLAM HYDROCHLORIDE 2 MG: 2 INJECTION, SOLUTION INTRAMUSCULAR; INTRAVENOUS at 17:37

## 2022-01-01 RX ADMIN — ALBUTEROL SULFATE 2.5 MG: 2.5 SOLUTION RESPIRATORY (INHALATION) at 08:32

## 2022-01-01 RX ADMIN — INSULIN LISPRO 2 UNITS: 100 INJECTION, SOLUTION INTRAVENOUS; SUBCUTANEOUS at 16:26

## 2022-01-01 RX ADMIN — Medication 1.4 MCG/KG/HR: at 11:44

## 2022-01-01 RX ADMIN — Medication 250 MCG/HR: at 14:29

## 2022-01-01 RX ADMIN — ASPIRIN 81 MG: 81 TABLET, CHEWABLE ORAL at 08:29

## 2022-01-01 RX ADMIN — INSULIN LISPRO 2 UNITS: 100 INJECTION, SOLUTION INTRAVENOUS; SUBCUTANEOUS at 20:00

## 2022-01-01 RX ADMIN — MELOXICAM 15 MG: 15 TABLET ORAL at 08:33

## 2022-01-01 RX ADMIN — CHLORHEXIDINE GLUCONATE 0.12% ORAL RINSE 15 ML: 1.2 LIQUID ORAL at 21:09

## 2022-01-01 RX ADMIN — ASPIRIN 81 MG: 81 TABLET, CHEWABLE ORAL at 13:00

## 2022-01-01 RX ADMIN — CEFEPIME 2000 MG: 2 INJECTION, POWDER, FOR SOLUTION INTRAVENOUS at 03:59

## 2022-01-01 RX ADMIN — CHLORHEXIDINE GLUCONATE 0.12% ORAL RINSE 15 ML: 1.2 LIQUID ORAL at 20:47

## 2022-01-01 RX ADMIN — MIDAZOLAM HYDROCHLORIDE 2 MG: 2 INJECTION, SOLUTION INTRAMUSCULAR; INTRAVENOUS at 04:19

## 2022-01-01 RX ADMIN — FUROSEMIDE 40 MG: 10 INJECTION, SOLUTION INTRAMUSCULAR; INTRAVENOUS at 08:11

## 2022-01-01 RX ADMIN — ENOXAPARIN SODIUM 40 MG: 100 INJECTION SUBCUTANEOUS at 08:13

## 2022-01-01 RX ADMIN — DIAZEPAM 10 MG: 10 TABLET ORAL at 13:40

## 2022-01-01 RX ADMIN — INSULIN LISPRO 3 UNITS: 100 INJECTION, SOLUTION INTRAVENOUS; SUBCUTANEOUS at 05:37

## 2022-01-01 RX ADMIN — LEVOFLOXACIN 500 MG: 500 INJECTION, SOLUTION INTRAVENOUS at 08:59

## 2022-01-01 RX ADMIN — PROPOFOL 50 MCG/KG/MIN: 10 INJECTION, EMULSION INTRAVENOUS at 15:14

## 2022-01-01 RX ADMIN — PROPOFOL 50 MCG/KG/MIN: 10 INJECTION, EMULSION INTRAVENOUS at 00:30

## 2022-01-01 RX ADMIN — ALBUTEROL SULFATE 2.5 MG: 2.5 SOLUTION RESPIRATORY (INHALATION) at 06:52

## 2022-01-01 RX ADMIN — Medication 1.9 MCG/KG/HR: at 09:30

## 2022-01-01 RX ADMIN — ATORVASTATIN CALCIUM 10 MG: 10 TABLET, FILM COATED ORAL at 21:05

## 2022-01-01 RX ADMIN — VANCOMYCIN HYDROCHLORIDE 1000 MG: 1 INJECTION, POWDER, LYOPHILIZED, FOR SOLUTION INTRAVENOUS at 11:25

## 2022-01-01 RX ADMIN — CHOLECALCIFEROL (VITAMIN D3) 25 MCG (1,000 UNIT) TABLET 2000 UNITS: at 09:59

## 2022-01-01 RX ADMIN — FAMOTIDINE 20 MG: 10 INJECTION, SOLUTION INTRAVENOUS at 10:06

## 2022-01-01 RX ADMIN — DEXAMETHASONE SODIUM PHOSPHATE 4 MG: 4 INJECTION, SOLUTION INTRAMUSCULAR; INTRAVENOUS at 11:21

## 2022-01-01 RX ADMIN — GUAIFENESIN AND DEXTROMETHORPHAN 5 ML: 100; 10 SYRUP ORAL at 05:09

## 2022-01-01 RX ADMIN — ALBUTEROL SULFATE 2.5 MG: 2.5 SOLUTION RESPIRATORY (INHALATION) at 02:46

## 2022-01-01 RX ADMIN — ENOXAPARIN SODIUM 30 MG: 100 INJECTION SUBCUTANEOUS at 07:48

## 2022-01-01 RX ADMIN — FUROSEMIDE 40 MG: 10 INJECTION, SOLUTION INTRAMUSCULAR; INTRAVENOUS at 08:22

## 2022-01-01 RX ADMIN — MIDAZOLAM HYDROCHLORIDE 2 MG: 2 INJECTION, SOLUTION INTRAMUSCULAR; INTRAVENOUS at 11:44

## 2022-01-01 RX ADMIN — Medication 3 MG/HR: at 10:05

## 2022-01-01 RX ADMIN — QUETIAPINE FUMARATE 25 MG: 25 TABLET ORAL at 07:33

## 2022-01-01 RX ADMIN — MIDAZOLAM HYDROCHLORIDE 2 MG: 2 INJECTION, SOLUTION INTRAMUSCULAR; INTRAVENOUS at 12:13

## 2022-01-01 RX ADMIN — CHOLECALCIFEROL (VITAMIN D3) 25 MCG (1,000 UNIT) TABLET 2000 UNITS: at 09:02

## 2022-01-01 RX ADMIN — PROPOFOL 40 MCG/KG/MIN: 10 INJECTION, EMULSION INTRAVENOUS at 21:52

## 2022-01-01 RX ADMIN — INSULIN LISPRO 1 UNITS: 100 INJECTION, SOLUTION INTRAVENOUS; SUBCUTANEOUS at 12:13

## 2022-01-01 RX ADMIN — Medication 250 MCG/HR: at 09:22

## 2022-01-01 RX ADMIN — ALBUTEROL SULFATE 2.5 MG: 2.5 SOLUTION RESPIRATORY (INHALATION) at 22:33

## 2022-01-01 RX ADMIN — QUETIAPINE FUMARATE 25 MG: 25 TABLET ORAL at 09:48

## 2022-01-01 RX ADMIN — ALBUTEROL SULFATE 2.5 MG: 2.5 SOLUTION RESPIRATORY (INHALATION) at 02:51

## 2022-01-01 RX ADMIN — LINEZOLID 600 MG: 600 INJECTION, SOLUTION INTRAVENOUS at 12:10

## 2022-01-01 RX ADMIN — CARBOXYMETHYLCELLULOSE SODIUM 1 DROP: 10 GEL OPHTHALMIC at 13:47

## 2022-01-01 RX ADMIN — PROPOFOL 45 MCG/KG/MIN: 10 INJECTION, EMULSION INTRAVENOUS at 22:06

## 2022-01-01 RX ADMIN — INSULIN LISPRO 3 UNITS: 100 INJECTION, SOLUTION INTRAVENOUS; SUBCUTANEOUS at 17:29

## 2022-01-01 RX ADMIN — INSULIN LISPRO 1 UNITS: 100 INJECTION, SOLUTION INTRAVENOUS; SUBCUTANEOUS at 00:01

## 2022-01-01 RX ADMIN — FUROSEMIDE 40 MG: 10 INJECTION, SOLUTION INTRAVENOUS at 09:48

## 2022-01-01 RX ADMIN — ACETAMINOPHEN 650 MG: 325 TABLET ORAL at 00:32

## 2022-01-01 RX ADMIN — GUAIFENESIN AND DEXTROMETHORPHAN 5 ML: 100; 10 SYRUP ORAL at 20:22

## 2022-01-01 RX ADMIN — FENTANYL CITRATE 25 MCG: 50 INJECTION INTRAMUSCULAR; INTRAVENOUS at 13:18

## 2022-01-01 RX ADMIN — INSULIN LISPRO 6 UNITS: 100 INJECTION, SOLUTION INTRAVENOUS; SUBCUTANEOUS at 11:54

## 2022-01-01 RX ADMIN — LINEZOLID 600 MG: 600 INJECTION, SOLUTION INTRAVENOUS at 14:35

## 2022-01-01 RX ADMIN — CHLORHEXIDINE GLUCONATE 0.12% ORAL RINSE 15 ML: 1.2 LIQUID ORAL at 07:33

## 2022-01-01 RX ADMIN — PROPOFOL INJECTABLE EMULSION 50 MCG/KG/MIN: 10 INJECTION, EMULSION INTRAVENOUS at 12:13

## 2022-01-01 RX ADMIN — SODIUM CHLORIDE 250 ML: 9 INJECTION, SOLUTION INTRAVENOUS at 10:15

## 2022-01-01 RX ADMIN — FENTANYL CITRATE 50 MCG: 50 INJECTION INTRAMUSCULAR; INTRAVENOUS at 14:51

## 2022-01-01 RX ADMIN — ALBUTEROL SULFATE 2.5 MG: 2.5 SOLUTION RESPIRATORY (INHALATION) at 23:28

## 2022-01-01 RX ADMIN — INSULIN LISPRO 6 UNITS: 100 INJECTION, SOLUTION INTRAVENOUS; SUBCUTANEOUS at 13:31

## 2022-01-01 RX ADMIN — INSULIN LISPRO 1 UNITS: 100 INJECTION, SOLUTION INTRAVENOUS; SUBCUTANEOUS at 19:33

## 2022-01-01 RX ADMIN — CHLORHEXIDINE GLUCONATE 0.12% ORAL RINSE 15 ML: 1.2 LIQUID ORAL at 20:27

## 2022-01-01 RX ADMIN — ACETAMINOPHEN 650 MG: 325 TABLET ORAL at 10:11

## 2022-01-01 RX ADMIN — CHOLECALCIFEROL (VITAMIN D3) 25 MCG (1,000 UNIT) TABLET 2000 UNITS: at 07:33

## 2022-01-01 RX ADMIN — ENOXAPARIN SODIUM 30 MG: 100 INJECTION SUBCUTANEOUS at 20:55

## 2022-01-01 RX ADMIN — MIDAZOLAM HYDROCHLORIDE 2 MG: 2 INJECTION, SOLUTION INTRAMUSCULAR; INTRAVENOUS at 13:17

## 2022-01-01 RX ADMIN — CLOPIDOGREL BISULFATE 75 MG: 75 TABLET ORAL at 07:58

## 2022-01-01 RX ADMIN — VANCOMYCIN HYDROCHLORIDE 1000 MG: 1 INJECTION, POWDER, LYOPHILIZED, FOR SOLUTION INTRAVENOUS at 13:04

## 2022-01-01 RX ADMIN — CLOPIDOGREL BISULFATE 75 MG: 75 TABLET ORAL at 08:32

## 2022-01-01 RX ADMIN — INSULIN LISPRO 3 UNITS: 100 INJECTION, SOLUTION INTRAVENOUS; SUBCUTANEOUS at 20:13

## 2022-01-01 RX ADMIN — ALBUTEROL SULFATE 2.5 MG: 2.5 SOLUTION RESPIRATORY (INHALATION) at 08:14

## 2022-01-01 RX ADMIN — Medication 175 MCG/HR: at 12:13

## 2022-01-01 RX ADMIN — SODIUM CHLORIDE, PRESERVATIVE FREE 10 ML: 5 INJECTION INTRAVENOUS at 09:36

## 2022-01-01 RX ADMIN — SODIUM CHLORIDE 1000 ML: 9 INJECTION, SOLUTION INTRAVENOUS at 10:26

## 2022-01-01 RX ADMIN — CEFEPIME 2000 MG: 2 INJECTION, POWDER, FOR SOLUTION INTRAVENOUS at 12:28

## 2022-01-01 RX ADMIN — CHLORHEXIDINE GLUCONATE 0.12% ORAL RINSE 15 ML: 1.2 LIQUID ORAL at 08:30

## 2022-01-01 RX ADMIN — MUPIROCIN: 20 OINTMENT TOPICAL at 09:12

## 2022-01-01 RX ADMIN — ENOXAPARIN SODIUM 30 MG: 100 INJECTION SUBCUTANEOUS at 08:07

## 2022-01-01 RX ADMIN — DIAZEPAM 10 MG: 10 TABLET ORAL at 20:20

## 2022-01-01 RX ADMIN — INSULIN LISPRO 2 UNITS: 100 INJECTION, SOLUTION INTRAVENOUS; SUBCUTANEOUS at 04:27

## 2022-01-01 RX ADMIN — CARBOXYMETHYLCELLULOSE SODIUM 1 DROP: 10 GEL OPHTHALMIC at 18:06

## 2022-01-01 RX ADMIN — Medication 100 MCG/HR: at 21:05

## 2022-01-01 RX ADMIN — PROPOFOL 50 MCG/KG/MIN: 10 INJECTION, EMULSION INTRAVENOUS at 20:15

## 2022-01-01 RX ADMIN — FAMOTIDINE 20 MG: 10 INJECTION, SOLUTION INTRAVENOUS at 19:44

## 2022-01-01 RX ADMIN — CHLORHEXIDINE GLUCONATE 0.12% ORAL RINSE 15 ML: 1.2 LIQUID ORAL at 20:35

## 2022-01-01 RX ADMIN — Medication 0.4 MCG/KG/HR: at 11:26

## 2022-01-01 RX ADMIN — HEPARIN SODIUM 1100 UNITS/HR: 10000 INJECTION, SOLUTION INTRAVENOUS; SUBCUTANEOUS at 21:42

## 2022-01-01 RX ADMIN — MIDAZOLAM HYDROCHLORIDE 2 MG: 2 INJECTION, SOLUTION INTRAMUSCULAR; INTRAVENOUS at 04:18

## 2022-01-01 RX ADMIN — QUETIAPINE FUMARATE 25 MG: 25 TABLET ORAL at 08:22

## 2022-01-01 RX ADMIN — IOPAMIDOL 85 ML: 755 INJECTION, SOLUTION INTRAVENOUS at 02:40

## 2022-01-01 RX ADMIN — ALTEPLASE 1 MG: 2.2 INJECTION, POWDER, LYOPHILIZED, FOR SOLUTION INTRAVENOUS at 18:15

## 2022-01-01 RX ADMIN — MIDAZOLAM HYDROCHLORIDE 2 MG: 2 INJECTION, SOLUTION INTRAMUSCULAR; INTRAVENOUS at 21:31

## 2022-01-01 RX ADMIN — VANCOMYCIN HYDROCHLORIDE 1000 MG: 1 INJECTION, POWDER, LYOPHILIZED, FOR SOLUTION INTRAVENOUS at 00:14

## 2022-01-01 RX ADMIN — FAMOTIDINE 20 MG: 10 INJECTION, SOLUTION INTRAVENOUS at 20:05

## 2022-01-01 RX ADMIN — Medication 100 MCG/HR: at 13:28

## 2022-01-01 RX ADMIN — ENOXAPARIN SODIUM 30 MG: 100 INJECTION SUBCUTANEOUS at 07:32

## 2022-01-01 RX ADMIN — OXYCODONE HYDROCHLORIDE AND ACETAMINOPHEN 1000 MG: 500 TABLET ORAL at 07:54

## 2022-01-01 RX ADMIN — VANCOMYCIN HYDROCHLORIDE 1000 MG: 1 INJECTION, POWDER, LYOPHILIZED, FOR SOLUTION INTRAVENOUS at 00:31

## 2022-01-01 RX ADMIN — OXYCODONE HYDROCHLORIDE AND ACETAMINOPHEN 1000 MG: 500 TABLET ORAL at 08:47

## 2022-01-01 RX ADMIN — SODIUM CHLORIDE, PRESERVATIVE FREE 10 ML: 5 INJECTION INTRAVENOUS at 08:42

## 2022-01-01 RX ADMIN — MIDAZOLAM HYDROCHLORIDE 2 MG: 2 INJECTION, SOLUTION INTRAMUSCULAR; INTRAVENOUS at 09:32

## 2022-01-01 RX ADMIN — Medication 1.8 MCG/KG/HR: at 08:08

## 2022-01-01 RX ADMIN — CHLORHEXIDINE GLUCONATE 0.12% ORAL RINSE 15 ML: 1.2 LIQUID ORAL at 20:55

## 2022-01-01 RX ADMIN — Medication 175 MCG/HR: at 05:35

## 2022-01-01 RX ADMIN — Medication 200 MCG/HR: at 04:09

## 2022-01-01 RX ADMIN — ENOXAPARIN SODIUM 30 MG: 100 INJECTION SUBCUTANEOUS at 09:59

## 2022-01-01 RX ADMIN — CARBOXYMETHYLCELLULOSE SODIUM 1 DROP: 10 GEL OPHTHALMIC at 07:29

## 2022-01-01 RX ADMIN — METOPROLOL TARTRATE 25 MG: 25 TABLET, FILM COATED ORAL at 12:03

## 2022-01-01 RX ADMIN — BARICITINIB 4 MG: 2 TABLET, FILM COATED ORAL at 10:54

## 2022-01-01 RX ADMIN — SODIUM CHLORIDE, PRESERVATIVE FREE 10 ML: 5 INJECTION INTRAVENOUS at 07:59

## 2022-01-01 RX ADMIN — FUROSEMIDE 20 MG: 10 INJECTION, SOLUTION INTRAMUSCULAR; INTRAVENOUS at 17:23

## 2022-01-01 RX ADMIN — Medication 1.2 MCG/KG/HR: at 03:34

## 2022-01-01 RX ADMIN — ALBUTEROL SULFATE 2.5 MG: 2.5 SOLUTION RESPIRATORY (INHALATION) at 20:03

## 2022-01-01 RX ADMIN — SODIUM CHLORIDE, PRESERVATIVE FREE 10 ML: 5 INJECTION INTRAVENOUS at 21:07

## 2022-01-01 RX ADMIN — FLUTICASONE PROPIONATE 1 SPRAY: 50 SPRAY, METERED NASAL at 09:19

## 2022-01-01 RX ADMIN — DIAZEPAM 10 MG: 10 TABLET ORAL at 09:08

## 2022-01-01 RX ADMIN — FENTANYL CITRATE 50 MCG: 50 INJECTION INTRAMUSCULAR; INTRAVENOUS at 15:02

## 2022-01-01 RX ADMIN — CHLORHEXIDINE GLUCONATE 0.12% ORAL RINSE 15 ML: 1.2 LIQUID ORAL at 10:00

## 2022-01-01 RX ADMIN — CEFEPIME 2000 MG: 2 INJECTION, POWDER, FOR SOLUTION INTRAVENOUS at 12:49

## 2022-01-01 RX ADMIN — CARBOXYMETHYLCELLULOSE SODIUM 1 DROP: 10 GEL OPHTHALMIC at 16:11

## 2022-01-01 RX ADMIN — ATORVASTATIN CALCIUM 10 MG: 10 TABLET, FILM COATED ORAL at 20:34

## 2022-01-01 RX ADMIN — Medication 1.8 MCG/KG/HR: at 19:06

## 2022-01-01 RX ADMIN — PROPOFOL 40 MCG/KG/MIN: 10 INJECTION, EMULSION INTRAVENOUS at 02:31

## 2022-01-01 RX ADMIN — MIDAZOLAM HYDROCHLORIDE 2 MG: 2 INJECTION, SOLUTION INTRAMUSCULAR; INTRAVENOUS at 16:01

## 2022-01-01 RX ADMIN — CHLORHEXIDINE GLUCONATE 0.12% ORAL RINSE 15 ML: 1.2 LIQUID ORAL at 21:11

## 2022-01-01 RX ADMIN — Medication 1.4 MCG/KG/HR: at 07:26

## 2022-01-01 RX ADMIN — PROPOFOL 50 MCG/KG/MIN: 10 INJECTION, EMULSION INTRAVENOUS at 13:38

## 2022-01-01 RX ADMIN — QUETIAPINE FUMARATE 25 MG: 25 TABLET ORAL at 20:18

## 2022-01-01 RX ADMIN — INSULIN LISPRO 2 UNITS: 100 INJECTION, SOLUTION INTRAVENOUS; SUBCUTANEOUS at 12:15

## 2022-01-01 RX ADMIN — SODIUM CHLORIDE, PRESERVATIVE FREE 10 ML: 5 INJECTION INTRAVENOUS at 08:51

## 2022-01-01 RX ADMIN — FUROSEMIDE 40 MG: 10 INJECTION, SOLUTION INTRAMUSCULAR; INTRAVENOUS at 09:49

## 2022-01-01 RX ADMIN — ENOXAPARIN SODIUM 30 MG: 100 INJECTION SUBCUTANEOUS at 21:10

## 2022-01-01 RX ADMIN — Medication 175 MCG/HR: at 04:35

## 2022-01-01 RX ADMIN — Medication 1 MCG/KG/HR: at 20:55

## 2022-01-01 RX ADMIN — MIDAZOLAM HYDROCHLORIDE 2 MG: 2 INJECTION, SOLUTION INTRAMUSCULAR; INTRAVENOUS at 06:48

## 2022-01-01 RX ADMIN — INSULIN LISPRO 3 UNITS: 100 INJECTION, SOLUTION INTRAVENOUS; SUBCUTANEOUS at 15:42

## 2022-01-01 RX ADMIN — FAMOTIDINE 20 MG: 10 INJECTION, SOLUTION INTRAVENOUS at 08:46

## 2022-01-01 RX ADMIN — ALBUTEROL SULFATE 2.5 MG: 2.5 SOLUTION RESPIRATORY (INHALATION) at 02:23

## 2022-01-01 RX ADMIN — ONDANSETRON 4 MG: 2 INJECTION INTRAMUSCULAR; INTRAVENOUS at 10:54

## 2022-01-01 RX ADMIN — QUETIAPINE FUMARATE 25 MG: 25 TABLET ORAL at 20:22

## 2022-01-01 RX ADMIN — FUROSEMIDE 40 MG: 10 INJECTION, SOLUTION INTRAMUSCULAR; INTRAVENOUS at 09:26

## 2022-01-01 RX ADMIN — QUETIAPINE FUMARATE 25 MG: 25 TABLET ORAL at 20:37

## 2022-01-01 RX ADMIN — FENTANYL CITRATE 50 MCG: 50 INJECTION INTRAMUSCULAR; INTRAVENOUS at 02:25

## 2022-01-01 RX ADMIN — Medication 1.4 MCG/KG/HR: at 09:14

## 2022-01-01 RX ADMIN — Medication 50 MCG/HR: at 08:33

## 2022-01-01 RX ADMIN — INSULIN LISPRO 3 UNITS: 100 INJECTION, SOLUTION INTRAVENOUS; SUBCUTANEOUS at 16:11

## 2022-01-01 RX ADMIN — Medication 2 MCG/KG/HR: at 06:22

## 2022-01-01 RX ADMIN — INSULIN LISPRO 3 UNITS: 100 INJECTION, SOLUTION INTRAVENOUS; SUBCUTANEOUS at 21:14

## 2022-01-01 RX ADMIN — INSULIN LISPRO 6 UNITS: 100 INJECTION, SOLUTION INTRAVENOUS; SUBCUTANEOUS at 04:15

## 2022-01-01 RX ADMIN — DEXAMETHASONE SODIUM PHOSPHATE 6 MG: 10 INJECTION INTRAMUSCULAR; INTRAVENOUS at 08:21

## 2022-01-01 RX ADMIN — MIDAZOLAM HYDROCHLORIDE 2 MG: 2 INJECTION, SOLUTION INTRAMUSCULAR; INTRAVENOUS at 18:30

## 2022-01-01 RX ADMIN — ACETAMINOPHEN 650 MG: 325 TABLET ORAL at 21:11

## 2022-01-01 RX ADMIN — SODIUM CHLORIDE, PRESERVATIVE FREE 10 ML: 5 INJECTION INTRAVENOUS at 07:29

## 2022-01-01 RX ADMIN — Medication 0.7 MCG/KG/HR: at 06:34

## 2022-01-01 RX ADMIN — Medication 1.2 MCG/KG/HR: at 22:59

## 2022-01-01 RX ADMIN — CARBOXYMETHYLCELLULOSE SODIUM 1 DROP: 10 GEL OPHTHALMIC at 17:25

## 2022-01-01 RX ADMIN — FENTANYL CITRATE 50 MCG: 50 INJECTION INTRAMUSCULAR; INTRAVENOUS at 20:12

## 2022-01-01 RX ADMIN — MUPIROCIN: 20 OINTMENT TOPICAL at 10:00

## 2022-01-01 RX ADMIN — SODIUM CHLORIDE, PRESERVATIVE FREE 10 ML: 5 INJECTION INTRAVENOUS at 20:19

## 2022-01-01 RX ADMIN — INSULIN LISPRO 2 UNITS: 100 INJECTION, SOLUTION INTRAVENOUS; SUBCUTANEOUS at 17:23

## 2022-01-01 RX ADMIN — Medication 1 MCG/KG/HR: at 05:36

## 2022-01-01 RX ADMIN — SODIUM CHLORIDE, PRESERVATIVE FREE 10 ML: 5 INJECTION INTRAVENOUS at 20:24

## 2022-01-01 RX ADMIN — ALBUMIN (HUMAN) 25 G: 0.25 INJECTION, SOLUTION INTRAVENOUS at 14:21

## 2022-01-01 RX ADMIN — ALBUTEROL SULFATE 2.5 MG: 2.5 SOLUTION RESPIRATORY (INHALATION) at 15:27

## 2022-01-01 RX ADMIN — PROPOFOL 20 MCG/KG/MIN: 10 INJECTION, EMULSION INTRAVENOUS at 00:09

## 2022-01-01 RX ADMIN — GUAIFENESIN AND DEXTROMETHORPHAN 5 ML: 100; 10 SYRUP ORAL at 20:23

## 2022-01-01 RX ADMIN — CARBOXYMETHYLCELLULOSE SODIUM 1 DROP: 10 GEL OPHTHALMIC at 20:13

## 2022-01-01 RX ADMIN — INSULIN LISPRO 3 UNITS: 100 INJECTION, SOLUTION INTRAVENOUS; SUBCUTANEOUS at 16:16

## 2022-01-01 RX ADMIN — INSULIN LISPRO 1 UNITS: 100 INJECTION, SOLUTION INTRAVENOUS; SUBCUTANEOUS at 09:09

## 2022-01-01 RX ADMIN — FENTANYL CITRATE 25 MCG: 50 INJECTION INTRAMUSCULAR; INTRAVENOUS at 13:52

## 2022-01-01 RX ADMIN — LEVOFLOXACIN 500 MG: 500 INJECTION, SOLUTION INTRAVENOUS at 08:41

## 2022-01-01 RX ADMIN — Medication 1.8 MCG/KG/HR: at 11:13

## 2022-01-01 RX ADMIN — SODIUM CHLORIDE, PRESERVATIVE FREE 10 ML: 5 INJECTION INTRAVENOUS at 20:57

## 2022-01-01 RX ADMIN — DIAZEPAM 10 MG: 10 TABLET ORAL at 08:12

## 2022-01-01 RX ADMIN — CHLORHEXIDINE GLUCONATE 0.12% ORAL RINSE 15 ML: 1.2 LIQUID ORAL at 08:48

## 2022-01-01 RX ADMIN — CLOPIDOGREL BISULFATE 75 MG: 75 TABLET ORAL at 07:54

## 2022-01-01 RX ADMIN — QUETIAPINE FUMARATE 25 MG: 25 TABLET ORAL at 08:41

## 2022-01-01 RX ADMIN — ASPIRIN 81 MG: 81 TABLET, CHEWABLE ORAL at 07:58

## 2022-01-01 RX ADMIN — MIDAZOLAM HYDROCHLORIDE 2 MG: 2 INJECTION, SOLUTION INTRAMUSCULAR; INTRAVENOUS at 20:01

## 2022-01-01 RX ADMIN — PROPOFOL INJECTABLE EMULSION 30 MCG/KG/MIN: 10 INJECTION, EMULSION INTRAVENOUS at 16:48

## 2022-01-01 RX ADMIN — CHOLECALCIFEROL (VITAMIN D3) 25 MCG (1,000 UNIT) TABLET 2000 UNITS: at 08:33

## 2022-01-01 RX ADMIN — LINEZOLID 600 MG: 600 INJECTION, SOLUTION INTRAVENOUS at 23:47

## 2022-01-01 RX ADMIN — ONDANSETRON HYDROCHLORIDE 4 MG: 2 INJECTION, SOLUTION INTRAMUSCULAR; INTRAVENOUS at 14:49

## 2022-01-01 RX ADMIN — Medication 0.7 MCG/KG/HR: at 14:03

## 2022-01-01 RX ADMIN — ASPIRIN 81 MG: 81 TABLET, CHEWABLE ORAL at 07:54

## 2022-01-01 RX ADMIN — DEXAMETHASONE SODIUM PHOSPHATE 6 MG: 10 INJECTION INTRAMUSCULAR; INTRAVENOUS at 08:11

## 2022-01-01 RX ADMIN — POLYETHYLENE GLYCOL (3350) 17 G: 17 POWDER, FOR SOLUTION ORAL at 09:46

## 2022-01-01 RX ADMIN — FAMOTIDINE 20 MG: 10 INJECTION, SOLUTION INTRAVENOUS at 20:18

## 2022-01-01 RX ADMIN — CLOPIDOGREL BISULFATE 75 MG: 75 TABLET ORAL at 08:14

## 2022-01-01 RX ADMIN — Medication 1.4 MCG/KG/HR: at 18:27

## 2022-01-01 RX ADMIN — INSULIN LISPRO 2 UNITS: 100 INJECTION, SOLUTION INTRAVENOUS; SUBCUTANEOUS at 00:04

## 2022-01-01 RX ADMIN — QUETIAPINE FUMARATE 25 MG: 25 TABLET ORAL at 08:03

## 2022-01-01 RX ADMIN — FAMOTIDINE 20 MG: 10 INJECTION, SOLUTION INTRAVENOUS at 20:12

## 2022-01-01 RX ADMIN — DIAZEPAM 10 MG: 10 TABLET ORAL at 21:07

## 2022-01-01 RX ADMIN — SODIUM CHLORIDE, PRESERVATIVE FREE 10 ML: 5 INJECTION INTRAVENOUS at 08:15

## 2022-01-01 RX ADMIN — Medication 225 MCG/HR: at 20:59

## 2022-01-01 RX ADMIN — SODIUM CHLORIDE, PRESERVATIVE FREE 10 ML: 5 INJECTION INTRAVENOUS at 23:44

## 2022-01-01 RX ADMIN — FENTANYL CITRATE 25 MCG: 50 INJECTION INTRAMUSCULAR; INTRAVENOUS at 11:31

## 2022-01-01 RX ADMIN — ENOXAPARIN SODIUM 30 MG: 100 INJECTION SUBCUTANEOUS at 08:28

## 2022-01-01 RX ADMIN — QUETIAPINE FUMARATE 12.5 MG: 25 TABLET ORAL at 20:39

## 2022-01-01 RX ADMIN — INSULIN LISPRO 6 UNITS: 100 INJECTION, SOLUTION INTRAVENOUS; SUBCUTANEOUS at 18:00

## 2022-01-01 RX ADMIN — INSULIN LISPRO 3 UNITS: 100 INJECTION, SOLUTION INTRAVENOUS; SUBCUTANEOUS at 05:50

## 2022-01-01 RX ADMIN — POLYETHYLENE GLYCOL (3350) 17 G: 17 POWDER, FOR SOLUTION ORAL at 11:05

## 2022-01-01 RX ADMIN — INSULIN LISPRO 3 UNITS: 100 INJECTION, SOLUTION INTRAVENOUS; SUBCUTANEOUS at 16:57

## 2022-01-01 RX ADMIN — FENTANYL CITRATE 25 MCG: 50 INJECTION INTRAMUSCULAR; INTRAVENOUS at 11:38

## 2022-01-01 RX ADMIN — OXYCODONE HYDROCHLORIDE AND ACETAMINOPHEN 1000 MG: 500 TABLET ORAL at 09:05

## 2022-01-01 RX ADMIN — CHLORHEXIDINE GLUCONATE 0.12% ORAL RINSE 15 ML: 1.2 LIQUID ORAL at 08:31

## 2022-01-01 RX ADMIN — ENOXAPARIN SODIUM 40 MG: 100 INJECTION SUBCUTANEOUS at 20:23

## 2022-01-01 RX ADMIN — MIDAZOLAM HYDROCHLORIDE 2 MG: 2 INJECTION, SOLUTION INTRAMUSCULAR; INTRAVENOUS at 11:06

## 2022-01-01 RX ADMIN — PROPOFOL 50 MCG/KG/MIN: 10 INJECTION, EMULSION INTRAVENOUS at 06:31

## 2022-01-01 RX ADMIN — Medication 1.8 MCG/KG/HR: at 07:58

## 2022-01-01 RX ADMIN — FENTANYL CITRATE 25 MCG: 50 INJECTION INTRAMUSCULAR; INTRAVENOUS at 04:45

## 2022-01-01 RX ADMIN — SODIUM CHLORIDE, PRESERVATIVE FREE 10 ML: 5 INJECTION INTRAVENOUS at 19:49

## 2022-01-01 RX ADMIN — MIDAZOLAM HYDROCHLORIDE 2 MG: 2 INJECTION, SOLUTION INTRAMUSCULAR; INTRAVENOUS at 19:57

## 2022-01-01 RX ADMIN — INSULIN LISPRO 1 UNITS: 100 INJECTION, SOLUTION INTRAVENOUS; SUBCUTANEOUS at 11:20

## 2022-01-01 RX ADMIN — DIAZEPAM 10 MG: 10 TABLET ORAL at 08:47

## 2022-01-01 RX ADMIN — ENOXAPARIN SODIUM 30 MG: 100 INJECTION SUBCUTANEOUS at 09:37

## 2022-01-01 RX ADMIN — ENOXAPARIN SODIUM 30 MG: 100 INJECTION SUBCUTANEOUS at 09:53

## 2022-01-01 RX ADMIN — OXYCODONE HYDROCHLORIDE AND ACETAMINOPHEN 1000 MG: 500 TABLET ORAL at 09:48

## 2022-01-01 RX ADMIN — MIDAZOLAM HYDROCHLORIDE 2 MG: 2 INJECTION, SOLUTION INTRAMUSCULAR; INTRAVENOUS at 10:47

## 2022-01-01 RX ADMIN — SODIUM CHLORIDE, PRESERVATIVE FREE 10 ML: 5 INJECTION INTRAVENOUS at 20:22

## 2022-01-01 RX ADMIN — ALBUTEROL SULFATE 2.5 MG: 2.5 SOLUTION RESPIRATORY (INHALATION) at 11:22

## 2022-01-01 RX ADMIN — CHOLECALCIFEROL (VITAMIN D3) 25 MCG (1,000 UNIT) TABLET 2000 UNITS: at 10:12

## 2022-01-01 RX ADMIN — PROPOFOL 50 MCG/KG/MIN: 10 INJECTION, EMULSION INTRAVENOUS at 08:38

## 2022-01-01 RX ADMIN — CARBOXYMETHYLCELLULOSE SODIUM 1 DROP: 10 GEL OPHTHALMIC at 08:04

## 2022-01-01 RX ADMIN — INSULIN LISPRO 1 UNITS: 100 INJECTION, SOLUTION INTRAVENOUS; SUBCUTANEOUS at 12:42

## 2022-01-01 RX ADMIN — CARBOXYMETHYLCELLULOSE SODIUM 1 DROP: 10 GEL OPHTHALMIC at 16:06

## 2022-01-01 RX ADMIN — MIDAZOLAM HYDROCHLORIDE 2 MG: 2 INJECTION, SOLUTION INTRAMUSCULAR; INTRAVENOUS at 18:20

## 2022-01-01 RX ADMIN — MIDAZOLAM HYDROCHLORIDE 2 MG: 2 INJECTION, SOLUTION INTRAMUSCULAR; INTRAVENOUS at 00:31

## 2022-01-01 RX ADMIN — INSULIN LISPRO 1 UNITS: 100 INJECTION, SOLUTION INTRAVENOUS; SUBCUTANEOUS at 08:39

## 2022-01-01 RX ADMIN — INSULIN LISPRO 3 UNITS: 100 INJECTION, SOLUTION INTRAVENOUS; SUBCUTANEOUS at 12:50

## 2022-01-01 RX ADMIN — Medication 100 MCG/HR: at 21:45

## 2022-01-01 RX ADMIN — ENOXAPARIN SODIUM 30 MG: 100 INJECTION SUBCUTANEOUS at 08:27

## 2022-01-01 RX ADMIN — INSULIN LISPRO 2 UNITS: 100 INJECTION, SOLUTION INTRAVENOUS; SUBCUTANEOUS at 20:03

## 2022-01-01 RX ADMIN — INSULIN LISPRO 3 UNITS: 100 INJECTION, SOLUTION INTRAVENOUS; SUBCUTANEOUS at 16:56

## 2022-01-01 RX ADMIN — ALBUTEROL SULFATE 2.5 MG: 2.5 SOLUTION RESPIRATORY (INHALATION) at 10:36

## 2022-01-01 RX ADMIN — INSULIN LISPRO 1 UNITS: 100 INJECTION, SOLUTION INTRAVENOUS; SUBCUTANEOUS at 04:02

## 2022-01-01 RX ADMIN — CHLORHEXIDINE GLUCONATE 0.12% ORAL RINSE 15 ML: 1.2 LIQUID ORAL at 08:10

## 2022-01-01 RX ADMIN — INSULIN LISPRO 3 UNITS: 100 INJECTION, SOLUTION INTRAVENOUS; SUBCUTANEOUS at 19:58

## 2022-01-01 RX ADMIN — QUETIAPINE FUMARATE 25 MG: 25 TABLET ORAL at 19:46

## 2022-01-01 RX ADMIN — ENOXAPARIN SODIUM 40 MG: 100 INJECTION SUBCUTANEOUS at 20:55

## 2022-01-01 RX ADMIN — SODIUM ZIRCONIUM CYCLOSILICATE 10 G: 10 POWDER, FOR SUSPENSION ORAL at 16:08

## 2022-01-01 RX ADMIN — PROPOFOL 40 MCG/KG/MIN: 10 INJECTION, EMULSION INTRAVENOUS at 22:29

## 2022-01-01 RX ADMIN — MIDAZOLAM HYDROCHLORIDE 2 MG: 2 INJECTION, SOLUTION INTRAMUSCULAR; INTRAVENOUS at 15:36

## 2022-01-01 RX ADMIN — ASPIRIN 81 MG: 81 TABLET, CHEWABLE ORAL at 09:05

## 2022-01-01 RX ADMIN — CARBOXYMETHYLCELLULOSE SODIUM 1 DROP: 10 GEL OPHTHALMIC at 07:55

## 2022-01-01 RX ADMIN — MIDAZOLAM HYDROCHLORIDE 2 MG: 2 INJECTION, SOLUTION INTRAMUSCULAR; INTRAVENOUS at 20:40

## 2022-01-01 RX ADMIN — PROPOFOL 30 MCG/KG/MIN: 10 INJECTION, EMULSION INTRAVENOUS at 22:06

## 2022-01-01 RX ADMIN — CHOLECALCIFEROL (VITAMIN D3) 25 MCG (1,000 UNIT) TABLET 2000 UNITS: at 08:07

## 2022-01-01 RX ADMIN — CHLORHEXIDINE GLUCONATE 0.12% ORAL RINSE 15 ML: 1.2 LIQUID ORAL at 09:49

## 2022-01-01 RX ADMIN — ATORVASTATIN CALCIUM 10 MG: 10 TABLET, FILM COATED ORAL at 19:49

## 2022-01-01 RX ADMIN — DIAZEPAM 10 MG: 10 TABLET ORAL at 14:52

## 2022-01-01 RX ADMIN — PROPOFOL INJECTABLE EMULSION 45 MCG/KG/MIN: 10 INJECTION, EMULSION INTRAVENOUS at 19:00

## 2022-01-01 RX ADMIN — ALBUTEROL SULFATE 2.5 MG: 2.5 SOLUTION RESPIRATORY (INHALATION) at 02:58

## 2022-01-01 ASSESSMENT — PAIN SCALES - GENERAL
PAINLEVEL_OUTOF10: 0
PAINLEVEL_OUTOF10: 1
PAINLEVEL_OUTOF10: 0
PAINLEVEL_OUTOF10: 5
PAINLEVEL_OUTOF10: 0
PAINLEVEL_OUTOF10: 2
PAINLEVEL_OUTOF10: 0
PAINLEVEL_OUTOF10: 6
PAINLEVEL_OUTOF10: 0
PAINLEVEL_OUTOF10: 5
PAINLEVEL_OUTOF10: 0
PAINLEVEL_OUTOF10: 8
PAINLEVEL_OUTOF10: 0
PAINLEVEL_OUTOF10: 2
PAINLEVEL_OUTOF10: 0
PAINLEVEL_OUTOF10: 2
PAINLEVEL_OUTOF10: 0
PAINLEVEL_OUTOF10: 6
PAINLEVEL_OUTOF10: 0
PAINLEVEL_OUTOF10: 0
PAINLEVEL_OUTOF10: 8
PAINLEVEL_OUTOF10: 10
PAINLEVEL_OUTOF10: 2
PAINLEVEL_OUTOF10: 0
PAINLEVEL_OUTOF10: 2
PAINLEVEL_OUTOF10: 0
PAINLEVEL_OUTOF10: 0
PAINLEVEL_OUTOF10: 2
PAINLEVEL_OUTOF10: 0
PAINLEVEL_OUTOF10: 2
PAINLEVEL_OUTOF10: 0
PAINLEVEL_OUTOF10: 5
PAINLEVEL_OUTOF10: 2
PAINLEVEL_OUTOF10: 0
PAINLEVEL_OUTOF10: 10
PAINLEVEL_OUTOF10: 0
PAINLEVEL_OUTOF10: 3
PAINLEVEL_OUTOF10: 0
PAINLEVEL_OUTOF10: 5
PAINLEVEL_OUTOF10: 0

## 2022-01-01 ASSESSMENT — PULMONARY FUNCTION TESTS
PIF_VALUE: 37
PIF_VALUE: 0
PIF_VALUE: 26
PIF_VALUE: 37
PIF_VALUE: 36
PIF_VALUE: 27
PIF_VALUE: 0
PIF_VALUE: 23
PIF_VALUE: 31
PIF_VALUE: 24
PIF_VALUE: 13
PIF_VALUE: 36
PIF_VALUE: 37
PIF_VALUE: 36
PIF_VALUE: 24
PIF_VALUE: 28
PIF_VALUE: 29
PIF_VALUE: 25
PIF_VALUE: 36
PIF_VALUE: 24
PIF_VALUE: 22
PIF_VALUE: 29
PIF_VALUE: 23
PIF_VALUE: 30
PIF_VALUE: 26
PIF_VALUE: 28
PIF_VALUE: 24
PIF_VALUE: 36
PIF_VALUE: 30
PIF_VALUE: 25
PIF_VALUE: 28
PIF_VALUE: 36
PIF_VALUE: 27
PIF_VALUE: 23
PIF_VALUE: 36
PIF_VALUE: 36
PIF_VALUE: 14
PIF_VALUE: 24
PIF_VALUE: 24
PIF_VALUE: 27
PIF_VALUE: 24
PIF_VALUE: 36
PIF_VALUE: 26
PIF_VALUE: 23
PIF_VALUE: 30
PIF_VALUE: 0
PIF_VALUE: 30
PIF_VALUE: 36
PIF_VALUE: 28
PIF_VALUE: 44
PIF_VALUE: 23
PIF_VALUE: 21
PIF_VALUE: 23
PIF_VALUE: 28
PIF_VALUE: 26
PIF_VALUE: 33
PIF_VALUE: 0
PIF_VALUE: 25
PIF_VALUE: 36
PIF_VALUE: 31
PIF_VALUE: 19
PIF_VALUE: 23
PIF_VALUE: 27
PIF_VALUE: 36
PIF_VALUE: 37
PIF_VALUE: 0
PIF_VALUE: 26
PIF_VALUE: 23
PIF_VALUE: 36
PIF_VALUE: 26
PIF_VALUE: 35
PIF_VALUE: 26
PIF_VALUE: 32
PIF_VALUE: 26
PIF_VALUE: 35
PIF_VALUE: 36
PIF_VALUE: 28
PIF_VALUE: 26
PIF_VALUE: 15
PIF_VALUE: 32
PIF_VALUE: 36
PIF_VALUE: 36
PIF_VALUE: 30
PIF_VALUE: 26
PIF_VALUE: 28
PIF_VALUE: 24
PIF_VALUE: 1
PIF_VALUE: 23
PIF_VALUE: 27
PIF_VALUE: 28
PIF_VALUE: 31
PIF_VALUE: 0
PIF_VALUE: 26
PIF_VALUE: 0
PIF_VALUE: 36
PIF_VALUE: 25
PIF_VALUE: 0
PIF_VALUE: 18
PIF_VALUE: 27
PIF_VALUE: 15
PIF_VALUE: 32
PIF_VALUE: 29
PIF_VALUE: 30
PIF_VALUE: 0
PIF_VALUE: 13
PIF_VALUE: 19
PIF_VALUE: 18
PIF_VALUE: 24
PIF_VALUE: 50
PIF_VALUE: 36
PIF_VALUE: 24
PIF_VALUE: 29
PIF_VALUE: 21
PIF_VALUE: 26
PIF_VALUE: 30
PIF_VALUE: 24
PIF_VALUE: 30
PIF_VALUE: 28
PIF_VALUE: 35
PIF_VALUE: 16
PIF_VALUE: 36
PIF_VALUE: 36
PIF_VALUE: 14
PIF_VALUE: 27
PIF_VALUE: 0
PIF_VALUE: 26
PIF_VALUE: 23
PIF_VALUE: 29
PIF_VALUE: 35
PIF_VALUE: 28
PIF_VALUE: 21
PIF_VALUE: 23
PIF_VALUE: 36
PIF_VALUE: 36
PIF_VALUE: 28
PIF_VALUE: 14
PIF_VALUE: 36
PIF_VALUE: 16
PIF_VALUE: 21
PIF_VALUE: 21
PIF_VALUE: 24
PIF_VALUE: 36
PIF_VALUE: 0
PIF_VALUE: 24
PIF_VALUE: 22
PIF_VALUE: 27
PIF_VALUE: 36
PIF_VALUE: 26
PIF_VALUE: 25
PIF_VALUE: 32
PIF_VALUE: 28
PIF_VALUE: 28
PIF_VALUE: 38
PIF_VALUE: 35
PIF_VALUE: 26
PIF_VALUE: 36
PIF_VALUE: 38
PIF_VALUE: 32
PIF_VALUE: 16
PIF_VALUE: 0
PIF_VALUE: 0
PIF_VALUE: 25
PIF_VALUE: 29
PIF_VALUE: 36
PIF_VALUE: 0
PIF_VALUE: 27
PIF_VALUE: 36
PIF_VALUE: 36
PIF_VALUE: 27
PIF_VALUE: 31
PIF_VALUE: 23
PIF_VALUE: 25
PIF_VALUE: 29
PIF_VALUE: 31
PIF_VALUE: 23
PIF_VALUE: 22
PIF_VALUE: 33
PIF_VALUE: 38
PIF_VALUE: 24
PIF_VALUE: 28
PIF_VALUE: 28
PIF_VALUE: 11
PIF_VALUE: 27
PIF_VALUE: 36
PIF_VALUE: 26
PIF_VALUE: 36
PIF_VALUE: 36
PIF_VALUE: 28
PIF_VALUE: 23
PIF_VALUE: 0
PIF_VALUE: 1
PIF_VALUE: 28
PIF_VALUE: 36
PIF_VALUE: 24
PIF_VALUE: 26
PIF_VALUE: 25
PIF_VALUE: 27
PIF_VALUE: 29
PIF_VALUE: 28
PIF_VALUE: 19
PIF_VALUE: 22
PIF_VALUE: 25
PIF_VALUE: 23
PIF_VALUE: 24
PIF_VALUE: 28
PIF_VALUE: 23
PIF_VALUE: 27
PIF_VALUE: 36
PIF_VALUE: 37
PIF_VALUE: 25
PIF_VALUE: 26
PIF_VALUE: 36
PIF_VALUE: 31
PIF_VALUE: 36
PIF_VALUE: 29
PIF_VALUE: 28
PIF_VALUE: 21
PIF_VALUE: 36
PIF_VALUE: 36
PIF_VALUE: 23
PIF_VALUE: 19
PIF_VALUE: 29
PIF_VALUE: 28
PIF_VALUE: 25
PIF_VALUE: 25
PIF_VALUE: 36
PIF_VALUE: 29
PIF_VALUE: 31
PIF_VALUE: 36
PIF_VALUE: 27
PIF_VALUE: 17
PIF_VALUE: 27
PIF_VALUE: 0
PIF_VALUE: 24
PIF_VALUE: 36
PIF_VALUE: 28
PIF_VALUE: 36
PIF_VALUE: 29

## 2022-01-01 ASSESSMENT — PAIN DESCRIPTION - PROGRESSION
CLINICAL_PROGRESSION: GRADUALLY IMPROVING
CLINICAL_PROGRESSION: GRADUALLY WORSENING

## 2022-01-01 ASSESSMENT — PAIN DESCRIPTION - ONSET
ONSET: GRADUAL
ONSET: GRADUAL

## 2022-01-01 ASSESSMENT — PAIN DESCRIPTION - LOCATION
LOCATION: BACK
LOCATION: BACK
LOCATION: HEAD
LOCATION: HEAD

## 2022-01-01 ASSESSMENT — PAIN DESCRIPTION - PAIN TYPE
TYPE: CHRONIC PAIN
TYPE: ACUTE PAIN

## 2022-01-01 ASSESSMENT — PAIN DESCRIPTION - FREQUENCY
FREQUENCY: INTERMITTENT
FREQUENCY: INTERMITTENT

## 2022-01-01 ASSESSMENT — PAIN DESCRIPTION - ORIENTATION
ORIENTATION: RIGHT;LEFT;UPPER
ORIENTATION: MID;LOWER
ORIENTATION: RIGHT;LEFT;LOWER;UPPER

## 2022-01-01 ASSESSMENT — LIFESTYLE VARIABLES: SMOKING_STATUS: 0

## 2022-01-01 ASSESSMENT — PAIN - FUNCTIONAL ASSESSMENT: PAIN_FUNCTIONAL_ASSESSMENT: PREVENTS OR INTERFERES SOME ACTIVE ACTIVITIES AND ADLS

## 2022-01-01 ASSESSMENT — PAIN DESCRIPTION - DESCRIPTORS
DESCRIPTORS: ACHING
DESCRIPTORS: HEADACHE
DESCRIPTORS: HEADACHE;POUNDING;THROBBING

## 2022-01-12 PROBLEM — U07.1 COVID-19: Status: ACTIVE | Noted: 2022-01-01

## 2022-01-12 NOTE — PROGRESS NOTES
Patient admitted to room __216__ ER. Patient oriented to room, call light, bed rails, phone, lights and bathroom. Patient instructed about the schedule of the day including: vital sign frequency, lab draws, possible tests, frequency of MD and staff rounds, daily weights, I &O's and prescribed diet. Bed alarm deferred patient low fall risk and refuses alarm. Telemetry box in place, patient aware of placement and reason. Bed locked, in lowest position, side rails up 2/4, call light within reach. Recliner Assessment:     Patient is able to demonstrate the ability to move from a reclining position to an upright position within the recliner. 4 Eyes Skin Assessment     The patient is being assess for   Admission    This RN has performed a thorough Head to Toe Skin Assessment on the patient. ALL assessment sites listed below have been assessed. Scattered scabs and bruises, dry feet and heels. Bottom red but blanchable. Areas assessed for pressure by both nurses:   [x]   Head, Face, and Ears   [x]   Shoulders, Back, and Chest, Abdomen  [x]   Arms, Elbows, and Hands   [x]   Coccyx, Sacrum, and Ischium  [x]   Legs, Feet, and Heels        Skin Assessed Under all Medical Devices by both nurses:  O2 device tubing              All Mepilex Borders were peeled back and area peeked at by both nurses:  No: na  Please list where Mepilex Borders are located:  na             **SHARE this note so that the co-signing nurse is able to place an eSignature**    Covid-19 Room    Does the Patient have Skin Breakdown related to pressure?   No              Anton Prevention initiated:  No   Wound Care Orders initiated:  No      [facility] nurse consulted for Pressure Injury (Stage 3,4, Unstageable, DTI, NWPT, Complex wounds)and New or Established Ostomies:  No      Primary Nurse eSignature: Electronically signed by Lalito Rider RN on 9/51/84 at 6:12 PM EST

## 2022-01-12 NOTE — PLAN OF CARE
Problem: Airway Clearance - Ineffective  Goal: Achieve or maintain patent airway  Outcome: Ongoing     Problem: Airway Clearance - Ineffective  Goal: Achieve or maintain patent airway  Outcome: Ongoing

## 2022-01-12 NOTE — H&P
Hospital Medicine History & Physical      PCP: Chaz Moe DO    Date of Admission: 1/12/2022    Date of Service: Pt seen/examined on 01/12/22      Chief Complaint:    Chief Complaint   Patient presents with    Positive For Covid-19     cough, SOB, chills and sweats       History Of Present Illness: The patient is a 80 y.o. female with PMH hypercholesterolemia, generalized anxiety disorder, impaired glucose tolerance, and vitamin D deficiency who presents to [facility] with ncreased shortness of brat   History obtained from the patient and review of EMR. Pt stated that symptoms started familia 1/5. Started with runny nose. She then started with shortness of breath and cough. +fever as high as 102.1 and chills at home. She stated that she has been spking fever at night, she stated she wakes up soaking wet at night only. Daughter at bedside stated she has been wheezing some at home. She went to urgent care on Friday where she was diagnosed with COVID they started her on Z-Sal, prednisone, and gave her Tessalon Perles. She came back to the ED today because she was not feeling any better had increased shortness of breath and cough. Patient also said she has had a decrease in appetite and p.o. intake over the last couple days. She does have some nausea. No vomiting or diarrhea. Denies hemoptysis. Upon my assessment today patient sitting in chair O2 saturations dropped to 83 to 87% on room air. She was placed on 2 L of oxygen. Sats now mid 90s. Patient and daughter both had multiple questions regarding plan of care. All questions answered. Patient stated that she has arthritis and without her Mobic she is unable to move. We will reorder Mobic. Patient also stated since she got her [address] vaccine in March her left arm has been very painful and tender to touch at the injection site. She stated this is the reason why she has not gotten booster.   Patient will be admitted for further care.    Past Medical History:        Diagnosis Date    Arthritis     Asthma     DDD (degenerative disc disease)     Diabetes mellitus ([facility] [address]     Hyperlipidemia        Past Surgical History:        Procedure Laterality Date    COLONOSCOPY  2001    COLONOSCOPY  08/17/2016    diverticulosis    HYSTERECTOMY      KNEE ARTHROSCOPY      x2    TONSILLECTOMY      TOTAL KNEE ARTHROPLASTY  10/05/2012    RIGHT    TUBAL LIGATION      WRIST SURGERY         Medications Prior to Admission:    Prior to Admission medications    Medication Sig Start Date End Date Taking? Authorizing Provider   simvastatin (ZOCOR) 20 MG tablet TAKE 1 TABLET AT BEDTIME FOR HIGH AMOUNT OF FATS IN THE BLOOD 2/23/21  Yes Historical Provider, MD   fluticasone (FLONASE) 50 MCG/ACT nasal spray USE 1 SPRAY IN EACH NOSTRIL TWICE A DAY AS NEEDED 8/31/21  Yes Historical Provider, MD   Cholecalciferol (VITAMIN D) 10 MCG (400 UNIT) CAPS Capsule Take 1 tablet by mouth daily    Historical Provider, MD   meloxicam (MOBIC) 15 MG tablet Take 15 mg by mouth daily 5/17/19   Historical Provider, MD   gabapentin (NEURONTIN) 300 MG capsule Take 1 capsule by mouth nightly for 30 days. 10/6/12 11/5/12  ANTHONY August   aspirin 81 MG tablet Take 81 mg by mouth daily. Historical Provider, MD       Allergies:  Morphine, Vicodin [hydrocodone-acetaminophen], and Pcn [penicillins]    Social History:  The patient currently lives home     TOBACCO:   reports that she has quit smoking. Her smoking use included cigarettes. She has a 15.00 pack-year smoking history. She has never used smokeless tobacco.  ETOH:   reports current alcohol use.       Family History:   Positive as follows:        Problem Relation Age of Onset    Cancer Mother         breast and lung    Diabetes Mother     Breast Cancer Mother 79    Heart Disease Father     Emphysema Father     Cancer Brother         lung    Cancer Maternal Uncle        REVIEW OF SYSTEMS:       Constitutional: fever + generalized weakness, poor appetite  HENT: Negative for sore throat   Eyes: Negative for redness   Respiratory: Positive dyspnea and cough   Cardiovascular: Negative for chest pain   Gastrointestinal: Negative for vomiting, diarrhea positive nausea  Genitourinary: Negative for hematuria   Musculoskeletal: Positive arthralgias   Skin: Negative for rash   Neurological: Negative for syncope   Hematological: Negative for adenopathy   Psychiatric/Behavorial: Negative for anxiety    PHYSICAL EXAM:    /74   Pulse 79   Temp 99.1 °F (37.3 °C)   Resp 19   Ht 5' 3\" (1.6 m)   Wt 184 lb (83.5 kg)   SpO2 93%   BMI 32.59 kg/m²     Gen: mild distress. Alert. Eyes: PERRL. No sclera icterus. No conjunctival injection. ENT: No discharge. Pharynx clear. Neck: No JVD. No Carotid Bruit. Trachea midline. Resp: + accessory muscle use. Good air movement, bibasilar crackles. No wheezes. No rhonchi. CV: Regular rate. Regular rhythm. No murmur. No rub. No edema. GI: Non-tender. Non-distended. No masses. No organomegaly. Normal bowel sounds. No hernia. Skin: Warm and dry. No nodule on exposed extremities. No rash on exposed extremities. M/S: No cyanosis. No joint deformity. No clubbing. Neuro: Awake. Grossly nonfocal    Psych: Oriented x 3. No anxiety or agitation. CBC:   Recent Labs     01/12/22  1011   WBC 4.0   HGB 12.6   HCT 37.5   MCV 90.3   PLT 95*     BMP:   Recent Labs     01/12/22  1011      K 4.1      CO2 28   BUN 15   CREATININE 0.6     LIVER PROFILE:   Recent Labs     01/12/22  1011   AST 30   ALT 14   BILITOT 0.3   ALKPHOS 47       CARDIAC ENZYMES  Recent Labs     01/12/22  1011   TROPONINI <0.01       Cultures:     SARS-CoV-2, NAAT DETECTED          EKG:  I have reviewed the EKG with the following interpretation:   NSR    RADIOLOGY  XR CHEST PORTABLE   Final Result   Multifocal bilateral atypical pneumonia.              Active Problems:    COVID-19  Resolved Problems:    * No resolved hospital problems. *      ASSESSMENT/PLAN:  COVID PNA  Acute Respiratory Failure   - CXR: noted with multifocal PNA  - no home O2 at baseline, 83-87% at rest, placed on 2 liters of oxygen and now mid 90s  - J &J in March 2021- no booster   - Admitted to 2W, droplet +, tele, cont pulse ox  - supp O2, wean as tolerated - 2 oxygen  - Decadron D#1,  Remdesivir not started due to out of window- sx 7+ days prior to admit, PRN Robitussin  - cont care as above, RN to wean O2 as able   - Lovenox BID  - procal 0.10   - add CRP    Thrombocytopenia  - likely 2/2 above  - platelets 93  - continue Lovenox and monitor    HLD  - Continue statin      MADHURI  - not on any home medications  - stable    Arthritis   - continue mobic   - pt stated without thi    Hx of Asthma  - not on inhalers at home       DVT Prophylaxis: Lovenox   Diet: ADULT DIET; Regular  Code Status: Full Code    Pt is full code and if needed would like to be intubated . [facility] Daughter Margo Lyles [phone].     Maribell Mejia, APRN - CNP  01/12/22

## 2022-01-12 NOTE — ED NOTES
PerfectAng[employer] sent to Dr. Ethel Coleman [address] [facility]  01/12/22 1218    Perfect[employer] completed with call back from [facility] at [address] [facility]  01/12/22 7235

## 2022-01-12 NOTE — PLAN OF CARE
Admit to 2W    COVID +  Vaccinated pt    Desaturates with ambulation 87%  On RA at rest    Kaiser Yao PA-C  1/12/2022 12:51 PM

## 2022-01-12 NOTE — ED PROVIDER NOTES
[address] ED      CHIEF COMPLAINT  Positive For Covid-19 (cough, SOB, chills and sweats)     HISTORY OF PRESENT ILLNESS  Shelbie Dawson is a 80 y.o. female  who presents to the ED complaining of shortness of breath, hypoxia. Patient developed symptoms of COVID approximately 1 week ago. Tested positive on Friday. Since then, has been monitoring her oxygen levels at home and it has regularly been in the 85% range without ambulation. She states that she is feeling short of breath. Has been taking Tylenol for fevers and body aches. She denies any chest pain. She did receive her first 2 COVID vaccines, did not receive a booster dose. She denies any other complaints or concerns. No other complaints, modifying factors or associated symptoms. I have reviewed the following from the nursing documentation. Past Medical History:   Diagnosis Date    Arthritis     Asthma     DDD (degenerative disc disease)     Diabetes mellitus ([facility] [address]     Hyperlipidemia      Past Surgical History:   Procedure Laterality Date    COLONOSCOPY  2001    COLONOSCOPY  08/17/2016    diverticulosis    HYSTERECTOMY      KNEE ARTHROSCOPY      x2    TONSILLECTOMY      TOTAL KNEE ARTHROPLASTY  10/05/2012    RIGHT    TUBAL LIGATION      WRIST SURGERY       Family History   Problem Relation Age of Onset    Cancer Mother         breast and lung    Diabetes Mother     Breast Cancer Mother 79    Heart Disease Father     Emphysema Father     Cancer Brother         lung    Cancer Maternal Uncle      Social History     Socioeconomic History    Marital status:       Spouse name: Not on file    Number of children: Not on file    Years of education: Not on file    Highest education level: Not on file   Occupational History    Not on file   Tobacco Use    Smoking status: Former Smoker     Packs/day: 1.00     Years: 15.00     Pack years: 15.00     Types: Cigarettes    Smokeless tobacco: Never Used   [place] [employer] Tobacco comment: Quit in 1979   Vaping Use    Vaping Use: Never used   Substance and Sexual Activity    Alcohol use: Yes     Comment: social    Drug use: No    Sexual activity: Not on file   Other Topics Concern    Not on file   Social History Narrative    Not on file     Social Determinants of Health     Financial Resource Strain:     Difficulty of Paying Living Expenses: Not on file   Food Insecurity:     Worried About Running Out of Food in the Last Year: Not on file    Norma of Food in the Last Year: Not on file   Transportation Needs:     Lack of Transportation (Medical): Not on file    Lack of Transportation (Non-Medical):  Not on file   Physical Activity:     Days of Exercise per Week: Not on file    Minutes of Exercise per Session: Not on file   Stress:     Feeling of Stress : Not on file   Social Connections:     Frequency of Communication with Friends and Family: Not on file    Frequency of Social Gatherings with Friends and Family: Not on file    Attends Restorationist Services: Not on file    Active Member of [address] [place] [address] or Organizations: Not on file    Attends Club or Organization Meetings: Not on file    Marital Status: Not on file   Intimate Partner Violence:     Fear of Current or Ex-Partner: Not on file    Emotionally Abused: Not on file    Physically Abused: Not on file    Sexually Abused: Not on file   Housing Stability:     Unable to Pay for Housing in the Last Year: Not on file    Number of [place] in the Last Year: Not on file    Unstable Housing in the Last Year: Not on file     Current Facility-Administered Medications   Medication Dose Route Frequency Provider Last Rate Last Admin    atorvastatin (LIPITOR) tablet 10 mg  10 mg Oral Nightly [place], [address]        sodium chloride flush 0.9 % injection 5-40 mL  5-40 mL IntraVENous 2 times per day ANTHONY Crenshaw        sodium chloride flush 0.9 % injection 5-40 mL  5-40 mL IntraVENous PRN ANTHONY Crenshaw        0.9 % sodium chloride infusion  25 mL IntraVENous PRN ANTHONY Ludwig        ondansetron (ZOFRAN-ODT) disintegrating tablet 4 mg  4 mg Oral Q8H PRN ANTHONY Ludwig        Or    ondansetron (ZOFRAN) injection 4 mg  4 mg IntraVENous Q6H PRN ANTHONY Ludwig        polyethylene glycol (GLYCOLAX) packet 17 g  17 g Oral Daily PRN ANTHONY Ludwig        acetaminophen (TYLENOL) tablet 650 mg  650 mg Oral Q6H PRN ANTHONY Ludwig        Or    acetaminophen (TYLENOL) suppository 650 mg  650 mg Rectal Q6H PRN ANTHONY Ludwig        [START ON 1/13/2022] dexamethasone (PF) (DECADRON) injection 6 mg  6 mg IntraVENous Daily Kristine Ludwig        Vitamin D (CHOLECALCIFEROL) tablet 2,000 Units  2,000 Units Oral Daily ANTHONY Ludwig        enoxaparin (LOVENOX) injection 40 mg  40 mg SubCUTAneous BID Kristine Ludwig        meloxicam (MOBIC) tablet 15 mg  15 mg Oral Daily Jennifer Lower, APRN - CNP         Current Outpatient Medications   Medication Sig Dispense Refill    simvastatin (ZOCOR) 20 MG tablet TAKE 1 TABLET AT BEDTIME FOR HIGH AMOUNT OF FATS IN THE BLOOD      fluticasone (FLONASE) 50 MCG/ACT nasal spray USE 1 SPRAY IN EACH NOSTRIL TWICE A DAY AS NEEDED      Cholecalciferol (VITAMIN D) 10 MCG (400 UNIT) CAPS Capsule Take 1 tablet by mouth daily      meloxicam (MOBIC) 15 MG tablet Take 15 mg by mouth daily      gabapentin (NEURONTIN) 300 MG capsule Take 1 capsule by mouth nightly for 30 days. 30 capsule 0    aspirin 81 MG tablet Take 81 mg by mouth daily. Allergies   Allergen Reactions    Morphine Nausea Only    Vicodin [Hydrocodone-Acetaminophen] Nausea Only    Pcn [Penicillins] Rash       REVIEW OF SYSTEMS  10 systems reviewed, pertinent positives per HPI otherwise noted to be negative.     PHYSICAL EXAM  BP (!) 93/59   Pulse 87   Temp 99.1 °F (37.3 °C)   Resp 18   Ht 5' 3\" (1.6 m)   Wt 184 lb (83.5 kg)   SpO2 (S) 98%   BMI 32.59 kg/m²    GENERAL APPEARANCE: Awake and alert. Cooperative. No acute distress. HENT: Normocephalic. Atraumatic. NECK: Supple. EYES: PERRL. EOM's grossly intact. HEART/CHEST: RRR. No murmurs. LUNGS: Respirations unlabored. CTAB. Good air exchange. Speaking comfortably in full sentences. ABDOMEN: No tenderness. Soft. Non-distended. No masses. No organomegaly. No guarding or rebound. MUSCULOSKELETAL: No extremity edema. Compartments soft. No deformity. No tenderness in the extremities. All extremities neurovascularly intact. SKIN: Warm and dry. No acute rashes. NEUROLOGICAL: Alert and oriented. CN's 2-12 intact. No gross facial drooping. Strength 5/5, sensation intact. Gait normal.  PSYCHIATRIC: Normal mood and affect. LABS  I have reviewed all labs for this visit.    Results for orders placed or performed during the hospital encounter of 01/12/22   COVID-19, Rapid    Specimen: Nasopharyngeal Swab   Result Value Ref Range    SARS-CoV-2, NAAT DETECTED (AA) Not Detected   CBC Auto Differential   Result Value Ref Range    WBC 4.0 4.0 - 11.0 K/uL    RBC 4.16 4.00 - 5.20 M/uL    Hemoglobin 12.6 12.0 - 16.0 g/dL    Hematocrit 37.5 36.0 - 48.0 %    MCV 90.3 80.0 - 100.0 fL    MCH 30.3 26.0 - 34.0 pg    MCHC 33.6 31.0 - 36.0 g/dL    RDW 13.0 12.4 - 15.4 %    Platelets 95 (L) 735 - 450 K/uL    MPV 8.0 5.0 - 10.5 fL    PLATELET SLIDE REVIEW Decreased     SLIDE REVIEW see below     Neutrophils % 88.1 %    Lymphocytes % 7.4 %    Monocytes % 4.3 %    Eosinophils % 0.0 %    Basophils % 0.2 %    Neutrophils Absolute 3.5 1.7 - 7.7 K/uL    Lymphocytes Absolute 0.3 (L) 1.0 - 5.1 K/uL    Monocytes Absolute 0.2 0.0 - 1.3 K/uL    Eosinophils Absolute 0.0 0.0 - 0.6 K/uL    Basophils Absolute 0.0 0.0 - 0.2 K/uL   Comprehensive Metabolic Panel w/ Reflex to MG   Result Value Ref Range    Sodium 139 136 - 145 mmol/L    Potassium reflex Magnesium 4.1 3.5 - 5.1 mmol/L    Chloride 100 99 - 110 mmol/L    CO2 28 21 - 32 mmol/L    Anion Gap 11 3 - 16    Glucose 116 (H) 70 - 99 mg/dL    BUN 15 7 - 20 mg/dL    CREATININE 0.6 0.6 - 1.2 mg/dL    GFR Non-African American >60 >60    GFR African American >60 >60    Calcium 8.4 8.3 - 10.6 mg/dL    Total Protein 6.6 6.4 - 8.2 g/dL    Albumin 3.6 3.4 - 5.0 g/dL    Albumin/Globulin Ratio 1.2 1.1 - 2.2    Total Bilirubin 0.3 0.0 - 1.0 mg/dL    Alkaline Phosphatase 47 40 - 129 U/L    ALT 14 10 - 40 U/L    AST 30 15 - 37 U/L   Troponin   Result Value Ref Range    Troponin <0.01 <0.01 ng/mL   Procalcitonin   Result Value Ref Range    Procalcitonin 0.10 0.00 - 0.15 ng/mL   EKG 12 Lead   Result Value Ref Range    Ventricular Rate 80 BPM    Atrial Rate 80 BPM    P-R Interval 140 ms    QRS Duration 72 ms    Q-T Interval 348 ms    QTc Calculation (Bazett) 401 ms    P Axis 44 degrees    R Axis 30 degrees    T Axis 40 degrees    Diagnosis       Normal sinus rhythmNormal ECGNo previous ECGs available       ECG  The Ekg interpreted by me shows  normal sinus rhythm with a rate of 80  Axis is   Normal  QTc is  normal  Intervals and Durations are unremarkable. ST Segments: normal  No significant change from prior EKG dated 6/27/2019    RADIOLOGY  XR CHEST PORTABLE   Final Result   Multifocal bilateral atypical pneumonia. ED COURSE/MDM  Patient seen and evaluated. Old records reviewed. Labs and imaging reviewed and results discussed with patient. Patient is a 67-year-old female, presenting with concerns for shortness of breath, hypoxia, recent COVID diagnosis. Full HPI as detailed above. Upon arrival in the ED, patient is resting comfortably and is no acute distress. Heart regular rate and rhythm. Lungs clear to auscultation bilaterally. She did have hypoxia in the mid 80s with ambulation here in the department. Will be treated with steroids, oxygen, will be admitted to the hospital for further work-up and treatment of her condition. She is comfortable in agreement with plan of care.     During the patient's ED course, the patient was given:  Medications   atorvastatin (LIPITOR) tablet 10 mg (has no administration in time range)   sodium chloride flush 0.9 % injection 5-40 mL (has no administration in time range)   sodium chloride flush 0.9 % injection 5-40 mL (has no administration in time range)   0.9 % sodium chloride infusion (has no administration in time range)   ondansetron (ZOFRAN-ODT) disintegrating tablet 4 mg (has no administration in time range)     Or   ondansetron (ZOFRAN) injection 4 mg (has no administration in time range)   polyethylene glycol (GLYCOLAX) packet 17 g (has no administration in time range)   acetaminophen (TYLENOL) tablet 650 mg (has no administration in time range)     Or   acetaminophen (TYLENOL) suppository 650 mg (has no administration in time range)   dexamethasone (PF) (DECADRON) injection 6 mg (has no administration in time range)   Vitamin D (CHOLECALCIFEROL) tablet 2,000 Units (has no administration in time range)   enoxaparin (LOVENOX) injection 40 mg (has no administration in time range)   meloxicam (MOBIC) tablet 15 mg (has no administration in time range)   0.9 % sodium chloride bolus (0 mLs IntraVENous Stopped 1/12/22 1147)   dexamethasone (PF) (DECADRON) injection 6 mg (6 mg IntraVENous Given 1/12/22 1225)        CLINICAL IMPRESSION  1. Acute respiratory failure with hypoxia ([facility] [address]    2. Pneumonia due to COVID-19 virus        Blood pressure (!) 93/59, pulse 87, temperature 99.1 °F (37.3 °C), resp. rate 18, height 5' 3\" (1.6 m), weight 184 lb (83.5 kg), SpO2 (S) 98 %, not currently breastfeeding. [facility]. Patient was given scripts for the following medications. I counseled patient how to take these medications. New Prescriptions    No medications on file       Follow-up with:  No follow-up provider specified. DISCLAIMER: This chart was created using Dragon dictation software.   Efforts were made by me to ensure accuracy, however some errors may be present due to limitations of this technology and occasionally words are not transcribed correctly.        Seth Rodriguez MD  01/12/22 2678

## 2022-01-13 PROBLEM — J12.82 PNEUMONIA DUE TO COVID-19 VIRUS: Status: ACTIVE | Noted: 2022-01-01

## 2022-01-13 NOTE — PROGRESS NOTES
IM Progress Note    Admit Date:  1/12/2022  1    Interval history:  covid 19 infection, hypoxia  Vaccinated with J & J in 3/21, did not get booster  Recently on steroids, z pack      Subjective:  Ms. Tony Gongora seen now on 7 L , slightly worsened from yesterday   Cough and diarrhea noted  Slightly anxious        Objective:   /88   Pulse 82   Temp 97.7 °F (36.5 °C) (Oral)   Resp 18   Ht 5' 3\" (1.6 m)   Wt 184 lb (83.5 kg)   SpO2 93%   BMI 32.59 kg/m²     Intake/Output Summary (Last 24 hours) at 1/13/2022 0730  Last data filed at 1/12/2022 1750  Gross per 24 hour   Intake 120 ml   Output    Net 120 ml       Physical Exam:        General:  Elderly female up in bed  Awake, alert and oriented. Appears to be not in any distress  Mucous Membranes:  Pink , anicteric  Neck: No JVD, no carotid bruit, no thyromegaly  Chest: diminished in bases with scattered crackles in bases  Cardiovascular:  RRR S1S2 heard, no murmurs or gallops  Abdomen:  Soft, undistended, non tender, no organomegaly, BS present  Extremities: No edema or cyanosis.  Distal pulses well felt  Neurological : grossly normal        Medications:   Scheduled Medications:    atorvastatin  10 mg Oral Nightly    sodium chloride flush  5-40 mL IntraVENous 2 times per day    dexamethasone  6 mg IntraVENous Daily    Vitamin D  2,000 Units Oral Daily    enoxaparin  40 mg SubCUTAneous BID    meloxicam  15 mg Oral Daily     I   sodium chloride       sodium chloride flush, sodium chloride, ondansetron **OR** ondansetron, polyethylene glycol, acetaminophen **OR** acetaminophen    Lab Data:  Recent Labs     01/12/22  1011 01/13/22  0513   WBC 4.0 3.7*   HGB 12.6 12.6   HCT 37.5 37.7   MCV 90.3 92.7   PLT 95* 98*     Recent Labs     01/12/22  1011 01/13/22  0513    139   K 4.1 3.9    103   CO2 28 25   BUN 15 12   CREATININE 0.6 <0.5*     Recent Labs     01/12/22  1011   TROPONINI <0.01       Coagulation:   Lab Results   Component Value Date    INR 0.91 09/22/2012    APTT 26.7 09/22/2012     Cardiac markers:   Lab Results   Component Value Date    TROPONINI <0.01 01/12/2022         Lab Results   Component Value Date    ALT 13 01/13/2022    AST 31 01/13/2022    ALKPHOS 46 01/13/2022    BILITOT 0.3 01/13/2022       Lab Results   Component Value Date    INR 0.91 09/22/2012    INR 0.93 08/29/2010    PROTIME 10.4 09/22/2012    PROTIME 10.4 08/29/2010       Radiology    Cultures:       SARS-CoV-2, NAAT DETECTED             EKG:  I have reviewed the EKG with the following interpretation:   NSR     RADIOLOGY  XR CHEST PORTABLE   Final Result   Multifocal bilateral atypical pneumonia.                    ASSESSMENT/PLAN:    COVID PNA  Acute Respiratory Failure   - CXR: noted with multifocal PNA  - no home O2 at baseline, 83-87% at rest, placed on 2 liters of oxygen and now mid 90s  - J &J in March 2021- no booster   - Admitted to 2W, droplet +, tele, cont pulse ox  - supp O2, wean as tolerated - worsening slowly to  7 L   - Decadron D#3 ,  Remdesivir not started due to out of window- sx 7+ days prior to admit, PRN Robitussin-  -- procal 0.10 wnl and crp elevated   - plan for tocilizumab  - cont care as above, RN to wean O2 as able   - Lovenox BID       Thrombocytopenia  - likely with covid  - platelets 93  - continue Lovenox and monitor     HLD  - Continue statin       MADHURI  - not on any home medications  - stable     Arthritis   - continue mobic      Hx of Asthma  - not on inhalers at home         DVT Prophylaxis: Lovenox   Diet: ADULT DIET;  Regular  Code Status: Full Code           Lourdes Kent MD, 1/13/2022 7:30 AM

## 2022-01-13 NOTE — PLAN OF CARE
Problem: Airway Clearance - Ineffective  Goal: Achieve or maintain patent airway  1/13/2022 0534 by Kera Plummer RN  Outcome: Ongoing  8/33/8466 0372 by Russell Cifuentes RN  Outcome: Ongoing     Problem: Gas Exchange - Impaired  Goal: Absence of hypoxia  1/13/2022 0534 by Kera Plummer RN  Outcome: Ongoing  0/90/1254 0375 by Russell Cifuentes RN  Outcome: Ongoing  Goal: Promote optimal lung function  1/13/2022 0534 by Kera Plummer RN  Outcome: Ongoing  2/16/0791 7093 by Russell Cifuentes RN  Outcome: Ongoing     Problem: Breathing Pattern - Ineffective  Goal: Ability to achieve and maintain a regular respiratory rate  1/13/2022 0534 by eKra Plummer RN  Outcome: Ongoing  8/10/3990 2393 by Russell Cifuentes RN  Outcome: Ongoing     Problem:  Body Temperature -  Risk of, Imbalanced  Goal: Ability to maintain a body temperature within defined limits  1/13/2022 0534 by Kera Plummer RN  Outcome: Ongoing  6/36/0239 5642 by Russell Cifuentes RN  Outcome: Ongoing  Goal: Will regain or maintain usual level of consciousness  1/13/2022 0534 by Kera Plummer RN  Outcome: Ongoing  6/50/3684 4577 by Russell Cifuentes RN  Outcome: Ongoing  Goal: Complications related to the disease process, condition or treatment will be avoided or minimized  1/13/2022 0534 by Kera Plummer RN  Outcome: Ongoing  4/20/4596 4316 by Russell Cifuentes RN  Outcome: Ongoing     Problem: Isolation Precautions - Risk of Spread of Infection  Goal: Prevent transmission of infection  1/13/2022 0534 by Kera Plummer RN  Outcome: Ongoing  2/29/3212 9869 by Russell Cifuentes RN  Outcome: Ongoing     Problem: Nutrition Deficits  Goal: Optimize nutritional status  1/13/2022 0534 by Kera Plummer RN  Outcome: Ongoing  8/36/6728 3654 by Russell Cifuentes RN  Outcome: Ongoing     Problem: Risk for Fluid Volume Deficit  Goal: Maintain normal heart rhythm  1/13/2022 0534 by Kera Plummer RN  Outcome: Ongoing  4/99/6168 2027 by Russell Cifuentes RN  Outcome: Ongoing  Goal: Maintain absence of muscle cramping  1/13/2022 0534 by Tosha Shaw RN  Outcome: Ongoing  2/17/7409 5757 by Lalito Rider RN  Outcome: Ongoing  Goal: Maintain normal serum potassium, sodium, calcium, phosphorus, and pH  1/13/2022 0534 by Tosha Shaw RN  Outcome: Ongoing  7/25/7127 0441 by Lalito Rider RN  Outcome: Ongoing     Problem: Loneliness or Risk for Loneliness  Goal: Demonstrate positive use of time alone when socialization is not possible  1/13/2022 0534 by Tosha Shaw RN  Outcome: Ongoing  9/08/1142 9352 by Lalito Rider RN  Outcome: Ongoing     Problem: Fatigue  Goal: Verbalize increase energy and improved vitality  1/13/2022 0534 by Tosha Shaw RN  Outcome: Ongoing  8/38/5310 3717 by Lalito Rider RN  Outcome: Ongoing     Problem: Patient Education: Go to Patient Education Activity  Goal: Patient/Family Education  1/13/2022 0534 by Tosha Shaw RN  Outcome: Ongoing  6/46/6230 3143 by Lalito Rider RN  Outcome: Ongoing

## 2022-01-13 NOTE — PROGRESS NOTES
Pt a/o. Am assessment completed see flow sheet. High flow O2 tubing set up, pt currently requiring 7L to maintain sats >92%. Persistent cough, tessalon and robitussin ordered by MD. Administered tessalon PRN. Provided chair for pt to sit up if needed. Pt somewhat anxious about treatments, spent time explaining possible courses of treatment, pt calm at this time. Call light within reach.

## 2022-01-13 NOTE — PROGRESS NOTES
BARICITINIB INITIATION    Parameters (must answer yes)  COVID + yes   Oxygen Requirement yes   Elevated Inflammatory Marker yes   Steroid Use yes     Parameters (must answer no)  Tocilizumab in last 14 days no   Pregnant no   Hepatic Impairment (severe) no   Chronic/Recurrent Infections no   Hemoglobin <8.0 g/dL no   Chronically immunosuppressed (via drug or disease) no   ANC <500 cells/mcL no   ALC <200 cells/mcL no     Dosing (for patients >5years of age): eGFR: >60: 4 mg once daily  eGFR: 30 to <60: 2 mg once daily  eGFR:  15 to <30: 1 mg once daily if potential benefit outweighs risk  eGFR: <15: not recommended  On dialysis, ESRD, or TANIA: not recommended    Oral:  Duration of baricitinib is 14 days or until hospital discharge, whichever is first.     Pharmacy will continue to monitor.     Radha Ricks Pharm D 1/13/20221:04 PM  .

## 2022-01-13 NOTE — CARE COORDINATION
Case Management Assessment  Initial Evaluation      Patient Name: Charity Barnes  YOB: 1950  Diagnosis: Acute respiratory failure with hypoxia ([facility] [address] [J96.01]  Pneumonia due to COVID-19 virus [U07.1, J12.82]  COVID-19 [U07.1]  Date / Time: 1/12/2022  9:40 AM    Admission status/Date:INPT 1/12/22  Chart Reviewed: Yes      Patient Interviewed: Yes   Family Interviewed:  No      Hospitalization in the last 30 days:  No      Health Care Decision Maker :  Ngoc Mg, daughter: [phone]   (CM - must 1st enter selection under Navigator - emergency contact- [place] Relationship and pick relationship)   Who do you trust or have selected to make healthcare decisions for you      Met with: pt via telephone  Interview conducted  (bedside/phone):    Current PCP: Altagracia Torres required for SNF : Y, N          3 night stay required - Y, N    ADLS  Support Systems/Care Needs: Family Members,Children  Transportation: self    Meal Preparation: self    Housing  Living Arrangements: home alone. IPTA.   Steps: 3  Intent for return to present living arrangements: Yes  Identified Issues: [address] with Home Health Care : No Agency:(Services)  Type of Home Care Services: None  Passport/Waiver : No  :                      Phone Number:    Passport/Waiver Services: n//a          Durable Medical Equiptment   DME Provider: n/a  Equipment: n/a  Walker___Cane___RTS___ BSC___Shower Chair___Hospital Bed___W/C____Other________  02 at ____Liter(s)---wears(frequency)_______ HHN ___ CPAP___ BiPap___   N/A____      Home O2 Use :  No    If No for home O2---if presently on O2 during hospitalization:  Yes  if yes CM to follow for potential DC O2 need  Informed of need for care provider to bring portable home O2 tank on day of discharge for nursing to connect prior to leaving:   Not Indicated  Verbalized agreement/Understanding:   Not Indicated    Community Service Affiliation  Dialysis:  No    · Agency:  · Location:  · Dialysis Schedule:  · Phone:   · Fax: Other Community Services: (ex:PT/OT,Mental Health,Wound Clinic, Cardio/Pul [address])    DISCHARGE PLAN: Explained Case Management role/services. Reviewed chart and spoke with pt via telephone. Pt reports that she lives at home alone and is IPTA. Pt states \"I am very active. I exercise and I'm never sick. \" Pt states that she plans to return home at discharge and denies needs. CM following for poss hhc/home O2 needs.  Pt currently on O2 @ 7lpm.

## 2022-01-13 NOTE — PROGRESS NOTES
I had a discussion with patient's daughter, Nuria Shelby. She was upset and wanting her mother transferred to a different hospital.  She states she wants her mother treated. She was saying she wants Ivermectin and hydroxychloroquine. I stated we do not use these medications as they are not evidence based practices. I explained we are treating her and giving her the best possible care. I consulted pulmonology with her increased oxygen requirements. I explained she is receiving Decadron. I told her her mother will receive the best possible care. If she is still not happy later, I will attempt to transfer to the patient to another hospital is possible. Explained that most hospitals are out of beds. She is okay with staying at this time.       Noel RIVERO  1/13/2022

## 2022-01-13 NOTE — ACP (ADVANCE CARE PLANNING)
Advance Care Planning   Healthcare Decision Maker: Audi Rhodes, daughter: [phone]      Click here to complete Healthcare Decision Makers including selection of the Healthcare Decision Maker Relationship (ie \"Primary\").

## 2022-01-13 NOTE — CONSULTS
Patient is being seen at the request of Marino Santiago for a consultation for COVID-19 and respiratory failure in a partially vaccinated patient. Hospital Day: 2     HISTORY OF PRESENT ILLNESS: Dinesh Arceo is a 80 y.o. female with a PMHx of asthma, and known COVID19 positive on 1/17/22 at urgent care, who presented to the ED on 1/12/2022 with a 1-week history of moderate cough & shortness of breath, worse with exertion, and associated with hypoxemia, wheezing, fevers, & myalgias. Her SpO2 has consistently been 85% at rest on RA at home. Pt has no baseline O2. Urgent care started her on Zpak, prednisone, & tessalon perles. On arrival to the ED, was in no acute distress but hypoxic treated with supplemental O2. Pt initially only required 2 L O2, but oxygen demand has now increased to 7 L and so we were consulted. She feels better with the oxygen. Of note, patient's daughter requested she be transferred to another hospital to receive Ivermectin & Hydroxychloroquine. Patient was previously vaccinated for COVID-19 but did not receive her booster; this is because since she received her J&J shot in March, her arm has been sore to touch at the vaccination site. PAST MEDICAL HISTORY:  Past Medical History:   Diagnosis Date    Arthritis     Asthma     DDD (degenerative disc disease)     Diabetes mellitus ([address]     Hyperlipidemia      PAST SURGICAL HISTORY:  Past Surgical History:   Procedure Laterality Date    COLONOSCOPY  2001    COLONOSCOPY  08/17/2016    diverticulosis    HYSTERECTOMY      KNEE ARTHROSCOPY      x2    TONSILLECTOMY      TOTAL KNEE ARTHROPLASTY  10/05/2012    RIGHT    TUBAL LIGATION      WRIST SURGERY       FAMILY HISTORY:  family history includes Breast Cancer (age of onset: 79) in her mother; Cancer in her brother, maternal uncle, and mother; Diabetes in her mother; Emphysema in her father; Heart Disease in her father. SOCIAL HISTORY:   reports that she has quit smoking.  Her smoking use included cigarettes. She has a 15.00 pack-year smoking history. She has never used smokeless tobacco.    Scheduled Meds:   atorvastatin  10 mg Oral Nightly    sodium chloride flush  5-40 mL IntraVENous 2 times per day    dexamethasone  6 mg IntraVENous Daily    Vitamin D  2,000 Units Oral Daily    enoxaparin  40 mg SubCUTAneous BID    meloxicam  15 mg Oral Daily     Continuous Infusions:   sodium chloride       PRN Meds:  guaiFENesin-dextromethorphan, benzonatate, sodium chloride flush, sodium chloride, ondansetron **OR** ondansetron, polyethylene glycol, acetaminophen **OR** acetaminophen    ALLERGIES:  Patient is allergic to morphine, vicodin [hydrocodone-acetaminophen], and pcn [penicillins]. REVIEW OF SYSTEMS:  Constitutional: + for fever  HENT: Negative for sore throat  Eyes: Negative for redness   Respiratory: + for dyspnea, + cough  Cardiovascular: Negative for chest pain  Gastrointestinal: Negative for vomiting, diarrhea   Genitourinary: Negative for hematuria   Musculoskeletal: + for arthralgias/myalgias  Skin: Negative for rash  Neurological: Negative for syncope  Hematological: Negative for adenopathy  Psychiatric/Behavorial: Negative for anxiety    PHYSICAL EXAM:  Blood pressure 123/70, pulse 85, temperature 98.6 °F (37 °C), temperature source Oral, resp. rate 22, height 5' 3\" (1.6 m), weight 184 lb (83.5 kg), SpO2 92 %, not currently breastfeeding.' on 7 L  General: ill appearing. Eyes: PERRL. No sclera icterus. No conjunctival injection. ENT: No discharge. Pharynx clear. Neck: Trachea midline. Normal thyroid. Resp: No accessory muscle use. Mid lung field  crackles. No wheezing. No rhonchi. No dullness on percussion. CV: Regular rate. Regular rhythm. No mumur or rub. No edema. Peripheral pulses are 2+. Capillary refill is less than 3 seconds. GI: Non-tender. Non-distended. No masses. No organomegaly. Normal bowel sounds. No hernia. Skin: Warm and dry.  No nodule on exposed extremities. No rash on exposed extremities. Lymph: No cervical LAD. No supraclavicular LAD. M/S: No cyanosis. No joint deformity. No clubbing. Neuro: A&(X3. Patellar reflexes are symmetric. Psych: No agitation, no anxiety, affect is full. I performed the above physical exam independently in its entirety. Arelis Mcdaniels MD on 1/13/22 at 6:12 PM EST     LABS:  CBC:   Recent Labs     01/12/22  1011 01/13/22  0513   WBC 4.0 3.7*   HGB 12.6 12.6   HCT 37.5 37.7   MCV 90.3 92.7   PLT 95* 98*     BMP:   Recent Labs     01/12/22  1011 01/13/22  0513    139   K 4.1 3.9    103   CO2 28 25   BUN 15 12   CREATININE 0.6 <0.5*     LIVER PROFILE:   Recent Labs     01/12/22  1011 01/13/22  0513   AST 30 31   ALT 14 13   BILITOT 0.3 0.3   ALKPHOS 47 46     PT/INR: No results for input(s): PROTIME, INR in the last 72 hours. APTT: No results for input(s): APTT in the last 72 hours. UA:No results for input(s): NITRITE, COLORU, PHUR, LABCAST, WBCUA, RBCUA, MUCUS, TRICHOMONAS, YEAST, BACTERIA, CLARITYU, SPECGRAV, LEUKOCYTESUR, UROBILINOGEN, BILIRUBINUR, BLOODU, GLUCOSEU, AMORPHOUS in the last 72 hours. Invalid input(s): KETONESU  No results for input(s): PHART, MFL2DTE, PO2ART in the last 72 hours. Micro:  1/17/2022 SARS-CoV-2 positive at urgent care  1/12/2022 SARS-CoV-2 detected  Procalcitonin 0.10  CRP 53 --> 81    Imaging: Chest imaging was reviewed by me and showed   CXR 1/12/2022 Multifocal bilateral atypical pneumonia.      ASSESSMENT:  · COVID-19 pneumonia in a partially vaccinated patient, s/p J&J vaccination 3/2021  · Acute hypoxemic respiratory failure, no baseline O2  · Thrombocytopenia  · H/O asthma, no home inhalers  · Former smoker    PLAN:   COVID-19 isolation, droplet plus   Supplemental O2 to maintain SaO2 >92%; monitor sats closely    No Remdesevir given late presentation   Decadron D#2, 6 mg daily    Baricitinib D#1   Inhaled bronchodilators only as needed, MDI preferred  Prophylaxis: Lovenox 40 BID, monitor platelets   Full code confirmed by IM    I contributed to updating portions of this encounter note.    Natalio Valdez PA-C on 1/13/22 at 12:24 PM EST

## 2022-01-14 NOTE — PROGRESS NOTES
Inpatient Occupational Therapy  Evaluation and Treatment    Unit: 2 [place]  Date:  1/14/2022  Patient Name:    Jay Peterson  Admitting diagnosis:  Acute respiratory failure with hypoxia ([facility] [address] [J96.01]  Pneumonia due to COVID-19 virus [U07.1, J12.82]  COVID-19 [U07.1]  Admit Date:  1/12/2022  Precautions/Restrictions/WB Status/ Lines/ Wounds/ Oxygen: fall risk, bed/chair alarm, supplemental O2 (10L), telemetry, continuous pulse ox and Droplet Plus precautions (+ COVID 19)    Treatment Time:  3783-9822  Treatment Number: 1     Billable Treatment Time: 29 minutes   Total Treatment Time:   39   minutes    Patient Goals for Therapy:  Pt would like to be able to move more and do exercises      Discharge Recommendations: Home with PRN assist  DME needs for discharge: Needs Met       Therapy recommendations for staff: Independent in room for short distance transfers    History of Present Illness: Per H&P 80 y.o. female with PMH hypercholesterolemia, generalized anxiety disorder, impaired glucose tolerance, and vitamin D deficiency who presents to [facility] with ncreased shortness of brat   History obtained from the patient and review of EMR. Pt stated that symptoms started familia 1/5. Started with runny nose. She then started with shortness of breath and cough. +fever as high as 102.1 and chills at home. She stated that she has been spking fever at night, she stated she wakes up soaking wet at night only. Daughter at bedside stated she has been wheezing some at home. She went to urgent care on Friday where she was diagnosed with COVID they started her on Z-Sal, prednisone, and gave her Tessalon Perles. She came back to the ED today because she was not feeling any better had increased shortness of breath and cough. Patient also said she has had a decrease in appetite and p.o. intake over the last couple days. She does have some nausea. No vomiting or diarrhea. Denies hemoptysis.   Upon my assessment today patient sitting in chair O2 saturations dropped to 83 to 87% on room air. She was placed on 2 L of oxygen. Sats now mid 90s. Patient and daughter both had multiple questions regarding plan of care. All questions answered. Patient stated that she has arthritis and without her Mobic she is unable to move. We will reorder Mobic. Patient also stated since she got her [address] vaccine in March her left arm has been very painful and tender to touch at the injection site. She stated this is the reason why she has not gotten booster. Patient will be admitted for further care. Home Health S4 Level Recommendation:  Level 1 Standard  AM-PAC Score: AM-PAC Inpatient Daily Activity Raw Score: 24    Preadmission Environment    Pt. Lives Alone , pts daughters can stay with her if needed  Home environment:  one story home  Steps to enter first floor:   3 steps to enter and hand rail bilateral    Steps to second floor: N/A  Bathroom:  Walk-in Shower, Grab bars, Shower Chair  and BSC (3 in 1 commode)  Equipment owned:  [address] [place] [address], [facility] (), Rollator , manual W/C, lift chair, pulse ox and reacher  Adjustable bed    Preadmission Status / PLOF:  History of falls   Yes  Pt. Able to drive   Yes  Pt Fully independent with ADL's  Yes  Pt. Required assistance from family for: Independent PTA    Pt. Fully independent for transfers and gait and walked with: No Device    Pain  Yes  Rating:mild  Location:buttock, back  Pain Medicine Status: No request made      Cognition    A&O x4   Able to follow 2 step commands    Subjective  Patient lying supine in bed with no family present   Pt agreeable to this OT eval & tx. Upper Extremity ROM:    WFL,  pt able to perform all bed mobility, transfers, and gait without ROM limitation.     Upper Extremity Strength:    BUE strength impaired but not formally assessed w/ MMT    Upper Extremity Sensation    WFL    Upper Extremity Proprioception:  [facility]    Coordination and Tone  WFL    Balance  Functional Sitting Balance:  WFL  Functional Standing Balance:WFL    Bed mobility:    Supine to sit: Independent  Sit to supine:   Not Tested  Rolling:    Not Tested  Scooting in sitting:  Independent  Scooting to head of bed:   Not Tested    Bridging:   Not Tested    Transfers:    Sit to stand:  Independent  Stand to sit: Independent  Bed to chair:   Independent  Standard toilet: Not Tested  Bed to [facility]:  Not Tested    Dressing:      UE:   Not Tested  LE:    Independent    Bathing:    UE:  Not Tested  LE:  Not Tested    Eating:   Independent    Toileting:  Not Tested    Activity Tolerance   Pt completed therapy session with SOB noted with activity  SpO2: initially sitting at EOB 84% on 10 L O2, then after sitting in a chair with back support 92% During UB exercise O2 sats dropped 84%, after purse lip breathing and rest recovered to 91% on 10 L   Pt educated at length on energy conservation, purse lip breathing, supported sitting vs unsupported sitting, frequent rest breaks. Positioning Needs:   Up in chair, call light and needs in reach. Exercise / Activities Initiated: with yellow theraband  Shoulder flex/ext:  x10  Shoulder horizontal abd/add:  x10   Elbow flex/ext x10  Patient/Family Education:   Role of OT  Recommendations for DC    Assessment of Deficits: Pt seen for Occupational therapy evaluation in acute care setting. Pt demonstrated decreased Activity tolerance, IADLs, Strength and Coping Skills. Pt functioning below baseline and will likely benefit from skilled occupational therapy services to maximize safety and independence. Goal(s) : To be met in 3 Visits:  1). Independent with energy conservation techniques during ADLs    To be met in 5 Visits:  1). Pt to demonstrate UE exs x 15 reps with minimal cues    Rehabilitation Potential:  Good for goals listed above.     Strengths for achieving goals include: Pt motivated, PLOF, Family Support and Pt cooperative  Barriers to achieving goals include:  Complexity of condition     Plan: To be seen for 1-2 visits while in acute care setting for therapeutic exercises, bed mobility, transfers, dressing, bathing, family/patient education, ADL/IADL retraining, energy conservation training.       Madison Rodriguez, OTR/L [id number]  If patient discharges from this facility prior to next visit, this note will serve as the Discharge Summary

## 2022-01-14 NOTE — PROGRESS NOTES
VTE Prophylaxis Monitoring    Recent Labs     01/13/22  0513 01/14/22  0624   CREATININE <0.5* <0.5*     Recent Labs     01/14/22  0624   HGB 13.3   HCT 39.1        CrCl cannot be calculated (This lab value cannot be used to calculate CrCl because it is not a number: <0.5). Plt: 156  BUN/SCr: 13/0.5  BMI > 30    Continue 40mg BID. Platelets have improved since yesterday (were at 98).      Steven Merchant 1/14/2022 12:35 PM

## 2022-01-14 NOTE — PROGRESS NOTES
VSS - afebrile. Pt is alert and oriented x 4 with no history of falls. Assessment completed as charted. Bed is in lowest position with 2/4 bed rails raised, wheels locked and call light within reach - patient wearing non-skid socks and verbalizes understanding to call out for assistance. No further requests at this time. Will continue to monitor.      Vitals:    01/13/22 2027   BP: (!) 145/91   Pulse: 75   Resp: 20   Temp: 97.1 °F (36.2 °C)   SpO2: 93%

## 2022-01-14 NOTE — PROGRESS NOTES
Pt a/o. Am assessment completed see flow sheet. Pt 78% on 6L. Increased to 8L, pt recovering slowly. Pt sitting up eating breakfast. Explained to pt that she is able to move around some, but that rest is important at this time and using prone positioning can help with oxygenation. Pt verbalizes understanding. Call light within reach.

## 2022-01-14 NOTE — PROGRESS NOTES
IM Progress Note    Admit Date:  1/12/2022  2    Interval history:  covid 19 infection, hypoxia  Vaccinated with J & J in 3/21, did not get booster  Recently on steroids, z pack      Subjective:  Ms. Parminder Mcnulty with worsening hypoxia overnight, desaturates with any activity  Now upto 10 L   Does not feel well. Tired and fatigued          Objective:   BP (!) 151/80   Pulse 83   Temp 97.3 °F (36.3 °C) (Oral)   Resp 20   Ht 5' 3\" (1.6 m)   Wt 184 lb (83.5 kg)   SpO2 91%   BMI 32.59 kg/m²       Intake/Output Summary (Last 24 hours) at 1/14/2022 0738  Last data filed at 1/14/2022 0304  Gross per 24 hour   Intake 490 ml   Output 0 ml   Net 490 ml       Physical Exam:        General:  Elderly female up in bed  Awake, alert and oriented. Appears to be not in any distress  Mucous Membranes:  Pink , anicteric  Neck: No JVD, no carotid bruit, no thyromegaly  Chest: diminished in bases with scattered crackles in bases  Cardiovascular:  RRR S1S2 heard, no murmurs or gallops  Abdomen:  Soft, undistended, non tender, no organomegaly, BS present  Extremities: No edema or cyanosis.  Distal pulses well felt  Neurological : grossly normal with fatigue and gen weakness        Medications:   Scheduled Medications:    baricitinib  4 mg Oral Daily    atorvastatin  10 mg Oral Nightly    sodium chloride flush  5-40 mL IntraVENous 2 times per day    dexamethasone  6 mg IntraVENous Daily    Vitamin D  2,000 Units Oral Daily    enoxaparin  40 mg SubCUTAneous BID    meloxicam  15 mg Oral Daily     I   sodium chloride       guaiFENesin-dextromethorphan, benzonatate, sodium chloride flush, sodium chloride, ondansetron **OR** ondansetron, polyethylene glycol, acetaminophen **OR** acetaminophen    Lab Data:  Recent Labs     01/12/22  1011 01/13/22  0513 01/14/22  0624   WBC 4.0 3.7* 5.9   HGB 12.6 12.6 13.3   HCT 37.5 37.7 39.1   MCV 90.3 92.7 89.2   PLT 95* 98* 156     Recent Labs     01/12/22  1011 01/13/22  0513 01/14/22  0624    139 142   K 4.1 3.9 3.5    103 103   CO2 28 25 27   BUN 15 12 13   CREATININE 0.6 <0.5* <0.5*     Recent Labs     01/12/22  1011   TROPONINI <0.01       Coagulation:   Lab Results   Component Value Date    INR 0.91 09/22/2012    APTT 26.7 09/22/2012     Cardiac markers:   Lab Results   Component Value Date    TROPONINI <0.01 01/12/2022         Lab Results   Component Value Date    ALT 15 01/14/2022    AST 37 01/14/2022    ALKPHOS 50 01/14/2022    BILITOT 0.4 01/14/2022       Lab Results   Component Value Date    INR 0.91 09/22/2012    INR 0.93 08/29/2010    PROTIME 10.4 09/22/2012    PROTIME 10.4 08/29/2010       Radiology    Cultures:       SARS-CoV-2, NAAT DETECTED             EKG:  I have reviewed the EKG with the following interpretation:   NSR     RADIOLOGY  XR CHEST PORTABLE   Final Result   Multifocal bilateral atypical pneumonia.                    ASSESSMENT/PLAN:    COVID PNA  Acute Respiratory Failure   - CXR: noted with multifocal PNA  - no home O2 at baseline, 83-87% at rest, placed on 2 liters of oxygen and now mid 90s  - J &J in March 2021- no booster   - Admitted to [facility], droplet +, tele, cont pulse ox  - supp O2, wean as tolerated - worsening slowly to 10 L today    - Decadron D#3 ,  Remdesivir not started due to out of window- sx 7+ days prior to admit, PRN Robitussin-  -- procal 0.10 wnl and crp elevated   - baricitinib D 2   - cont care as above,   - Lovenox BID       Thrombocytopenia  - likely with covid  - platelets 93 >457 improved  - continue Lovenox and monitor     HLD  - Continue statin       MADHURI  - not on any home medications  - stable     Arthritis   - continue mobic      Hx of Asthma  - not on inhalers at home         DVT Prophylaxis: Lovenox bid  Diet: ADULT DIET;  Regular  Code Status: Full Code           Anne Marie Dia MD, 1/14/2022 7:38 AM

## 2022-01-14 NOTE — PROGRESS NOTES
Independent PTA    Pt. Fully independent for transfers and gait and walked with: No Device    Pain   Yes  Location: buttocks, back  Rating: mild /10  Pain Medicine Status: No request made    Cognition    A&O x4   Able to follow 2 step commands    Subjective  Patient sitting up in chair with no family present. Pt agreeable to this PT eval & tx. Upper Extremity ROM/Strength  Please see OT evaluation. Lower Extremity ROM / Strength   AROM WFL: No    BLE strength WFL, but not formally assessed with MMT. Pt reports exercising daily at home on treadmill 1 hour/day and sometimes on elliptical.    Lower Extremity Sensation    [email or web address]    Lower Extremity Proprioception:   NeurogesX    Coordination and Tone  WFL    Balance  Sitting:  Normal; Independent  Comments: Able to maintain good balance in unsupported sitting. Pt does c/o mild back pain in this position. Standing: Good ; Supervision  Comments: without AD    Bed Mobility   Supine to Sit:    Modified Independent (HOB flat, use of bedrail)  Sit to Supine:   Modified Independent  Rolling:   Modified Independent  Scooting in sitting: Independent  Scooting in supine:  Not Tested    Transfer Training     Sit to stand:   Independent  Stand to sit: Independent  Bed to Chair:   Independent with use of No AD    Gait gait completed as indicated below  Distance:      20 ft + 20 ft + multiple shorter distances over course of session  Deviations (firm surface/linoleum):  decreased carson  Assistive Device Used:    No AD  Level of Assist:    Supervision  Comment: Pt generally managing O2 cord independently. Stair Training deferred, pt unsafe/ not appropriate to complete stairs at this time    Activity Tolerance   Pt completed therapy session with No adverse symptoms noted w/activity. SpO2 in low 90%s on 10L with seated exercise and short standing/ambulating bouts.     Positioning Needs   Pt up in chair, no alarm needed, positioned in proper neutral alignment and pressure relief provided. Call light provided and all needs within reach    Exercises Initiated  all completed bilaterally unless indicated and completed in seated position, with red theraband (left with pt in room). Ankle pumps x 15 reps    Leg press x 15 reps  Hip abduction  X 15 reps    Other  RN aware of pt status. Patient/Family Education   Pt educated on role of inpatient PT, POC, importance of continued activity, energy conservation, HEP and calling for assist with mobility. Assessment  Pt seen for Physical Therapy evaluation in acute care setting. Pt demonstrated decreased Activity tolerance as well as decreased independence with Ambulation and Transfers. Recommending Home PRN assist upon discharge as patient functioning below baseline level with regards to activity tolerance but able to move safely with appropriate rest breaks. Goals : To be met in 3 visits:  1). Independent with LE Ex x 10 reps    To be met in 6 visits:  1). Supine to/from sit: Independent  2). Gait: Ambulate 150 ft.  with  Independent and use of No AD  5). Tolerate B LE exercises 3 sets of 10-15 reps  6). Ascend/descend 3 steps with Independent with use of hand rail unilateral and LRAD (least restrictive assistive device)    Rehabilitation Potential: Good  Strengths for achieving goals include:   Pt motivated, PLOF, Family Support and Pt cooperative   Barriers to achieving goals include:    No Barriers    Plan    To be seen for 1-2 visits while in acute care setting for therapeutic exercises, bed mobility, transfers, progressive gait training, balance training, and family/patient education. Signature: Tyrone Perez, PT, DPT    If patient discharges from this facility prior to next visit, this note will serve as the Discharge Summary.

## 2022-01-14 NOTE — PROGRESS NOTES
Pulmonary Progress Note  CC: COVID in partially vaccinated patient     Subjective:  Food is inedible to patient, she is feeling poorly, her but hurts    IV line peripheral    EXAM:   /67   Pulse 81   Temp 97.1 °F (36.2 °C) (Oral)   Resp 22   Ht 5' 3\" (1.6 m)   Wt 184 lb (83.5 kg)   SpO2 (!) 88% Comment: recovering  BMI 32.59 kg/m²  on 9 L  General: ill appearing. Eyes: PERRL. No sclera icterus. No conjunctival injection. ENT: No discharge. Pharynx clear. Neck: Trachea midline. Normal thyroid. Resp: +  accessory muscle use. No crackles. No wheezing. No rhonchi. No dullness on percussion. CV: Regular rate. Regular rhythm. No mumur or rub. No edema. GI: Non-tender. Non-distended. No masses. No organomegaly. Normal bowel sounds. No hernia. Skin: Warm and dry. No nodule on exposed extremities. No rash on exposed extremities. Lymph: No cervical LAD. No supraclavicular LAD. M/S: No cyanosis. No joint deformity. No clubbing. Neuro: A7OX3. Patellar reflexes are symmetric. Psych: No agitation, no anxiety, affect is full.      Scheduled Meds:   baricitinib  4 mg Oral Daily    atorvastatin  10 mg Oral Nightly    sodium chloride flush  5-40 mL IntraVENous 2 times per day    dexamethasone  6 mg IntraVENous Daily    Vitamin D  2,000 Units Oral Daily    enoxaparin  40 mg SubCUTAneous BID    meloxicam  15 mg Oral Daily     Continuous Infusions:   sodium chloride       PRN Meds:  guaiFENesin-dextromethorphan, benzonatate, sodium chloride flush, sodium chloride, ondansetron **OR** ondansetron, polyethylene glycol, acetaminophen **OR** acetaminophen    Labs:  CBC:   Recent Labs     01/12/22  1011 01/13/22  0513 01/14/22  0624   WBC 4.0 3.7* 5.9   HGB 12.6 12.6 13.3   HCT 37.5 37.7 39.1   MCV 90.3 92.7 89.2   PLT 95* 98* 156     BMP:   Recent Labs     01/12/22  1011 01/13/22  0513 01/14/22  0624    139 142   K 4.1 3.9 3.5    103 103   CO2 28 25 27   BUN 15 12 13   CREATININE 0.6 <0.5* <0.5*     Micro:  1/17/2022 SARS-CoV-2 positive at urgent care  1/12/2022 SARS-CoV-2 detected    Procalcitonin 0.10  CRP 53 --> 81    Imaging:   CXR 1/12/2022 Multifocal bilateral atypical pneumonia.      ASSESSMENT:  · COVID-19 pneumonia in a partially vaccinated patient, s/p J&J vaccination 3/2021- progressive, severe  · Acute hypoxemic respiratory failure, no baseline O2  · Thrombocytopenia  · H/O asthma, no home inhalers  · Former smoker    PLAN:   COVID-19 isolation, droplet plus   Supplemental O2 to maintain SaO2 >92%; monitor sats closely    No Remdesevir given late presentation   Decadron D#3, 6 mg daily    Baricitinib D#2, monitor liver, kidney, leukocytes    Inhaled bronchodilators only as needed, MDI preferred    Prophylaxis: Lovenox 40 BID, monitor platelets   Full code confirmed by IM

## 2022-01-15 NOTE — PROGRESS NOTES
Writer evaluated at this time, spo2 at 84% on 15 HF n/c. Pt. C/o shortness of breath with minimal exertion. Pt. Verbalizing frustration with \"I work out every day and here I am, fix this. I need to get OFF this oxygen, NOT to add more\". Reviewed current POC with pt; pt verbalizes understanding and is  in agreement to place vapotherm or airvo due to desaturation. POC reviewed with bedside MIKE duval.  Kelechi Durham Clinical

## 2022-01-15 NOTE — PROGRESS NOTES
IM Progress Note    Admit Date:  1/12/2022  3    Interval history:  covid 19 infection, hypoxia  Vaccinated with J & J in 3/21, did not get booster  Recently on steroids, z pack      Subjective:  Ms. [place] with worsening hypoxia overnight,  Now on vapotherm  At 30 L and 90 % fio2. Feels comfortable with vapotherm      Objective:   BP (!) 147/84   Pulse 82   Temp 97.5 °F (36.4 °C) (Oral)   Resp 16   Ht 5' 3\" (1.6 m)   Wt 184 lb (83.5 kg)   SpO2 90%   BMI 32.59 kg/m²       Intake/Output Summary (Last 24 hours) at 1/15/2022 0798  Last data filed at 1/15/2022 0358  Gross per 24 hour   Intake 720 ml   Output 300 ml   Net 420 ml       Physical Exam:        General:  Elderly female up in bed  Awake, alert and oriented. Appears to be not in any distress  Mucous Membranes:  Pink , anicteric  Neck: No JVD, no carotid bruit, no thyromegaly  Chest: diminished in bases with scattered crackles in bases  Cardiovascular:  RRR S1S2 heard, no murmurs or gallops  Abdomen:  Soft, undistended, non tender, no organomegaly, BS present  Extremities: No edema or cyanosis.  Distal pulses well felt  Neurological : grossly normal with fatigue and gen weakness        Medications:   Scheduled Medications:    baricitinib  4 mg Oral Daily    atorvastatin  10 mg Oral Nightly    sodium chloride flush  5-40 mL IntraVENous 2 times per day    dexamethasone  6 mg IntraVENous Daily    Vitamin D  2,000 Units Oral Daily    enoxaparin  40 mg SubCUTAneous BID    meloxicam  15 mg Oral Daily     I   sodium chloride       albuterol sulfate HFA, guaiFENesin-dextromethorphan, benzonatate, sodium chloride flush, sodium chloride, ondansetron **OR** ondansetron, polyethylene glycol, acetaminophen **OR** acetaminophen    Lab Data:  Recent Labs     01/13/22 0513 01/14/22  0624 01/15/22  0551   WBC 3.7* 5.9 4.7   HGB 12.6 13.3 13.1   HCT 37.7 39.1 39.3   MCV 92.7 89.2 91.3   PLT 98* 156 171     Recent Labs     01/13/22 0513 01/14/22  0624 01/15/22  0551  142 141   K 3.9 3.5 3.9    103 103   CO2 25 27 27   BUN 12 13 19   CREATININE <0.5* <0.5* <0.5*     Recent Labs     01/12/22  1011   TROPONINI <0.01       Coagulation:   Lab Results   Component Value Date    INR 0.91 09/22/2012    APTT 26.7 09/22/2012     Cardiac markers:   Lab Results   Component Value Date    TROPONINI <0.01 01/12/2022         Lab Results   Component Value Date    ALT 15 01/15/2022    AST 31 01/15/2022    ALKPHOS 50 01/15/2022    BILITOT 0.5 01/15/2022       Lab Results   Component Value Date    INR 0.91 09/22/2012    INR 0.93 08/29/2010    PROTIME 10.4 09/22/2012    PROTIME 10.4 08/29/2010       Radiology    Cultures:       SARS-CoV-2, NAAT DETECTED             EKG:  I have reviewed the EKG with the following interpretation:   NSR     RADIOLOGY  XR CHEST PORTABLE   Final Result   Multifocal bilateral atypical pneumonia.                    ASSESSMENT/PLAN:    COVID PNA  Acute Respiratory Failure   - CXR: noted with multifocal PNA  - no home O2 at baseline, 83-87% at rest,     - J &J in March 2021- no booster   - Admitted to , droplet +, tele, cont pulse ox  - supp O2, wean as tolerated - worsening slowly -was on 10 L - - progressively worsening hypoxia, now on vapotherm at 90 % fio2  - Decadron D#4 ,  Remdesivir not started due to out of window- sx 7+ days prior to admit, PRN Robitussin-  -- procal 0.10 wnl and crp elevated   - baricitinib D 3  - Lovenox Bid       Thrombocytopenia  - likely with covid  - platelets 93 >720 improved  - continue Lovenox and monitor     HLD  - Continue statin       MADHURI  - not on any home medications  - stable     Arthritis   - continue mobic      Hx of Asthma  - not on inhalers at home         DVT Prophylaxis: Lovenox bid  Diet: ADULT DIET;  Regular  Code Status: Full Code           Ivette Desir MD, 1/15/2022 7:43 AM

## 2022-01-15 NOTE — PROGRESS NOTES
Pulmonary Progress Note  CC: COVID in partially vaccinated patient     Subjective:    Anxious  More hypoxic today. On Vapotherm 90%/25lpm    IV line peripheral    EXAM:   BP (!) 147/84   Pulse 82   Temp 97.5 °F (36.4 °C) (Oral)   Resp 16   Ht 5' 3\" (1.6 m)   Wt 184 lb (83.5 kg)   SpO2 90%   BMI 32.59 kg/m²  on vapotherm  General: ill appearing. Eyes: PERRL. No sclera icterus. No conjunctival injection. ENT: No discharge. Pharynx clear. Neck: Trachea midline. Normal thyroid. Resp: +  accessory muscle use. No crackles. No wheezing. No rhonchi. CV: Regular rate. Regular rhythm. No mumur or rub. No edema. GI: Non-tender. Non-distended. No masses. No organomegaly. Normal bowel sounds. No hernia. Neuro: AOX3  Psych: No agitation, no anxiety, affect is full. Scheduled Meds:   baricitinib  4 mg Oral Daily    atorvastatin  10 mg Oral Nightly    sodium chloride flush  5-40 mL IntraVENous 2 times per day    dexamethasone  6 mg IntraVENous Daily    Vitamin D  2,000 Units Oral Daily    enoxaparin  40 mg SubCUTAneous BID    meloxicam  15 mg Oral Daily     Continuous Infusions:   sodium chloride       PRN Meds:  albuterol sulfate HFA, guaiFENesin-dextromethorphan, benzonatate, sodium chloride flush, sodium chloride, ondansetron **OR** ondansetron, polyethylene glycol, acetaminophen **OR** acetaminophen    Labs:  CBC:   Recent Labs     01/13/22  0513 01/14/22  0624 01/15/22  0551   WBC 3.7* 5.9 4.7   HGB 12.6 13.3 13.1   HCT 37.7 39.1 39.3   MCV 92.7 89.2 91.3   PLT 98* 156 171     BMP:   Recent Labs     01/12/22  1011 01/13/22  0513 01/14/22  0624    139 142   K 4.1 3.9 3.5    103 103   CO2 28 25 27   BUN 15 12 13   CREATININE 0.6 <0.5* <0.5*     Micro:  1/17/2022 SARS-CoV-2 positive at urgent care  1/12/2022 SARS-CoV-2 detected    Procalcitonin 0.10  CRP 53 --> 81    Imaging:   CXR 1/12/2022 Multifocal bilateral atypical pneumonia.      ASSESSMENT:  · COVID-19 pneumonia in a partially vaccinated patient, s/p J&J vaccination 3/2021- progressive, severe  · Acute hypoxemic respiratory failure, no baseline O2  · Thrombocytopenia  · H/O asthma, no home inhalers  · Former smoker    PLAN:   COVID-19 isolation, droplet plus   Supplemental O2 to maintain SaO2 >92%; monitor sats closely    No Remdesevir given late presentation   Decadron D#4, 6 mg daily    Baricitinib D#3, monitor liver, kidney, leukocytes    Inhaled bronchodilators only as needed, MDI preferred    Prophylaxis: Lovenox 40 BID, monitor platelets   Transfer to PCU

## 2022-01-15 NOTE — PROGRESS NOTES
Writer reviewed with Dr. Payal Martinez via phone regarding o2 at 15l HF. MD order requested to initiate vapotherm at this time. Order placed. Update given to charge MIKE Brennan.  Rodo Lopez RN CLinical

## 2022-01-15 NOTE — PROGRESS NOTES
Physical Therapy  Hold, patient now on vapotherm 30L,90% due to impaired oxygenation. Will continue to follow and re-attemtpt when patients oxygenation is more stable.     Sriram Tuttle, PT #[id number]

## 2022-01-16 NOTE — PROGRESS NOTES
IM Progress Note    Admit Date:  1/12/2022  4    Interval history:  covid 19 infection, hypoxia  Vaccinated with J & J in 3/21, did not get booster  Recently on steroids, z pack      Subjective:  Vapotherm 30% 90%. Feels ok. Feels she has overdone breathing exercises. Anxious appearing. Objective:   /60   Pulse 68   Temp 98.6 °F (37 °C) (Oral)   Resp 20   Ht 5' 3\" (1.6 m)   Wt 183 lb 9.6 oz (83.3 kg)   SpO2 96%   BMI 32.52 kg/m²       Intake/Output Summary (Last 24 hours) at 1/16/2022 1115  Last data filed at 1/16/2022 0842  Gross per 24 hour   Intake 520 ml   Output 300 ml   Net 220 ml       Physical Exam:        General:  Elderly female up in bed  Awake, alert and oriented. Appears to be not in any distress  Mucous Membranes:  Pink , anicteric  Neck: No JVD, no carotid bruit, no thyromegaly  Chest: diminished in bases with scattered crackles in bases  Cardiovascular:  RRR S1S2 heard, no murmurs or gallops  Abdomen:  Soft, undistended, non tender, no organomegaly, BS present  Extremities: No edema or cyanosis.  Distal pulses well felt  Neurological : grossly normal with fatigue and gen weakness        Medications:   Scheduled Medications:    fluticasone  1 spray Each Nostril Daily    baricitinib  4 mg Oral Daily    atorvastatin  10 mg Oral Nightly    sodium chloride flush  5-40 mL IntraVENous 2 times per day    dexamethasone  6 mg IntraVENous Daily    Vitamin D  2,000 Units Oral Daily    enoxaparin  40 mg SubCUTAneous BID     I   sodium chloride       meloxicam, albuterol sulfate HFA, guaiFENesin-dextromethorphan, benzonatate, sodium chloride flush, sodium chloride, ondansetron **OR** ondansetron, polyethylene glycol, acetaminophen **OR** acetaminophen    Lab Data:  Recent Labs     01/14/22  0624 01/15/22  0551   WBC 5.9 4.7   HGB 13.3 13.1   HCT 39.1 39.3   MCV 89.2 91.3    171     Recent Labs     01/14/22  0624 01/15/22  0551    141   K 3.5 3.9    103   CO2 27 27 BUN 13 19   CREATININE <0.5* <0.5*     No results for input(s): CKTOTAL, CKMB, CKMBINDEX, TROPONINI in the last 72 hours. Coagulation:   Lab Results   Component Value Date    INR 0.91 09/22/2012    APTT 26.7 09/22/2012     Cardiac markers:   Lab Results   Component Value Date    TROPONINI <0.01 01/12/2022         Lab Results   Component Value Date    ALT 15 01/15/2022    AST 31 01/15/2022    ALKPHOS 50 01/15/2022    BILITOT 0.5 01/15/2022       Lab Results   Component Value Date    INR 0.91 09/22/2012    INR 0.93 08/29/2010    PROTIME 10.4 09/22/2012    PROTIME 10.4 08/29/2010       Radiology    Cultures:       SARS-CoV-2, NAAT DETECTED             EKG:  I have reviewed the EKG with the following interpretation:   NSR     RADIOLOGY  XR CHEST PORTABLE   Final Result   Multifocal bilateral atypical pneumonia.                    ASSESSMENT/PLAN:      COVID PNA  Acute Respiratory Failure   - CXR: noted with multifocal PNA  - no home O2 at baseline, 83-87% at rest,   - J &J in March 2021- no booster   - Admitted to , droplet +, tele, cont pulse ox  - supp O2, wean as tolerated - worsening slowly -was on 10 L - - progressively worsening hypoxia, now on vapotherm at 90 % fio2  - Decadron D#5 ,  Remdesivir not started due to out of window- sx 7+ days prior to admit, PRN Robitussin-  -- procal 0.10 wnl and crp elevated   - baricitinib D 4  - Lovenox Bid       Thrombocytopenia  - likely with covid  - platelets 93 >696 improved  - continue Lovenox and monitor     HLD  - Continue statin       MADHURI  - not on any home medications  - stable     Arthritis   - continue mobic      Hx of Asthma  - not on inhalers at home         DVT Prophylaxis: Lovenox bid  Diet: ADULT DIET;  Regular  Code Status: Full Code           Yuliya Branch MD, 1/16/2022 11:15 AM

## 2022-01-16 NOTE — PROGRESS NOTES
Pulmonary Progress Note  CC: COVID in partially vaccinated patient     Subjective:  Anxious  Had some blood from nares and in sputum  On Vapotherm 80%/30lpm    IV line peripheral    EXAM:   /60   Pulse 68   Temp 98.6 °F (37 °C) (Oral)   Resp 20   Ht 5' 3\" (1.6 m)   Wt 183 lb 9.6 oz (83.3 kg)   SpO2 96%   BMI 32.52 kg/m²  on vapotherm  General: ill appearing. Eyes: PERRL. No sclera icterus. No conjunctival injection. ENT: No discharge. Pharynx clear. Neck: Trachea midline. Normal thyroid. Resp: +  accessory muscle use. + crackles. No wheezing. No rhonchi. CV: Regular rate. Regular rhythm. No mumur or rub. No edema. GI: Non-tender. Non-distended. No masses. No organomegaly. Normal bowel sounds. No hernia. Neuro: AOX3  Psych: No agitation, no anxiety, affect is full. Scheduled Meds:   fluticasone  1 spray Each Nostril Daily    baricitinib  4 mg Oral Daily    atorvastatin  10 mg Oral Nightly    sodium chloride flush  5-40 mL IntraVENous 2 times per day    dexamethasone  6 mg IntraVENous Daily    Vitamin D  2,000 Units Oral Daily    enoxaparin  40 mg SubCUTAneous BID     Continuous Infusions:   sodium chloride       PRN Meds:  meloxicam, albuterol sulfate HFA, guaiFENesin-dextromethorphan, benzonatate, sodium chloride flush, sodium chloride, ondansetron **OR** ondansetron, polyethylene glycol, acetaminophen **OR** acetaminophen    Labs:  CBC:   Recent Labs     01/14/22  0624 01/15/22  0551   WBC 5.9 4.7   HGB 13.3 13.1   HCT 39.1 39.3   MCV 89.2 91.3    171     BMP:   Recent Labs     01/14/22  0624 01/15/22  0551    141   K 3.5 3.9    103   CO2 27 27   BUN 13 19   CREATININE <0.5* <0.5*     Micro:  1/17/2022 SARS-CoV-2 positive at urgent care  1/12/2022 SARS-CoV-2 detected    Procalcitonin 0.10  CRP 53 --> 81    Imaging:   CXR 1/12/2022 Multifocal bilateral atypical pneumonia.      ASSESSMENT:  · COVID-19 pneumonia in a partially vaccinated patient, s/p J&J vaccination 3/2021- progressive, severe  · Acute hypoxemic respiratory failure, no baseline O2  · Thrombocytopenia  · H/O asthma, no home inhalers  · Former smoker    PLAN:  Droplet Plus Airborne Precautions    Supplemental O2 to maintain SaO2 >92%; monitor sats closely    No Remdesevir given late presentation   Decadron D#5, 6 mg daily    Baricitinib D#4, monitor liver, kidney, leukocytes    PRN albuterol MDI   Prophylaxis: Lovenox 40 BID, monitor platelets

## 2022-01-16 NOTE — PROGRESS NOTES
Pt sitting on side of bed with daughter, eating dinner, oxygen dropping to  85% at times. No needs expressed. Call light in reach.  Will monitor Dwayne Cardenas RN

## 2022-01-16 NOTE — PROGRESS NOTES
Vitals:    01/15/22 2200   BP: (!) 144/72   Pulse: 75   Resp: 18   Temp: 99.1 °F (37.3 °C)   SpO2: 91%     A&O x 4, shift assessment complete. Pt denies any pain. Pt oriented to new room. Skin assessment complete, no skin issues. Pt remains on Vapotherm 30L @ 90%. Beverage and snack offered. Bed in lowest position and call light within reach. Pt denies any further needs. All needs addressed.

## 2022-01-16 NOTE — PROGRESS NOTES
Pt resting in bed. States she has realized she needs to rest more than focusing on her breathing. She feels she has improved since doing so. Today is her birthday and family has brought in snacks and dinner. Refused lunch stating that she did not like our food. Call light and bedside table within reach. Will continue to monitor.

## 2022-01-16 NOTE — PROGRESS NOTES
Called and updated pt's daughter Gisele Maguire of pt's transfer to PCU tonight to room 321. Questions answered.  Deo Clement RN

## 2022-01-16 NOTE — PROGRESS NOTES
Shift assessment complete. Pt A&O x 4. VSS. Pt c/o nausea this morning, rating headache 5/10, and productive cough. Pt concerned about new onset of hemoptysis this morning but also blew bright red blood from nares while nurse was in room, specimen cup provided. Pt desats with little exertion but recovers easily. Pt currently on Vapotherm 30L, 90% FiO2. Pt was given Tylenol, Zofran, Tessalon, Robitussin, Meloxicam. Pt voided 300 mL (see flow sheet). Pt repositioned, beverage provided, batteries changed in tele, all belongings within reach with call light. Bed in lowest position. Pt denies any further needs. Will continue to monitor.      Vitals:    01/16/22 0700   BP: 106/60   Pulse: 68   Resp: 20   Temp: 98.6 °F (37 °C)   SpO2: 97%

## 2022-01-17 NOTE — CARE COORDINATION
INTERDISCIPLINARY PLAN OF CARE CONFERENCE    Date/Time: 1/17/2022 1:53 PM  Completed by: Cristal Claros RN, Case Management      Patient Name:  Felipa Martinez  YOB: 1950  Admitting Diagnosis: Acute respiratory failure with hypoxia ([facility] [address] [J96.01]  Pneumonia due to COVID-19 virus [U07.1, J12.82]  COVID-19 [U07.1]     Admit Date/Time:  1/12/2022  9:40 AM    Chart reviewed. Interdisciplinary team contacted or reviewed plan related to patient progress and discharge plans. Disciplines included Case Management, Nursing, and Dietitian. Current Status:ICU pt on PCU; C19+ on vpotherm  PT/OT recommendation for discharge plan of care: on hold due to increased O2 demand    Expected D/C Disposition:  tbd  Discharge Plan Comments: Chart reviewed. Pt from home alone. ICU pt on PCU on vapotherm. Will follow and develop dcp as pt progresses.      Home O2 in place on admit: No

## 2022-01-17 NOTE — PROGRESS NOTES
Patient resting quietly in bed. No s/s of distress noted. SpO2 91% on vapotherm 35L 90%. Shift assessment complete, see flow sheet. Denies needs at this time. Call light in reach. Will monitor.

## 2022-01-17 NOTE — PROGRESS NOTES
Occupational/Physical Therapy  Since time of evaluation, patient has transferred to ICU (housed on PCU) from [facility] due to respiratory status. Will hold, please reorder as status permits.   Anu Flores, OTR/L [id number]  Ingrid Reilly, MS PT, # [id number]

## 2022-01-17 NOTE — PROGRESS NOTES
IM Progress Note    Admit Date:  1/12/2022  5    Interval history:    covid 19 infection, hypoxia  Vaccinated with J & J in 3/21, did not get booster  Recently on steroids, z pack      Subjective:    Patient remains on high flow oxygen per Vapotherm. O2 increased to 35 L FiO2 90%. She is awake alert and well-oriented, denies any shortness of breath. Overall feels well. O2 sats maintained around 91% on Vapotherm      Objective:   BP (!) 102/51   Pulse 80   Temp 98.4 °F (36.9 °C) (Oral)   Resp 22   Ht 5' 3\" (1.6 m)   Wt 178 lb 12.8 oz (81.1 kg)   SpO2 96%   BMI 31.67 kg/m²       Intake/Output Summary (Last 24 hours) at 1/17/2022 1320  Last data filed at 1/17/2022 5567  Gross per 24 hour   Intake 590 ml   Output 500 ml   Net 90 ml       Physical Exam:  Patient seen in droplet plus precautions  General:  Elderly female   Awake, alert and oriented. Appears to be not in any distress  Mucous Membranes:  Pink , anicteric  Neck: No JVD, no carotid bruit, no thyromegaly  Chest: diminished in bases, no wheezes rales or rhonchi. No crackles today. Cardiovascular:  RRR S1S2 heard, no murmurs or gallops  Abdomen:  Soft, undistended, non tender, no organomegaly, BS present  Extremities: No edema or cyanosis.  Distal pulses well felt  Neurological : grossly normal with fatigue and gen weakness        Medications:   Scheduled Medications:    fluticasone  1 spray Each Nostril Daily    baricitinib  4 mg Oral Daily    atorvastatin  10 mg Oral Nightly    sodium chloride flush  5-40 mL IntraVENous 2 times per day    dexamethasone  6 mg IntraVENous Daily    Vitamin D  2,000 Units Oral Daily    enoxaparin  40 mg SubCUTAneous BID     I   sodium chloride       meloxicam, albuterol sulfate HFA, guaiFENesin-dextromethorphan, benzonatate, sodium chloride flush, sodium chloride, ondansetron **OR** ondansetron, polyethylene glycol, acetaminophen **OR** acetaminophen    Lab Data:  Recent Labs     01/15/22  0551   WBC 4.7 HGB 13.1   HCT 39.3   MCV 91.3        Recent Labs     01/15/22  0551      K 3.9      CO2 27   BUN 19   CREATININE <0.5*     No results for input(s): CKTOTAL, CKMB, CKMBINDEX, TROPONINI in the last 72 hours. Coagulation:   Lab Results   Component Value Date    INR 0.91 09/22/2012    APTT 26.7 09/22/2012     Cardiac markers:   Lab Results   Component Value Date    TROPONINI <0.01 01/12/2022         Lab Results   Component Value Date    ALT 15 01/15/2022    AST 31 01/15/2022    ALKPHOS 50 01/15/2022    BILITOT 0.5 01/15/2022       Lab Results   Component Value Date    INR 0.91 09/22/2012    INR 0.93 08/29/2010    PROTIME 10.4 09/22/2012    PROTIME 10.4 08/29/2010         Cultures:       SARS-CoV-2, NAAT DETECTED             EKG:   NSR     RADIOLOGY  XR CHEST PORTABLE   Final Result   Multifocal bilateral atypical pneumonia.                    ASSESSMENT/PLAN:    COVID PNA  Acute hypoxic respiratory Failure   - CXR: noted with multifocal PNA  - no home O2 at baseline, 83-87% at rest,   - J &J in March 2021- no booster   - Admitted to 2W, droplet +, tele, cont pulse ox  - supp O2, wean as tolerated - worsening slowly -was on 10 L - - progressively worsening hypoxia, now on vapotherm at 35L, 90 % fio2. Patient currently moved to PCU. She will be an ICU bed held in PCU  -Continue Decadron D#6   -  Remdesivir not started due to out of window- sx 7+ days prior to admit  - PRN Robitussin-  - procal 0.10 wnl   - crp elevated . Started on Baricitinib D#5  - Lovenox Bid    Thrombocytopenia  - likely with covid  - platelets 93 >402 improved  - continue Lovenox and monitor     HLD  - Continue statin       MADHURI  - not on any home medications  - stable     Arthritis   - continue mobic      Hx of Asthma  - not on inhalers at home         DVT Prophylaxis: Lovenox bid  Diet: ADULT DIET;  Regular  Code Status: Full Code           Juancho Smyth MD, 1/17/2022 1:20 PM

## 2022-01-17 NOTE — PROGRESS NOTES
Pt very aggravated this AM about oxygen being increased. Had conversation at length with pt explaining reasoning for increased in oxygen. pt made aware that last could hrs pt has been 85-89 on 75% 30L. Pt sustained 86%, respiratory notified and increased to 85% 30L. Pt states she feels like she is being punished and is having as set back. reiterated to pt reasoning. Pt states understanding and has calmed down. Oxygen now 93% at this time.   Marlys Alcocer, RN

## 2022-01-17 NOTE — PROGRESS NOTES
Pt in bed, eyes closed. No distress noted. Call light in reach. Will continue to monitor.   Misti Aguilar RN

## 2022-01-17 NOTE — PROGRESS NOTES
Pt in bed, awake, A/O X4. Shift assessment complete, night meds given. No C/o pain at this time. PRN tylenol and robitussin given. Pt up to [facility], desat to 85%, recovered. . No other needs expressed. Call light in reach. Will monitor.  Dwayne Cardenas RN

## 2022-01-17 NOTE — PROGRESS NOTES
Pulmonary Progress Note  CC: COVID in partially vaccinated patient     Subjective:  Anxious, worse hypoxia last few days    On Vapotherm 90%/35lpm    IV line peripheral    EXAM:   /67   Pulse 86   Temp 98.1 °F (36.7 °C) (Oral)   Resp 25   Ht 5' 3\" (1.6 m)   Wt 178 lb 12.8 oz (81.1 kg)   SpO2 92%   BMI 31.67 kg/m²  on vapotherm  General: ill appearing. Eyes: PERRL. No sclera icterus. No conjunctival injection. ENT: No discharge. Pharynx clear. Neck: Trachea midline. Normal thyroid. Resp: +  accessory muscle use. + crackles. No wheezing. No rhonchi. CV: Regular rate. Regular rhythm. No mumur or rub. No edema. GI: Non-tender. Non-distended. No masses. No organomegaly. Normal bowel sounds. No hernia. Neuro: AOX3  Psych: No agitation, + anxiety, affect is full. Scheduled Meds:   fluticasone  1 spray Each Nostril Daily    baricitinib  4 mg Oral Daily    atorvastatin  10 mg Oral Nightly    sodium chloride flush  5-40 mL IntraVENous 2 times per day    dexamethasone  6 mg IntraVENous Daily    Vitamin D  2,000 Units Oral Daily    enoxaparin  40 mg SubCUTAneous BID     Continuous Infusions:   sodium chloride       PRN Meds:  meloxicam, albuterol sulfate HFA, guaiFENesin-dextromethorphan, benzonatate, sodium chloride flush, sodium chloride, ondansetron **OR** ondansetron, polyethylene glycol, acetaminophen **OR** acetaminophen    Labs:  CBC:   Recent Labs     01/15/22  0551   WBC 4.7   HGB 13.1   HCT 39.3   MCV 91.3        BMP:   Recent Labs     01/15/22  0551      K 3.9      CO2 27   BUN 19   CREATININE <0.5*     Micro:  1/17/2022 SARS-CoV-2 positive at urgent care  1/12/2022 SARS-CoV-2 detected    Procalcitonin 0.10  CRP 53 --> 81    Imaging:   CXR 1/12/2022 Multifocal bilateral atypical pneumonia.      ASSESSMENT:  · COVID-19 pneumonia in a partially vaccinated patient, s/p J&J vaccination 3/2021- progressive, severe  · Acute hypoxemic respiratory failure, no baseline O2 - worse last few days  · Thrombocytopenia  · H/O asthma, no home inhalers  · Former smoker    PLAN:  Droplet Plus Airborne Precautions    Continue Vapotherm for life-threatening hypoxemia   No Remdesevir given late presentation   Decadron D#6, 6 mg daily    Baricitinib D#5, monitor liver, kidney, leukocytes    PRN albuterol MDI   Prophylaxis: Lovenox 40 BID, monitor platelets   Due to at least single organ failure and risk of rapid deterioration, I spent 31 minutes of Critical care time reviewing labs/films, examining patient, collaborating with other physicians. This does not include time performing critical procedures.

## 2022-01-17 NOTE — PROGRESS NOTES
Pt up to [facility], desat to 72%, very SOB and dizzy. Non rebreather place. Slow to recover. Respiratory called. Increased vapo to 35L and 90%.

## 2022-01-18 NOTE — PROGRESS NOTES
Spoke with Dr Jack Awan on his recommendation of adding plavix, will update Dr. Abby Young.  Remberto Cottrell RN 87

## 2022-01-18 NOTE — PROGRESS NOTES
Inpatient Occupational Therapy Re-Evaluation and Treatment    Unit: ICU (housed on PCU)   Date:  1/18/2022  Patient Name:    Vimal Underwood  Admitting diagnosis:  Acute respiratory failure with hypoxia ([facility] [J96.01]  Pneumonia due to COVID-19 virus [U07.1, J12.82]  COVID-19 [U07.1]  Admit Date:  1/12/2022  Precautions/Restrictions/WB Status/ Lines/ Wounds/ Oxygen: fall risk, bed/chair alarm, supplemental O2 (35 LPM, 85% O2), telemetry, continuous pulse ox and Droplet Plus precautions (+ COVID 19)    Treatment Time:  4716-4027  Treatment Number: 1     Billable Treatment Time:  39 minutes   Total Treatment Time:    49  minutes    Patient Goals for Therapy:  \"Get better\"      Discharge Recommendations: Home with PRN assist vs LTAC unless needed for O2 demands  DME needs for discharge: Needs Met       Therapy recommendations for staff:   Lazaro Raya in room for short distance transfers    History of Present Illness: Per H&P 80 y.o. female with PMH hypercholesterolemia, generalized anxiety disorder, impaired glucose tolerance, and vitamin D deficiency who presents to [facility] with ncreased shortness of brat   History obtained from the patient and review of EMR. Pt stated that symptoms started familia 1/5. Started with runny nose. She then started with shortness of breath and cough. +fever as high as 102.1 and chills at home. She stated that she has been spking fever at night, she stated she wakes up soaking wet at night only. Daughter at bedside stated she has been wheezing some at home. She went to urgent care on Friday where she was diagnosed with COVID they started her on Z-Sal, prednisone, and gave her Tessalon Perles. She came back to the ED today because she was not feeling any better had increased shortness of breath and cough. Patient also said she has had a decrease in appetite and p.o. intake over the last couple days. She does have some nausea. No vomiting or diarrhea. Denies hemoptysis.   Upon my assessment today patient sitting in chair O2 saturations dropped to 83 to 87% on room air. She was placed on 2 L of oxygen. Sats now mid 90s. Patient and daughter both had multiple questions regarding plan of care. All questions answered. Patient stated that she has arthritis and without her Mobic she is unable to move. We will reorder Mobic. Patient also stated since she got her [employer] vaccine in March her left arm has been very painful and tender to touch at the injection site. She stated this is the reason why she has not gotten booster. Patient will be admitted for further care. Home Health S4 Level Recommendation:  Level 1 Standard  AM-PAC Score: AM-PAC Inpatient Daily Activity Raw Score: 24    Preadmission Environment    Pt. Lives Alone , pt's daughters can stay with her if needed  Home environment:  one story home  Steps to enter first floor:   3 steps to enter and hand rail bilateral    Steps to second floor: N/A  Bathroom:  Walk-in Shower, Grab bars, Shower Chair  and BSC (3 in 1 commode)  Equipment owned:  [facility], [address] [place] [address] (), Rollator , manual W/C, lift chair, pulse ox and reacher  Adjustable bed    Preadmission Status / PLOF:  History of falls   Yes  Pt. Able to drive   Yes  Pt Fully independent with ADL's  Yes  Pt. Required assistance from family for: Independent PTA    Pt. Fully independent for transfers and gait and walked with: No Device    Pain  No  Rating:NA  Location:  Pain Medicine Status: No request made      Cognition    A&O x4   Able to follow 2 step commands    Subjective  Patient seated at edge of bed with no family present   Pt agreeable to this OT eval & tx. Upper Extremity ROM:    WFL,  pt able to perform all bed mobility, transfers, and gait without ROM limitation.     Upper Extremity Strength:    BUE strength impaired but not formally assessed w/ MMT    Upper Extremity Sensation    WFL    Upper Extremity Proprioception:  [facility]    Coordination and Tone  WFL    Balance  Functional Sitting Balance:  WFL  Functional Standing Balance:WFL    Bed mobility:    Supine to sit: Independent  Sit to supine:   Independent  Rolling:    Not Tested  Scooting in sitting:  Independent  Scooting to head of bed:   Ephraim BARNES via [employer] bed, also able to complete with SBA on second trial (smaller scoot)    Bridging:   Not Tested    Transfers:    Sit to stand:  Independent  Stand to sit: Independent  Bed to chair:   SBA for cues/line management  Standard toilet: Not Tested  Bed to BSC:  SBA for cues/line management  Grooming: SBA (assist with lines) to wash face    Dressing:      UE:   Not Tested  LE:    Independent manage pants/undergarment for toileting    Bathing:    UE:  Not Tested  LE:  Not Tested    Eating:   Independent beverage management    Toileting:  Supervision urinating on BSC and managing pericare; cues for pacing/energy conservation techniques    Activity Tolerance   Pt completed therapy session with SOB noted with activity  SpO2: after pivot EOB to chair 85% on 35 LPM, 85% O2  Improved to 90% with rest/PLB, back down to 82% with coughing bout, improved to 90% with rest/PLB x 7 min. HR  70s-90s    After stance hygiene after toileting:  SpO2 78-82% on 35 LPM, 85% O2  HR 86  Improved to 89% with seated rest/PLB x 3 min, up to 91% after 4 min  Pt educated  on energy conservation, purse lip breathing, supported sitting vs unsupported sitting, frequent rest breaks/pacing    Positioning Needs: In bed, call light and needs in reach. Exercise / Activities Initiated:  NA this session    Patient/Family Education:   Role of OT  Recommendations for DC  Energy conservation techniques    Assessment of Deficits: Pt seen for Occupational therapy evaluation in acute care setting. Pt demonstrated decreased Activity tolerance, IADLs, Strength and Coping Skills.  Pt functioning below baseline and will likely benefit from skilled occupational therapy services to maximize safety

## 2022-01-18 NOTE — PROGRESS NOTES
ABG drawn from right radial x 1  attempt(s). Sight prepped per policy and procedure. Modified Tyler's test positive. Pressure held to sight after procedure for five minutes. No hematoma present. Pulse present after procedure.  90% vapotherm

## 2022-01-18 NOTE — PROGRESS NOTES
NIH Stroke Scale  Interval: Reassessment  Level of Consciousness (1a. ): Alert  LOC Questions (1b. ): Answers both correctly  LOC Commands (1c. ): Performs both tasks correctly  Best Gaze (2. ): Normal  Visual (3. ): No visual loss  Facial Palsy (4. ): Normal symmetrical movement  Motor Arm, Left (5a. ): No drift  Motor Arm, Right (5b. ): No drift  Motor Leg, Left (6a. ): No drift  Motor Leg, Right (6b. ): No drift  Limb Ataxia (7. ): Absent  Sensory (8. ): Normal  Best Language (9. ): No aphasia  Dysarthria (10. ): Normal  Extinction and Inattention (11): No abnormality  Total: 0   Stroke scale now 0  Reportedly was a score of 2 ~ 25 min ago for expressive aphasia. Will go ahead and get CT head and CTA head neck but would not be a TPA candidate 2/2 Sx resolving rapidly. Will go ahead and place on ASA. q4 h neuro checks for ON  Dr. Alla Siddiqi from [address] [place] [address] ASA, Plavix loading w/ 600 mg and Plavix daily for now. Will likely need OP referral to neuro as long as no further Sx.      CTA HEAD NECK W CONTRAST [[id number]]    Collected: 01/18/22 0301    Updated: 01/18/22 0310    Narrative:     EXAMINATION:   CTA OF THE HEAD AND NECK WITH CONTRAST 1/18/2022 2:56 am:     TECHNIQUE:   CTA of the head and neck was performed with the administration of intravenous   contrast. Multiplanar reformatted images are provided for review.  MIP images   are provided for review. Stenosis of the internal carotid arteries measured   using NASCET criteria. Dose modulation, iterative reconstruction, and/or   weight based adjustment of the mA/kV was utilized to reduce the radiation   dose to as low as reasonably achievable. COMPARISON:   None.      HISTORY:   ORDERING SYSTEM PROVIDED HISTORY: [facility] 2   TECHNOLOGIST PROVIDED HISTORY:   Reason for exam:->NIH 2   Reason for Exam: poss stroke     FINDINGS:     CTA NECK:     AORTIC ARCH/ARCH VESSELS: No dissection or arterial injury.  No significant   stenosis of the brachiocephalic or subclavian arteries. CAROTID ARTERIES: No dissection, arterial injury, or hemodynamically   significant stenosis by NASCET criteria. VERTEBRAL ARTERIES: No dissection, arterial injury, or significant stenosis. SOFT TISSUES: The lung apices are noted for bilateral infiltrates compatible   with COVID pneumonia with associated reactive mediastinal adenopathy.  No   cervical lymphadenopathy.  The larynx and pharynx are unremarkable.  No acute   abnormality of the salivary and thyroid glands. BONES: No acute osseous abnormality. CTA HEAD:     ANTERIOR CIRCULATION: No significant stenosis of the intracranial internal   carotid, anterior cerebral, or middle cerebral arteries. No aneurysm. POSTERIOR CIRCULATION: No significant stenosis of the vertebral, basilar, or   posterior cerebral arteries. No aneurysm. OTHER: No dural venous sinus thrombosis on this non-dedicated study. BRAIN: Please refer to the report for the dedicated noncontrast head CT. Impression:     Unremarkable CTA of the head and neck. Findings consistent with COVID pneumonia. This scan was analyzed using [employer]. ai contact LVO. Identification of suspected   findings is not for diagnostic use beyond notification. Viz LVO is limited to   analysis of imaging data and should not be used in-lieu of full patient   evaluation or relied upon to make or confirm diagnosis. CT HEAD WO CONTRAST [[id number]]    Collected: 01/18/22 0258    Updated: 01/18/22 0303    Narrative:     EXAMINATION:   CT OF THE HEAD WITHOUT CONTRAST  1/18/2022 2:04 am     TECHNIQUE:   CT of the head was performed without the administration of intravenous   contrast. Dose modulation, iterative reconstruction, and/or weight based   adjustment of the mA/kV was utilized to reduce the radiation dose to as low   as reasonably achievable.      COMPARISON:   08/29/2010     HISTORY:   ORDERING SYSTEM PROVIDED HISTORY: [facility] 2   TECHNOLOGIST PROVIDED HISTORY:   Has a \"code stroke\" or \"stroke alert\" been called? ->Yes   Reason for exam:->NIH 2   Reason for Exam: poss stroke     FINDINGS:   BRAIN/VENTRICLES: There is no acute intracranial hemorrhage, mass effect or   midline shift.  No abnormal extra-axial fluid collection.  The gray-white   differentiation is maintained without evidence of an acute infarct.  There is   no evidence of hydrocephalus. Mild diffuse cerebral atrophy and chronic white   matter ischemic change. ORBITS: The visualized portion of the orbits demonstrate no acute abnormality. SINUSES: The visualized paranasal sinuses and mastoid air cells demonstrate   no acute abnormality. SOFT TISSUES/SKULL:  No acute abnormality of the visualized skull or soft   tissues. Impression:     No acute intracranial abnormality. Total critical care time caring for this patient with life threatening, unstable organ failure, including direct patient contact, management of life support systems, review of data including imaging and labs, discussions with other team members and physicians is 33 minutes so far today, excluding procedures.

## 2022-01-18 NOTE — PROGRESS NOTES
IM Progress Note    Admit Date:  1/12/2022  6    Interval history:      covid 19 infection, hypoxia  Vaccinated with J & J in 3/21, did not get booster  Recently on steroids, z pack  Patient remains severely hypoxic requiring high flow oxygen per Vapotherm. 1/18. code stroke called earlier this morning for expressive aphasia. Stroke ruled out. On aspirin and Plavix per neurology recommendations    Subjective:  Patient remains on high flow oxygen per Vapotherm , O2 35 L FiO2 90%. -She is very weak and fatigued.   -Has persistent cough. Coughing up some sputum.   -Patient states that she has episodes of \"fogging\" , and has difficulty finding words at times. Objective:   /69   Pulse 79   Temp 98.6 °F (37 °C) (Oral)   Resp 20   Ht 5' 3\" (1.6 m)   Wt 178 lb 12.8 oz (81.1 kg)   SpO2 95%   BMI 31.67 kg/m²       Intake/Output Summary (Last 24 hours) at 1/18/2022 1619  Last data filed at 1/18/2022 1107  Gross per 24 hour   Intake 180 ml   Output 950 ml   Net -770 ml       Physical Exam:  Patient seen in droplet plus precautions  General:  Elderly female . She appears ill and very weak and fatigued  Awake, alert and oriented. Mild distress  Mucous Membranes:  Pink , anicteric  Neck: No JVD, no carotid bruit, no thyromegaly  Chest: diminished in bases, bilateral mild crackles present . Cardiovascular:  RRR S1S2 heard, no murmurs or gallops  Abdomen:  Soft, undistended, non tender, no organomegaly, BS present  Extremities: No edema or cyanosis. Distal pulses well felt  Neurological : grossly normal with fatigue and gen weakness.   Nonfocal        Medications:   Scheduled Medications:    aspirin  81 mg Oral Daily    [START ON 1/19/2022] clopidogrel  75 mg Oral Daily    ascorbic acid  1,000 mg Oral Daily    fluticasone  1 spray Each Nostril Daily    baricitinib  4 mg Oral Daily    atorvastatin  10 mg Oral Nightly    sodium chloride flush  5-40 mL IntraVENous 2 times per day    dexamethasone  6 mg IntraVENous Daily    Vitamin D  2,000 Units Oral Daily    enoxaparin  40 mg SubCUTAneous BID     I   sodium chloride       meloxicam, albuterol sulfate HFA, guaiFENesin-dextromethorphan, benzonatate, sodium chloride flush, sodium chloride, ondansetron **OR** ondansetron, polyethylene glycol, acetaminophen **OR** acetaminophen    Lab Data:  Recent Labs     01/18/22  0128   WBC 10.7   HGB 12.3   HCT 36.5   MCV 88.5        Recent Labs     01/18/22 0128      K 3.5      CO2 24   BUN 25*   CREATININE <0.5*     Recent Labs     01/18/22 0128   TROPONINI <0.01       Coagulation:   Lab Results   Component Value Date    INR 1.08 01/18/2022    APTT 26.7 09/22/2012     Cardiac markers:   Lab Results   Component Value Date    TROPONINI <0.01 01/18/2022         Lab Results   Component Value Date    ALT 18 01/18/2022    AST 21 01/18/2022    ALKPHOS 56 01/18/2022    BILITOT 0.6 01/18/2022       Lab Results   Component Value Date    INR 1.08 01/18/2022    INR 0.91 09/22/2012    INR 0.93 08/29/2010    PROTIME 12.3 01/18/2022    PROTIME 10.4 09/22/2012    PROTIME 10.4 08/29/2010         Cultures:       SARS-CoV-2, NAAT DETECTED             EKG:   NSR     RADIOLOGY  CTA HEAD NECK W CONTRAST   Final Result   Unremarkable CTA of the head and neck. Findings consistent with COVID pneumonia. This scan was analyzed using V[employer]. ai contact LVO. Identification of suspected   findings is not for diagnostic use beyond notification. Viz LVO is limited to   analysis of imaging data and should not be used in-lieu of full patient   evaluation or relied upon to make or confirm diagnosis. CT HEAD WO CONTRAST   Final Result   No acute intracranial abnormality.          XR CHEST PORTABLE   Final Result   Multifocal bilateral atypical pneumonia.                     ASSESSMENT/PLAN:    COVID PNA  Acute hypoxic respiratory Failure   - CXR: noted with multifocal PNA  - no home O2 at baseline, 83-87% at rest,   - J &J in March 2021- no booster   - Admitted to 2W, droplet +, tele, cont pulse ox  - supp O2, wean as tolerated - worsening slowly -was on 10 L - - progressively worsening hypoxia, now on vapotherm at 35L, 90 % fio2. Patient currently moved to PCU. She will be an ICU bed held in PCU.   persistent severe hypoxemia   -Continue Decadron D# 7   - Remdesivir not started due to out of window- sx 7+ days prior to admit  - PRN Robitussin  - procal 0.10 wnl   - crp elevated . Started on Baricitinib D#6  - Lovenox Bid    Episodes of expressive aphasia   - ? TIA, ? Brain fog related to COVID infection   - cont ASA, Plavix     Thrombocytopenia  - likely with covid  - platelets 93 >826 improved  - continue Lovenox and monitor     HLD  - Continue statin       MADHURI  - not on any home medications  - stable     Arthritis   - continue mobic      Hx of Asthma  - not on inhalers at home    Weakness,  Debility   - consult PT,OT        DVT Prophylaxis: Lovenox bid  Diet: ADULT DIET;  Regular  Code Status: Full Code           Edward Coates MD, 1/18/2022 4:19 PM

## 2022-01-18 NOTE — PROGRESS NOTES
Inpatient Physical Therapy Re-Evaluation and Treatment    Unit: PCU  Date:  1/18/2022  Patient Name:    Bo Pallas  Admitting diagnosis:  Acute respiratory failure with hypoxia ([facility] [address] [J96.01]  Pneumonia due to COVID-19 virus [U07.1, J12.82]  COVID-19 [U07.1]  Admit Date:  1/12/2022  Precautions/Restrictions/WB Status/ Lines/ Wounds/ Oxygen: Fall risk, Bed/chair alarm, Lines -IV and Supplemental O2 (On Vapotherm, 85% FiO2, 35L/min), Telemetry, Continuous pulse oximetry and Isolation Precautions: Droplet Plus - COVID    Treatment Time: 1339 - 1423  Treatment Number:  1   Timed Code Treatment Minutes: 34 minutes  Total Treatment Minutes: 44 minutes    Patient Goals for Therapy: \" Go home \"          Discharge Recommendations: Home PRN assist   DME needs for discharge: Needs Met       Therapy recommendation for EMS Transport: can transport by wheelchair    Therapy recommendations for staff: Independent with use of No AD for all transfers and ambulation to/from [facility]  to/from [facility]    History of Present Illness: H & P as per ANGEL Yi CNP's note dated 1/12/2022  The patient is a 80 y.o. female with PMH hypercholesterolemia, generalized anxiety disorder, impaired glucose tolerance, and vitamin D deficiency who presents to [facility] with ncreased shortness of brat   History obtained from the patient and review of EMR. Pt stated that symptoms started familia 1/5. Started with runny nose. She then started with shortness of breath and cough. +fever as high as 102.1 and chills at home. She stated that she has been spking fever at night, she stated she wakes up soaking wet at night only. Daughter at bedside stated she has been wheezing some at home. She went to urgent care on Friday where she was diagnosed with COVID they started her on Z-Sal, prednisone, and gave her Tessalon Perles. She came back to the ED today because she was not feeling any better had increased shortness of breath and cough.   Patient also said she has had a decrease in appetite and p.o. intake over the last couple days. She does have some nausea. No vomiting or diarrhea. Denies hemoptysis. Upon my assessment today patient sitting in chair O2 saturations dropped to 83 to 87% on room air. She was placed on 2 L of oxygen. Sats now mid 90s. Patient and daughter both had multiple questions regarding plan of care. All questions answered. Patient stated that she has arthritis and without her Mobic she is unable to move. We will reorder Mobic. Patient also stated since she got her [address] vaccine in March her left arm has been very painful and tender to touch at the injection site. She stated this is the reason why she has not gotten booster. Patient will be admitted for further care. Home Health S4 Level Recommendation:  NA  AM-PAC Mobility Score    AM-PAC Inpatient Mobility Raw Score : 24       Preadmission Environment    Pt. Dierdre Severance, pts daughters can stay with her if needed  Home environment:    one story home  Steps to enter first floor:   3 steps to enter and hand rail bilateral       Steps to second floor: N/A  Bathroom:       Walk-in Shower, Grab bars, Shower Chair  and BSC (3 in 1 commode)  Equipment owned:      SPC, Rolling Walker (RW), Rollator , manual W/C, lift chair, pulse ox and reacher  Adjustable bed     Preadmission Status / PLOF:  History of falls             Yes  Pt. Able to drive          Yes  Pt Fully independent with ADL's         Yes  Pt. Required assistance from family for:  Independent PTA    Pt. Fully independent for transfers and gait and walked with: No Device    Pain   Yes  Location: N/A  Rating: NA /10  Pain Medicine Status: Denies need    Cognition    A&O x4   Able to follow 2 step commands    Subjective  Patient lying supine in bed with no family present. Pt agreeable to this PT eval & tx. Upper Extremity ROM/Strength  Please see OT evaluation.       Lower Extremity ROM / Strength AROM WFL: Yes  ROM limitations: N/A    Strength Assessment (measured on a 0-5 scale):  R LE   Quad   5   Ant Tib  5   Hamstring 5   Iliopsoas 5  L LE  Quad   5   Ant Tib  5   Hamstring 5   Iliopsoas 5    Lower Extremity Sensation    WFL    Lower Extremity Proprioception:   WFL    Coordination and Tone  WFL    Balance  Sitting:  Normal; Independent  Comments:     Standing: Good ; Modified Independent  Comments: 3 min     Bed Mobility   Supine to Sit:    Independent  Sit to Supine:   Independent  Rolling:   Independent  Scooting in sitting: Independent  Scooting in supine:  Independent    Transfer Training     Sit to stand:   Modified Independent  Stand to sit:   Modified Independent  Bed to Chair:   Modified Independent with use of No AD    Gait gait completed as indicated below  Distance:      5 ft + 5 ft  Deviations (firm surface/linoleum):  decreased carson and increased MONA  Assistive Device Used:    No AD  Level of Assist:    Modified Independent  Comment: Patient was limited in walking distance due to short vapotherm cord    Stair Training deferred, pt unsafe/ not appropriate to complete stairs at this time    Activity Tolerance   Pt completed therapy session with SOB noted with all functional mobility   SpO2: after pivot EOB to chair 85% on 35 LPM, 85% O2  Improved to 90% with rest/PLB, back down to 82% with coughing bout, improved to 90% with rest/PLB x 7 min. HR  70s-90s     After stance hygiene after toileting:  SpO2 78-82% on 35 LPM, 85% O2  HR 86  Improved to 89% with seated rest/PLB x 3 min, up to 91% after 4 min  Pt educated  on energy conservation, purse lip breathing, supported sitting vs unsupported sitting, frequent rest breaks/pacing    Positioning Needs   Pt in bed, no alarm needed, positioned in proper neutral alignment and pressure relief provided.    Call light provided and all needs within reach    Exercises Initiated  all completed bilaterally unless indicated  Pursed lip breathing

## 2022-01-18 NOTE — PROGRESS NOTES
Pulmonary Progress Note  CC: COVID in partially vaccinated patient     Subjective: Last night. Evaluation was negative for stroke. Symptoms resolved. Pt stated the \"fog\" was similar to what was occuring earlier in her course of infection. Vapotherm 90%/35lpm    IV line peripheral    EXAM:   /65   Pulse 74   Temp 98.5 °F (36.9 °C) (Oral)   Resp 22   Ht 5' 3\" (1.6 m)   Wt 178 lb 12.8 oz (81.1 kg)   SpO2 91%   BMI 31.67 kg/m²  on vapotherm  General: ill appearing. Eyes: PERRL. No sclera icterus. No conjunctival injection. ENT: No discharge. Pharynx clear. Neck: Trachea midline. Normal thyroid. Resp: +  accessory muscle use. + crackles. No wheezing. No rhonchi. CV: Regular rate. Regular rhythm. No mumur or rub. No edema. GI: Non-tender. Non-distended. No masses. No organomegaly. Normal bowel sounds. No hernia. Neuro: AOX3  Psych: No agitation, + anxiety, affect is full.      Scheduled Meds:   aspirin  81 mg Oral Daily    [START ON 1/19/2022] clopidogrel  75 mg Oral Daily    fluticasone  1 spray Each Nostril Daily    baricitinib  4 mg Oral Daily    atorvastatin  10 mg Oral Nightly    sodium chloride flush  5-40 mL IntraVENous 2 times per day    dexamethasone  6 mg IntraVENous Daily    Vitamin D  2,000 Units Oral Daily    enoxaparin  40 mg SubCUTAneous BID     Continuous Infusions:   sodium chloride       PRN Meds:  meloxicam, albuterol sulfate HFA, guaiFENesin-dextromethorphan, benzonatate, sodium chloride flush, sodium chloride, ondansetron **OR** ondansetron, polyethylene glycol, acetaminophen **OR** acetaminophen    Labs:  CBC:   Recent Labs     01/18/22  0128   WBC 10.7   HGB 12.3   HCT 36.5   MCV 88.5        BMP:   Recent Labs     01/18/22  0128      K 3.5      CO2 24   BUN 25*   CREATININE <0.5*     Micro:  1/17/2022 SARS-CoV-2 positive at urgent care  1/12/2022 SARS-CoV-2 detected    Procalcitonin 0.10  CRP 53 --> 81    Imaging:   CXR 1/12/2022 Multifocal bilateral atypical pneumonia. ASSESSMENT:  · COVID-19 pneumonia in a partially vaccinated patient, s/p J&J vaccination 3/2021- progressive, severe  · Acute hypoxemic respiratory failure  · Thrombocytopenia -resolved  · H/O asthma, no home inhalers  · Former smoker    PLAN:  Droplet Plus Airborne Precautions    Continue Vapotherm for life-threatening hypoxemia   No Remdesevir given late presentation   Decadron D#7, 6 mg daily    Baricitinib D#6, monitor liver, kidney, leukocytes    PRN albuterol MDI   Prophylaxis: Lovenox 40 BID, monitor platelets   Due to at least single organ failure and risk of rapid deterioration, I spent 31 minutes of Critical care time reviewing labs/films, examining patient, collaborating with other physicians. This does not include time performing critical procedures.

## 2022-01-18 NOTE — PROGRESS NOTES
Pt in bed, awake, A/O X4. Shift assessment complete, night meds given. No C/o pain at this time. PRN robitussin given. . No other needs expressed. Call light in reach. Will monitor.  Von Reese RN

## 2022-01-18 NOTE — PROGRESS NOTES
Pt in bed, eyes closed. No distress noted. Call light in reach. Will continue to monitor.   Pete Monk RN

## 2022-01-18 NOTE — PLAN OF CARE
STROKE TEAM PLAN OF CARE    Name:  Annia Ag (76 y.o., female)  : 1950  MRN:  [id number]  Today's Date: 2022    Stroke team contacted at: 01:08  History obtained from: patient's bedside nurse    Annia Ag is a 71 y.o. female who presented with difficulty speaking. Briefly, pt has been admitted for COVID-19 lower respiratory tract infection. She was LKW at 20:30. At midnight, she was found to have difficulty speaking and getting words out. No other deficits or weakness noted, for an NIHSS of 2. Later, symptoms improved with an NIHSS of 0 reported. NCCT (my read): no acute  CTA (my read): no LVO or flow-limiting stenosis, mild ICAD in places    Acute Ischemic Stroke Clinical Decision Making:    (1) Intravenous tPA:    The patient is not a candidate for iv TPA based on:  Time greater than 4.5h from symptom onset  Symptoms nondisabling    Additional Information:   Given symptoms, consideration for TIA or minor stroke. Given her risk profile, would consider DAPT load with  and clopidogrel 600mg, but would weigh this against any potential elevated bleeding risk. (2) Angiography / Thrombectomy:  The patient does not have evidence of a proximal large vessel occlusion on CTA, and therefore is not an EVT candidate. For any additional questions regarding acute stroke care, please feel free to contact the  Stroke Team at [phone].      MD Tracie   Stroke Team   2022 3:22 AM

## 2022-01-18 NOTE — PROGRESS NOTES
Spoke with Dr Chalino Gtz from [facility].  MD states CT of head without contrast followed by CT angio of head and neck with contrast. Chica Reynolds RN

## 2022-01-19 PROBLEM — E44.0 MODERATE PROTEIN-CALORIE MALNUTRITION (HCC): Status: ACTIVE | Noted: 2022-01-01

## 2022-01-19 NOTE — PROGRESS NOTES
Pulmonary Progress Note  CC: COVID in partially vaccinated patient     Subjective: No fever. Hypoxia with ADLs    Vapotherm 90%/35lpm    IV line peripheral    EXAM:   BP (!) 103/57   Pulse 88   Temp 98.7 °F (37.1 °C) (Oral)   Resp 22   Ht 5' 3\" (1.6 m)   Wt 169 lb (76.7 kg)   SpO2 (!) 85%   BMI 29.94 kg/m²  on vapotherm  General: ill appearing. Eyes: PERRL. No sclera icterus. No conjunctival injection. ENT: No discharge. Pharynx clear. Neck: Trachea midline. Normal thyroid. Resp: +  accessory muscle use. + crackles. No wheezing. No rhonchi. CV: Regular rate. Regular rhythm. No mumur or rub. No edema. GI: Non-tender. Non-distended. No masses. No organomegaly. Normal bowel sounds. No hernia. Neuro: AOX3  Psych: No agitation, + anxiety, affect is full. Scheduled Meds:   aspirin  81 mg Oral Daily    clopidogrel  75 mg Oral Daily    ascorbic acid  1,000 mg Oral Daily    fluticasone  1 spray Each Nostril Daily    baricitinib  4 mg Oral Daily    atorvastatin  10 mg Oral Nightly    sodium chloride flush  5-40 mL IntraVENous 2 times per day    dexamethasone  6 mg IntraVENous Daily    Vitamin D  2,000 Units Oral Daily    enoxaparin  40 mg SubCUTAneous BID     Continuous Infusions:   sodium chloride       PRN Meds:  meloxicam, albuterol sulfate HFA, guaiFENesin-dextromethorphan, benzonatate, sodium chloride flush, sodium chloride, ondansetron **OR** ondansetron, polyethylene glycol, acetaminophen **OR** acetaminophen    Labs:  CBC:   Recent Labs     01/18/22  0128   WBC 10.7   HGB 12.3   HCT 36.5   MCV 88.5        BMP:   Recent Labs     01/18/22  0128      K 3.5      CO2 24   BUN 25*   CREATININE <0.5*     Micro:  1/17/2022 SARS-CoV-2 positive at urgent care  1/12/2022 SARS-CoV-2 detected    Procalcitonin 0.10, 0.26  CRP 53 --> 81    Imaging:   CXR 1/12/2022 Multifocal bilateral atypical pneumonia.      ASSESSMENT:  · COVID-19 pneumonia in a partially vaccinated patient, s/p J&J vaccination 3/2021- progressive, severe  · Acute hypoxemic respiratory failure  · Thrombocytopenia -resolved  · H/O asthma, no home inhalers  · Former smoker    PLAN:  Droplet Plus Airborne Precautions    Continue Vapotherm for life-threatening hypoxemia   No Remdesevir given late presentation   Decadron D#8, 6 mg daily    Baricitinib D#7, monitor liver, kidney, leukocytes    PRN albuterol MDI   Prophylaxis: Lovenox 40 BID, monitor platelets   Due to at least single organ failure and risk of rapid deterioration, I spent 31 minutes of Critical care time reviewing labs/films, examining patient, collaborating with other physicians. This does not include time performing critical procedures.

## 2022-01-19 NOTE — PROGRESS NOTES
Pt up to [facility], bathed and hair wash. Tolerated well. Back to bed. Call light in reach.  Will monitor Oksana Trejo RN

## 2022-01-19 NOTE — PROGRESS NOTES
Pt in bed, eyes closed. No distress noted. Call light in reach. Will continue to monitor.   Tahir Gonzales RN

## 2022-01-19 NOTE — PROGRESS NOTES
Please call scheduling.  This CT scan does need to be done stat or soon as possible.  His headache picture i and clinical picture does not suggest COVID-19.   Shift assessment complete. See flow sheet. Scheduled meds given. See MAR. Patient remains on Heated High flow nasal cannula. Desaturates into 70's with minimal exertion but recovers. Patients head-toe complete, VS are logged, and active bowel sound noted in all four quadrants. Requested PRN glycolax. No further needs noted at this time. Call light and bedside table are within reach. The bed is locked and is in the lowest position.

## 2022-01-19 NOTE — CARE COORDINATION
INTERDISCIPLINARY PLAN OF CARE CONFERENCE    Date/Time: 1/19/2022 5:23 PM  Completed by: Solo Wilson RN, Case Management      Patient Name:  Brett Miller  YOB: 1950  Admitting Diagnosis: Acute respiratory failure with hypoxia ([facility] [address] [J96.01]  Pneumonia due to COVID-19 virus [U07.1, J12.82]  COVID-19 [U07.1]     Admit Date/Time:  1/12/2022  9:40 AM    Chart reviewed. Interdisciplinary team contacted or reviewed plan related to patient progress and discharge plans. Disciplines included Case Management, Nursing, and Dietitian. Current Status:ongoing  PT/OT recommendation for discharge plan of care: will need eval when appropriate    Expected D/C Disposition:  Home/TBD  Confirmed plan with patient and/or family Yes confirmed with: (name) pt  Met with:pt  Discharge Plan Comments: Reviewed chart. Role of discharge planner explained and patient verbalized understanding. Pt is from home alone and independent. Pt plans to return. Pt is currently on Vapotherm  Pt is covid pos as of 1/12/2022. PT/OT when appropriate. Follow for possible home O2 need,   Follow for possible PT/OT need. Not on home O2.      Home O2 in place on admit: No  Pt informed of need to bring portable home O2 tank on day of discharge for nursing to connect prior to leaving:  No  Verbalized agreement/Understanding:  No

## 2022-01-19 NOTE — PROGRESS NOTES
Pt in bed, awake, A/O X4. Shift assessment complete, night meds given. No C/o pain at this time. . No other needs expressed. Call light in reach. Will monitor.  Adina Yates RN

## 2022-01-19 NOTE — PROGRESS NOTES
IM Progress Note    Admit Date:  1/12/2022  7    Interval history:      covid 19 infection, hypoxia  Vaccinated with J & J in 3/21, did not get booster  Recently on steroids, z pack  Patient remains severely hypoxic requiring high flow oxygen per Vapotherm. 1/18. code stroke called earlier this morning for expressive aphasia. Stroke ruled out. On aspirin and Plavix per neurology recommendations    Subjective:    1/18, 1/19  Patient remains on high flow oxygen per Vapotherm , O2 35 L FiO2 90%. -She is very weak and fatigued.   -Has persistent cough. Coughing up some sputum.   -Patient states that she has episodes of \"fogging\" , and has difficulty finding words at times. Objective:   /72   Pulse 85   Temp 98.4 °F (36.9 °C) (Oral)   Resp 22   Ht 5' 3\" (1.6 m)   Wt 169 lb (76.7 kg)   SpO2 90%   BMI 29.94 kg/m²       Intake/Output Summary (Last 24 hours) at 1/19/2022 1320  Last data filed at 1/19/2022 1467  Gross per 24 hour   Intake 560 ml   Output 900 ml   Net -340 ml       Physical Exam:  Patient seen in droplet plus precautions  General:  Elderly female . She appears ill and very weak and fatigued  Awake, alert and oriented. Mild distress  Mucous Membranes:  Pink , anicteric  Neck: No JVD, no carotid bruit, no thyromegaly  Chest: diminished in bases, bilateral mild crackles present . Cardiovascular:  RRR S1S2 heard, no murmurs or gallops  Abdomen:  Soft, undistended, non tender, no organomegaly, BS present  Extremities: No edema or cyanosis. Distal pulses well felt  Neurological : grossly normal with fatigue and gen weakness.   Nonfocal        Medications:   Scheduled Medications:    aspirin  81 mg Oral Daily    clopidogrel  75 mg Oral Daily    ascorbic acid  1,000 mg Oral Daily    fluticasone  1 spray Each Nostril Daily    baricitinib  4 mg Oral Daily    atorvastatin  10 mg Oral Nightly    sodium chloride flush  5-40 mL IntraVENous 2 times per day    dexamethasone  6 mg IntraVENous Daily    Vitamin D  2,000 Units Oral Daily    enoxaparin  40 mg SubCUTAneous BID     I   sodium chloride       meloxicam, albuterol sulfate HFA, guaiFENesin-dextromethorphan, benzonatate, sodium chloride flush, sodium chloride, ondansetron **OR** ondansetron, polyethylene glycol, acetaminophen **OR** acetaminophen    Lab Data:  Recent Labs     01/18/22  0128   WBC 10.7   HGB 12.3   HCT 36.5   MCV 88.5        Recent Labs     01/18/22 0128      K 3.5      CO2 24   BUN 25*   CREATININE <0.5*     Recent Labs     01/18/22 0128   TROPONINI <0.01       Coagulation:   Lab Results   Component Value Date    INR 1.08 01/18/2022    APTT 26.7 09/22/2012     Cardiac markers:   Lab Results   Component Value Date    TROPONINI <0.01 01/18/2022         Lab Results   Component Value Date    ALT 18 01/18/2022    AST 21 01/18/2022    ALKPHOS 56 01/18/2022    BILITOT 0.6 01/18/2022       Lab Results   Component Value Date    INR 1.08 01/18/2022    INR 0.91 09/22/2012    INR 0.93 08/29/2010    PROTIME 12.3 01/18/2022    PROTIME 10.4 09/22/2012    PROTIME 10.4 08/29/2010         Cultures:       SARS-CoV-2, NAAT DETECTED             EKG:   NSR     RADIOLOGY  CTA HEAD NECK W CONTRAST   Final Result   Unremarkable CTA of the head and neck. Findings consistent with COVID pneumonia. This scan was analyzed using Viz. ai contact LVO. Identification of suspected   findings is not for diagnostic use beyond notification. Viz LVO is limited to   analysis of imaging data and should not be used in-lieu of full patient   evaluation or relied upon to make or confirm diagnosis. CT HEAD WO CONTRAST   Final Result   No acute intracranial abnormality.          XR CHEST PORTABLE   Final Result   Multifocal bilateral atypical pneumonia.                     ASSESSMENT/PLAN:    COVID PNA  Acute hypoxic respiratory Failure   - CXR: noted with multifocal PNA  - no home O2 at baseline, 83-87% at rest,   - J &J in March 2021- no booster   - Admitted to 2W, droplet +, tele, cont pulse ox  - supp O2, wean as tolerated - worsening slowly -was on 10 L - - progressively worsening hypoxia, now on vapotherm at 35L, 90 % fio2. Patient currently moved to PCU. She will be an ICU bed held in PCU.   persistent severe hypoxemia   -Continue Decadron D# 8  - Remdesivir not started due to out of window- sx 7+ days prior to admit  - PRN Robitussin  - procal 0.10 wnl   - crp elevated . Started on Baricitinib D#7  - Lovenox Bid    Episodes of expressive aphasia   - ? TIA, ? Brain fog related to COVID infection   - cont ASA, Plavix     Thrombocytopenia  - likely with covid  - platelets 93 >039 improved  - continue Lovenox and monitor     HLD  - Continue statin       MADHURI  - not on any home medications  - stable     Arthritis   - continue mobic      Hx of Asthma  - not on inhalers at home    Weakness,  Debility   - consult PT,OT        DVT Prophylaxis: Lovenox bid  Diet: ADULT DIET;  Regular  Code Status: Full Code           Kyle Lyon MD, 1/19/2022 1:20 PM

## 2022-01-20 NOTE — PROGRESS NOTES
Patient resting in bed, AM assessment completed. Patient on Vapotherm 40L, 100% FiO2. When patient removed oxygen, sat down to 77% on RA. Patient down to 84% with movement and eating. Lungs diminished. BS+. INT x 2 intact. No other acute distress noted. Bedside monitor in use. Call light in reach. Will continue to monitor.

## 2022-01-20 NOTE — PROGRESS NOTES
Comprehensive Nutrition Assessment    Type and Reason for Visit:  Initial,RD Nutrition Re-Screen/LOS    Nutrition Recommendations/Plan:   1. continue current diet of Regular   2. Added Ensure High protein with each meals     Nutrition Assessment:  Pt. moderately malnourished AEB she has sustained 7.6% weight los since she was admitted 7 days r/t intakes < 75%. At risk for further nutrition compromise r/t she is being treated for highly catabolic dieses, COVID and currently on vapo therm d/t worsening hypoxia . Will add to HP supps to meals . Malnutrition Assessment:  Malnutrition Status: Moderate malnutrition    Context:  Acute Illness     Findings of the 6 clinical characteristics of malnutrition:  Energy Intake:  1 - 75% or less of estimated energy requirements for 7 or more days  Weight Loss:  1 - 1% to 2% over 1 week     Body Fat Loss:  Unable to assess     Muscle Mass Loss:  Unable to assess    Fluid Accumulation:  Unable to assess     Strength:  Not Performed    Estimated Daily Nutrient Needs:  Energy (kcal):  5909-0165 based ~ 15-18 kcal/kg cbw; Weight Used for Energy Requirements:  Current     Protein (g):  63-73 based ~ 1.2-2.4 gr/kg cbw; Weight Used for Protein Requirements:  Current        Fluid (ml/day):  8014-2059; Method Used for Fluid Requirements:  1 ml/kcal      Nutrition Related Findings:  ill appearing ; reports feeling foggy and not able to find words; No fever; Hypoxia with ADLs; Vapotherm 90%/35lpm; IV line peripheral PRN glycolax and tessalon provided for constipation and cough; Wounds:  None       Current Nutrition Therapies:    ADULT DIET; Regular    Anthropometric Measures:  · Height: 5' 3\" (160 cm)  · Current Body Weight: 169 lb (76.7 kg)   · Admission Body Weight: 183 lb (83 kg)    · Usual Body Weight: Information not available  · Ideal Body Weight: 115 lbs; % Ideal Body Weight 147 %   · BMI: 29.9  · ABMI Categories: Overweight (BMI 25.0-29. 9)       Nutrition Diagnosis:   · Unintended weight loss related to catabolic illness,inadequate protein-energy intake as evidenced by intake 51-75%,poor intake prior to admission,weight loss greater than or equal to 2% in 1 week      Nutrition Interventions:   Food and/or Nutrient Delivery:  Continue Current Diet,Start Oral Nutrition Supplement  Nutrition Education/Counseling:  No recommendation at this time   Coordination of Nutrition Care:  Continue to monitor while inpatient    Goals:  pt will increase her intake of nutrtion by consuming > 50% of most meals and supps       Nutrition Monitoring and Evaluation:   Behavioral-Environmental Outcomes:  None Identified   Food/Nutrient Intake Outcomes:  Food and Nutrient Intake,Supplement Intake  Physical Signs/Symptoms Outcomes:  Biochemical Data,Constipation,Weight     Discharge Planning:     Too soon to determine     Electronically signed by Daly Ibrahim RD, LD on 1/19/22 at 7:17 PM EST    Contact: [phone]

## 2022-01-20 NOTE — PROGRESS NOTES
IM Progress Note    Admit Date:  1/12/2022  8    Interval history:      covid 19 infection, hypoxia  Vaccinated with J & J in 3/21, did not get booster  Recently on steroids, z pack  Patient remains severely hypoxic requiring high flow oxygen per Vapotherm. 1/18. code stroke called earlier this morning for expressive aphasia. Stroke ruled out. On aspirin and Plavix per neurology recommendations    Transferred to ICU 1/19    Subjective:      Patient remains on high flow oxygen per Vapotherm  On 100 % fio2  She thinks she can go home on oxygen   -She is very weak and fatigued. Objective:   /68   Pulse 95   Temp 98.8 °F (37.1 °C) (Axillary)   Resp 18   Ht 5' 3\" (1.6 m)   Wt 169 lb (76.7 kg)   SpO2 96%   BMI 29.94 kg/m²       Intake/Output Summary (Last 24 hours) at 1/20/2022 0759  Last data filed at 1/20/2022 1712  Gross per 24 hour   Intake 840 ml   Output 575 ml   Net 265 ml       Physical Exam:  Patient seen in droplet plus precautions  General:  Elderly female lying in bed ,   She appears ill and very weak and fatigued  Awake, alert and oriented. Mild distress  Mucous Membranes:  Pink , anicteric  Neck: No JVD, no carotid bruit, no thyromegaly  Chest: diminished in bases, bilateral mild crackles present . Cardiovascular:  RRR S1S2 heard, no murmurs or gallops  Abdomen:  Soft, undistended, non tender, no organomegaly, BS present  Extremities: No edema or cyanosis. Distal pulses well felt  Neurological : grossly normal with fatigue and gen weakness.   Nonfocal        Medications:   Scheduled Medications:    aspirin  81 mg Oral Daily    clopidogrel  75 mg Oral Daily    ascorbic acid  1,000 mg Oral Daily    fluticasone  1 spray Each Nostril Daily    baricitinib  4 mg Oral Daily    atorvastatin  10 mg Oral Nightly    sodium chloride flush  5-40 mL IntraVENous 2 times per day    dexamethasone  6 mg IntraVENous Daily    Vitamin D  2,000 Units Oral Daily    enoxaparin  40 mg SubCUTAneous BID     I   sodium chloride       meloxicam, albuterol sulfate HFA, guaiFENesin-dextromethorphan, benzonatate, sodium chloride flush, sodium chloride, ondansetron **OR** ondansetron, polyethylene glycol, acetaminophen **OR** acetaminophen    Lab Data:  Recent Labs     01/18/22  0128 01/20/22  0645   WBC 10.7 14.8*   HGB 12.3 12.9   HCT 36.5 38.3   MCV 88.5 90.4    384     Recent Labs     01/18/22  0128 01/20/22  0645    134*   K 3.5 4.5    98*   CO2 24 27   BUN 25* 19   CREATININE <0.5* <0.5*     Recent Labs     01/18/22 0128   TROPONINI <0.01       Coagulation:   Lab Results   Component Value Date    INR 1.08 01/18/2022    APTT 26.7 09/22/2012     Cardiac markers:   Lab Results   Component Value Date    TROPONINI <0.01 01/18/2022         Lab Results   Component Value Date    ALT 17 01/20/2022    AST 17 01/20/2022    ALKPHOS 76 01/20/2022    BILITOT 0.6 01/20/2022       Lab Results   Component Value Date    INR 1.08 01/18/2022    INR 0.91 09/22/2012    INR 0.93 08/29/2010    PROTIME 12.3 01/18/2022    PROTIME 10.4 09/22/2012    PROTIME 10.4 08/29/2010         Cultures:       SARS-CoV-2, NAAT DETECTED             EKG:   NSR     RADIOLOGY  CTA HEAD NECK W CONTRAST   Final Result   Unremarkable CTA of the head and neck. Findings consistent with COVID pneumonia. This scan was analyzed using Viz. ai contact LVO. Identification of suspected   findings is not for diagnostic use beyond notification. Viz LVO is limited to   analysis of imaging data and should not be used in-lieu of full patient   evaluation or relied upon to make or confirm diagnosis. CT HEAD WO CONTRAST   Final Result   No acute intracranial abnormality.          XR CHEST PORTABLE   Final Result   Multifocal bilateral atypical pneumonia.                     ASSESSMENT/PLAN:    COVID PNA  Acute hypoxic respiratory Failure   - CXR: noted with multifocal PNA  - no home O2 at baseline, 83-87% at rest,   - J &J in March 2021- no booster   - Admitted to 2W, droplet +, tele, cont pulse ox  - supp O2, wean as tolerated - worsening slowly -was on 10 L   -progressively worsening hypoxia, now on vapotherm at 40L, 100 % fio2. Patient currently moved to ICU       persistent severe hypoxemia   -Continue Decadron D# 8  - Remdesivir not started due to out of window- sx 7+ days prior to admit  - PRN Robitussin  - procal 0.10 wnl   - CRP elevated . Started on Baricitinib D#8  - Lovenox Bid    Episodes of expressive aphasia   - ? TIA, vs mental status changes related to covid   - cont ASA, Plavix     Thrombocytopenia  - likely with covid  - platelets 93 >533 improved  - continue Lovenox and monitor     HLD  - Continue statin       MADHURI  - not on any home medications  - stable     Arthritis   - was on  mobic      Hx of Asthma  - not on inhalers at home    Weakness,  Debility   - consult PT,OT        DVT Prophylaxis: Lovenox bid  Diet: ADULT DIET;  Regular  Code Status: Full Code           Gray Hooker MD, 1/20/2022 7:59 AM

## 2022-01-20 NOTE — PROGRESS NOTES
Physical Therapy    Attempted to see pt for PT tx. Spoke with RN who reported pt has been desaturating with very little movement and while eating. Pt recently got comfortable and is now sleeping. PT will f/u 1/21/22 as PT schedule and pt status permit. No Charge.     Sabino Fleming, PT, DPT, OMT-C # [id number]

## 2022-01-20 NOTE — PLAN OF CARE
Problem: Airway Clearance - Ineffective  Goal: Achieve or maintain patent airway  1/20/2022 0249 by Woodrow Nolasco RN  Outcome: Ongoing  1/19/2022 2002 by Jayme Guillen RN  Outcome: Ongoing     Problem: Gas Exchange - Impaired  Goal: Absence of hypoxia  1/20/2022 0249 by Woodrow Nolasco RN  Outcome: Ongoing  1/19/2022 2002 by Jayme Guillen RN  Outcome: Ongoing  Goal: Promote optimal lung function  1/20/2022 0249 by Woodrow Nolasco RN  Outcome: Ongoing  1/19/2022 2002 by Jayme Guillen RN  Outcome: Ongoing     Problem: Breathing Pattern - Ineffective  Goal: Ability to achieve and maintain a regular respiratory rate  1/20/2022 0249 by Woodrow Nolasco RN  Outcome: Ongoing  1/19/2022 2002 by Jayme Guillen RN  Outcome: Ongoing     Problem:  Body Temperature -  Risk of, Imbalanced  Goal: Ability to maintain a body temperature within defined limits  1/20/2022 0249 by Woodrow Nolasco RN  Outcome: Ongoing  1/19/2022 2002 by Jayme Guillen RN  Outcome: Ongoing  Goal: Will regain or maintain usual level of consciousness  1/20/2022 0249 by Woodrow Nolasco RN  Outcome: Ongoing  1/19/2022 2002 by Jayme Guillen RN  Outcome: Ongoing  Goal: Complications related to the disease process, condition or treatment will be avoided or minimized  1/20/2022 0249 by Woodrow Nolasco RN  Outcome: Ongoing  1/19/2022 2002 by Jayme Guillen RN  Outcome: Ongoing     Problem: Isolation Precautions - Risk of Spread of Infection  Goal: Prevent transmission of infection  1/20/2022 0249 by Woodrow Nolasco RN  Outcome: Ongoing  1/19/2022 2002 by Jayme Guillen RN  Outcome: Ongoing     Problem: Nutrition Deficits  Goal: Optimize nutritional status  1/20/2022 0249 by Woodrow Nolasco RN  Outcome: Ongoing  1/19/2022 2002 by Jayme Guillen RN  Outcome: Ongoing     Problem: Risk for Fluid Volume Deficit  Goal: Maintain normal heart rhythm  1/20/2022 0249 by Woodrow Nolasco RN  Outcome: Ongoing  1/19/2022 2002 by Jayme Guillen RN  Outcome: Ongoing  Goal: Maintain absence of muscle cramping  1/20/2022 0249 by Yvon Owens RN  Outcome: Ongoing  1/19/2022 2002 by Te Snow RN  Outcome: Ongoing  Goal: Maintain normal serum potassium, sodium, calcium, phosphorus, and pH  1/20/2022 0249 by Yvon Owens RN  Outcome: Ongoing  1/19/2022 2002 by Te Snow RN  Outcome: Ongoing     Problem: Loneliness or Risk for Loneliness  Goal: Demonstrate positive use of time alone when socialization is not possible  1/20/2022 0249 by Yvon Owens RN  Outcome: Ongoing  1/19/2022 2002 by Te Snow RN  Outcome: Ongoing     Problem: Fatigue  Goal: Verbalize increase energy and improved vitality  1/20/2022 0249 by Yvon Owens RN  Outcome: Ongoing  1/19/2022 2002 by Te Snow RN  Outcome: Ongoing     Problem: Patient Education: Go to Patient Education Activity  Goal: Patient/Family Education  1/20/2022 0249 by Yvon Owens RN  Outcome: Ongoing  1/19/2022 2002 by Te Snow RN  Outcome: Ongoing     Problem: Pain:  Goal: Pain level will decrease  Description: Pain level will decrease  1/20/2022 0249 by Yvon Owens RN  Outcome: Ongoing  1/19/2022 2002 by Te Snow RN  Outcome: Ongoing  Goal: Control of acute pain  Description: Control of acute pain  1/20/2022 0249 by Yvon Owens RN  Outcome: Ongoing  1/19/2022 2002 by Te Snow RN  Outcome: Ongoing  Goal: Control of chronic pain  Description: Control of chronic pain  1/20/2022 0249 by Yvon Owens RN  Outcome: Ongoing  1/19/2022 2002 by Te Snow RN  Outcome: Ongoing     Problem: Nutrition  Goal: Optimal nutrition therapy  1/20/2022 0249 by Yvon Owens RN  Outcome: Ongoing  1/19/2022 2002 by Te Snow RN  Outcome: Ongoing  1/19/2022 1922 by Althea Corrales RD, LD  Outcome: Ongoing

## 2022-01-20 NOTE — PLAN OF CARE
Nutrition Problem #1: Unintended weight loss  Intervention: Food and/or Nutrient Delivery: Continue Current Diet,Start Oral Nutrition Supplement  Nutritional Goals: pt will increase her intake of nutrition by consuming > 50% of most meals and supps

## 2022-01-20 NOTE — PROGRESS NOTES
Blood pressure (!) 120/53, pulse 76, temperature 98 °F (36.7 °C), temperature source Axillary, resp. rate 21, height 5' 3\" (1.6 m), weight 169 lb (76.7 kg), SpO2 90 %, not currently breastfeeding. Reassessment completed. Pt awake and anxious. Productive cough. Repositioned to other side. Pt still attempting to self prone. Vapotherm remains maxed at 40L 100%. No other physical assessment changes noted.  Electronically signed by Libia Buck RN on 1/20/2022 at 12:53 AM

## 2022-01-20 NOTE — PROGRESS NOTES
Shift handoff given to oncoming RN. All questions answered at this time.  Electronically signed by Donzell Fleischer, RN on 1/20/2022 at 7:19 AM

## 2022-01-20 NOTE — PROGRESS NOTES
Blood pressure (!) 143/61, pulse 88, temperature 98 °F (36.7 °C), temperature source Axillary, resp. rate 21, height 5' 3\" (1.6 m), weight 169 lb (76.7 kg), SpO2 90 %, not currently breastfeeding. Reassessment completed. Pt continues to be anxious and restless in the bed. Attempting to self prone. Maxed on vapotherm. No further changes noted in physical assessment.

## 2022-01-20 NOTE — PROGRESS NOTES
Patient's saturation down to 80%. New pulse ox placed on patient. NRB mask placed on per RT at this time. No other acute distress noted. Will continue to monitor.

## 2022-01-20 NOTE — PROGRESS NOTES
Inpatient Occupational Therapy Treatment    Unit: ICU (housed on PCU)   Date:  1/20/2022  Patient Name:    Rene Fletcher  Admitting diagnosis:  Acute respiratory failure with hypoxia ([facility] [address] [J96.01]  Pneumonia due to COVID-19 virus [U07.1, J12.82]  COVID-19 [U07.1]  Admit Date:  1/12/2022  Precautions/Restrictions/WB Status/ Lines/ Wounds/ Oxygen: fall risk, bed/chair alarm, supplemental O2 (35 LPM, 85% O2), telemetry, continuous pulse ox and Droplet Plus precautions (+ COVID 19)    Treatment Time:  4205-6686  Treatment Number: 2    Billable Treatment Time:  29 minutes   Total Treatment Time:    29  minutes    Patient Goals for Therapy:  \"Brush my teeth\"      Discharge Recommendations: Home with 24/7 assist vs LTAC unless needed for O2 demands  DME needs for discharge: Needs Met       Therapy recommendations for staff:   Standby Assist in room for short distance transfers    History of Present Illness: Per H&P 80 y.o. female with PMH hypercholesterolemia, generalized anxiety disorder, impaired glucose tolerance, and vitamin D deficiency who presents to [facility] with ncreased shortness of brat   History obtained from the patient and review of EMR. Pt stated that symptoms started familia 1/5. Started with runny nose. She then started with shortness of breath and cough. +fever as high as 102.1 and chills at home. She stated that she has been spking fever at night, she stated she wakes up soaking wet at night only. Daughter at bedside stated she has been wheezing some at home. She went to urgent care on Friday where she was diagnosed with COVID they started her on Z-Sal, prednisone, and gave her Tessalon Perles. She came back to the ED today because she was not feeling any better had increased shortness of breath and cough. Patient also said she has had a decrease in appetite and p.o. intake over the last couple days. She does have some nausea. No vomiting or diarrhea. Denies hemoptysis.   Upon my assessment today patient sitting in chair O2 saturations dropped to 83 to 87% on room air. She was placed on 2 L of oxygen. Sats now mid 90s. Patient and daughter both had multiple questions regarding plan of care. All questions answered. Patient stated that she has arthritis and without her Mobic she is unable to move. We will reorder Mobic. Patient also stated since she got her Lia Perish vaccine in March her left arm has been very painful and tender to touch at the injection site. She stated this is the reason why she has not gotten booster. Patient will be admitted for further care. Home Health S4 Level Recommendation:  Level 1 Standard  AM-PAC Score: 19    Pain  No  Rating:NA  Location:  Pain Medicine Status: No request made      Cognition    A&O to person-not otherwise tested   Able to follow 1 step directions inconsistently    Subjective  Pt found sidelying in bed today  Pt agreeable to this OT eval & tx. Balance  Functional Sitting Balance:  Good in long sitting  Functional Standing Balance: NT    Bed mobility:    Supine to sit: Independent (long sit only)  Sit to supine:   Independent (from long sit)  Rolling:    Independent  Scooting in sitting:  Not tested  Scooting to head of bed:   Not tested    Bridging:   Not Tested    Transfers:    Sit to stand:  Not tested  Stand to sit:  Not tested  Bed to chair:   Not tested  Standard toilet: Not Tested  Bed to [facility]:  Not tested  Grooming: setup was given for brushing teeth. Pt did not end up performing. Was very distractible. Dressing:      UE:   Not Tested  LE:    Not tested    Bathing:    UE:  Not Tested  LE:  Not Tested    Eating:   Independent beverage management and eating raisins-declined lunch when it arrived. Toileting:  Not tested    Activity Tolerance   Pt completed therapy session with SOB noted with activity  SpO2: 86% on 40 LPM, 100% FiO2 with long sitting.  Had to raise the [facility] and have pt sit back supported before she could maintain 90%. Improved to 90% with rest/PLB, back down to 87% with feeding, improved to 90% with rest/PLB x 3 min. Pt needed max verbal cues to slow her breathing down, has a tendency to inhale/exhale very quickly in spurts. Positioning Needs: In bed, call light and needs in reach. Exercise / Activities Initiated:  NA this session    Patient/Family Education:   Role of OT  Recommendations for DC  Energy conservation techniques, breathing instruction    Assessment:  Pt was very distractible throughout today's treatment, with inconsistency with following directions. She needs max verbal cues to perform pursed lip breathing properly. Pt should continue to benefit from skilled OT tx to maximize independence as her oxygen demands improve so that she may eventually return home with the least amount of assistance. Assessment of Deficits:   Goal(s) : To be met in 3 Visits:  1). Independent with energy conservation techniques during ADLs    To be met in 5 Visits:  1). Pt to demonstrate UE exs x 15 reps with minimal cues  2). Pt to demo toilet transfer with IND.      Plan:  Continue OT POC    Marissa Steven MS, OTR/L  #[id number]    If patient discharges from this facility prior to next visit, this note will serve as the Discharge Summary

## 2022-01-20 NOTE — PROGRESS NOTES
Care Plan, Education, Fall Risk, Anton Scale, and Lift Assessment complete. Patient is resting and reports no needs at this time.

## 2022-01-20 NOTE — PROGRESS NOTES
Pulmonary Progress Note  CC: COVID in partially vaccinated patient     Subjective:   ICU bed became available and patient was moved to ICU  Maxed on Vapotherm      IV line:  peripheral    EXAM:   /68   Pulse 95   Temp 98.8 °F (37.1 °C) (Axillary)   Resp 18   Ht 5' 3\" (1.6 m)   Wt 169 lb (76.7 kg)   SpO2 96%   BMI 29.94 kg/m²  on vapotherm  Gen: + distress. Eyes: PERRL. No sclera icterus. No conjunctival injection. ENT: No discharge. Pharynx clear. Neck: Trachea midline. No obvious mass. Resp: + accessory muscle use. Bilateral crackles. No wheezes. No rhonchi. No dullness on percussion. Good air entry. CV: Regular rate. Regular rhythm. No murmur or rub. No edema. GI: Non-tender. Non-distended. No hernia. Skin: Warm and dry. No nodule on exposed extremities. Lymph: No cervical LAD. No supraclavicular LAD. M/S: No cyanosis. No joint deformity. No clubbing. Neuro: Awake. Alert. Moves all four extremities. Psych: Oriented x 3. No anxiety.        Scheduled Meds:   aspirin  81 mg Oral Daily    clopidogrel  75 mg Oral Daily    ascorbic acid  1,000 mg Oral Daily    fluticasone  1 spray Each Nostril Daily    baricitinib  4 mg Oral Daily    atorvastatin  10 mg Oral Nightly    sodium chloride flush  5-40 mL IntraVENous 2 times per day    dexamethasone  6 mg IntraVENous Daily    Vitamin D  2,000 Units Oral Daily    enoxaparin  40 mg SubCUTAneous BID     Continuous Infusions:   sodium chloride       PRN Meds:  meloxicam, albuterol sulfate HFA, guaiFENesin-dextromethorphan, benzonatate, sodium chloride flush, sodium chloride, ondansetron **OR** ondansetron, polyethylene glycol, acetaminophen **OR** acetaminophen    Labs:  CBC:   Recent Labs     01/18/22  0128 01/20/22  0645   WBC 10.7 14.8*   HGB 12.3 12.9   HCT 36.5 38.3   MCV 88.5 90.4    384     BMP:   Recent Labs     01/18/22  0128 01/20/22  0645    134*   K 3.5 4.5    98*   CO2 24 27   BUN 25* 19   CREATININE <0.5* <0.5*     Micro:  1/17/2022 SARS-CoV-2 positive at urgent care  1/12/2022 SARS-CoV-2 detected    Procalcitonin 0.10, 0.26  CRP 53 --> 81    Imaging:   CXR 1/12/2022 imaging reviewed by me and showed  Multifocal bilateral atypical pneumonia, worse on the R    ASSESSMENT:  · Acute hypoxemic respiratory failure, progressive life-threatening  · COVID-19 pneumonia in a partially vaccinated patient, s/p J&J vaccination 3/2021  · ARDS  · Leukocytosis   · Hyperglycemia   · Thrombocytopenia -resolved  · H/O asthma, no home inhalers  · Former smoker      PLAN:  Droplet Plus Airborne Precautions   Vapotherm for life-threatening acute hypoxemic respiratory failure and titrate to maintain SaO2 >92%  CPAP 8 cmH2O QHS and PRN during the day  D/W patient the possibility of intubation/MV if worsening   Repeat CXR   CBC with diff   Add Levaquin   Supervised self proning   No Remdesevir given late presentation  Decadron D9, 6 mg daily   Baricitinib D#8, monitor liver, kidney, leukocytes   PRN albuterol MDI  Blood sugar control ISS, with goal 150-180  Home medications were addressed   Prophylaxis: Lovenox 40 BID, Bactroban   Full code     Due to at least single organ failure and risk of rapid deterioration, I spent 31 minutes of Critical care time reviewing labs/films, examining patient, collaborating with other physicians. This does not include time performing critical procedures.

## 2022-01-20 NOTE — PROGRESS NOTES
01/19/22 8970   Oxygen Therapy/Pulse Ox   O2 Therapy Oxygen humidified   O2 Device Heated high flow cannula   O2 Flow Rate (L/min) 40 L/min   FiO2  100 %   Resp 28   SpO2 98 %

## 2022-01-21 NOTE — CARE COORDINATION
INTERDISCIPLINARY PLAN OF CARE CONFERENCE    Date/Time: 1/21/2022 3:13 PM  Completed by: Enedelia Fernanedz RN, Case Management      Patient Name:  Molina Argueta  YOB: 1950  Admitting Diagnosis: Acute respiratory failure with hypoxia ([facility] [address] [J96.01]  Pneumonia due to COVID-19 virus [U07.1, J12.82]  COVID-19 [U07.1]     Admit Date/Time:  1/12/2022  9:40 AM    Chart reviewed. Interdisciplinary team contacted or reviewed plan related to patient progress and discharge plans. Disciplines included Case Management, Nursing, and Dietitian. Current Status:ICU C19+ intubated 1/20; 1/21 moved to ICU for proning    Discharge Plan Comments: Chart reviewed. Pt is from home alone and independent. Pt intubated and plan is for proning. CM will follow.      Home O2 in place on admit: No

## 2022-01-21 NOTE — PROGRESS NOTES
IM Progress Note    Admit Date:  1/12/2022  9    Interval history:      covid 19 infection, hypoxia  Vaccinated with J & J in 3/21, did not get booster  Recently on steroids, z pack  Patient remains severely hypoxic requiring high flow oxygen per Vapotherm. 1/18. code stroke  for expressive aphasia. Stroke ruled out. On aspirin and Plavix per neurology recommendations    Transferred to ICU 1/19, remained on 100 % vapotherm with persistent hypoxia     1/20 intubated     Subjective:    1/21  71 y.o. female seen sedated on vent. No distress  Hypotensive this am   .          Objective:   /71   Pulse 104   Temp 99 °F (37.2 °C) (Axillary)   Resp 27   Ht 5' 3\" (1.6 m)   Wt 169 lb (76.7 kg)   SpO2 97%   BMI 29.94 kg/m²       Intake/Output Summary (Last 24 hours) at 1/21/2022 0757  Last data filed at 1/21/2022 1106  Gross per 24 hour   Intake 45 ml   Output 1285 ml   Net -1240 ml       Physical Exam:  Patient seen in droplet plus precautions  General:  Elderly female sedated on vent  Oral ETT and OG noted   Mucous Membranes:  Pink , anicteric  Neck: No JVD, no carotid bruit, no thyromegaly  Chest: diminished in bases, bilateral mild crackles present . Cardiovascular:  RRR S1S2 heard, no murmurs or gallops  Abdomen:  Soft, undistended, non tender, no organomegaly, BS present  Extremities: No edema or cyanosis.  Distal pulses well felt  Neurological : sedated on vent        Medications:   Scheduled Medications:    chlorhexidine  15 mL Mouth/Throat BID    mupirocin   Nasal BID    insulin lispro  0-6 Units SubCUTAneous TID WC    insulin lispro  0-3 Units SubCUTAneous Nightly    levofloxacin  500 mg IntraVENous Q24H    lidocaine 1 % injection  5 mL IntraDERmal Once    aspirin  81 mg Oral Daily    clopidogrel  75 mg Oral Daily    ascorbic acid  1,000 mg Oral Daily    fluticasone  1 spray Each Nostril Daily    baricitinib  4 mg Oral Daily    atorvastatin  10 mg Oral Nightly    sodium chloride flush line.         XR CHEST PORTABLE   Final Result   New multifocal moderate to severe pneumonia. Clinical and imaging follow-up   to resolution recommended. CTA HEAD NECK W CONTRAST   Final Result   Unremarkable CTA of the head and neck. Findings consistent with COVID pneumonia. This scan was analyzed using Viz. ai contact LVO. Identification of suspected   findings is not for diagnostic use beyond notification. Viz LVO is limited to   analysis of imaging data and should not be used in-lieu of full patient   evaluation or relied upon to make or confirm diagnosis. CT HEAD WO CONTRAST   Final Result   No acute intracranial abnormality. XR CHEST PORTABLE   Final Result   Multifocal bilateral atypical pneumonia. XR CHEST PORTABLE    (Results Pending)               ASSESSMENT/PLAN:    COVID PNA  Acute hypoxic respiratory Failure   - CXR: noted with multifocal PNA  - no home O2 at baseline, 83-87% at rest,   - J &J in March 2021- no booster   - Admitted to W, droplet +, tele, cont pulse ox  - supp O2, wean as tolerated - worsening slowly -was on 10 L   -progressively worsening hypoxia, was on vapotherm with full support   - eventually placed on vent support 1/21     persistent severe hypoxemia - for proning today   -Continue Decadron D# 10  - Remdesivir not started due to out of window- sx 7+ days prior to admit  - levaquin added  - PRN Robitussin  - procal 0.10 wnl   - CRP elevated . Started on Baricitinib D#9  - Lovenox Bid    Episodes of expressive aphasia   - ?  TIA, vs mental status changes related to covid   - cont ASA, Plavix     Thrombocytopenia  - likely with covid  - platelets 93 >871 improved  - continue Lovenox and monitor     HLD  - Continue statin         Arthritis   - was on  mobic - holding     Hx of Asthma  - not on inhalers at home          DVT Prophylaxis: Lovenox bid  Diet: can start TF   Code Status: Full Code           Allison Quintero MD, 1/21/2022 7:57 AM

## 2022-01-21 NOTE — PROGRESS NOTES
Care rounds completed with Dr. Alvarez Dugan and multidisciplinary team. Reviewed labs, meds, VS, assessment, & plan of care for today. Transfer to ICU to [facility]. Send tracheal asp. See dictated note and new orders for details. Informed family would like a verbal update from Dr Alvarez Dugan.      High risk vesicant drug infusing: yes    Multiple incompatible medications infusing:  yes    CVP Monitoring:      Extremely difficult IV access challenge:      Continued need for central line access:   Yes    Addressed with physician:  YES    RIGHT PATIENT, RIGHT TIME, RIGHT LINE

## 2022-01-21 NOTE — PROGRESS NOTES
Face to Face transfer of care report given to [facility]. Pt to transfer to ICU, pending bed availability. 2:50 PM  Pt transferred to ICU from [address] [place] [address] in stable condition. Care transferred. 6:08 PM  Call to Daughter- was seeing patient in ICU. Already updated on POC.

## 2022-01-21 NOTE — PROGRESS NOTES
Report given to [facility]. Transfer of care completed at this time.  Electronically signed by Watson Edmond RN on 1/21/2022 at 7:32 AM

## 2022-01-21 NOTE — PROGRESS NOTES
01/20/22 2323   NIV Type   Equipment Type V60   Mode CPAP   Mask Type Full face mask   Mask Size Small   Settings/Measurements   CPAP/EPAP 8 cmH2O   Resp (!) 49   FiO2  100 %   Vt Exhaled 907 mL   Minute Volume 17.8 Liters   Mask Leak (lpm) 21 lpm   Comfort Level Good   Using Accessory Muscles No   SpO2 91   Oxygen Therapy/Pulse Ox   O2 Therapy Oxygen   O2 Device PAP (positive airway pressure)   SpO2 90 %

## 2022-01-21 NOTE — PROGRESS NOTES
Comprehensive Nutrition Assessment    Type and Reason for Visit:  Reassess,Consult (consult for TF ordering and management)    Nutrition Recommendations/Plan:   1. Start TF - ADULT TUBE FEEDING; Orogastric; Peptide-Based formula - Vital AF 1.2 with a trophic rate of 10 ml/hr x 20 hours. Water flushes, 30 ml every 3 hours for tube patency. 2. Monitor TF start, rate, intake, and tolerance + water flushes. 3. Monitor vent status, sedation type/amount (propofol is currently at 50 mcg x 24 hours which = 610 kcals from lipids), and plan of care. 4. Please obtain an updated weight for this patient - last weight was obtained on 1/19/22.   5. Monitor nutrition-related labs, bowel function, and weight trends. Nutrition Assessment:  patient is declining from a nutritional standpoint AEB transfer to ICU for worsening medical status and need for intubation in early am on 1/21/22 and she remains at risk for further compromise d/t need for EN as sole source of nutrition at this time, altered nutrition-related labs, and increased nutrition needs r/t COVID-19 virus; will make patient NPO and provide TF recommendations    Malnutrition Assessment:  Malnutrition Status: Moderate malnutrition    Context:  Acute Illness     Findings of the 6 clinical characteristics of malnutrition:  Energy Intake:  1 - 75% or less of estimated energy requirements for 7 or more days  Weight Loss:  7 - Greater than 2% over 1 week (- 14# or 8% weight loss x 1 week)     Body Fat Loss:  Unable to assess (COVID-19 +)     Muscle Mass Loss:  Unable to assess (COVID-19 +)    Fluid Accumulation:  No significant fluid accumulation     Strength:  Not Performed    Estimated Daily Nutrient Needs:  Energy (kcal):  1200 - 1400 kcals based on 15-18 kcals/kg/CBW; Weight Used for Energy Requirements:  Current     Protein (g):  62 - 78 g protein based on 1.2-1.5 g/kg/IBW;  Weight Used for Protein Requirements:  Ideal        Fluid (ml/day):  1200 - 1400 ml; Method Used for Fluid Requirements:  1 ml/kcal      Nutrition Related Findings:  patient is intubated and sedated on 50 mcg propofol at this time; she was intubated early this am; she was transferred to ICU on 1/19/22; last BM was on 1/14/22; abdomen is soft, non-tender, and bowel sounds are active      Wounds:  None       Current Nutrition Therapies:    Current Tube Feeding (TF) Orders:  · Feeding Route: Orogastric  · Formula: Peptide Based  · Schedule: Continuous  · Additives/Modulars: Protein (one proteinex P2Go TWICE daily)  · Water Flushes: 30 ml every 3 hours for tube patency  · Current TF & Flush Orders Provides: TF recommendations - Vital AF 1.2 with a trophic rate (patient is prone) of 10 ml/hr x 20 hours = 200 ml TV, 240 kcals, 15 g protein, and 162 ml free water + 30 ml water flushes every 3 hours for tube patency + 52 g protein and 208 kcals from one proteinex P2Go TWICE daily (67 g protein and 448 kcals total)  · Goal TF & Flush Orders Provides: Vital AF 1.2 with a goal rate of 30 ml/hr x 20 hours = 600 ml TV, 720 kcals, 45 g protein, and 487 ml free water + 30 ml water flushes every 3 hours for tube patency      Anthropometric Measures:  · Height: 5' 3\" (160 cm)  · Current Body Weight: 169 lb (76.7 kg) (obtained on 1/19/22; actual weight)   · Admission Body Weight: 183 lb 9.6 oz (83.3 kg) (obtained on 1/16/22; actual weight)    · Usual Body Weight: 183 lb 9.6 oz (83.3 kg) (obtained on 1/16/22; actual weight)     · Ideal Body Weight: 115 lbs; % Ideal Body Weight 147 %   · BMI: 29.9     · BMI Categories: Overweight (BMI 25.0-29. 9)       Nutrition Diagnosis:   · Inadequate oral intake related to inadequate protein-energy intake,impaired respiratory function,increase demand for energy/nutrients as evidenced by NPO or clear liquid status due to medical condition,intubation      Nutrition Interventions:   Food and/or Nutrient Delivery:  Continue NPO,Start Tube Feeding  Nutrition Education/Counseling:  No

## 2022-01-21 NOTE — PROGRESS NOTES
VTE Prophylaxis Monitoring    Recent Labs     01/20/22  0645 01/21/22  0426   CREATININE <0.5* <0.5*     Recent Labs     01/21/22  0426   HGB 13.1   HCT 39.6   *     CrCl cannot be calculated (This lab value cannot be used to calculate CrCl because it is not a number: <0.5). Wt Readings from Last 1 Encounters:   01/19/22 169 lb (76.7 kg)     Body mass index is 29.94 kg/m². Plan: Will change patient to enoxaparin 30mg BID for BMI <30 in a COVID+ patient.      Will continue to monitor    Vivek Betina 1/21/2022 1:55 PM

## 2022-01-21 NOTE — PROGRESS NOTES
0155- Pt agitated and restlessness worsening. Remains A&O however, just irrational. Messaged Dr. Elena Mobley for possibly a precedex drip. 0230- Pt very agitated and continuously trying to take the Cpap mask off to eat. This RN explained multiple times why patient could not eat. Water was offered along with mouth care. Pt grabbed personal pulse ox off table and put it in her mouth and started chewing on it. Pt hitting and kicking staff members. 80- Dr. Elena Mobley at bedside to evaluate patient. Decision made to intubate. See further notes.  Electronically signed by Gio Villanueva RN on 1/21/2022 at 3:56 AM

## 2022-01-21 NOTE — PROGRESS NOTES
Pulmonary Progress Note  CC: COVID in partially vaccinated patient     Subjective: Intubated overnight for worsening hypoxemia  Propofol 45 mcg/kg/min   Fentanyl 150 mcg/hr   Levophed 2 mcg/min   PEEP 12--> 14   FiO2 80%  Plateau pressure 25  PaO2/FiO2 94        IV line: PICC line 1/20/2022  MV: 1/21/2022      Intake/Output Summary (Last 24 hours) at 1/21/2022 0722  Last data filed at 1/21/2022 0558  Gross per 24 hour   Intake 45 ml   Output 1285 ml   Net -1240 ml         EXAM:   /71   Pulse 104   Temp 99 °F (37.2 °C) (Axillary)   Resp 27   Ht 5' 3\" (1.6 m)   Wt 169 lb (76.7 kg)   SpO2 97%   BMI 29.94 kg/m²  on 26/320  EXAM:  General: ill appearing. Intubated sedated. Eyes: PERRL. No sclera icterus. No conjunctival injection. ENT: No discharge. Pharynx clear. ET tube in place  Neck: Trachea midline. Normal thyroid. Resp: No accessory muscle use. Few crackles. No wheezing. No rhonchi. CV: Regular rate. Regular rhythm. No mumur or rub. No edema. GI: Non-tender. Non-distended. No masses. Skin: Warm and dry. No nodule on exposed extremities. No rash on exposed extremities. Lymph: No cervical LAD. No supraclavicular LAD. M/S: No cyanosis. No joint deformity. No clubbing. Neuro: Intubated sedated. + response to painful stimuli.  + following commands.   Psych: Noncommunicative unable to obtain        Scheduled Meds:   chlorhexidine  15 mL Mouth/Throat BID    mupirocin   Nasal BID    insulin lispro  0-6 Units SubCUTAneous TID WC    insulin lispro  0-3 Units SubCUTAneous Nightly    levofloxacin  500 mg IntraVENous Q24H    lidocaine 1 % injection  5 mL IntraDERmal Once    aspirin  81 mg Oral Daily    clopidogrel  75 mg Oral Daily    ascorbic acid  1,000 mg Oral Daily    fluticasone  1 spray Each Nostril Daily    baricitinib  4 mg Oral Daily    atorvastatin  10 mg Oral Nightly    sodium chloride flush  5-40 mL IntraVENous 2 times per day    dexamethasone  6 mg IntraVENous Daily    Vitamin D  2,000 Units Oral Daily    enoxaparin  40 mg SubCUTAneous BID     Continuous Infusions:   propofol 50 mcg/kg/min (01/21/22 0424)    fentaNYL 150 mcg/hr (01/21/22 7794)    dextrose      sodium chloride       PRN Meds:  midazolam, albuterol sulfate HFA, fentanNYL, glucose, glucagon (rDNA), dextrose, dextrose bolus (hypoglycemia) **OR** dextrose bolus (hypoglycemia), meloxicam, albuterol sulfate HFA, guaiFENesin-dextromethorphan, benzonatate, sodium chloride flush, sodium chloride, ondansetron **OR** ondansetron, polyethylene glycol, acetaminophen **OR** acetaminophen    Labs:  CBC:   Recent Labs     01/20/22  0645 01/21/22  0426   WBC 14.8* 23.1*   HGB 12.9 13.1   HCT 38.3 39.6   MCV 90.4 90.5    476*     BMP:   Recent Labs     01/20/22  0645 01/21/22  0426   * 136   K 4.5 3.7   CL 98* 96*   CO2 27 27   BUN 19 22*   CREATININE <0.5* <0.5*     Micro:  1/17/2022 SARS-CoV-2 positive at urgent care  1/12/2022 SARS-CoV-2 detected  1/21 Tracheal aspirate       Procalcitonin 0.10, 0.26  CRP 53 --> 81    Imaging:   CXR 1/21/2022 imaging reviewed by me and showed  Satisfactory ETT position  Satisfactory PICC line position   Bilateral ASD- no changes         ASSESSMENT:  · Acute hypoxemic respiratory failure, progressive life-threatening  · COVID-19 pneumonia in a partially vaccinated patient, s/p J&J vaccination 3/2021  · ARDS  · Leukocytosis   · Hypotension   · Hyperglycemia   · Thrombocytopenia -resolved  · H/O asthma, no home inhalers  · Former smoker      PLAN:  Mechanical ventilation as per my orders.  The ventilator was adjusted by me at the bedside for unstable, life threatening respiratory failure  Prone ventilation today -discussed with nursing staff  Follow ABG and chest x-ray while on the ventilator  Supplemental oxygen to maintain SaO2 >92%; wean as tolerated  IV Propofol for sedation, target RASS -2, with daily spontaneous awakening trial   Follow TG   Fentanyl and Versed PRN, gtt as needed  Head of bed 30 degrees or higher at all times  Daily spontaneous breathing trial once PEEP less than 8, FiO2 less than 55%  Closely monitory airways, clinical status, cardiac rhythm, vital signs, and urine output   IV Levophed to keep MAP > 65 mmHg or SBP>90  Levaquin day #3  No Remdesevir given late presentation  Decadron D10, 6 mg daily   Baricitinib D#9, monitor liver, kidney, leukocytes   PRN albuterol MDI  Tube feed when able  Blood sugar control ISS, with goal 150-180  Prophylaxis: Pepcid, Lovenox 40 BID, Bactroban   Full code             Due to at least single organ failure and risk of rapid deterioration, I spent 35 minutes of Critical care time reviewing labs/films, examining patient, collaborating with other physicians. This does not include time performing critical procedures.

## 2022-01-21 NOTE — PROCEDURES
Pt very agitated and will not leave NIV on. Now in restraints. Not redirectable. She is currently on Vapotherm w/ overlying NRB mask. Still satting on 70's and 80's. Decision made to emergently intubate her. Required ketamine and etomidate for initial sedation prior to paralytic. Pt was sedated with 125 mg ketamine, 20 mg etomidate and 45 mg rocuronium. Pt was intubated in a single attempt using a Glide scope with a size 3 MAC in place. The tip of a size 8 ETT was inserted between the cords and then advanced off of the stylette into the air way to 22 cm from the lip. Cuff was inflated and secured. Good CO2 color change and bilateral breath sounds auscultated. Vent settings provided to respiratory staff. CXR ordered and pending to verify tube placement.

## 2022-01-21 NOTE — PROGRESS NOTES
0324 18 Etomodate, 100 ketamine ivp given by Dr. Prashanth Uribe. 0326 Dexter 45mg ivp given by Dr. Prashanth Uribe  01013 92 36 21 Pt intubated with use of glidescope with size 8 ETT 22 at lip. Good color change with CO2 device and eusebio breath sounds heard.      95% on Vent f1O2- 100% 195/110 RR- 26

## 2022-01-21 NOTE — PROGRESS NOTES
Blood pressure 126/70, pulse 102, temperature 96.9 °F (36.1 °C), temperature source Axillary, resp. rate (!) 49, height 5' 3\" (1.6 m), weight 169 lb (76.7 kg), SpO2 90 %, not currently breastfeeding. Shift assessment completed. Pt alert but anxious and restless. Able to answer orientation questions but with increased delirium / confusion. On Cpap of 8, 100%. Lungs diminished. SR. Mcguire patent. Repositions self in bed frequently. Attempting to self prone. No further needs known at this time.  Electronically signed by Darrel Hunt RN on 1/20/2022 at 11:29 PM

## 2022-01-21 NOTE — PROGRESS NOTES
Reassessment completed (see Flowsheet). All ICU lines remain intact, ICU monitoring continued-   Infusing:  Prop, Fent, Levophed (See MAR). 1L bolus infusing. pt remains PEEP 12, 80%. .   Lung sounds diminished/ rhonchi  pt's blood pressures WDL on LEVO. Continuing to monitor.

## 2022-01-21 NOTE — PROGRESS NOTES
2mg of versed given and 25 mcg of fentanyl given. RASS +4. Awake, agitated, red faced, tachycardic and hypertensive. Biting ETT and turning head side to side. 6661- instant relief noted. 7090- Fentanyl drip initiated at 25mcg      0515- patient awake, agitated and restless. Verbal de escalation attempted. Pt continues to bite ETT, pull on restraints and is hypertensive and tachycardic.

## 2022-01-21 NOTE — PROGRESS NOTES
Shift assessment was completed (see flow sheet). Pt is Sedated. Pt currently on ventilator- 8 ETT, at [address] [facility],[address]. No SBT- pt intubated this morning. FiO2 at 100, PEEP 12, SpO2 at 99%, Respirations are Even/ equal,  with diminished/ Rhonchi sounds. Infusing:  Fentanyl, Propofol (see MAR). Oral Gastric tube clamped, 0 mL residual observed and returned. IV access- PICC/ Peripheral and WDL. Mcguire in place with STAT lock, Draining Cari/ Clear urine. Scheduled medications to follow. Call light within reach. Bed in lowest position. Bed alarm on. Bilateral Wrist restraints in place and tied, for safety of lines and tubes. Family Updated- Patito Daughter. **Would like to Speak with Dr Issa Anton today. Will continue to monitor    Patient is not able to demonstrated the ability to move from a reclining position to an upright position within the recliner. Patient is confused, demented and /or unable to follow instruction.

## 2022-01-21 NOTE — PROGRESS NOTES
Pt wide awake on 50mcg of propofol and biting on ETT. Message sent to Dr. Afshin Ahumada about ordering prns and additional sedation.

## 2022-01-21 NOTE — PROGRESS NOTES
Blood pressure (!) 150/87, pulse 126, temperature 99 °F (37.2 °C), temperature source Axillary, resp. rate 25, height 5' 3\" (1.6 m), weight 169 lb (76.7 kg), SpO2 96 %, not currently breastfeeding. Patient currently intubated with ETT at 25. Vent settings are 26/320/+12/100%. Fentanyl infusing at 150 mcg//h. Propofol infusing at 50 mcg/kg/min. Pt finally resting comfortably. RASS -1.

## 2022-01-21 NOTE — PROGRESS NOTES
Pt has become very confused and combative. Ripping off Bipap mask and trying to force feed herself her personal pulse ox.  Were able to calm pt and get blood gas    45 jad    20 etom    125 ketamine

## 2022-01-21 NOTE — PROGRESS NOTES
01/21/22 0406   Vent Information   $Ventilation $Initial Day   Vent Type 840   Vent Mode AC/VC   Vt Ordered 320 mL   Rate Set 26 bmp   Peak Flow 60 L/min   FiO2  100 %   SpO2 96 %   SpO2/FiO2 ratio 96   Sensitivity 3   PEEP/CPAP 12   Humidification Source Heated wire   Humidification Temp 37   Humidification Temp Measured 36   Circuit Condensation Drained   Vent Patient Data   Peak Inspiratory Pressure 25 cmH2O   Mean Airway Pressure 16 cmH20   Rate Measured 33 br/min   Vt Exhaled 382 mL   Minute Volume 9.89 Liters   I:E Ratio 1:3   Plateau Pressure 25 ZFC21   Spontaneous Breathing Trial (SBT) RT Doc   Pulse 122   Breath Sounds   Right Upper Lobe Diminished   Right Middle Lobe Diminished   Right Lower Lobe Diminished   Left Upper Lobe Diminished   Left Lower Lobe Diminished   Additional Respiratory  Assessments   Resp 22   Position Semi-Mohamud's   Oral Care Completed? Yes   Oral Care Mouth suctioned   Subglottic Suction Done?  Yes   Alarm Settings   High Pressure Alarm 45 cmH2O   Low Minute Volume Alarm 3 L/min   Apnea (secs) 20 secs   High Respiratory Rate 50 br/min   Low Exhaled Vt  250 mL   ETT (adult)   Placement Date/Time: 01/21/22 0330   Tube Size: 8 mm  Location: Oral  Secured at: 23 cm  Measured From: Lips   Secured at 23 cm   Measured From [address] By [employer] tube nicole   Site Condition Dry

## 2022-01-22 NOTE — PROGRESS NOTES
IM Progress Note    Admit Date:  1/12/2022  10    Interval history:      covid 19 infection, hypoxia  Vaccinated with J & J in 3/21, did not get booster  Recently on steroids, z pack  Patient remains severely hypoxic requiring high flow oxygen per Vapotherm. 1/18. code stroke  for expressive aphasia. Stroke ruled out. On aspirin and Plavix per neurology recommendations    Transferred to ICU 1/19, remained on 100 % vapotherm with persistent hypoxia     1/20 intubated     1/21- sedated on the ventilator. She was proned yesterday. She is now supine. Subjective:    1/21  71 y.o. female seen sedated on vent. No distress  Hypotensive this am she is on propofol, fentanyl and Versed. Levophed at two mics. PEEP decreased to 10. FiO2 60%. She has a PICC line. .          Objective:   BP (!) 106/59   Pulse 53   Temp 96.3 °F (35.7 °C) (Axillary)   Resp 30   Ht 5' 3\" (1.6 m)   Wt 169 lb (76.7 kg)   SpO2 97%   BMI 29.94 kg/m²       Intake/Output Summary (Last 24 hours) at 1/22/2022 1556  Last data filed at 1/22/2022 1519  Gross per 24 hour   Intake 847.35 ml   Output 1125 ml   Net -277.65 ml       Physical Exam:  Patient seen in droplet plus precautions  General:  Elderly female sedated on vent  Oral ETT and OG noted   Mucous Membranes:  Pink , anicteric  Neck: No JVD, no carotid bruit, no thyromegaly  Chest: diminished in bases, bilateral mild crackles present . Cardiovascular:  RRR S1S2 heard, no murmurs or gallops  Abdomen:  Soft, undistended, non tender, no organomegaly, BS present  Extremities: No edema or cyanosis.  Distal pulses well felt  Neurological : sedated on vent        Medications:   Scheduled Medications:    chlorhexidine  15 mL Mouth/Throat BID    mupirocin   Nasal BID    famotidine (PEPCID) injection  20 mg IntraVENous BID    insulin lispro  0-6 Units SubCUTAneous Q4H    enoxaparin  30 mg SubCUTAneous BID    levofloxacin  500 mg IntraVENous Q24H    aspirin  81 mg Oral Daily    clopidogrel  75 mg Oral Daily    ascorbic acid  1,000 mg Oral Daily    baricitinib  4 mg Oral Daily    atorvastatin  10 mg Oral Nightly    sodium chloride flush  5-40 mL IntraVENous 2 times per day    Vitamin D  2,000 Units Oral Daily     I   propofol 50 mcg/kg/min (01/22/22 1545)    fentaNYL 200 mcg/hr (01/22/22 1552)    norepinephrine 1 mcg/min (01/22/22 1241)    midazolam 2 mg/hr (01/22/22 0410)    dextrose      sodium chloride 25 mL (01/22/22 1022)     midazolam, albuterol sulfate HFA, fentanNYL, glucose, glucagon (rDNA), dextrose, dextrose bolus (hypoglycemia) **OR** dextrose bolus (hypoglycemia), albuterol sulfate HFA, guaiFENesin-dextromethorphan, benzonatate, sodium chloride flush, sodium chloride, ondansetron **OR** ondansetron, polyethylene glycol, acetaminophen **OR** acetaminophen    Lab Data:  Recent Labs     01/21/22  0426 01/21/22  1634 01/22/22  0450   WBC 23.1* 13.6* 13.6*   HGB 13.1 11.7* 11.9*   HCT 39.6 35.4* 35.9*   MCV 90.5 91.3 91.2   * 313 340     Recent Labs     01/20/22  0645 01/21/22  0426 01/22/22  0450   * 136 133*   K 4.5 3.7 4.1   CL 98* 96* 97*   CO2 27 27 28   BUN 19 22* 25*   CREATININE <0.5* <0.5* <0.5*     No results for input(s): CKTOTAL, CKMB, CKMBINDEX, TROPONINI in the last 72 hours. Coagulation:   Lab Results   Component Value Date    INR 1.08 01/18/2022    APTT 26.7 09/22/2012     Cardiac markers:   Lab Results   Component Value Date    TROPONINI <0.01 01/18/2022         Lab Results   Component Value Date    ALT 17 01/20/2022    AST 17 01/20/2022    ALKPHOS 76 01/20/2022    BILITOT 0.6 01/20/2022       Lab Results   Component Value Date    INR 1.08 01/18/2022    INR 0.91 09/22/2012    INR 0.93 08/29/2010    PROTIME 12.3 01/18/2022    PROTIME 10.4 09/22/2012    PROTIME 10.4 08/29/2010         Cultures:       SARS-CoV-2, NAAT DETECTED             EKG:   NSR     RADIOLOGY  XR CHEST PORTABLE   Final Result   1. No significant change.          XR CHEST PORTABLE   Final Result   Appropriate positioning of the endotracheal tube. Enteric tube courses   beneath the diaphragm; tip is excluded by collimation inferiorly. No significant change in extensive bilateral airspace disease. IR PICC WO SQ PORT/PUMP > 5 YEARS   Preliminary Result   Successful placement of PICC line. XR CHEST PORTABLE   Final Result   New multifocal moderate to severe pneumonia. Clinical and imaging follow-up   to resolution recommended. CTA HEAD NECK W CONTRAST   Final Result   Unremarkable CTA of the head and neck. Findings consistent with COVID pneumonia. This scan was analyzed using Viz. ai contact LVO. Identification of suspected   findings is not for diagnostic use beyond notification. Viz LVO is limited to   analysis of imaging data and should not be used in-lieu of full patient   evaluation or relied upon to make or confirm diagnosis. CT HEAD WO CONTRAST   Final Result   No acute intracranial abnormality. XR CHEST PORTABLE   Final Result   Multifocal bilateral atypical pneumonia. XR CHEST PORTABLE    (Results Pending)               ASSESSMENT/PLAN:    COVID PNA  Acute hypoxic respiratory Failure   - CXR: noted with multifocal PNA  - no home O2 at baseline, 83-87% at rest,   - J &J in March 2021- no booster   - Admitted to , droplet +, tele, cont pulse ox  - supp O2, wean as tolerated - worsening slowly -was on 10 L   -progressively worsening hypoxia, was on vapotherm with full support   - eventually placed on vent support 1/21     persistent severe hypoxemia -she was proned on 1/20/2022. She is back in supine position.  -Continue Decadron D# 11  - Remdesivir not started due to out of window- sx 7+ days prior to admit  - levaquin added  - PRN Robitussin  - procal 0.10 wnl   - CRP elevated . Started on Baricitinib D#10/14. Monitor CBC and LFTs  - Lovenox Bid    Episodes of expressive aphasia   - ?  TIA, vs mental status changes related to covid   - cont ASA, Plavix     Thrombocytopenia resolved  - likely with covid  - platelets 93 >653 improved  - continue Lovenox and monitor     HLD  - Continue statin         Arthritis   - was on  mobic - holding     Hx of Asthma  - not on inhalers at home          DVT Prophylaxis: Lovenox bid  Diet: can start TF   Code Status: Full Code           Mike Foley MD, 1/22/2022 3:56 PM

## 2022-01-22 NOTE — PROGRESS NOTES
Reassessment completed. See flowsheet    Fentanyl decreased 75 mcg/hr due to increased sedation for previous prone. Pt remain supine.

## 2022-01-22 NOTE — PROGRESS NOTES
Progress Note      Patient Name: Leonides John   Patient ID: [id number]   Sex: Male   YOB: 1990    Primary Care Provider: Devora Patton MD   Referring Provider: Devora Patton MD    Visit Date: August 3, 2020    Provider: JESÚS Orozco   Location: [facility]   Location Address: [address] [place] [address], [address]  [place], [place]  [id number]   Location Phone: [phone]          01/22/22 1711   Vent Information   Vent Type 840   Vent Mode AC/VC   Vt Ordered 320 mL   Rate Set 30 bmp   Peak Flow 50 L/min   FiO2  55 %   SpO2 95 %   SpO2/FiO2 ratio 172.73   Sensitivity 3   PEEP/CPAP 10   Humidification Source Heated wire   Humidification Temp Measured 37   Vent Patient Data   Peak Inspiratory Pressure 26 cmH2O   Mean Airway Pressure 16 cmH20   Rate Measured 30 br/min   Vt Exhaled 338 mL   Minute Volume 10.1 Liters   I:E Ratio 1:2   Plateau Pressure 26 [id number]   Cough/Sputum   Sputum How Obtained Endotracheal;Suctioned;Oral   Sputum Amount Small   Sputum Color Creamy   Tenacity Thick   Spontaneous Breathing Trial (SBT) RT Doc   Pulse 58   Breath Sounds   Right Upper Lobe Diminished   Right Middle Lobe Diminished   Right Lower Lobe Diminished   Left Upper Lobe Diminished   Left Lower Lobe Diminished   Additional Respiratory  Assessments   Resp 30   Position Reverse Trendelenburg   Oral Care Completed? Yes   Oral Care Mouth suctioned   Subglottic Suction Done?  Yes   Alarm Settings   High Pressure Alarm 45 cmH2O   Low Minute Volume Alarm 3 L/min   Apnea (secs) 20 secs   High Respiratory Rate 45 br/min   Low Exhaled Vt  250 mL   ETT (adult)   Placement Date/Time: 01/21/22 0330   Tube Size: 8 mm  Location: Oral  Secured at: 23 cm  Measured From: Lips   Secured at 24 cm   Measured From [address] By [employer] tube nicole   Site Condition Dry "  18289, 81154, 05424, " [id number], 10523, 20250) - - 

## 2022-01-22 NOTE — PROGRESS NOTES
Pulmonary Progress Note  CC: COVID in partially vaccinated patient     Subjective:   Proned overnight   Propofol 50 mcg/kg/min   Fentanyl 200 mcg/hr   Versed 2   Levophed 2 mcg/min   PEEP 12--> 10  FiO2 60%        IV line: PICC line 1/20/2022  MV: 1/21/2022      Intake/Output Summary (Last 24 hours) at 1/22/2022 0740  Last data filed at 1/22/2022 0410  Gross per 24 hour   Intake 1778.94 ml   Output 645 ml   Net 1133.94 ml         EXAM:   BP (!) 106/56   Pulse 62   Temp 98.1 °F (36.7 °C) (Axillary)   Resp 26   Ht 5' 3\" (1.6 m)   Wt 169 lb (76.7 kg)   SpO2 96%   BMI 29.94 kg/m²  on 26/320  EXAM: limited due to prone ventilation   General: ill appearing. Intubated sedated. Eyes: PERRL. No sclera icterus. + conjunctival injection. ENT: ET tube in place  Neck: Trachea midline  Resp: No accessory muscle use. Few crackles. No wheezing. Few rhonchi. No dullness on percussion. CV: Regular rate. Regular rhythm. No edema. GI: Proned not able to assess   Skin: Warm and dry. No nodule on exposed extremities. No rash on exposed extremities. Lymph: No cervical LAD. No supraclavicular LAD. M/S: No cyanosis. No joint deformity. No clubbing. Neuro: Intubated sedated. Responsive to painful stimuli. Not following commands.    Psych: Noncommunicative unable to obtain          Scheduled Meds:   chlorhexidine  15 mL Mouth/Throat BID    mupirocin   Nasal BID    famotidine (PEPCID) injection  20 mg IntraVENous BID    insulin lispro  0-6 Units SubCUTAneous Q4H    enoxaparin  30 mg SubCUTAneous BID    levofloxacin  500 mg IntraVENous Q24H    aspirin  81 mg Oral Daily    clopidogrel  75 mg Oral Daily    ascorbic acid  1,000 mg Oral Daily    baricitinib  4 mg Oral Daily    atorvastatin  10 mg Oral Nightly    sodium chloride flush  5-40 mL IntraVENous 2 times per day    Vitamin D  2,000 Units Oral Daily     Continuous Infusions:   propofol 50 mcg/kg/min (01/22/22 0705)    fentaNYL 200 mcg/hr (01/22/22 0410)   [facility] norepinephrine 2 mcg/min (01/22/22 0507)    midazolam 2 mg/hr (01/22/22 5760)    dextrose      sodium chloride       PRN Meds:  midazolam, albuterol sulfate HFA, fentanNYL, glucose, glucagon (rDNA), dextrose, dextrose bolus (hypoglycemia) **OR** dextrose bolus (hypoglycemia), albuterol sulfate HFA, guaiFENesin-dextromethorphan, benzonatate, sodium chloride flush, sodium chloride, ondansetron **OR** ondansetron, polyethylene glycol, acetaminophen **OR** acetaminophen    Labs:  CBC:   Recent Labs     01/21/22  0426 01/21/22  1634 01/22/22  0450   WBC 23.1* 13.6* 13.6*   HGB 13.1 11.7* 11.9*   HCT 39.6 35.4* 35.9*   MCV 90.5 91.3 91.2   * 313 340     BMP:   Recent Labs     01/20/22  0645 01/21/22  0426 01/22/22  0450   * 136 133*   K 4.5 3.7 4.1   CL 98* 96* 97*   CO2 27 27 28   BUN 19 22* 25*   CREATININE <0.5* <0.5* <0.5*     Micro:  1/17/2022 SARS-CoV-2 positive at urgent care  1/12/2022 SARS-CoV-2 detected  1/21 Tracheal aspirate       Imaging:   CXR 1/21/2022 imaging reviewed by me and showed   High ETT position  Satisfactory PICC line position   Bilateral ASD-no changes        ASSESSMENT:  · Acute hypoxemic respiratory failure, progressive life-threatening  · COVID-19 pneumonia in a partially vaccinated patient, s/p J&J vaccination 3/2021  · ARDS  · Leukocytosis   · Hypotension   · Hyperglycemia   · Thrombocytopenia -resolved  · H/O asthma, no home inhalers  · Former smoker      PLAN:  Mechanical ventilation as per my orders.  The ventilator was adjusted by me at the bedside for unstable, life threatening respiratory failure  Advance ET tube 1 cm  Increase RR 30   Continue with prone ventilation  Follow ABG and chest x-ray while on the ventilator  Supplemental oxygen to maintain SaO2 >92%; wean as tolerated  IV Versed/Propofol for sedation, target RASS -2, with daily spontaneous awakening trial   Follow TG   Fentanyl and Versed PRN, gtt as needed  Head of bed 30 degrees or higher at all times  Daily spontaneous breathing trial once PEEP less than 8, FiO2 less than 55%  IV Levophed to keep MAP > 65 mmHg or SBP>90  Levaquin day #4  No Remdesevir given late presentation  Decadron D11, 6 mg daily   Baricitinib D#10, monitor liver, kidney, leukocytes   PRN albuterol MDI  Tube feed - trophic   Blood sugar control ISS, with goal 150-180  Prophylaxis: Pepcid, Lovenox 40 BID, Bactroban   Full code         Due to at least single organ failure and risk of rapid deterioration, I spent 31 minutes of Critical care time reviewing labs/films, examining patient, collaborating with other physicians. This does not include time performing critical procedures.

## 2022-01-22 NOTE — PROGRESS NOTES
01/22/22 0431   Vent Information   Vent Type 840   Vent Mode AC/VC   Vt Ordered 320 mL   Rate Set 26 bmp   Peak Flow 50 L/min   FiO2  80 %   SpO2 97 %   SpO2/FiO2 ratio 121.25   Sensitivity 3   PEEP/CPAP 14   Humidification Source Heated wire   Humidification Temp Measured 37   Circuit Condensation Drained   Vent Patient Data   Peak Inspiratory Pressure 28 cmH2O   Mean Airway Pressure 18 cmH20   Rate Measured 26 br/min   Vt Exhaled 354 mL   Minute Volume 8.7 Liters   I:E Ratio 1:2.3   Cough/Sputum   Sputum How Obtained Endotracheal   Cough Non-productive   Sputum Amount None   Spontaneous Breathing Trial (SBT) RT Doc   Pulse 78   Breath Sounds   Right Upper Lobe Diminished   Right Middle Lobe Diminished   Right Lower Lobe Diminished   Left Upper Lobe Diminished   Left Lower Lobe Diminished   Additional Respiratory  Assessments   Resp 20   Alarm Settings   High Pressure Alarm 45 cmH2O   Low Minute Volume Alarm 3 L/min   Apnea (secs) 20 secs   High Respiratory Rate 50 br/min   Low Exhaled Vt  250 mL   Patient Observation   Observations 8ett 23 at lip

## 2022-01-22 NOTE — PROGRESS NOTES
01/21/22 2112   Vent Information   Vent Type 840   Vent Mode AC/VC   Vt Ordered 320 mL   Rate Set 26 bmp   Peak Flow 50 L/min   FiO2  80 %   SpO2 95 %   SpO2/FiO2 ratio 118.75   Sensitivity 3   PEEP/CPAP 14   Humidification Source Heated wire   Humidification Temp 37   Humidification Temp Measured 36.8   Circuit Condensation Drained   Vent Patient Data   Peak Inspiratory Pressure 31 cmH2O   Mean Airway Pressure 21 cmH20   Rate Measured 30 br/min   Vt Exhaled 366 mL   Minute Volume 10.3 Liters   I:E Ratio 1:2.3   Plateau Pressure 27 [id number]   Static Compliance 28 mL/cmH2O   Dynamic Compliance 22 mL/cmH2O   Cough/Sputum   Sputum How Obtained Endotracheal;Suctioned   Cough Non-productive   Sputum Amount None   Tenacity None   Spontaneous Breathing Trial (SBT) RT Doc   Pulse 98   Breath Sounds   Right Upper Lobe Diminished   Right Middle Lobe Diminished   Right Lower Lobe Diminished   Left Upper Lobe Diminished   Left Lower Lobe Diminished   Additional Respiratory  Assessments   Resp 23   Position Prone   Alarm Settings   High Pressure Alarm 45 cmH2O   Low Minute Volume Alarm 3 L/min   Apnea (secs) 20 secs   High Respiratory Rate 50 br/min   Low Exhaled Vt  250 mL   Patient Observation   Observations ETT SIZE 8.0, SECURED AT 23 LIP LINE. AMBU BAG AT HEAD OF BED.   WATER   ETT (adult)   Placement Date/Time: 01/21/22 0330   Tube Size: 8 mm  Location: Oral  Secured at: 23 cm  Measured From: Lips   Secured at 23 cm   Measured From [address] [place] [address],[address] By Cloth tape;Twill tape   Site Condition Dry

## 2022-01-22 NOTE — PROGRESS NOTES
Pt placed supine.  RR increased to 30  FiO2 decraesed to 60%           01/22/22 1126   Vent Information   Vent Mode AC/VC   Vt Ordered 320 mL   Rate Set 30 bmp   Peak Flow 50 L/min   FiO2  60 %   SpO2 93 %   SpO2/FiO2 ratio 155   Sensitivity 3   PEEP/CPAP 10   Spontaneous Breathing Trial (SBT) RT Doc   Pulse 67   Additional Respiratory  Assessments   Resp 30

## 2022-01-22 NOTE — PLAN OF CARE
Problem: Airway Clearance - Ineffective  Goal: Achieve or maintain patent airway  Outcome: Ongoing     Problem: Gas Exchange - Impaired  Goal: Absence of hypoxia  Outcome: Ongoing  Goal: Promote optimal lung function  Outcome: Ongoing     Problem: Breathing Pattern - Ineffective  Goal: Ability to achieve and maintain a regular respiratory rate  Outcome: Ongoing     Problem:  Body Temperature -  Risk of, Imbalanced  Goal: Ability to maintain a body temperature within defined limits  Outcome: Ongoing  Goal: Will regain or maintain usual level of consciousness  Outcome: Ongoing  Goal: Complications related to the disease process, condition or treatment will be avoided or minimized  Outcome: Ongoing     Problem: Isolation Precautions - Risk of Spread of Infection  Goal: Prevent transmission of infection  Outcome: Ongoing     Problem: Nutrition Deficits  Goal: Optimize nutritional status  Outcome: Ongoing     Problem: Risk for Fluid Volume Deficit  Goal: Maintain normal heart rhythm  Outcome: Ongoing  Goal: Maintain absence of muscle cramping  Outcome: Ongoing  Goal: Maintain normal serum potassium, sodium, calcium, phosphorus, and pH  Outcome: Ongoing     Problem: Loneliness or Risk for Loneliness  Goal: Demonstrate positive use of time alone when socialization is not possible  Outcome: Ongoing     Problem: Fatigue  Goal: Verbalize increase energy and improved vitality  Outcome: Ongoing     Problem: Patient Education: Go to Patient Education Activity  Goal: Patient/Family Education  Outcome: Ongoing     Problem: Pain:  Goal: Pain level will decrease  Description: Pain level will decrease  Outcome: Ongoing  Goal: Control of acute pain  Description: Control of acute pain  Outcome: Ongoing  Goal: Control of chronic pain  Description: Control of chronic pain  Outcome: Ongoing     Problem: Nutrition  Goal: Optimal nutrition therapy  1/21/2022 2349 by Yoni Alvarado RN  Outcome: Ongoing  1/21/2022 1850 by Erica Goldstein JENNIFER Xiao, LD  Outcome: Not Met This Shift     Problem: Infection - Central Venous Catheter-Associated Bloodstream Infection:  Goal: Will show no infection signs and symptoms  Description: Will show no infection signs and symptoms  Outcome: Ongoing     Problem: Non-Violent Restraints  Goal: Removal from restraints as soon as assessed to be safe  Outcome: Ongoing  Goal: No harm/injury to patient while restraints in use  Outcome: Ongoing  Goal: Patient's dignity will be maintained  Outcome: Ongoing

## 2022-01-22 NOTE — PROGRESS NOTES
Patient calm, versed effective, able to suction, turn patient without awakening, VS stable, will continue to monitor.

## 2022-01-22 NOTE — PROGRESS NOTES
Shift assessment completed, see flow sheet. RASS -4. Pt currently in prone position    Intubated and sedated on AC # 23 ETT, at 8 LL. 26 / 320 / 70 %/ +14. SpO2 99%. Respirations are easy, even, and unlabored. Bilateral lung sounds diminished. VSS  Sinus rhythm on the monitor  OG in place at 60, with TF , 30 residual.      PICC, WNL with fentanyl infusing at 200 mcg/hr  Propofol infusing 50 mcg/kg/min  Versed 2 mg/hr  Levophed 2 mcg/min    All lines and monitoring devices in place. Mcguire is patent and secured. Bilateral soft wrist restraints in place for patient safety. Bed in lowest position with wheels locked. No needs expressed at this time. Will continue to monitor.

## 2022-01-23 NOTE — PROGRESS NOTES
Patient remains sedated RASS -3 to -4, stopped versed, only on propofol and fentanyl at this time. Lung sounds clear, oral care complete, solomon care and lynch care done, turned and repositioned.

## 2022-01-23 NOTE — PROGRESS NOTES
01/23/22 0513   Vent Information   FiO2  65 %   SpO2 94 %   SpO2/FiO2 ratio 144.62   Spontaneous Breathing Trial (SBT) RT Doc   Pulse 92   Additional Respiratory  Assessments   Resp 30

## 2022-01-23 NOTE — PROGRESS NOTES
01/22/22 2048   Vent Information   Vent Type 840   Vent Mode AC/VC   Vt Ordered 320 mL   Rate Set 30 bmp   Peak Flow 50 L/min   FiO2  55 %   SpO2 94 %   SpO2/FiO2 ratio 170.91   Sensitivity 3   PEEP/CPAP 10   Humidification Source Heated wire   Humidification Temp 37   Humidification Temp Measured 36.9   Circuit Condensation Drained   Vent Patient Data   Peak Inspiratory Pressure 26 cmH2O   Mean Airway Pressure 15 cmH20   Rate Measured 30 br/min   Vt Exhaled 351 mL   Minute Volume 10.3 Liters   I:E Ratio 1:1.9   Plateau Pressure 25 [id number]   Static Compliance 23 mL/cmH2O   Dynamic Compliance 22 mL/cmH2O   Cough/Sputum   Sputum How Obtained Endotracheal;Suctioned   Cough Productive   Sputum Amount Small   Sputum Color Creamy   Tenacity Thick   Spontaneous Breathing Trial (SBT) RT Doc   Pulse 60   Breath Sounds   Right Upper Lobe Diminished   Right Middle Lobe Diminished   Right Lower Lobe Diminished   Left Upper Lobe Diminished   Left Lower Lobe Diminished   Additional Respiratory  Assessments   Resp 30   Position Semi-Mohamud's   Alarm Settings   High Pressure Alarm 45 cmH2O   Low Minute Volume Alarm 3 L/min   Apnea (secs) 20 secs   High Respiratory Rate 45 br/min   Low Exhaled Vt  250 mL   Patient Observation   Observations ETT SIZE 8.0, SECURED AT 24 LIP LINE. AMBU BAG AT HEAD OF BED. WATER GOOD.     ETT (adult)   Placement Date/Time: 01/21/22 0330   Tube Size: 8 mm  Location: Oral  Secured at: 23 cm  Measured From: Lips   Secured at 24 cm   Measured From [address] By Cloth tape;Twill tape   Site Condition Dry

## 2022-01-23 NOTE — PROGRESS NOTES
Pulmonary Progress Note  CC: COVID in partially vaccinated patient     Subjective:   Supine overnight   Propofol 50 mcg/kg/min   Fentanyl 175 mcg/hr   Versed off  Levophed 2off  PEEP 10--> 8  FiO2 60%--> 50%        IV line: PICC line 1/20/2022  MV: 1/21/2022      Intake/Output Summary (Last 24 hours) at 1/23/2022 0727  Last data filed at 1/23/2022 0438  Gross per 24 hour   Intake 1301.93 ml   Output 900 ml   Net 401.93 ml         EXAM:   BP (!) 101/56   Pulse 71   Temp 99.2 °F (37.3 °C) (Axillary)   Resp 30   Ht 5' 3\" (1.6 m)   Wt 169 lb (76.7 kg)   SpO2 94%   BMI 29.94 kg/m²  on 30/320  EXAM:  General: ill appearing. Intubated sedated. Eyes: PERRL. No sclera icterus. No conjunctival injection. ENT: No discharge. Pharynx clear. ET tube in place  Neck: Trachea midline. Normal thyroid. Resp: No accessory muscle use. Few crackles. No wheezing. No rhonchi. CV: Regular rate. Regular rhythm. No mumur or rub. No edema. GI: Non-tender. Non-distended. No masses. Skin: Warm and dry. No nodule on exposed extremities. No rash on exposed extremities. Lymph: No cervical LAD. No supraclavicular LAD. M/S: No cyanosis. No joint deformity. No clubbing. Neuro: Intubated sedated. + response to painful stimuli.  + following commands.   Psych: Noncommunicative unable to obtain            Scheduled Meds:   chlorhexidine  15 mL Mouth/Throat BID    mupirocin   Nasal BID    famotidine (PEPCID) injection  20 mg IntraVENous BID    insulin lispro  0-6 Units SubCUTAneous Q4H    enoxaparin  30 mg SubCUTAneous BID    levofloxacin  500 mg IntraVENous Q24H    aspirin  81 mg Oral Daily    clopidogrel  75 mg Oral Daily    ascorbic acid  1,000 mg Oral Daily    baricitinib  4 mg Oral Daily    atorvastatin  10 mg Oral Nightly    sodium chloride flush  5-40 mL IntraVENous 2 times per day    Vitamin D  2,000 Units Oral Daily     Continuous Infusions:   propofol 50 mcg/kg/min (01/23/22 0438)    fentaNYL 200 mcg/hr (01/23/22 5528)    norepinephrine 1 mcg/min (01/23/22 0438)    midazolam Stopped (01/23/22 0058)    dextrose      sodium chloride Stopped (01/22/22 1231)     PRN Meds:  midazolam, albuterol sulfate HFA, fentanNYL, glucose, glucagon (rDNA), dextrose, dextrose bolus (hypoglycemia) **OR** dextrose bolus (hypoglycemia), albuterol sulfate HFA, guaiFENesin-dextromethorphan, benzonatate, sodium chloride flush, sodium chloride, ondansetron **OR** ondansetron, polyethylene glycol, acetaminophen **OR** acetaminophen    Labs:  CBC:   Recent Labs     01/21/22  1634 01/22/22  0450 01/23/22  0420   WBC 13.6* 13.6* 15.3*   HGB 11.7* 11.9* 12.0   HCT 35.4* 35.9* 35.2*   MCV 91.3 91.2 88.3    340 366     BMP:   Recent Labs     01/21/22  0426 01/22/22  0450 01/23/22  0420    133* 136   K 3.7 4.1 4.2   CL 96* 97* 100   CO2 27 28 27   BUN 22* 25* 28*   CREATININE <0.5* <0.5* <0.5*     Micro:  1/17/2022 SARS-CoV-2 positive at urgent care  1/12/2022 SARS-CoV-2 detected  1/21 Tracheal aspirate       Imaging:   CXR 1/23/2022 imaging reviewed by me and showed   Satisfactory ETT position  Satisfactory PICC line position   Bilateral ASD-no changes        ASSESSMENT:  · Acute hypoxemic respiratory failure, progressive life-threatening  · COVID-19 pneumonia in a partially vaccinated patient, s/p J&J vaccination 3/2021  · ARDS  · Leukocytosis   · Hypotension   · Hyperglycemia   · Thrombocytopenia -resolved  · H/O asthma, no home inhalers  · Former smoker      PLAN:  Mechanical ventilation as per my orders.  The ventilator was adjusted by me at the bedside for unstable, life threatening respiratory failure  Continue with prone ventilation  Follow ABG and chest x-ray while on the ventilator  Supplemental oxygen to maintain SaO2 >92%; wean as tolerated  IV Propofol for sedation, target RASS -2, with daily spontaneous awakening trial   Follow TG   Fentanyl and Versed PRN, gtt as needed  Head of bed 30 degrees or higher at all times  Daily spontaneous breathing trial once PEEP less than 8, FiO2 less than 55%  IV Levophed to keep MAP > 65 mmHg or SBP>90  Levaquin day #5  No Remdesevir given late presentation  Decadron D12, 6 mg daily   Baricitinib D#11, monitor liver, kidney, leukocytes   PRN albuterol MDI  Tube feed - advance today   Blood sugar control ISS, with goal 150-180  Prophylaxis: Pepcid, Lovenox 40 BID, Bactroban   Full code         Due to at least single organ failure and risk of rapid deterioration, I spent 31 minutes of Critical care time reviewing labs/films, examining patient, collaborating with other physicians. This does not include time performing critical procedures.

## 2022-01-23 NOTE — PROGRESS NOTES
Shift assessment completed as documented. Pt sedated and mechancially ventilated. RASS -2. VSS Lungs diminished. tbue feeding infusing. NSR without ectopy. Mcguire patent to bsd. Gio wrist restraints.

## 2022-01-23 NOTE — PROGRESS NOTES
End of shift report given to Bryan Kerns RN at bedside. Patient sedated and intubated. Bed in lowest position with wheels locked. Call light within reach. No further needs at this time.      All lines in tact, lynch intact

## 2022-01-23 NOTE — PROGRESS NOTES
FiO2 decreased to 50%, peep decreased to 8cwp         01/23/22 1120   Vent Information   Vent Mode AC/VC   Vt Ordered 320 mL   Rate Set 30 bmp   Peak Flow 50 L/min   FiO2  50 %   SpO2 93 %   SpO2/FiO2 ratio 186   Sensitivity 3   PEEP/CPAP 8   Humidification Source Heated wire   Humidification Temp Measured 37   Vent Patient Data   Peak Inspiratory Pressure 24 cmH2O   Mean Airway Pressure 14 cmH20   Rate Measured 30 br/min   Vt Exhaled 374 mL   Minute Volume 10.9 Liters   Cough/Sputum   Sputum How Obtained None   Spontaneous Breathing Trial (SBT) RT Doc   Pulse 109   Breath Sounds   Right Upper Lobe Diminished   Right Middle Lobe Diminished   Right Lower Lobe Diminished   Left Upper Lobe Diminished   Left Lower Lobe Diminished   Additional Respiratory  Assessments   Resp 14   Position Reverse Trendelenburg   Oral Care Completed? Yes   Oral Care Mouth suctioned   Subglottic Suction Done?  Yes   Alarm Settings   High Pressure Alarm 45 cmH2O   Low Minute Volume Alarm 3 L/min   Apnea (secs) 20 secs   High Respiratory Rate 45 br/min   Low Exhaled Vt  250 mL   ETT (adult)   Placement Date/Time: 01/21/22 0330   Tube Size: 8 mm  Location: Oral  Secured at: 23 cm  Measured From: Lips   Secured at 24 cm   Measured From Lips   ET Placement Left   Secured By [employer] tube nicole   Site Condition Dry

## 2022-01-24 NOTE — PROGRESS NOTES
01/24/22 0020   Vent Information   Vent Type 840   Vent Mode AC/VC   Vt Ordered 320 mL   Rate Set 30 bmp   Peak Flow 50 L/min   FiO2  50 %   SpO2 93 %   SpO2/FiO2 ratio 186   Sensitivity 3   PEEP/CPAP 5   Humidification Source Heated wire   Humidification Temp 37   Humidification Temp Measured 37   Circuit Condensation Drained   Vent Patient Data   Peak Inspiratory Pressure 24 cmH2O   Mean Airway Pressure 14 cmH20   Rate Measured 30 br/min   Vt Exhaled 344 mL   Minute Volume 10.3 Liters   I:E Ratio 1:1.9   Plateau Pressure 22 BIR51   Cough/Sputum   Sputum How Obtained Endotracheal;Suctioned   Cough Productive   Sputum Amount Small   Sputum Color Creamy   Tenacity Thick   Spontaneous Breathing Trial (SBT) RT Doc   Pulse 65   Breath Sounds   Right Upper Lobe Diminished   Right Middle Lobe Diminished   Right Lower Lobe Diminished   Left Upper Lobe Diminished   Left Lower Lobe Diminished   Additional Respiratory  Assessments   Resp 30   Position Semi-Mohamud's   Alarm Settings   High Pressure Alarm 45 cmH2O   Low Minute Volume Alarm 3 L/min   Apnea (secs) 20 secs   High Respiratory Rate 45 br/min   Low Exhaled Vt  250 mL   Patient Observation   Observations ETT SIZE 8.0, SECURED AT 24 LIP LINE. AMBU BAG AT HEAD OF BED. WATER GOOD.     ETT (adult)   Placement Date/Time: 01/21/22 0330   Tube Size: 8 mm  Location: Oral  Secured at: 23 cm  Measured From: Lips   Secured at 24 cm   Measured From Lips   ET Placement Left   Secured By Cloth tape;Twill tape   Site Condition Dry

## 2022-01-24 NOTE — CARE COORDINATION
INTERDISCIPLINARY PLAN OF CARE CONFERENCE    Date/Time: 1/24/2022 11:09 AM  Completed by: Elías Adam RN, Case Management      Patient Name:  Vicki Burleson  YOB: 1950  Admitting Diagnosis: Acute respiratory failure with hypoxia ([facility] [address] [J96.01]  Pneumonia due to COVID-19 virus [U07.1, J12.82]  COVID-19 [U07.1]     Admit Date/Time:  1/12/2022  9:40 AM    Chart reviewed. Interdisciplinary team contacted or reviewed plan related to patient progress and discharge plans. Disciplines included Case Management, Nursing, and Dietitian. Current Status:inpt,ICU LOC  PT/OT recommendation for discharge plan of care: tbd    Expected D/C Disposition:  tbd    Discharge Plan Comments: Reviewed chart. Pt cont in ICU on Ventilator,Supine overnight,C-19+,SBT this AM. Pt from home alone. CM following and will develop d/c plan pending medical progress.      Home O2 in place on admit: No  Pt informed of need to bring portable home O2 tank on day of discharge for nursing to connect prior to leaving:  Not Indicated  Verbalized agreement/Understanding:  Not Indicated

## 2022-01-24 NOTE — PROGRESS NOTES
01/24/22 0417   Vent Information   Vent Type 840   Vent Mode AC/VC   Vt Ordered 320 mL   Rate Set 30 bmp   Peak Flow 50 L/min   FiO2  50 %   SpO2 96 %   SpO2/FiO2 ratio 192   Sensitivity 3   PEEP/CPAP 5   Humidification Source Heated wire   Humidification Temp Measured 37   Circuit Condensation Drained   Vent Patient Data   Peak Inspiratory Pressure 23 cmH2O   Mean Airway Pressure 14 cmH20   Rate Measured 30 br/min   Vt Exhaled 363 mL   Minute Volume 11.3 Liters   I:E Ratio 1:1.9   Cough/Sputum   Sputum How Obtained Endotracheal   Cough Productive   Sputum Amount Moderate   Sputum Color Yellow   Tenacity Thick   Spontaneous Breathing Trial (SBT) RT Doc   Pulse 103   Breath Sounds   Right Upper Lobe Diminished   Right Middle Lobe Diminished   Right Lower Lobe Diminished   Left Upper Lobe Diminished   Left Lower Lobe Diminished   Additional Respiratory  Assessments   Resp 22   Alarm Settings   High Pressure Alarm 45 cmH2O   Low Minute Volume Alarm 3 L/min   Apnea (secs) 20 secs   High Respiratory Rate 45 br/min   Low Exhaled Vt  250 mL   Patient Observation   Observations 8ett 23 at lip

## 2022-01-24 NOTE — PROGRESS NOTES
Shift assessment completed as documented on flowsheet. VSS Pt opens eyes to verbal stimuli. Lungs diminished, NSR on monitor no ectopy. Mcguire patent eusebio wrist restrains. Turned tube feeding off and started to decrease sedation for SBT and SAT.

## 2022-01-24 NOTE — PROGRESS NOTES
Pulmonary Progress Note  CC: COVID in partially vaccinated patient     Subjective:   Supine overnight   Propofol 30 mcg/kg/min - off this am for SBT trial   Fentanyl 50 mcg/hr - off this am for SBT trial   Precedex 1 mcg/kg/hr  Versed off  Levophed off  PEEP 5  FiO2 45%        IV line: PICC line 1/20/2022  MV: 1/21/2022      Intake/Output Summary (Last 24 hours) at 1/24/2022 8491  Last data filed at 1/24/2022 0600  Gross per 24 hour   Intake 923.15 ml   Output 450 ml   Net 473.15 ml         EXAM:   BP (!) 97/55   Pulse 63   Temp 99 °F (37.2 °C) (Axillary)   Resp 30   Ht 5' 3\" (1.6 m)   Wt 169 lb (76.7 kg)   SpO2 96%   BMI 29.94 kg/m²  on 30/320  EXAM:  General: ill appearing. Intubated sedated. Eyes: PERRL. No sclera icterus. No conjunctival injection. ENT: No discharge. Pharynx clear. ET tube in place  Neck: Trachea midline. Normal thyroid. Resp: No accessory muscle use. Few crackles. No wheezing. No rhonchi. CV: Regular rate. Regular rhythm. No mumur or rub. No edema. GI: Non-tender. Non-distended. No masses. Skin: Warm and dry. No nodule on exposed extremities. No rash on exposed extremities. Lymph: No cervical LAD. No supraclavicular LAD. M/S: No cyanosis. No joint deformity. No clubbing. Neuro: Intubated. Alert and awake.  + response to painful stimuli.  + following commands.  Good cough  Psych: Noncommunicative unable to obtain            Scheduled Meds:   chlorhexidine  15 mL Mouth/Throat BID    mupirocin   Nasal BID    famotidine (PEPCID) injection  20 mg IntraVENous BID    insulin lispro  0-6 Units SubCUTAneous Q4H    enoxaparin  30 mg SubCUTAneous BID    levofloxacin  500 mg IntraVENous Q24H    aspirin  81 mg Oral Daily    clopidogrel  75 mg Oral Daily    ascorbic acid  1,000 mg Oral Daily    baricitinib  4 mg Oral Daily    atorvastatin  10 mg Oral Nightly    sodium chloride flush  5-40 mL IntraVENous 2 times per day    Vitamin D  2,000 Units Oral Daily     Continuous Infusions:   dexmedetomidine HCl in NaCl 0.4 mcg/kg/hr (01/24/22 0408)    propofol 15 mcg/kg/min (01/24/22 0220)    fentaNYL 75 mcg/hr (01/24/22 0355)    norepinephrine Stopped (01/23/22 0730)    midazolam Stopped (01/23/22 0058)    dextrose      sodium chloride Stopped (01/22/22 1231)     PRN Meds:  midazolam, albuterol sulfate HFA, fentanNYL, glucose, glucagon (rDNA), dextrose, dextrose bolus (hypoglycemia) **OR** dextrose bolus (hypoglycemia), albuterol sulfate HFA, guaiFENesin-dextromethorphan, benzonatate, sodium chloride flush, sodium chloride, ondansetron **OR** ondansetron, polyethylene glycol, acetaminophen **OR** acetaminophen    Labs:  CBC:   Recent Labs     01/22/22 0450 01/23/22 0420 01/24/22 0459   WBC 13.6* 15.3* 7.8   HGB 11.9* 12.0 11.0*   HCT 35.9* 35.2* 31.6*   MCV 91.2 88.3 88.9    366 266     BMP:   Recent Labs     01/22/22 0450 01/23/22 0420 01/24/22 0459   * 136 131*   K 4.1 4.2 3.7   CL 97* 100 95*   CO2 28 27 27   BUN 25* 28* 21*   CREATININE <0.5* <0.5* <0.5*     Micro:  1/17/2022 SARS-CoV-2 positive at urgent care  1/12/2022 SARS-CoV-2 detected  1/21 Tracheal aspirate       Imaging:   CXR 1/24/2022 imaging reviewed by me and showed   Satisfactory ETT position  Satisfactory PICC line position   Bilateral ASD- worse         ASSESSMENT:  · Acute hypoxemic respiratory failure, progressive life-threatening  · COVID-19 pneumonia in a partially vaccinated patient, s/p J&J vaccination 3/2021  · ARDS  · Leukocytosis   · Hypotension   · Hyperglycemia   · Thrombocytopenia -resolved  · H/O asthma, no home inhalers  · Former smoker      PLAN:  Mechanical ventilation as per my orders.  The ventilator was adjusted by me at the bedside for unstable, life threatening respiratory failure  Continue with prone ventilation  Follow ABG and chest x-ray while on the ventilator  Supplemental oxygen to maintain SaO2 >92%; wean as tolerated  IV Precedex for sedation, target RASS -2, with daily spontaneous awakening trial   Keep off propofol and Fentanyl   Follow TG   Head of bed 30 degrees or higher at all times  Daily spontaneous breathing trial once PEEP less than 8, FiO2 less than 55%  IV Levophed to keep MAP > 65 mmHg or SBP>90  Levaquin day #6/7  No Remdesevir given late presentation  Decadron D13, 6 mg daily   Baricitinib D#12, monitor liver, kidney, leukocytes   PRN albuterol MDI  Tube feed at goal   Lasix 40 mg IV x 1   Blood sugar control ISS, with goal 150-180  Prophylaxis: Pepcid, Lovenox 40 BID, Bactroban   Full code         Due to at least single organ failure and risk of rapid deterioration, I spent 31 minutes of Critical care time reviewing labs/films, examining patient, collaborating with other physicians. This does not include time performing critical procedures.

## 2022-01-24 NOTE — PROGRESS NOTES
Reassessment completed, Pt spo2 88% on current mechanical ventilation settings.  Notified RT to end SBT

## 2022-01-24 NOTE — PROGRESS NOTES
Hospitalist Progress Note      PCP: Sarahi Shay DO    Date of Admission: 1/12/2022    Subjective: awake, on SBT    Medications:  Reviewed    Infusion Medications    dexmedetomidine HCl in NaCl 1.6 mcg/kg/hr (01/24/22 1602)    propofol Stopped (01/24/22 0915)    fentaNYL Stopped (01/24/22 0915)    norepinephrine Stopped (01/23/22 0730)    midazolam Stopped (01/23/22 0058)    dextrose      sodium chloride Stopped (01/22/22 1231)     Scheduled Medications    chlorhexidine  15 mL Mouth/Throat BID    mupirocin   Nasal BID    famotidine (PEPCID) injection  20 mg IntraVENous BID    insulin lispro  0-6 Units SubCUTAneous Q4H    enoxaparin  30 mg SubCUTAneous BID    levofloxacin  500 mg IntraVENous Q24H    aspirin  81 mg Oral Daily    clopidogrel  75 mg Oral Daily    ascorbic acid  1,000 mg Oral Daily    baricitinib  4 mg Oral Daily    atorvastatin  10 mg Oral Nightly    sodium chloride flush  5-40 mL IntraVENous 2 times per day    Vitamin D  2,000 Units Oral Daily     PRN Meds: midazolam, albuterol sulfate HFA, fentanNYL, glucose, glucagon (rDNA), dextrose, dextrose bolus (hypoglycemia) **OR** dextrose bolus (hypoglycemia), albuterol sulfate HFA, guaiFENesin-dextromethorphan, benzonatate, sodium chloride flush, sodium chloride, ondansetron **OR** ondansetron, polyethylene glycol, acetaminophen **OR** acetaminophen      Intake/Output Summary (Last 24 hours) at 1/24/2022 1840  Last data filed at 1/24/2022 1510  Gross per 24 hour   Intake 1382.1 ml   Output 2350 ml   Net -967.9 ml       Physical Exam Performed:    BP (!) 107/58   Pulse 70   Temp 98.2 °F (36.8 °C) (Axillary)   Resp (!) 32   Ht 5' 3\" (1.6 m)   Wt 169 lb (76.7 kg)   SpO2 92%   BMI 29.94 kg/m²     Patient seen in droplet plus precautions  General:  Elderly female, on vent  Oral ETT and OG noted   Mucous Membranes:  Pink , anicteric  Neck: No JVD, no carotid bruit, no thyromegaly  Chest: diminished in bases, bilateral mild crackles present . Cardiovascular:  RRR S1S2 heard, no murmurs or gallops  Abdomen:  Soft, undistended, non tender, no organomegaly, BS present  Extremities: No edema or cyanosis. Distal pulses well felt  Neurological : awake on vent    Labs:   Recent Labs     01/22/22 0450 01/23/22  0420 01/24/22  0459   WBC 13.6* 15.3* 7.8   HGB 11.9* 12.0 11.0*   HCT 35.9* 35.2* 31.6*    366 266     Recent Labs     01/22/22  0450 01/23/22  0420 01/24/22  0459   * 136 131*   K 4.1 4.2 3.7   CL 97* 100 95*   CO2 28 27 27   BUN 25* 28* 21*   CREATININE <0.5* <0.5* <0.5*   CALCIUM 9.3 9.4 8.0*     No results for input(s): AST, ALT, BILIDIR, BILITOT, ALKPHOS in the last 72 hours. No results for input(s): INR in the last 72 hours. No results for input(s): [employer] in the last 72 hours. Urinalysis:      Lab Results   Component Value Date    NITRU Neg 08/29/2010    WBCUA  08/29/2010    BACTERIA 1+ 08/29/2010    RBCUA 5-10 08/29/2010    BLOODU Neg 08/29/2010    SPECGRAV 1.010 08/29/2010    GLUCOSEU Neg 08/29/2010       Radiology:  XR CHEST PORTABLE   Final Result   Extensive bilateral airspace opacities are seen without significant change. XR CHEST PORTABLE   Final Result   1. Recommend retraction of endotracheal tube 1.0 cm.   2. Stable severe ill-defined lung consolidation, greatest at the lung bases   consistent with pneumonia versus alveolar edema. 3. Suspected mild bilateral pleural effusion, unchanged. XR CHEST PORTABLE   Final Result   1. No significant change. XR CHEST PORTABLE   Final Result   Appropriate positioning of the endotracheal tube. Enteric tube courses   beneath the diaphragm; tip is excluded by collimation inferiorly. No significant change in extensive bilateral airspace disease. IR PICC WO SQ PORT/PUMP > 5 YEARS   Final Result   Successful placement of PICC line.          XR CHEST PORTABLE   Final Result   New multifocal moderate to severe pneumonia. Clinical and imaging follow-up   to resolution recommended. CTA HEAD NECK W CONTRAST   Final Result   Unremarkable CTA of the head and neck. Findings consistent with COVID pneumonia. This scan was analyzed using Viz. ai contact LVO. Identification of suspected   findings is not for diagnostic use beyond notification. Viz LVO is limited to   analysis of imaging data and should not be used in-lieu of full patient   evaluation or relied upon to make or confirm diagnosis. CT HEAD WO CONTRAST   Final Result   No acute intracranial abnormality. XR CHEST PORTABLE   Final Result   Multifocal bilateral atypical pneumonia. XR CHEST PORTABLE    (Results Pending)           Assessment/Plan:    Active Hospital Problems    Diagnosis     Hypotension [I95.9]     Pneumonia due to COVID-19 virus [U07.1, J12.82]     ARDS (adult respiratory distress syndrome) ([facility]) [J80]     Hyperglycemia [R73.9]     Leukocytosis [D72.829]     Moderate protein-calorie malnutrition ([address] [E44.0]     Hyperlipidemia [E78.5]     Arthritis [M19.90]     Brain fog [R41.89]     Anxiety [F41.9]     COVID-19 [U07.1]            COVID PNA  Acute hypoxic respiratory Failure   - CXR: noted with multifocal PNA  - no home O2 at baseline, 83-87% at rest,   - J &J in March 2021- no booster   - Admitted to 2W, droplet +, tele, cont pulse ox  - supp O2, wean as tolerated - worsening slowly -was on 10 L   -progressively worsening hypoxia, was on vapotherm with full support   - eventually placed on vent support 1/21      persistent severe hypoxemia -she was proned on 1/20/2022. She is back in supine position.  -Continue Decadron D# 13  - Remdesivir not started due to out of window- sx 7+ days prior to admit  - levaquin added  - PRN Robitussin  - procal 0.10 wnl   - CRP elevated . Started on Baricitinib D#11/14. Monitor CBC and LFTs  - Lovenox Bid     Episodes of expressive aphasia   - ?  TIA, vs mental status changes related to covid   - cont ASA, Plavix      Thrombocytopenia resolved  - likely with covid  - platelets 93 >880 improved  - continue Lovenox and monitor     HLD  - Continue statin          Arthritis   - was on  mobic - holding     Hx of Asthma  - not on inhalers at home           DVT Prophylaxis: Lovenox bid  Diet: Diet NPO  ADULT TUBE FEEDING; Orogastric; Peptide Based; Continuous; 10; No; 30; Q 3 hours; Protein; one proteinex P2Go TWICE daily  Code Status: Full Code    PT/OT Eval Status: not indicated    Ever Avalos MD

## 2022-01-24 NOTE — PROGRESS NOTES
No signs of distress noted, pt tolerating breathing trial without concerns. Follows commands and remaining calm.

## 2022-01-24 NOTE — PROGRESS NOTES
01/23/22 1925   Vent Information   Vent Type 840   Vent Mode AC/VC   Vt Ordered 320 mL   Rate Set 30 bmp   FiO2  50 %   SpO2 92 %   SpO2/FiO2 ratio 184   Sensitivity 3   PEEP/CPAP 8   Humidification Source Heated wire   Humidification Temp Measured 37   Circuit Condensation Drained   Vent Patient Data   Peak Inspiratory Pressure 25 cmH2O   Mean Airway Pressure 14 cmH20   Rate Measured 30 br/min   Vt Exhaled 344 mL   Minute Volume 10.4 Liters   I:E Ratio 1:1.9   Plateau Pressure 22 [id number]   Static Compliance 24 mL/cmH2O   Dynamic Compliance 21 mL/cmH2O   Cough/Sputum   Sputum How Obtained Endotracheal   Cough Productive   Sputum Amount Small   Sputum Color Creamy   Tenacity Thick   Spontaneous Breathing Trial (SBT) RT Doc   Pulse 65   Breath Sounds   Right Upper Lobe Clear   Right Middle Lobe Clear   Right Lower Lobe Clear   Left Upper Lobe Clear   Left Lower Lobe Clear   Additional Respiratory  Assessments   Resp 30   Alarm Settings   High Pressure Alarm 45 cmH2O   Low Minute Volume Alarm 3 L/min   Apnea (secs) 20 secs   High Respiratory Rate 45 br/min   Low Exhaled Vt  250 mL   Patient Observation   Observations 8ett 23 at lip

## 2022-01-25 NOTE — PROGRESS NOTES
Reassessment completed (see Flowsheet). All ICU lines remain intact, ICU monitoring continued-   Infusing:  Precedex (See MAR)  pt remains In spontaneous 10/8. Opens eyes to voice, agitated at this time. CPOT 3  Lung sounds diminished  pt's blood pressures WDL. Continuing to monitor. 11:52 AM  Pt's daughterElana Remedies Updated on POC for day.

## 2022-01-25 NOTE — PROGRESS NOTES
Pulmonary Progress Note  CC: COVID in partially vaccinated patient     Subjective:   Supine overnight   Propofol off   Fentanyl off  Versed off  Precedex 2 mcg/kg/hr  Levophed off  PEEP 8  FiO2 60%        IV line: PICC line 1/20/2022  MV: 1/21/2022      Intake/Output Summary (Last 24 hours) at 1/25/2022 0708  Last data filed at 1/25/2022 0600  Gross per 24 hour   Intake 710.95 ml   Output 2400 ml   Net -1689.05 ml         EXAM:   BP (!) 125/59   Pulse 73   Temp 98.6 °F (37 °C) (Axillary)   Resp 27   Ht 5' 3\" (1.6 m)   Wt 169 lb (76.7 kg)   SpO2 92%   BMI 29.94 kg/m²  on 30/320  EXAM:  General: ill appearing. Intubated sedated. Eyes: PERRL. No sclera icterus. No conjunctival injection. ENT: No discharge. Pharynx clear. ET tube in place  Neck: Trachea midline. Normal thyroid. Resp: No accessory muscle use. + crackles. No wheezing. No rhonchi. CV: Regular rate. Regular rhythm. No mumur or rub. No edema. GI: Non-tender. Non-distended. No masses. Skin: Warm and dry. No nodule on exposed extremities. No rash on exposed extremities. Lymph: No cervical LAD. No supraclavicular LAD. M/S: No cyanosis. No joint deformity. No clubbing. Neuro: Intubated and awake.   + response to painful stimuli.  + following commands.    Psych: Noncommunicative unable to obtain            Scheduled Meds:   chlorhexidine  15 mL Mouth/Throat BID    mupirocin   Nasal BID    famotidine (PEPCID) injection  20 mg IntraVENous BID    insulin lispro  0-6 Units SubCUTAneous Q4H    enoxaparin  30 mg SubCUTAneous BID    levofloxacin  500 mg IntraVENous Q24H    aspirin  81 mg Oral Daily    clopidogrel  75 mg Oral Daily    ascorbic acid  1,000 mg Oral Daily    baricitinib  4 mg Oral Daily    atorvastatin  10 mg Oral Nightly    sodium chloride flush  5-40 mL IntraVENous 2 times per day    Vitamin D  2,000 Units Oral Daily     Continuous Infusions:   dexmedetomidine HCl in NaCl 2 mcg/kg/hr (01/25/22 0530)    propofol Stopped (01/24/22 0915)    fentaNYL Stopped (01/24/22 0915)    norepinephrine Stopped (01/23/22 0730)    midazolam Stopped (01/23/22 0058)    dextrose      sodium chloride Stopped (01/22/22 1231)     PRN Meds:  midazolam, albuterol sulfate HFA, fentanNYL, glucose, glucagon (rDNA), dextrose, dextrose bolus (hypoglycemia) **OR** dextrose bolus (hypoglycemia), albuterol sulfate HFA, guaiFENesin-dextromethorphan, benzonatate, sodium chloride flush, sodium chloride, ondansetron **OR** ondansetron, polyethylene glycol, acetaminophen **OR** acetaminophen    Labs:  CBC:   Recent Labs     01/23/22 0420 01/24/22 0459 01/25/22  0454   WBC 15.3* 7.8 9.6   HGB 12.0 11.0* 10.6*   HCT 35.2* 31.6* 31.1*   MCV 88.3 88.9 88.3    266 237     BMP:   Recent Labs     01/23/22 0420 01/24/22 0459 01/25/22  0454    131* 135*   K 4.2 3.7 3.3*    95* 98*   CO2 27 27 29   BUN 28* 21* 16   CREATININE <0.5* <0.5* <0.5*     Micro:  1/17/2022 SARS-CoV-2 positive at urgent care  1/12/2022 SARS-CoV-2 detected  1/21 Tracheal aspirate       Imaging:   CXR 1/25/2022 imaging reviewed by me and showed   Satisfactory ETT position  Satisfactory PICC line position   Bilateral ASD- better on the R         ASSESSMENT:  · Acute hypoxemic respiratory failure, progressive life-threatening  · COVID-19 pneumonia in a partially vaccinated patient, s/p J&J vaccination 3/2021  · ARDS  · Leukocytosis   · Hypotension   · Hyperglycemia   · Thrombocytopenia -resolved  · H/O asthma, no home inhalers  · Former smoker      PLAN:  Mechanical ventilation as per my orders.  The ventilator was adjusted by me at the bedside for unstable, life threatening respiratory failure  Change RR 26  SP 10/8 trial as tolerated during the day with AC at night   Continue with prone ventilation  Follow ABG and chest x-ray while on the ventilator  Supplemental oxygen to maintain SaO2 >92%; wean as tolerated  IV Precedex for sedation, target RASS -2, with daily spontaneous awakening trial   Seroquel 12.5 BID   Follow TG   Head of bed 30 degrees or higher at all times  Daily spontaneous breathing trial once PEEP less than 8, FiO2 less than 55%  IV Levophed to keep MAP > 65 mmHg or SBP>90  Levaquin day #7/7  No Remdesevir given late presentation  Decadron D14--> 4 mg daily   Baricitinib D#13/14, monitor liver, kidney, leukocytes   PRN albuterol MDI  Tube feed at goal   Repeat Lasix 40 mg IV x 1   Electrolytes replacement   Blood sugar control ISS, with goal 150-180  Prophylaxis: Pepcid, Lovenox 40 BID, Bactroban   Full code         Due to at least single organ failure and risk of rapid deterioration, I spent 31 minutes of Critical care time reviewing labs/films, examining patient, collaborating with other physicians. This does not include time performing critical procedures.

## 2022-01-25 NOTE — PROGRESS NOTES
Shift assessment was completed (see flow sheet). Pt is Sedated- periods of agitation, thrashing in bed. Pt currently on ventilator- 8 ETT, at [address]. Spontaneous breathing trial- Held. Frequent suctioning when agitated. FiO2 at 60, PEEP 8, SpO2 at 88-90%, Respirations are Even,  with Diminished sounds. Infusing:  Precedex (See MAR). PRN Versed given for agitation and SpO2 drop (See MAR). Oral Gastric tube with tube feed at 30, 0mL residual observed and returned. IV access- PICC and WDL. Mcguire in place with STAT lock, Draining clear/ yellow urine. Scheduled medications to follow. Call light within reach. Bed in lowest position. Bed alarm on. Bilateral Wrist restraints in place and tied, for safety of lines and tubes. Family to be updated. Will continue to monitor    Patient is not able to demonstrated the ability to move from a reclining position to an upright position within the recliner. Patient is confused, demented and /or unable to follow instruction.

## 2022-01-25 NOTE — PROGRESS NOTES
01/24/22 2055   Vent Information   Vent Type 840   Vent Mode AC/VC   Vt Ordered 320 mL   Rate Set 30 bmp   Peak Flow 50 L/min   FiO2  50 %   SpO2 (!) 89 %   SpO2/FiO2 ratio 178   Sensitivity 3   PEEP/CPAP 8   Humidification Source Heated wire   Humidification Temp 37   Humidification Temp Measured 37   Circuit Condensation Drained   Vent Patient Data   Peak Inspiratory Pressure 19 cmH2O   Mean Airway Pressure 12 cmH20   Rate Measured 31 br/min   Vt Exhaled 371 mL   Minute Volume 9 Liters   I:E Ratio 1:1.9   Plateau Pressure 24 [id number]   Cough/Sputum   Sputum How Obtained Endotracheal;Suctioned   Cough Productive   Sputum Amount Small   Sputum Color Creamy   Tenacity Thick   Spontaneous Breathing Trial (SBT) RT Doc   Pulse 71   Additional Respiratory  Assessments   Resp 25   Position Semi-Mohamud's   Alarm Settings   High Pressure Alarm 45 cmH2O   Low Minute Volume Alarm 3 L/min   Apnea (secs) 20 secs   High Respiratory Rate 45 br/min   Low Exhaled Vt  250 mL   ETT (adult)   Placement Date/Time: 01/21/22 0330   Tube Size: 8 mm  Location: Oral  Secured at: 23 cm  Measured From: Lips   Secured at 24 cm   Measured From Lips   ET Placement Right   Secured By [employer] tube nicole   Site Condition Dry

## 2022-01-25 NOTE — PROGRESS NOTES
Reassessment completed (see Flowsheet). All ICU lines remain intact, ICU monitoring continued-   Infusing:  Precedex  pt remains in Spontaneous 10/8, tolerating well. .   Lung sounds diminished  pt's blood pressures WDL. Daughter at bedside and updated on POC for day. Continuing to monitor.

## 2022-01-25 NOTE — PROGRESS NOTES
Care rounds completed with Dr. Giselle Quiros and multidisciplinary team. Reviewed labs, meds, VS, assessment, & plan of care for today. give 40 Meq K+, order for Decadron, lasix once. Add seroquel. At bedside dr Giselle Quiros flipped to Spontaneous 10/8. Informed Writer to keep patient in spontaneous until she desaturates, becomes tachycardic or tachypnea.    See dictated note and new orders for details.      High risk vesicant drug infusing:     Multiple incompatible medications infusing:      CVP Monitoring:      Extremely difficult IV access challenge:      Continued need for central line access:  YES    Addressed with physician:  YES    RIGHT PATIENT, RIGHT TIME, RIGHT LINE

## 2022-01-25 NOTE — PROGRESS NOTES
01/24/22 2338   Vent Information   Vent Type 840   Vent Mode AC/VC   Vt Ordered 302 mL   Rate Set 30 bmp   Peak Flow 50 L/min   FiO2  60 %   SpO2 90 %   SpO2/FiO2 ratio 150   Sensitivity 3   PEEP/CPAP 8   Humidification Source Heated wire   Humidification Temp 37   Humidification Temp Measured 36   Circuit Condensation Drained   Vent Patient Data   Peak Inspiratory Pressure 21 cmH2O   Mean Airway Pressure 14 cmH20   Rate Measured 32 br/min   Vt Exhaled 349 mL   Minute Volume 9.59 Liters   I:E Ratio 1:1.7   Plateau Pressure 21 [id number]   Cough/Sputum   Sputum How Obtained Suctioned;Endotracheal   Cough Non-productive   Spontaneous Breathing Trial (SBT) RT Doc   Pulse 70   Breath Sounds   Right Upper Lobe Diminished   Right Middle Lobe Diminished   Right Lower Lobe Diminished   Left Upper Lobe Diminished   Left Lower Lobe Diminished   Additional Respiratory  Assessments   Resp 22   Position Semi-Mohamud's   Oral Care Completed? Yes   Oral Care Mouth suctioned   Subglottic Suction Done?  Yes   Alarm Settings   High Pressure Alarm 45 cmH2O   Low Minute Volume Alarm 3 L/min   Apnea (secs) 20 secs   High Respiratory Rate 45 br/min   Low Exhaled Vt  250 mL   ETT (adult)   Placement Date/Time: 01/21/22 0330   Tube Size: 8 mm  Location: Oral  Secured at: 23 cm  Measured From: Lips   Secured at 24 cm   Measured From Lips   ET Placement Left   Secured By [employer] tube nicole   Site Condition Dry

## 2022-01-25 NOTE — PROGRESS NOTES
01/25/22 0244   Vent Information   $Ventilation $Subsequent Day   Vent Type 840   Vent Mode AC/VC   Vt Ordered 320 mL   Rate Set 30 bmp   Peak Flow 60 L/min   FiO2  60 %   SpO2 92 %   SpO2/FiO2 ratio 153.33   Sensitivity 3   PEEP/CPAP 8   Humidification Source Heated wire   Humidification Temp 37   Humidification Temp Measured 36   Circuit Condensation Drained   Vent Patient Data   Peak Inspiratory Pressure 21 cmH2O   Mean Airway Pressure 14 cmH20   Rate Measured 34 br/min   Vt Exhaled 332 mL   Minute Volume 11.1 Liters   I:E Ratio 1:2.4   Cough/Sputum   Sputum How Obtained Suctioned;Endotracheal   Cough Non-productive   Spontaneous Breathing Trial (SBT) RT Doc   Pulse 73   Breath Sounds   Right Upper Lobe Diminished   Right Middle Lobe Diminished   Right Lower Lobe Diminished   Left Upper Lobe Diminished   Left Lower Lobe Diminished   Additional Respiratory  Assessments   Resp 30   Position Semi-Mohamud's   Oral Care Completed? Yes   Oral Care Mouth suctioned   Subglottic Suction Done?  Yes   Alarm Settings   High Pressure Alarm 45 cmH2O   Low Minute Volume Alarm 3 L/min   Apnea (secs) 20 secs   High Respiratory Rate 45 br/min   Low Exhaled Vt  250 mL   ETT (adult)   Placement Date/Time: 01/21/22 0330   Tube Size: 8 mm  Location: Oral  Secured at: 23 cm  Measured From: Lips   Secured at 24 cm   Measured From Lips   ET Placement Right   Secured By [employer] tube nicole   Site Condition Dry

## 2022-01-25 NOTE — PROGRESS NOTES
Pt Given PRN Versed. (see MAR)    SpO2 88%, Peak Pressuring Vent. Requiring frequent ETT suctioning. 7:53 AM  Dr Claudeen Curl on floor- updated.

## 2022-01-26 NOTE — PROGRESS NOTES
01/26/22 1522   Vent Information   Equipment Changed Humidification   Vent Type 840   Vent Mode AC/VC   Vt Ordered 320 mL   Rate Set 26 bmp   Peak Flow 60 L/min   FiO2  60 %   SpO2 96 %   SpO2/FiO2 ratio 160   Sensitivity 3   PEEP/CPAP 8   Humidification Source Heated wire   Humidification Temp 37   Circuit Condensation Drained   Vent Patient Data   Peak Inspiratory Pressure 26 cmH2O   Mean Airway Pressure 13 cmH20   Rate Measured 26 br/min   Vt Exhaled 339 mL   Minute Volume 8.73 Liters   I:E Ratio 1:3   Cough/Sputum   Cough None   Spontaneous Breathing Trial (SBT) RT Doc   Pulse (!) 49   Breath Sounds   Right Upper Lobe Diminished;Clear   Right Middle Lobe Diminished   Right Lower Lobe Diminished   Left Upper Lobe Diminished;Clear   Left Lower Lobe Diminished   Additional Respiratory  Assessments   Resp 26   Position Semi-Mohamud's   Oral Care Completed? Yes   Oral Care Mouth suctioned   Subglottic Suction Done?  Yes   Alarm Settings   High Pressure Alarm 45 cmH2O   Low Minute Volume Alarm 3 L/min   Apnea (secs) 20 secs   High Respiratory Rate 45 br/min   Low Exhaled Vt  250 mL   Patient Observation   Observations 8ett 23 at lip   ETT (adult)   Placement Date/Time: 01/21/22 0330   Tube Size: 8 mm  Location: Oral  Secured at: 23 cm  Measured From: Lips   Secured at 23 cm   Measured From Lips   ET Placement Right   Secured By [employer] tube nicole

## 2022-01-26 NOTE — PROGRESS NOTES
Attempted to wean precedex to 0.8 mcg/kg/hr for SBT. Within 15 mins Pt became very agitated, sitting up in bed, alarming vent. SPO2 dropped to 85%. PRN versed provided and precedex increased back 2 mcg/kg/hr. Agitation resolved. RT made aware. Will continue to monitor.

## 2022-01-26 NOTE — CARE COORDINATION
INTERDISCIPLINARY PLAN OF CARE CONFERENCE    Date/Time: 1/26/2022 1:59 PM  Completed by: Divya Montenegro RN, Case Management      Patient Name:  Rosalia Pruett  YOB: 1950  Admitting Diagnosis: Acute respiratory failure with hypoxia ([facility] [address] [J96.01]  Pneumonia due to COVID-19 virus [U07.1, J12.82]  COVID-19 [U07.1]     Admit Date/Time:  1/12/2022  9:40 AM    Chart reviewed. Interdisciplinary team contacted or reviewed plan related to patient progress and discharge plans. Disciplines included Case Management, Nursing, and Dietitian. Current Status: ICU/VENT  PT/OT recommendation for discharge plan of care:   SBT attempted today/failed. Remains intubated.    CM following

## 2022-01-26 NOTE — PROGRESS NOTES
gallops  Abdomen:  Soft, undistended, non tender, no organomegaly, BS present  Extremities: No edema or cyanosis. Distal pulses well felt  Neurological : awake on vent    Labs:   Recent Labs     01/23/22 0420 01/24/22 0459 01/25/22 0454   WBC 15.3* 7.8 9.6   HGB 12.0 11.0* 10.6*   HCT 35.2* 31.6* 31.1*    266 237     Recent Labs     01/23/22 0420 01/24/22 0459 01/25/22 0454    131* 135*   K 4.2 3.7 3.3*    95* 98*   CO2 27 27 29   BUN 28* 21* 16   CREATININE <0.5* <0.5* <0.5*   CALCIUM 9.4 8.0* 7.9*     Recent Labs     01/25/22 0454   AST 18   ALT 16   BILIDIR <0.2   BILITOT 0.3   ALKPHOS 50     No results for input(s): INR in the last 72 hours. No results for input(s): Myrla Greulich in the last 72 hours. Urinalysis:      Lab Results   Component Value Date    NITRU Neg 08/29/2010    WBCUA  08/29/2010    BACTERIA 1+ 08/29/2010    RBCUA 5-10 08/29/2010    BLOODU Neg 08/29/2010    SPECGRAV 1.010 08/29/2010    GLUCOSEU Neg 08/29/2010       Radiology:  XR CHEST PORTABLE   Final Result   Mildly improved parenchymal infiltrates at the right lung base, otherwise   similar appearing chest.         XR CHEST PORTABLE   Final Result   Extensive bilateral airspace opacities are seen without significant change. XR CHEST PORTABLE   Final Result   1. Recommend retraction of endotracheal tube 1.0 cm.   2. Stable severe ill-defined lung consolidation, greatest at the lung bases   consistent with pneumonia versus alveolar edema. 3. Suspected mild bilateral pleural effusion, unchanged. XR CHEST PORTABLE   Final Result   1. No significant change. XR CHEST PORTABLE   Final Result   Appropriate positioning of the endotracheal tube. Enteric tube courses   beneath the diaphragm; tip is excluded by collimation inferiorly. No significant change in extensive bilateral airspace disease.          IR PICC WO SQ PORT/PUMP > 5 YEARS   Final Result   Successful placement of PICC line.         XR CHEST PORTABLE   Final Result   New multifocal moderate to severe pneumonia. Clinical and imaging follow-up   to resolution recommended. CTA HEAD NECK W CONTRAST   Final Result   Unremarkable CTA of the head and neck. Findings consistent with COVID pneumonia. This scan was analyzed using Viz. ai contact LVO. Identification of suspected   findings is not for diagnostic use beyond notification. Viz LVO is limited to   analysis of imaging data and should not be used in-lieu of full patient   evaluation or relied upon to make or confirm diagnosis. CT HEAD WO CONTRAST   Final Result   No acute intracranial abnormality. XR CHEST PORTABLE   Final Result   Multifocal bilateral atypical pneumonia. XR CHEST PORTABLE    (Results Pending)           Assessment/Plan:    Active Hospital Problems    Diagnosis     Hypotension [I95.9]     Pneumonia due to COVID-19 virus [U07.1, J12.82]     ARDS (adult respiratory distress syndrome) ([facility] [J80]     Hyperglycemia [R73.9]     Leukocytosis [D72.829]     Moderate protein-calorie malnutrition ([address] [E44.0]     Hyperlipidemia [E78.5]     Arthritis [M19.90]     Brain fog [R41.89]     Anxiety [F41.9]     COVID-19 [U07.1]            COVID PNA  Acute hypoxic respiratory Failure   - CXR: noted with multifocal PNA  - no home O2 at baseline, 83-87% at rest,   - J &J in March 2021- no booster   - Admitted to 2W, droplet +, tele, cont pulse ox  - supp O2, wean as tolerated - worsening slowly -was on 10 L   -progressively worsening hypoxia, was on vapotherm with full support   - eventually placed on vent support 1/21      persistent severe hypoxemia -she was proned on 1/20/2022.  She is back in supine position.  -Continue Decadron  - Remdesivir not started due to out of window- sx 7+ days prior to admit  - levaquin added  - PRN Robitussin  - procal 0.10 wnl   - CRP elevated .  Started on Baricitinib.  Monitor CBC and LFTs  - Lovenox Bid     Episodes of expressive aphasia   - ?  TIA, vs mental status changes related to covid   - cont ASA, Plavix      Thrombocytopenia resolved  - likely with covid  - platelets 93 >926 improved  - continue Lovenox and monitor     HLD  - Continue statin          Arthritis   - was on  mobic - holding     Hx of Asthma  - not on inhalers at home           DVT Prophylaxis: Lovenox bid  Diet: Diet NPO  ADULT TUBE FEEDING; Orogastric; Peptide Based; Continuous; 10; Yes; 10; Q 4 hours; 35; 30; Q 3 hours; Protein; one proteinex P2Go TWICE daily  Code Status: Full Code    PT/OT Eval Status: not indicated    [facility]    Flo Aguilera MD

## 2022-01-26 NOTE — PROGRESS NOTES
01/26/22 0826   Vent Information   Vent Type 840   Vent Mode PS   Peak Flow 60 L/min   Pressure Support 10 cmH20   FiO2  60 %   SpO2 92 %   SpO2/FiO2 ratio 153.33   Sensitivity 3   PEEP/CPAP 8   Humidification Source Heated wire   Humidification Temp 37   Circuit Condensation Drained   Vent Patient Data   Peak Inspiratory Pressure 18 cmH2O   Mean Airway Pressure 7.8 cmH20   Rate Measured 20 br/min   Vt Exhaled 386 mL   Minute Volume 8 Liters   I:E Ratio 1:3   Plateau Pressure 22 [id number]   Static Compliance 21 mL/cmH2O   Cough/Sputum   Sputum How Obtained Suctioned;Endotracheal   Sputum Amount None   Spontaneous Breathing Trial (SBT) RT Doc   Pulse 50   Breath Sounds   Right Upper Lobe Diminished;Clear   Right Middle Lobe Diminished   Right Lower Lobe Diminished   Left Upper Lobe Diminished;Clear   Left Lower Lobe Diminished   Additional Respiratory  Assessments   Resp 20   Position Semi-Mohamud's   Alarm Settings   High Pressure Alarm 45 cmH2O   Low Minute Volume Alarm 3 L/min   Apnea (secs) 20 secs   High Respiratory Rate 45 br/min   Low Exhaled Vt  250 mL   Patient Observation   Observations 8ett 23 at lip   ETT (adult)   Placement Date/Time: 01/21/22 0330   Tube Size: 8 mm  Location: Oral  Secured at: 23 cm  Measured From: Lips   Secured at 24 cm   Measured From [address] By [employer] tube nicole   Site Condition Dry

## 2022-01-26 NOTE — PROGRESS NOTES
Hospitalist Progress Note      PCP: Asa Johnson DO    Date of Admission: 1/12/2022    Admitted with COVID-19 pneumonia and acute respiratory failure  Intubated    Subjective: failed SBT again today due to hypoxia and agitation,  Off Levophed  On Precedex    Medications:  Reviewed    Infusion Medications    propofol 15 mcg/kg/min (01/26/22 1355)    dexmedetomidine HCl in NaCl 0.8 mcg/kg/hr (01/26/22 1419)    dextrose      sodium chloride Stopped (01/22/22 1231)     Scheduled Medications    QUEtiapine  25 mg Oral BID    diazePAM  5 mg Oral TID    dexamethasone  4 mg IntraVENous Q24H    chlorhexidine  15 mL Mouth/Throat BID    famotidine (PEPCID) injection  20 mg IntraVENous BID    insulin lispro  0-6 Units SubCUTAneous Q4H    enoxaparin  30 mg SubCUTAneous BID    aspirin  81 mg Oral Daily    clopidogrel  75 mg Oral Daily    ascorbic acid  1,000 mg Oral Daily    atorvastatin  10 mg Oral Nightly    sodium chloride flush  5-40 mL IntraVENous 2 times per day    Vitamin D  2,000 Units Oral Daily     PRN Meds: midazolam, albuterol sulfate HFA, fentanNYL, glucose, glucagon (rDNA), dextrose, dextrose bolus (hypoglycemia) **OR** dextrose bolus (hypoglycemia), albuterol sulfate HFA, guaiFENesin-dextromethorphan, benzonatate, sodium chloride flush, sodium chloride, ondansetron **OR** ondansetron, polyethylene glycol, acetaminophen **OR** acetaminophen      Intake/Output Summary (Last 24 hours) at 1/26/2022 1453  Last data filed at 1/26/2022 1259  Gross per 24 hour   Intake 961.6 ml   Output 1175 ml   Net -213.4 ml       Physical Exam Performed:    /66   Pulse (!) 47   Temp 97.6 °F (36.4 °C) (Axillary)   Resp 26   Ht 5' 3\" (1.6 m)   Wt 169 lb (76.7 kg)   SpO2 95%   BMI 29.94 kg/m²       Patient seen in droplet plus precautions  General:  Elderly female, on vent  Oral ETT and OG noted   Mucous Membranes:  Pink , anicteric  Neck: No JVD, no carotid bruit, no thyromegaly  Chest: diminished in bases, bilateral mild crackles present . Cardiovascular:  RRR S1S2 heard, no murmurs or gallops  Abdomen:  Soft, undistended, non tender, no organomegaly, BS present  Extremities: No edema or cyanosis. Distal pulses well felt  Neurological : awake on vent    Labs:   Recent Labs     01/24/22  0459 01/25/22  0454 01/26/22  0400   WBC 7.8 9.6 8.6   HGB 11.0* 10.6* 11.4*   HCT 31.6* 31.1* 33.9*    237 287     Recent Labs     01/24/22  0459 01/25/22  0454 01/26/22  0400   * 135* 136   K 3.7 3.3* 3.7   CL 95* 98* 96*   CO2 27 29 33*   BUN 21* 16 18   CREATININE <0.5* <0.5* <0.5*   CALCIUM 8.0* 7.9* 9.3     Recent Labs     01/25/22  0454   AST 18   ALT 16   BILIDIR <0.2   BILITOT 0.3   ALKPHOS 50     No results for input(s): INR in the last 72 hours. No results for input(s): Earnie Lent in the last 72 hours. Urinalysis:      Lab Results   Component Value Date    NITRU Neg 08/29/2010    WBCUA  08/29/2010    BACTERIA 1+ 08/29/2010    RBCUA 5-10 08/29/2010    BLOODU Neg 08/29/2010    SPECGRAV 1.010 08/29/2010    GLUCOSEU Neg 08/29/2010       Radiology:  XR CHEST PORTABLE   Final Result   No significant interval change multifocal airspace disease. Stable lines and tubes. XR CHEST PORTABLE   Final Result   Mildly improved parenchymal infiltrates at the right lung base, otherwise   similar appearing chest.         XR CHEST PORTABLE   Final Result   Extensive bilateral airspace opacities are seen without significant change. XR CHEST PORTABLE   Final Result   1. Recommend retraction of endotracheal tube 1.0 cm.   2. Stable severe ill-defined lung consolidation, greatest at the lung bases   consistent with pneumonia versus alveolar edema. 3. Suspected mild bilateral pleural effusion, unchanged. XR CHEST PORTABLE   Final Result   1. No significant change. XR CHEST PORTABLE   Final Result   Appropriate positioning of the endotracheal tube.   Enteric tube courses beneath the diaphragm; tip is excluded by collimation inferiorly. No significant change in extensive bilateral airspace disease. IR PICC WO SQ PORT/PUMP > 5 YEARS   Final Result   Successful placement of PICC line. XR CHEST PORTABLE   Final Result   New multifocal moderate to severe pneumonia. Clinical and imaging follow-up   to resolution recommended. CTA HEAD NECK W CONTRAST   Final Result   Unremarkable CTA of the head and neck. Findings consistent with COVID pneumonia. This scan was analyzed using Viz. ai contact LVO. Identification of suspected   findings is not for diagnostic use beyond notification. Viz LVO is limited to   analysis of imaging data and should not be used in-lieu of full patient   evaluation or relied upon to make or confirm diagnosis. CT HEAD WO CONTRAST   Final Result   No acute intracranial abnormality. XR CHEST PORTABLE   Final Result   Multifocal bilateral atypical pneumonia.          XR CHEST PORTABLE    (Results Pending)           Assessment/Plan:    Active Hospital Problems    Diagnosis     Hypotension [I95.9]     Pneumonia due to COVID-19 virus [U07.1, J12.82]     ARDS (adult respiratory distress syndrome) ([facility]) [J80]     Hyperglycemia [R73.9]     Leukocytosis [D72.829]     Moderate protein-calorie malnutrition ([address] [E44.0]     Hyperlipidemia [E78.5]     Arthritis [M19.90]     Brain fog [R41.89]     Anxiety [F41.9]     COVID-19 [U07.1]            COVID PNA  Acute hypoxic respiratory Failure   - CXR: noted with multifocal PNA  - no home O2 at baseline, 83-87% at rest,   - J &J in March 2021- no booster   - Admitted to 2W, droplet +, tele, cont pulse ox  - supp O2, wean as tolerated - worsening slowly -was on 10 L   -progressively worsening hypoxia, was on vapotherm with full support   - eventually placed on vent support 1/21    persistent severe hypoxemia -she was proned on 1/20/2022.  She is back in supine position.  -Continue Decadron day #15  - Remdesivir not started due to out of window- sx 7+ days prior to admit  - levaquin day 7/7  - PRN Robitussin  - procal 0.10 wnl   - CRP elevated . Started on Baricitinib day #13/14.  Monitor CBC and LFTs  - Lovenox Bid  Failed SBT again today. Valium and Seroquel added    Hypotension  On Levophed.     Episodes of expressive aphasia   - ? TIA, vs mental status changes related to covid   - cont ASA, Plavix      Thrombocytopenia resolved  - likely with covid  - platelets 93 >560 improved  - continue Lovenox and monitor    Hyperglycemia  Secondary to Decadron   Sliding scale insulin     HLD  - Continue statin        Arthritis   - was on  mobic - holding     Hx of Asthma  - not on inhalers at home      DVT Prophylaxis: Lovenox bid  Diet: Diet NPO  ADULT TUBE FEEDING; Orogastric; Other Tube Feeding (specify);  Glucerna 1.2; Continuous; 20; Yes; 20; Q 4 hours; 65; 30; Q 3 hours  Code Status: Full Code    PT/OT Eval Status: not indicated    Robert Carranza MD

## 2022-01-26 NOTE — PROGRESS NOTES
01/26/22 0250   Vent Information   $Ventilation $Subsequent Day   Equipment Changed Humidification   Vent Type 840   Vent Mode AC/VC   Vt Ordered 320 mL   Rate Set 26 bmp   Peak Flow 60 L/min   FiO2  60 %   SpO2 91 %   SpO2/FiO2 ratio 151.67   Sensitivity 3   PEEP/CPAP 8   Humidification Source Heated wire   Humidification Temp 37   Humidification Temp Measured 35   Circuit Condensation Drained   Vent Patient Data   Peak Inspiratory Pressure 28 cmH2O   Mean Airway Pressure 14 cmH20   Rate Measured 28 br/min   Vt Exhaled 339 mL   Minute Volume 8.93 Liters   I:E Ratio 1:3   Plateau Pressure 22 ORA80   Cough/Sputum   Sputum How Obtained Suctioned;Endotracheal   Cough Non-productive   Spontaneous Breathing Trial (SBT) RT Doc   Pulse 63   Breath Sounds   Right Upper Lobe Diminished   Right Middle Lobe Diminished   Right Lower Lobe Diminished   Left Upper Lobe Diminished   Left Lower Lobe Diminished   Additional Respiratory  Assessments   Resp 23   Position Semi-Mohamud's   Oral Care Completed? Yes   Oral Care Mouth suctioned   Subglottic Suction Done?  Yes   Alarm Settings   High Pressure Alarm 45 cmH2O   Low Minute Volume Alarm 3 L/min   Apnea (secs) 20 secs   High Respiratory Rate 45 br/min   Low Exhaled Vt  250 mL   ETT (adult)   Placement Date/Time: 01/21/22 0330   Tube Size: 8 mm  Location: Oral  Secured at: 23 cm  Measured From: Lips   Secured at 24 cm   Measured From [address] By [employer] tube nicole   Site Condition Dry

## 2022-01-26 NOTE — PROGRESS NOTES
Comprehensive Nutrition Assessment    Type and Reason for Visit:  Reassess,Consult (consutl for TF ordering and management)    Nutrition Recommendations/Plan:   1. Modified TF order - ADULT TUBE FEEDING; Orogastric; Other formula - Glucerna 1.2 with a goal rate of 65 ml/hr x 24 hours. Start with 20 ml/hr and increase by 20 ml every 4 hours, as tolerated by patient, until goal rate can be achieved and maintained. Water flushes, 30 ml every 3 hours for tube patency. 2. Monitor TF formula switch, rate, intake, and tolerance + water flushes. 3. Monitor vent status and plan of care. 4. Please obtain an updated weight for this patient - last weight was obtained on 1/19/22.   5. Monitor nutrition-related labs, bowel function (last documented BM was on 1/14/22), and weight trends. Nutrition Assessment:  patient has slightly improved from a nutritional standpoint AEB TF was started and infusing at 10 ml/hr prior to SBT this am, however, she remains at risk for further compromise d/t need for EN as sole source of nutrition, increased nutrition needs r/t COVID-19 virus, and last documented BM was on 1/14/22; will modify TF to Glucerna 1.2 with a goal rate of 65 ml/hr x 20 hours + 30 ml water flushes every 3 hours for tube patency    Malnutrition Assessment:  Malnutrition Status: Moderate malnutrition (moderate malnutrition in the context of acute illness or injury < 3 months based on 75% or less of estimated energy requirements for 7 or more days and greater than 2% weight loss over 1 week), per guidelines from Academy of Nutrition and Dietetics (Academy)/American Society for Parenteral and Enteral Nutrition (A.S.P.E.N.) - clinical characteristics that the clinician can  obtain and document to support a diagnosis of malnutrition.    Context:  Acute Illness     Findings of the 6 clinical characteristics of malnutrition:  Energy Intake:  1 - 75% or less of estimated energy requirements for 7 or more days  Weight Loss:  7 - Greater than 2% over 1 week (- 14# or 8% weight loss x 1 week)     Body Fat Loss:  Unable to assess (COVID-19 +)     Muscle Mass Loss:  Unable to assess (COVID-19 +)    Fluid Accumulation:  No significant fluid accumulation     Strength:  Not Performed    Estimated Daily Nutrient Needs:  Energy (kcal):  1540 - 1771 kcals based on 20-23 kcals/kg/CBW; Weight Used for Energy Requirements:  Current     Protein (g):  68 - 78 g protein based on 1.3-1.5 g/kg/IBW; Weight Used for Protein Requirements:  Ideal        Fluid (ml/day):  1540 - 1771 ml; Method Used for Fluid Requirements:  1 ml/kcal      Nutrition Related Findings:  patient remains intubated; propofol is not on at this time; she responds to voice; abdomen is soft, non-tender, and bowel sounds are active; last documented BM was on 1/14/22; Cl and h/h are low; patient has vitamin C, lipitor, peridex, plavix, decadron, lovenox, pepcid, low-dose SSI, seroquel, vitamin D, and precedex ordered at this time      Wounds:  None       Current Nutrition Therapies:    Current Tube Feeding (TF) Orders:  · Feeding Route: Orogastric  · Formula:  Other Tube Feeding (Glucerna 1.2)  · Schedule: Continuous  · Additives/Modulars:  (none)  · Water Flushes: 30 ml every 3 hours for tube patency  · Current TF & Flush Orders Provides: Vital AF 1.2 with a trophic rate of 10 ml/hr x 20 hours = 200 ml TV, 240 kcals, 15 g protein, and 162 ml free water + 30 ml water flushes every 3 hours for tube patency + 52 g protein and 208 kcals from one proteinex P2Go TWICE daily via feeding tube (67 g protein and 448 kcals total)  · Goal TF & Flush Orders Provides: Glucerna 1.2 with a goal rate of 65 ml/hr x 20 hours = 1300 ml TV, 1560 kcals, 78 g protein, and 1047 ml free water + 30 ml water flushes every 3 hours for tube patency      Anthropometric Measures:  · Height: 5' 3\" (160 cm)  · Current Body Weight: 169 lb (76.7 kg) (obtained on 1/19/22; actual weight)   · Admission Body Weight: 183 lb 9.6 oz (83.3 kg) (obtained on 1/16/22; actual weight)    · Usual Body Weight: 183 lb 9.6 oz (83.3 kg) (obtained on 1/16/22; actual weight)     · Ideal Body Weight: 115 lbs; % Ideal Body Weight 147 %   · BMI: 29.9   · BMI Categories: Overweight (BMI 25.0-29. 9)       Nutrition Diagnosis:   · Inadequate oral intake related to inadequate protein-energy intake,impaired respiratory function,increase demand for energy/nutrients as evidenced by NPO or clear liquid status due to medical condition,intubation      Nutrition Interventions:   Food and/or Nutrient Delivery:  Continue NPO,Modify Tube Feeding  Nutrition Education/Counseling:  No recommendation at this time   Coordination of Nutrition Care:  Continue to monitor while inpatient,Interdisciplinary Rounds    Goals:  patient will tolerate Glucerna 1.2 at goal rate of 65 ml/hr x 20 hours without GI distress, without s/s of aspiration, and without additional lab/fluid disturbances       Nutrition Monitoring and Evaluation:   Behavioral-Environmental Outcomes:  None Identified   Food/Nutrient Intake Outcomes:  Enteral Nutrition Intake/Tolerance  Physical Signs/Symptoms Outcomes:  Biochemical Data,Constipation,GI Status,Hemodynamic Status,Weight,Skin     Discharge Planning:     Too soon to determine     Electronically signed by Nola Maradiaga RD, LD on 1/26/22 at 12:18 PM EST    Contact: [phone]

## 2022-01-26 NOTE — PROGRESS NOTES
01/25/22 2330   Vent Information   Vent Type 840   Vent Mode AC/VC   Vt Ordered 320 mL   Rate Set 26 bmp   Peak Flow 60 L/min   FiO2  60 %   SpO2 90 %   SpO2/FiO2 ratio 150   Sensitivity 3   PEEP/CPAP 8   Humidification Source Heated wire   Humidification Temp 37   Humidification Temp Measured 35   Circuit Condensation Drained   Vent Patient Data   Peak Inspiratory Pressure 24 cmH2O   Mean Airway Pressure 13 cmH20   Rate Measured 29 br/min   Vt Exhaled 364 mL   Minute Volume 8.59 Liters   I:E Ratio 1:3.0   Plateau Pressure 13 CLE57   Cough/Sputum   Sputum How Obtained Suctioned;Endotracheal   Cough Productive   Sputum Amount Small   Sputum Color Creamy   Tenacity Thick   Spontaneous Breathing Trial (SBT) RT Doc   Pulse 60   Breath Sounds   Right Upper Lobe Diminished   Right Middle Lobe Diminished   Right Lower Lobe Diminished   Left Upper Lobe Diminished   Left Lower Lobe Diminished   Additional Respiratory  Assessments   Resp 28   Position Semi-Mohamud's   Oral Care Completed? Yes   Oral Care Mouth suctioned   Subglottic Suction Done?  Yes   Alarm Settings   High Pressure Alarm 45 cmH2O   Low Minute Volume Alarm 3 L/min   Apnea (secs) 20 secs   High Respiratory Rate 45 br/min   Low Exhaled Vt  250 mL   ETT (adult)   Placement Date/Time: 01/21/22 0330   Tube Size: 8 mm  Location: Oral  Secured at: 23 cm  Measured From: Lips   Secured at 24 cm   Measured From Lips   ET Placement Left   Secured By [employer] tube nicole   Site Condition Dry

## 2022-01-26 NOTE — PROGRESS NOTES
Pulmonary Progress Note  CC: COVID in partially vaccinated patient     Subjective:   Supine overnight   Propofol off   Fentanyl off  Versed off  Precedex 2 mcg/kg/hr  Levophed off  PEEP 8  FiO2 65%  Failed SBT this am due to hypoxia and agitation       IV line: PICC line 1/20/2022  MV: 1/21/2022      Intake/Output Summary (Last 24 hours) at 1/26/2022 5710  Last data filed at 1/26/2022 0600  Gross per 24 hour   Intake 1332 ml   Output 2300 ml   Net -968 ml         EXAM:   BP (!) 97/49   Pulse 55   Temp 98 °F (36.7 °C) (Axillary)   Resp 25   Ht 5' 3\" (1.6 m)   Wt 169 lb (76.7 kg)   SpO2 91%   BMI 29.94 kg/m²  on 26/320  EXAM:  General: ill appearing. Intubated sedated. Eyes: PERRL. No sclera icterus. No conjunctival injection. ENT: No discharge. Pharynx clear. ET tube in place  Neck: Trachea midline. Normal thyroid. Resp: No accessory muscle use. + crackles. No wheezing. No rhonchi. CV: Regular rate. Regular rhythm. No mumur or rub. No edema. GI: Non-tender. Non-distended. No masses. Skin: Warm and dry. No nodule on exposed extremities. No rash on exposed extremities. Lymph: No cervical LAD. No supraclavicular LAD. M/S: No cyanosis. No joint deformity. No clubbing. Neuro: Intubated and awake.   + response to painful stimuli. Not following commands.    Psych: Noncommunicative unable to obtain            Scheduled Meds:   dexamethasone  4 mg IntraVENous Q24H    QUEtiapine  12.5 mg Oral BID    chlorhexidine  15 mL Mouth/Throat BID    mupirocin   Nasal BID    famotidine (PEPCID) injection  20 mg IntraVENous BID    insulin lispro  0-6 Units SubCUTAneous Q4H    enoxaparin  30 mg SubCUTAneous BID    levofloxacin  500 mg IntraVENous Q24H    aspirin  81 mg Oral Daily    clopidogrel  75 mg Oral Daily    ascorbic acid  1,000 mg Oral Daily    baricitinib  4 mg Oral Daily    atorvastatin  10 mg Oral Nightly    sodium chloride flush  5-40 mL IntraVENous 2 times per day    Vitamin D  2,000 Units Oral Daily     Continuous Infusions:   dexmedetomidine HCl in NaCl 2 mcg/kg/hr (01/26/22 0622)    dextrose      sodium chloride Stopped (01/22/22 1231)     PRN Meds:  midazolam, albuterol sulfate HFA, fentanNYL, glucose, glucagon (rDNA), dextrose, dextrose bolus (hypoglycemia) **OR** dextrose bolus (hypoglycemia), albuterol sulfate HFA, guaiFENesin-dextromethorphan, benzonatate, sodium chloride flush, sodium chloride, ondansetron **OR** ondansetron, polyethylene glycol, acetaminophen **OR** acetaminophen    Labs:  CBC:   Recent Labs     01/24/22 0459 01/25/22  0454 01/26/22  0400   WBC 7.8 9.6 8.6   HGB 11.0* 10.6* 11.4*   HCT 31.6* 31.1* 33.9*   MCV 88.9 88.3 89.3    237 287     BMP:   Recent Labs     01/24/22 0459 01/25/22 0454 01/26/22  0400   * 135* 136   K 3.7 3.3* 3.7   CL 95* 98* 96*   CO2 27 29 33*   BUN 21* 16 18   CREATININE <0.5* <0.5* <0.5*     Micro:  1/17/2022 SARS-CoV-2 positive at urgent care  1/12/2022 SARS-CoV-2 detected  1/21 Tracheal aspirate       Imaging:   CXR 1/26/2022 imaging reviewed by me and showed   Satisfactory ETT position  Satisfactory PICC line position   Bilateral ASD- same         ASSESSMENT:  · Acute hypoxemic respiratory failure, progressive life-threatening  · COVID-19 pneumonia in a partially vaccinated patient, s/p J&J vaccination 3/2021  · ARDS  · Leukocytosis   · Hypotension   · Hyperglycemia   · Thrombocytopenia -resolved  · H/O asthma, no home inhalers  · Former smoker      PLAN:  Mechanical ventilation as per my orders.  The ventilator was adjusted by me at the bedside for unstable, life threatening respiratory failure  Follow ABG and chest x-ray while on the ventilator  IV Precedex for sedation, target RASS -2, with daily spontaneous awakening trial   Propofol if needed - monitor TG   Increase Seroquel 25 BID   Add Valium 5 TID   Head of bed 30 degrees or higher at all times  Daily spontaneous breathing trial once PEEP less than 8, FiO2 less than

## 2022-01-26 NOTE — PROGRESS NOTES
01/26/22 1522   Vent Information   Equipment Changed Humidification   Vent Type 840   Vent Mode AC/VC   Vt Ordered 320 mL   Rate Set 26 bmp   Peak Flow 60 L/min   FiO2  60 %   SpO2 96 %   SpO2/FiO2 ratio 160   Sensitivity 3   PEEP/CPAP 8   Humidification Source Heated wire   Humidification Temp 37   Circuit Condensation Drained   Vent Patient Data   Peak Inspiratory Pressure 26 cmH2O   Mean Airway Pressure 13 cmH20   Rate Measured 26 br/min   Vt Exhaled 339 mL   Minute Volume 8.73 Liters   I:E Ratio 1:3   Cough/Sputum   Sputum How Obtained Suctioned;Endotracheal   Sputum Amount Small   Sputum Color Creamy   Tenacity Thick   Spontaneous Breathing Trial (SBT) RT Doc   Pulse 57   Breath Sounds   Right Upper Lobe Diminished;Clear   Right Middle Lobe Diminished   Right Lower Lobe Diminished   Left Upper Lobe Diminished;Clear   Left Lower Lobe Diminished   Additional Respiratory  Assessments   Resp 26   Position Semi-Mohamud's   Oral Care Completed? Yes   Oral Care Mouth suctioned   Subglottic Suction Done?  Yes   Alarm Settings   High Pressure Alarm 45 cmH2O   Low Minute Volume Alarm 3 L/min   Apnea (secs) 20 secs   High Respiratory Rate 45 br/min   Low Exhaled Vt  250 mL   Patient Observation   Observations 8ett 23 at lip   ETT (adult)   Placement Date/Time: 01/21/22 0330   Tube Size: 8 mm  Location: Oral  Secured at: 23 cm  Measured From: Lips   Secured at 23 cm   Measured From Lips   ET Placement Right   Secured By [employer] tube nicole

## 2022-01-26 NOTE — PROGRESS NOTES
Pt noted to be agitated, pulling against restraints, alarming the ventilator. RASS+2. SPO2 dropped to upper 70's. Pt unable to tolerate weaning of precedex. PRN versed provided. Precedex increased back up to 2 mcg/kg/hr. Fio2 increased to 65% per RT. SBT terminated at this time. Will continue to monitor.

## 2022-01-26 NOTE — PLAN OF CARE
Nutrition Problem #1: Inadequate oral intake  Intervention: Food and/or Nutrient Delivery: Continue NPO,Modify Tube Feeding  Nutritional Goals: patient will tolerate Glucerna 1.2 at goal rate of 65 ml/hr x 20 hours without GI distress, without s/s of aspiration, and without additional lab/fluid disturbances

## 2022-01-26 NOTE — PROGRESS NOTES
01/25/22 2040   Vent Information   Vent Type 840   Vent Mode AC/VC   Vt Ordered 320 mL   Rate Set 26 bmp   Peak Flow 60 L/min   FiO2  60 %   SpO2 91 %   SpO2/FiO2 ratio 151.67   Sensitivity 3   PEEP/CPAP 8   Humidification Source Heated wire   Humidification Temp 37   Humidification Temp Measured 35.8   Circuit Condensation Drained   Vent Patient Data   Peak Inspiratory Pressure 21 cmH2O   Mean Airway Pressure 12 cmH20   Rate Measured 26 br/min   Vt Exhaled 331 mL   Minute Volume 8.6 Liters   I:E Ratio 1:2.3   Plateau Pressure 17 HSR68   Static Compliance 37 mL/cmH2O   Dynamic Compliance 25 mL/cmH2O   Cough/Sputum   Sputum How Obtained Endotracheal;Suctioned   Cough Productive   Sputum Amount Large   Sputum Color Creamy   Tenacity Thick   Spontaneous Breathing Trial (SBT) RT Doc   Pulse 62   Breath Sounds   Right Upper Lobe Diminished   Right Middle Lobe Diminished   Right Lower Lobe Diminished   Left Upper Lobe Diminished   Left Lower Lobe Diminished   Additional Respiratory  Assessments   Resp 25   Position Semi-Mohamud's   Alarm Settings   High Pressure Alarm 45 cmH2O   Low Minute Volume Alarm 3 L/min   Apnea (secs) 20 secs   High Respiratory Rate 45 br/min   Low Exhaled Vt  250 mL   ETT (adult)   Placement Date/Time: 01/21/22 0330   Tube Size: 8 mm  Location: Oral  Secured at: 23 cm  Measured From: Lips   Secured at 24 cm   Measured From [address] By [employer] tube nicole   Site Condition Dry

## 2022-01-26 NOTE — PROGRESS NOTES
Shift handoff report given to Sara MCKEON at bedside. Pt is sedated on vent- Stopped Spontaneous, now on AC PEEP 8 60%. IV handoff completed. 4 eyes to follow. Call light within reach, bed in lowest position, bed alarm on. End of shift checks completed. Pt has been free of falls for duration of shift. Ceasar Rinaldi

## 2022-01-26 NOTE — PROGRESS NOTES
Patient switched back to previous settings of A/C, she was aggitated with increased RR to 36, desats, and RSBI of 149. Increased fio2 to 100% then weaned down to 65%.  sp02 92%

## 2022-01-27 NOTE — PROGRESS NOTES
.Bedside report and Pt care transferred to [facility]. Pt denies any assistance at this time. Additional Notes: Patient consent was obtained to proceed with the visit and recommended plan of care after discussion of all risks and benefits, including the risks of COVID-19 exposure. Detail Level: Simple

## 2022-01-27 NOTE — PROGRESS NOTES
01/26/22 2253   Vent Information   Vent Type 840   Vent Mode AC/VC   Vt Ordered 320 mL   Rate Set 26 bmp   Peak Flow 60 L/min   FiO2  55 %   SpO2 100 %   SpO2/FiO2 ratio 181.82   Sensitivity 3   PEEP/CPAP 8   Humidification Source Heated wire   Humidification Temp 37   Humidification Temp Measured 37   Circuit Condensation Drained   Vent Patient Data   Peak Inspiratory Pressure 38 cmH2O   Mean Airway Pressure 16 cmH20   Rate Measured 31 br/min   Vt Exhaled 359 mL   Minute Volume 9.62 Liters   I:E Ratio 1:2.7   Cough/Sputum   Sputum How Obtained Suctioned;Endotracheal   Cough Productive   Sputum Amount Small   Sputum Color Creamy   Tenacity Thick   Spontaneous Breathing Trial (SBT) RT Doc   Pulse 67   Breath Sounds   Right Upper Lobe Diminished   Right Middle Lobe Diminished   Right Lower Lobe Diminished   Left Upper Lobe Diminished   Left Lower Lobe Diminished   Additional Respiratory  Assessments   Resp (!) 33   Position Semi-Mohamud's   Oral Care Completed? Yes   Oral Care Mouth suctioned   Subglottic Suction Done?  Yes   Alarm Settings   High Pressure Alarm 45 cmH2O   Low Minute Volume Alarm 3 L/min   Apnea (secs) 20 secs   High Respiratory Rate 45 br/min   Low Exhaled Vt  250 mL   ETT (adult)   Placement Date/Time: 01/21/22 0330   Tube Size: 8 mm  Location: Oral  Secured at: 23 cm  Measured From: Lips   Secured at 24 cm   Measured From [address] By [employer] tube nicole   Site Condition Dry

## 2022-01-27 NOTE — PROGRESS NOTES
Attempted to wean propofol from 20 mcg/kg/min to 10 mcg/kg/min in preparation for SBT. Pt lasted approx 15 minutes until she became awake and very agitated, pulling at restraints, alarming the vent. HR increased from 60's to 100. SPO2 dropped to 85%. Tracheal suction performed and yielded copious thick creamy secretions. Propofol increased back to 20 mcg/kg/min. 2mg PRN IV versed provided with improvement in agitation, HR and SPO2. Will continue to monitor.

## 2022-01-27 NOTE — PROGRESS NOTES
Hospitalist Progress Note      PCP: Louie Vargas DO    Date of Admission: 1/12/2022    Admitted with COVID-19 pneumonia and acute respiratory failure  Intubated    Subjective: failed SBT again today due to hypoxia and agitation,  Off Levophed  On Precedex    1/27  Unable to perform SBT due to agitation of propofol  On Precedex    Medications:  Reviewed    Infusion Medications    fentaNYL 50 mcg/hr (01/27/22 1113)    norepinephrine 3 mcg/min (01/27/22 1359)    propofol 50 mcg/kg/min (01/27/22 1228)    dexmedetomidine HCl in NaCl Stopped (01/27/22 1124)    dextrose      sodium chloride Stopped (01/22/22 1231)     Scheduled Medications    diazePAM  10 mg Oral TID    QUEtiapine  25 mg Oral BID    dexamethasone  4 mg IntraVENous Q24H    chlorhexidine  15 mL Mouth/Throat BID    famotidine (PEPCID) injection  20 mg IntraVENous BID    insulin lispro  0-6 Units SubCUTAneous Q4H    enoxaparin  30 mg SubCUTAneous BID    aspirin  81 mg Oral Daily    clopidogrel  75 mg Oral Daily    ascorbic acid  1,000 mg Oral Daily    atorvastatin  10 mg Oral Nightly    sodium chloride flush  5-40 mL IntraVENous 2 times per day    Vitamin D  2,000 Units Oral Daily     PRN Meds: midazolam, albuterol sulfate HFA, fentanNYL, glucose, glucagon (rDNA), dextrose, dextrose bolus (hypoglycemia) **OR** dextrose bolus (hypoglycemia), albuterol sulfate HFA, guaiFENesin-dextromethorphan, benzonatate, sodium chloride flush, sodium chloride, ondansetron **OR** ondansetron, polyethylene glycol, acetaminophen **OR** acetaminophen      Intake/Output Summary (Last 24 hours) at 1/27/2022 1449  Last data filed at 1/27/2022 1218  Gross per 24 hour   Intake 1217.42 ml   Output 905 ml   Net 312.42 ml       Physical Exam Performed:    BP (!) 97/51   Pulse 74   Temp 99.2 °F (37.3 °C) (Axillary)   Resp 24   Ht 5' 3\" (1.6 m)   Wt 169 lb (76.7 kg)   SpO2 91%   BMI 29.94 kg/m²       Patient seen in droplet plus precautions  General:  Elderly female, on vent  Oral ETT and OG noted   Mucous Membranes:  Pink , anicteric  Neck: No JVD, no carotid bruit, no thyromegaly  Chest: diminished in bases, bilateral mild crackles present . Cardiovascular:  RRR S1S2 heard, no murmurs or gallops  Abdomen:  Soft, undistended, non tender, no organomegaly, BS present  Extremities: No edema or cyanosis. Distal pulses well felt  Neurological :  Sedated    Labs:   Recent Labs     01/25/22 0454 01/26/22 0400 01/27/22 0430   WBC 9.6 8.6 8.6   HGB 10.6* 11.4* 11.1*   HCT 31.1* 33.9* 31.9*    287 269     Recent Labs     01/25/22 0454 01/26/22 0400 01/27/22 0430   * 136 136   K 3.3* 3.7 3.7   CL 98* 96* 97*   CO2 29 33* 31   BUN 16 18 20   CREATININE <0.5* <0.5* <0.5*   CALCIUM 7.9* 9.3 8.4     Recent Labs     01/25/22 0454   AST 18   ALT 16   BILIDIR <0.2   BILITOT 0.3   ALKPHOS 50     No results for input(s): INR in the last 72 hours. No results for input(s): Ellene Moizier in the last 72 hours. Urinalysis:      Lab Results   Component Value Date    NITRU Neg 08/29/2010    WBCUA  08/29/2010    BACTERIA 1+ 08/29/2010    RBCUA 5-10 08/29/2010    BLOODU Neg 08/29/2010    SPECGRAV 1.010 08/29/2010    GLUCOSEU Neg 08/29/2010       Radiology:  XR CHEST PORTABLE   Final Result   Extensive infiltrates within the lungs bilaterally, worsening on the right   with increasing consolidation. XR CHEST PORTABLE   Final Result   No significant interval change multifocal airspace disease. Stable lines and tubes. XR CHEST PORTABLE   Final Result   Mildly improved parenchymal infiltrates at the right lung base, otherwise   similar appearing chest.         XR CHEST PORTABLE   Final Result   Extensive bilateral airspace opacities are seen without significant change. XR CHEST PORTABLE   Final Result   1.  Recommend retraction of endotracheal tube 1.0 cm.   2. Stable severe ill-defined lung consolidation, greatest at the lung bases   consistent with pneumonia versus alveolar edema. 3. Suspected mild bilateral pleural effusion, unchanged. XR CHEST PORTABLE   Final Result   1. No significant change. XR CHEST PORTABLE   Final Result   Appropriate positioning of the endotracheal tube. Enteric tube courses   beneath the diaphragm; tip is excluded by collimation inferiorly. No significant change in extensive bilateral airspace disease. IR PICC WO SQ PORT/PUMP > 5 YEARS   Final Result   Successful placement of PICC line. XR CHEST PORTABLE   Final Result   New multifocal moderate to severe pneumonia. Clinical and imaging follow-up   to resolution recommended. CTA HEAD NECK W CONTRAST   Final Result   Unremarkable CTA of the head and neck. Findings consistent with COVID pneumonia. This scan was analyzed using Viz. ai contact LVO. Identification of suspected   findings is not for diagnostic use beyond notification. Viz LVO is limited to   analysis of imaging data and should not be used in-lieu of full patient   evaluation or relied upon to make or confirm diagnosis. CT HEAD WO CONTRAST   Final Result   No acute intracranial abnormality. XR CHEST PORTABLE   Final Result   Multifocal bilateral atypical pneumonia.          XR CHEST PORTABLE    (Results Pending)           Assessment/Plan:    Active Hospital Problems    Diagnosis     Hypotension [I95.9]     Pneumonia due to COVID-19 virus [U07.1, J12.82]     ARDS (adult respiratory distress syndrome) (HCC) [J80]     Hyperglycemia [R73.9]     Leukocytosis [D72.829]     Moderate protein-calorie malnutrition (HCC) [E44.0]     Hyperlipidemia [E78.5]     Arthritis [M19.90]     Brain fog [R41.89]     Anxiety [F41.9]     COVID-19 [U07.1]            COVID PNA  Acute hypoxic respiratory Failure   - CXR: noted with multifocal PNA  - no home O2 at baseline, 83-87% at rest,   - J &J in March 2021- no booster   - Admitted to , droplet +, tele, cont pulse ox  - supp O2, wean as tolerated - worsening slowly -was on 10 L   -progressively worsening hypoxia, was on vapotherm with full support   - eventually placed on vent support 1/21    persistent severe hypoxemia -she was proned on 1/20/2022.  She is back in supine position.  -Continue Decadron day #16  - Remdesivir not started due to out of window- sx 7+ days prior to admit  - levaquin day 7/7  - PRN Robitussin  - procal 0.10 wnl   - CRP elevated . Started on Baricitinib day #14/14.  Monitor CBC and LFTs  - Lovenox Bid  Failed SBT again today. Valium and Seroquel added    Hypotension  On Levophed.     Episodes of expressive aphasia   - ? TIA, vs mental status changes related to covid   - cont ASA, Plavix      Thrombocytopenia resolved  - likely with covid  - platelets 93 >040 improved  - continue Lovenox and monitor    Hyperglycemia  Secondary to Decadron   Sliding scale insulin     HLD  - Continue statin        Arthritis   - was on  mobic - holding     Hx of Asthma  - not on inhalers at home      DVT Prophylaxis: Lovenox bid  Diet: Diet NPO  ADULT TUBE FEEDING; Orogastric; Other Tube Feeding (specify);  Glucerna 1.2; Continuous; 20; Yes; 20; Q 4 hours; 65; 30; Q 3 hours  Code Status: Full Code    PT/OT Eval Status: not indicated    Robert Carranza MD

## 2022-01-27 NOTE — PROGRESS NOTES
Dr. Nayeli Wing made aware of Pt's increased need for sedation/ worsening agitation. Verbal orders to increase propofol to 50 mcg/kg/min.

## 2022-01-27 NOTE — PROGRESS NOTES
BP's remain soft despite 250 cc NaCL bolus. Orders received to provide another 250 cc NaCL bolus and order levophed gtt as needed. Orders received for a fentanyl gtt. Will wean precedex off and initiate fentanyl gtt.

## 2022-01-27 NOTE — PROGRESS NOTES
01/26/22 2006   Vent Information   Vent Type 840   Vent Mode AC/VC   Vt Ordered 320 mL   Rate Set 26 bmp   Peak Flow 60 L/min   FiO2  60 %   SpO2 93 %   SpO2/FiO2 ratio 155   Sensitivity 3   PEEP/CPAP 8   Humidification Source Heated wire   Humidification Temp 37   Humidification Temp Measured 35   Circuit Condensation Drained   Vent Patient Data   Peak Inspiratory Pressure 28 cmH2O   Mean Airway Pressure 13 cmH20   Rate Measured 27 br/min   Vt Exhaled 357 mL   Minute Volume 10.7 Liters   I:E Ratio 1:2.6   Plateau Pressure 20 NJO63   Cough/Sputum   Sputum How Obtained Suctioned;Endotracheal   Cough Productive   Sputum Amount Large   Sputum Color Creamy   Tenacity Thick   Spontaneous Breathing Trial (SBT) RT Doc   Pulse 70   Breath Sounds   Right Upper Lobe Diminished   Right Middle Lobe Diminished   Right Lower Lobe Diminished   Left Upper Lobe Diminished   Left Lower Lobe Diminished   Additional Respiratory  Assessments   Resp 20   Position Semi-Mohamud's   Oral Care Completed? Yes   Oral Care Mouth suctioned   Subglottic Suction Done?  Yes   Alarm Settings   High Pressure Alarm 45 cmH2O   Low Minute Volume Alarm 3 L/min   Apnea (secs) 20 secs   High Respiratory Rate 45 br/min   Low Exhaled Vt  250 mL   ETT (adult)   Placement Date/Time: 01/21/22 0330   Tube Size: 8 mm  Location: Oral  Secured at: 23 cm  Measured From: Lips   Secured at 24 cm   Measured From Lips   ET Placement Left   Secured By [employer] tube nicole   Site Condition Dry

## 2022-01-27 NOTE — PROGRESS NOTES
Pulmonary Progress Note  CC: COVID in partially vaccinated patient     Subjective: unable to perform SBT due to agitation off of propofol  Precedex 2 mcg/kg/hr  Propofol 20    IV line: PICC line 1/20/2022  MV: 1/21/2022    Intake/Output Summary (Last 24 hours) at 1/27/2022 0816  Last data filed at 1/27/2022 0536  Gross per 24 hour   Intake 2003.42 ml   Output 950 ml   Net 1053.42 ml       EXAM:   /61   Pulse 72   Temp 98 °F (36.7 °C) (Bladder)   Resp (!) 34   Ht 5' 3\" (1.6 m)   Wt 169 lb (76.7 kg)   SpO2 91%   BMI 29.94 kg/m²  on 26/320  EXAM:  General: ill appearing. Intubated sedated. Eyes: PERRL. No sclera icterus. No conjunctival injection. ENT: No discharge. Pharynx clear. ET tube in place  Neck: Trachea midline. Normal thyroid. Resp: No accessory muscle use. + crackles. No wheezing. No rhonchi. CV: Regular rate. Regular rhythm. No mumur or rub. No edema. GI: Non-tender. Non-distended. No masses. M/S: No cyanosis. No joint deformity. No clubbing. Neuro: Intubated and awake.  + response to painful stimuli. Not following commands.    Psych: Noncommunicative unable to obtain    Scheduled Meds:   QUEtiapine  25 mg Oral BID    diazePAM  5 mg Oral TID    dexamethasone  4 mg IntraVENous Q24H    chlorhexidine  15 mL Mouth/Throat BID    famotidine (PEPCID) injection  20 mg IntraVENous BID    insulin lispro  0-6 Units SubCUTAneous Q4H    enoxaparin  30 mg SubCUTAneous BID    aspirin  81 mg Oral Daily    clopidogrel  75 mg Oral Daily    ascorbic acid  1,000 mg Oral Daily    atorvastatin  10 mg Oral Nightly    sodium chloride flush  5-40 mL IntraVENous 2 times per day    Vitamin D  2,000 Units Oral Daily     Continuous Infusions:   propofol 20 mcg/kg/min (01/27/22 0742)    dexmedetomidine HCl in NaCl 1 mcg/kg/hr (01/27/22 0536)    dextrose      sodium chloride Stopped (01/22/22 1231)     PRN Meds:  midazolam, albuterol sulfate HFA, fentanNYL, glucose, glucagon (rDNA), dextrose, dextrose bolus (hypoglycemia) **OR** dextrose bolus (hypoglycemia), albuterol sulfate HFA, guaiFENesin-dextromethorphan, benzonatate, sodium chloride flush, sodium chloride, ondansetron **OR** ondansetron, polyethylene glycol, acetaminophen **OR** acetaminophen    Labs:  CBC:   Recent Labs     01/25/22  0454 01/26/22  0400 01/27/22  0430   WBC 9.6 8.6 8.6   HGB 10.6* 11.4* 11.1*   HCT 31.1* 33.9* 31.9*   MCV 88.3 89.3 88.6    287 269     BMP:   Recent Labs     01/25/22  0454 01/26/22  0400 01/27/22  0430   * 136 136   K 3.3* 3.7 3.7   CL 98* 96* 97*   CO2 29 33* 31   BUN 16 18 20   CREATININE <0.5* <0.5* <0.5*     Micro:  1/17/2022 SARS-CoV-2 positive at urgent care  1/12/2022 SARS-CoV-2 detected  1/21 Tracheal aspirate     Imaging:   CXR 1/27/2022 imaging reviewed by me and showed   Extensive infiltrates within the lungs bilaterally, worsening on the right   with increasing consolidation. ASSESSMENT:  · Acute hypoxemic respiratory failure, progressive life-threatening  · COVID-19 pneumonia in a partially vaccinated patient, s/p J&J vaccination 3/2021  · ARDS  · Hypotension   · Thrombocytopenia -resolved  · H/O asthma, no home inhalers  · Former smoker    PLAN:  Mechanical ventilation as per my orders.  The ventilator was adjusted by me at the bedside for unstable, life threatening respiratory failure  Follow ABG and chest x-ray while on the ventilator  IV Precedex for sedation, target RASS -2, with daily spontaneous awakening trial   Propofol if needed - monitor TG   Seroquel 25 BID and Valium 10 TID   Head of bed 30 degrees or higher at all times  Daily spontaneous breathing trial once PEEP less than 8, FiO2 less than 55%  IV Levophed to keep MAP > 65 mmHg or SBP>90  Levaquin day #7/7  No Remdesevir given late presentation  Decadron D16--> 4 mg daily   Baricitinib D#14/14, monitor liver, kidney, leukocytes   PRN albuterol MDI  Tube feed at goal   Electrolytes replacement   Blood sugar control ISS, with goal 150-180  Prophylaxis: Pepcid, Lovenox 40 BID, Bactroban   Due to at least single organ failure and risk of rapid deterioration, I spent 32 minutes of Critical care time reviewing labs/films, examining patient, collaborating with other physicians. This does not include time performing critical procedures.

## 2022-01-27 NOTE — PROGRESS NOTES
01/27/22 0258   Vent Information   $Ventilation $Subsequent Day   Vent Type 840   Vent Mode AC/VC   Vt Ordered 320 mL   Rate Set 26 bmp   Peak Flow 60 L/min   FiO2  55 %   SpO2 91 %   SpO2/FiO2 ratio 165.45   Sensitivity 3   PEEP/CPAP 8   Humidification Source Heated wire   Humidification Temp 37   Humidification Temp Measured 37   Circuit Condensation Drained   Vent Patient Data   Peak Inspiratory Pressure 37 cmH2O   Mean Airway Pressure 14 cmH20   Rate Measured 32 br/min   Vt Exhaled 424 mL   Minute Volume 10.9 Liters   I:E Ratio 1:3   Plateau Pressure 22 SGH05   Cough/Sputum   Sputum How Obtained Suctioned;Endotracheal   Cough Non-productive   Spontaneous Breathing Trial (SBT) RT Doc   Pulse 70   Breath Sounds   Right Upper Lobe Clear   Right Middle Lobe Diminished   Right Lower Lobe Diminished   Left Upper Lobe Clear   Left Lower Lobe Diminished   Additional Respiratory  Assessments   Resp 25   Position Semi-Mohamud's   Oral Care Completed? Yes   Oral Care Mouth suctioned   Subglottic Suction Done?  Yes   Alarm Settings   High Pressure Alarm 45 cmH2O   Low Minute Volume Alarm 3 L/min   Apnea (secs) 20 secs   High Respiratory Rate 45 br/min   Low Exhaled Vt  250 mL   ETT (adult)   Placement Date/Time: 01/21/22 0330   Tube Size: 8 mm  Location: Oral  Secured at: 23 cm  Measured From: Lips   Secured at 24 cm   Measured From Lips   ET Placement Right   Secured By [employer] tube nicole   Site Condition Dry

## 2022-01-28 NOTE — PROGRESS NOTES
Sedation completely off for about 20 minutes and HR jumped up 130s, pt more agitated. Pt requiring 55%. Large amounts of secretions. Vent turned back to [facility] and propofol restarted and infusing at 25 mcg/kg/min and fentanyl restarted and infusing at 50 mcg/h.

## 2022-01-28 NOTE — PROGRESS NOTES
Pulmonary Progress Note  CC: COVID in partially vaccinated patient     Subjective: Did not tolerate precedex with bradycardia  Propofol 20    IV line: PICC line 1/20/2022  MV: 1/21/2022-    Intake/Output Summary (Last 24 hours) at 1/28/2022 1030  Last data filed at 1/28/2022 0600  Gross per 24 hour   Intake 2153.01 ml   Output 930 ml   Net 1223.01 ml       EXAM:   BP (!) 119/49   Pulse 104   Temp 99.4 °F (37.4 °C) (Axillary)   Resp 15   Ht 5' 3\" (1.6 m)   Wt 169 lb (76.7 kg)   SpO2 91%   BMI 29.94 kg/m²  on vent  EXAM:  General: ill appearing. Intubated sedated. Eyes: PERRL. No sclera icterus. No conjunctival injection. ENT: No discharge. Pharynx clear. ET tube in place  Neck: Trachea midline. Normal thyroid. Resp: No accessory muscle use. + crackles. No wheezing. No rhonchi. CV: Regular rate. Regular rhythm. No mumur or rub. No edema. GI: Non-tender. Non-distended. No masses. M/S: No cyanosis. No joint deformity. No clubbing. Neuro: Intubated and awake.  + response to painful stimuli. Not following commands.    Psych: Noncommunicative unable to obtain    Scheduled Meds:   diazePAM  10 mg Oral TID    QUEtiapine  25 mg Oral BID    dexamethasone  4 mg IntraVENous Q24H    chlorhexidine  15 mL Mouth/Throat BID    famotidine (PEPCID) injection  20 mg IntraVENous BID    insulin lispro  0-6 Units SubCUTAneous Q4H    enoxaparin  30 mg SubCUTAneous BID    aspirin  81 mg Oral Daily    clopidogrel  75 mg Oral Daily    ascorbic acid  1,000 mg Oral Daily    atorvastatin  10 mg Oral Nightly    sodium chloride flush  5-40 mL IntraVENous 2 times per day    Vitamin D  2,000 Units Oral Daily     Continuous Infusions:   fentaNYL 75 mcg/hr (01/28/22 1002)    norepinephrine Stopped (01/27/22 1652)    propofol 25 mcg/kg/min (01/28/22 0907)    dexmedetomidine HCl in NaCl Stopped (01/27/22 1124)    dextrose      sodium chloride Stopped (01/22/22 1231)     PRN Meds:  midazolam, albuterol sulfate HFA, fentanNYL, glucose, glucagon (rDNA), dextrose, dextrose bolus (hypoglycemia) **OR** dextrose bolus (hypoglycemia), albuterol sulfate HFA, guaiFENesin-dextromethorphan, benzonatate, sodium chloride flush, sodium chloride, ondansetron **OR** ondansetron, polyethylene glycol, acetaminophen **OR** acetaminophen    Labs:  CBC:   Recent Labs     01/26/22 0400 01/27/22  0430 01/28/22  0425   WBC 8.6 8.6 10.6   HGB 11.4* 11.1* 10.4*   HCT 33.9* 31.9* 30.7*   MCV 89.3 88.6 89.4    269 252     BMP:   Recent Labs     01/26/22 0400 01/27/22  0430 01/28/22  0425    136 134*   K 3.7 3.7 4.0   CL 96* 97* 96*   CO2 33* 31 32   BUN 18 20 15   CREATININE <0.5* <0.5* <0.5*     Micro:  1/17/2022 SARS-CoV-2 positive at urgent care  1/12/2022 SARS-CoV-2 detected     Imaging:   CXR 1/27/2022 imaging reviewed by me and showed   Extensive infiltrates within the lungs bilaterally, worsening on the right   with increasing consolidation. ASSESSMENT:  · Acute hypoxemic respiratory failure, progressive life-threatening  · COVID-19 pneumonia in a partially vaccinated patient, s/p J&J vaccination 3/2021  · ARDS  · Hypotension   · Thrombocytopenia -resolved  · H/O asthma, no home inhalers  · Former smoker    PLAN:  Mechanical ventilation as per my orders.  The ventilator was adjusted by me at the bedside for unstable, life threatening respiratory failure  Follow ABG and chest x-ray while on the ventilator  IV Propofol for sedation, target RASS -2, with daily spontaneous awakening trial   Seroquel 25 BID and Valium 10 TID   Levaquin day #7/7  No Remdesevir given late presentation  Decadron D#17--> 3 mg daily   Baricitinib D#14/14, monitor liver, kidney, leukocytes   PRN albuterol MDI  Tube feed at goal   Blood sugar control ISS, with goal 150-180  Prophylaxis: Pepcid, Lovenox 40 BID, Bactroban   Due to at least single organ failure and risk of rapid deterioration, I spent 31 minutes of Critical care time reviewing labs/films, examining patient, collaborating with other physicians. This does not include time performing critical procedures.

## 2022-01-28 NOTE — PROGRESS NOTES
Blood pressure 133/60, pulse 119, temperature 101.3 °F (38.5 °C), temperature source Axillary, resp. rate 25, height 5' 3\" (1.6 m), weight 169 lb (76.7 kg), SpO2 92 %, not currently breastfeeding. Patient currently intubated with ETT at 21. Vent settings are 320/26/+8/50%. Propofol infusing at 50 mcg/kg/min. Fentanyl infusing at 100 mcg//h. Reassessment completed. No physical changes noted. Pt resting and is more vent compliant with the increase in the fentanyl. Complete bath given. Repositioned. Restraints remain in place. No further needs at this time.  Electronically signed by Mikhail Mendoza RN on 1/28/2022 at 12:56 AM

## 2022-01-28 NOTE — PROGRESS NOTES
01/27/22 2304   Vent Information   Vent Type 840   Vent Mode AC/VC   Vt Ordered 320 mL   Rate Set 26 bmp   Peak Flow 60 L/min   FiO2  50 %   SpO2 (!) 87 %   SpO2/FiO2 ratio 174   Sensitivity 3   PEEP/CPAP 8   Vent Patient Data   Peak Inspiratory Pressure 27 cmH2O   Mean Airway Pressure 16 cmH20   Rate Measured 36 br/min   Vt Exhaled 403 mL   Minute Volume 11.5 Liters   I:E Ratio 1:3   Cough/Sputum   Sputum How Obtained Suctioned;Endotracheal   Sputum Amount Small   Sputum Color Creamy   Tenacity Thick   Spontaneous Breathing Trial (SBT) RT Doc   Pulse 108   Breath Sounds   Right Upper Lobe Diminished   Right Middle Lobe Diminished   Right Lower Lobe Diminished   Left Upper Lobe Diminished   Left Lower Lobe Diminished   Additional Respiratory  Assessments   Resp 22

## 2022-01-28 NOTE — PROGRESS NOTES
Rounding completed with Dr. Cash Bush and multidisciplinary team. POC, labs, lines and assessment reviewed.

## 2022-01-28 NOTE — PLAN OF CARE
Problem: Airway Clearance - Ineffective  Goal: Achieve or maintain patent airway  Outcome: Ongoing     Problem: Gas Exchange - Impaired  Goal: Absence of hypoxia  Outcome: Ongoing  Goal: Promote optimal lung function  Outcome: Ongoing     Problem: Breathing Pattern - Ineffective  Goal: Ability to achieve and maintain a regular respiratory rate  Outcome: Ongoing     Problem:  Body Temperature -  Risk of, Imbalanced  Goal: Ability to maintain a body temperature within defined limits  Outcome: Ongoing  Goal: Will regain or maintain usual level of consciousness  Outcome: Ongoing  Goal: Complications related to the disease process, condition or treatment will be avoided or minimized  Outcome: Ongoing     Problem: Isolation Precautions - Risk of Spread of Infection  Goal: Prevent transmission of infection  Outcome: Ongoing     Problem: Nutrition Deficits  Goal: Optimize nutritional status  Outcome: Ongoing     Problem: Risk for Fluid Volume Deficit  Goal: Maintain normal heart rhythm  Outcome: Ongoing  Goal: Maintain absence of muscle cramping  Outcome: Ongoing  Goal: Maintain normal serum potassium, sodium, calcium, phosphorus, and pH  Outcome: Ongoing     Problem: Loneliness or Risk for Loneliness  Goal: Demonstrate positive use of time alone when socialization is not possible  Outcome: Ongoing     Problem: Fatigue  Goal: Verbalize increase energy and improved vitality  Outcome: Ongoing     Problem: Patient Education: Go to Patient Education Activity  Goal: Patient/Family Education  Outcome: Ongoing     Problem: Pain:  Goal: Pain level will decrease  Description: Pain level will decrease  Outcome: Ongoing  Goal: Control of acute pain  Description: Control of acute pain  Outcome: Ongoing  Goal: Control of chronic pain  Description: Control of chronic pain  Outcome: Ongoing     Problem: Nutrition  Goal: Optimal nutrition therapy  Outcome: Ongoing     Problem: Infection - Central Venous Catheter-Associated Bloodstream Infection:  Goal: Will show no infection signs and symptoms  Description: Will show no infection signs and symptoms  Outcome: Ongoing     Problem: Non-Violent Restraints  Goal: Removal from restraints as soon as assessed to be safe  Outcome: Ongoing  Goal: No harm/injury to patient while restraints in use  Outcome: Ongoing  Goal: Patient's dignity will be maintained  Outcome: Ongoing     Problem: Skin Integrity:  Goal: Will show no infection signs and symptoms  Description: Will show no infection signs and symptoms  Outcome: Ongoing  Goal: Absence of new skin breakdown  Description: Absence of new skin breakdown  Outcome: Ongoing

## 2022-01-28 NOTE — PROGRESS NOTES
Bedside report and Pt care transferred to [facility]. Pt denies any assistance at this time.

## 2022-01-28 NOTE — CARE COORDINATION
INTERDISCIPLINARY PLAN OF CARE CONFERENCE    Date/Time: 1/28/2022 3:11 PM  Completed by: Zehra Louis RN, Case Management      Patient Name:  Epi Pappas  YOB: 1950  Admitting Diagnosis: Acute respiratory failure with hypoxia ([facility] [address] [J96.01]  Pneumonia due to COVID-19 virus [U07.1, J12.82]  COVID-19 [U07.1]     Admit Date/Time:  1/12/2022  9:40 AM    Chart reviewed. Interdisciplinary team contacted or reviewed plan related to patient progress and discharge plans. Disciplines included Case Management, Nursing, and Dietitian. Current Status: C19+, ICU LOC. Inpatient. Vent    Expected D/C Disposition:  TBD  Discharge Plan Comments: will follow pending progress. Pt not tolerating SBT- trialed again today. Back on A/C.      Home O2 in place on admit: No

## 2022-01-28 NOTE — PROGRESS NOTES
Comprehensive Nutrition Assessment    Type and Reason for Visit:  Reassess    Nutrition Recommendations/Plan:   1. Modified TF order - ADULT TUBE FEEDING; Orogastric; Other formula - Glucerna 1.2 with a goal rate of 50 ml/hr x 20 hours + 30 ml water flushes every 3 hours for tube patency. 2. Monitor TF rate, intake, and tolerance + water flushes. 3. Monitor vent status, sedation type/amount (propofol currently at 45 mcg x 24 hours which = 549 kcals from lipids), TG checks, and plan of care. 4. Please obtain an updated weight for this patient - last weight was obtained on 1/19/22.   5. Monitor nutrition-related labs, bowel function (last documented BM was on 1/14/22), and weight trends. Nutrition Assessment:  patient continues to improve from a nutritional standpoint AEB TF is infusing at goal rate and patient is tolerating well; she remains at risk for further compromise d/t increased nutrition needs r/t COVID-19 virus, altered nutrition-related labs, and last documented BM was on 1/14/22; will modify TF order to Glucerna 1.2 at goal rate of 50 ml/hr x 20 hours + 30 ml water flushes every 3 hours for tube patency    Malnutrition Assessment:  Malnutrition Status: Moderate malnutrition (moderate malnutrition in the context of acute illness or injury < 3 months based on 75% or less of estimated energy requirements for 7 or more days and greater than 2% weight loss over 1 week), per guidelines from Academy of Nutrition and Dietetics (Academy)/American Society for Parenteral and Enteral Nutrition (A.S.P.E.N.) - clinical characteristics that the clinician can  obtain and document to support a diagnosis of malnutrition.    Context:  Acute Illness     Findings of the 6 clinical characteristics of malnutrition:  Energy Intake:  1 - 75% or less of estimated energy requirements for 7 or more days  Weight Loss:  7 - Greater than 2% over 1 week (- 14# or 8% weight loss x 1 week)     Body Fat Loss:  Unable to assess (COVID-19 +)     Muscle Mass Loss:  Unable to assess (COVID-19 +)    Fluid Accumulation:  No significant fluid accumulation     Strength:  Not Performed    Estimated Daily Nutrient Needs:  Energy (kcal):  1540 - 1771 kcals based on 20-23 kcals/kg/CBW; Weight Used for Energy Requirements:  Current     Protein (g):  68 - 78 g protein based on 1.3-1.5 g/kg/IBW; Weight Used for Protein Requirements:  Ideal        Fluid (ml/day):  1540 - 1771 ml; Method Used for Fluid Requirements:  1 ml/kcal      Nutrition Related Findings:  patient remains intubated and sedated with 45 mcg propofol at this time; patient responds to voice; last documented BM was on 1/14/22; abdomen is non-tender, soft, and bowel sounds are active; TF is infusing at goal rate and patient is tolerating well; blood glucose trends are elevated; Na, Cl, and h/h are low; patient has low-dose SSI ordered for blood glucose control; pepcid is ordered for GI prophylaxis      Wounds:  None       Current Nutrition Therapies:    Current Tube Feeding (TF) Orders:  · Feeding Route: Orogastric  · Formula:  Other Tube Feeding (Glucerna 1.2)  · Schedule: Continuous  · Additives/Modulars:  (none)  · Water Flushes: 30 ml every 3 hours for tube patency  · Current TF & Flush Orders Provides: Glucerna 1.2 at goal rate of 65 ml/hr x 20 hours = 1300 ml TV, 1560 kcals, 78 g protein, and 1047 ml free water + 30 ml water flushes every 3 hours for tube patency  · Goal TF & Flush Orders Provides: Glucerna 1.2 with a goal rate of 50 ml/hr x 20 hours = 1000 ml TV, 1200 kcals, 60 g protein, and 805 ml free water + 30 ml water flushes every 3 hours for tube patency      Anthropometric Measures:  · Height: 5' 3\" (160 cm)  · Current Body Weight: 169 lb (76.7 kg) (obtained on 1/19/22; actual weight)   · Admission Body Weight: 183 lb 9.6 oz (83.3 kg) (obtained on 1/16/22; actual weight)    · Usual Body Weight: 183 lb 9.6 oz (83.3 kg) (obtained on 1/16/22; actual weight)     · Ideal Body Weight: 115 lbs; % Ideal Body Weight 147 %   · BMI: 29.9   · BMI Categories: Overweight (BMI 25.0-29. 9)       Nutrition Diagnosis:   · Inadequate oral intake related to inadequate protein-energy intake,impaired respiratory function,increase demand for energy/nutrients as evidenced by NPO or clear liquid status due to medical condition,intubation      Nutrition Interventions:   Food and/or Nutrient Delivery:  Continue NPO,Modify Tube Feeding  Nutrition Education/Counseling:  No recommendation at this time   Coordination of Nutrition Care:  Continue to monitor while inpatient,Interdisciplinary Rounds    Goals:  patient will tolerate Glucerna 1.2 with a goal rate of 50 ml/hr x 20 hours without GI distress, without s/s of aspiration, and without additional lab/fluid disturbances       Nutrition Monitoring and Evaluation:   Behavioral-Environmental Outcomes:  None Identified   Food/Nutrient Intake Outcomes:  Enteral Nutrition Intake/Tolerance  Physical Signs/Symptoms Outcomes:  Biochemical Data,Constipation,GI Status,Hemodynamic Status,Weight,Skin     Discharge Planning:     Too soon to determine     Electronically signed by Nadine Hilliard RD, BARBARA on 1/28/22 at 4:00 PM EST    Contact: [phone]

## 2022-01-28 NOTE — PLAN OF CARE
Nutrition Problem #1: Inadequate oral intake  Intervention: Food and/or Nutrient Delivery: Continue NPO,Modify Tube Feeding  Nutritional Goals: patient will tolerate Glucerna 1.2 with a goal rate of 50 ml/hr x 20 hours without GI distress, without s/s of aspiration, and without additional lab/fluid disturbances

## 2022-01-28 NOTE — PROGRESS NOTES
SBT started at 0905. Propofol decreased and infusing at 25 mcg/kg/min. Fentanyl infusing at 100 mcg/h. Tube feed turned off.

## 2022-01-28 NOTE — PROGRESS NOTES
Blood pressure 128/60, pulse 130, temperature 102.8 °F (39.3 °C), temperature source Axillary, resp. rate (!) 32, height 5' 3\" (1.6 m), weight 169 lb (76.7 kg), SpO2 91 %, not currently breastfeeding. Patient currently intubated with ETT at 25. Vent settings are 26/320/+8/50%. Propofol infusing at 50 mcg/kg/min. Fentanyl infusing at 75 mcg//h. Shift assessment completed. Pt awakens to verbal stimuli but does not follow commands. Easily agitated- 2mg IVP versed given. Remains on vent. Lungs diminished. Large amount of secretions noted through ETT and mouth. Temp elevated 102.8- tylenol given. ST on monitor. Even in the 140s when agitated. BP stable off levo. OG with TF at goal- low residuals. Mcguire patent, good OP. Restraints in place. Repositioned. No further needs noted at this time.  Electronically signed by Manolo Peterson RN on 1/27/2022 at 10:06 PM

## 2022-01-28 NOTE — PROGRESS NOTES
01/28/22 0650   Vent Information   Vent Mode AC/VC   Vt Ordered 320 mL   Rate Set 26 bmp   Peak Flow 60 L/min   FiO2  50 %   SpO2 91 %   SpO2/FiO2 ratio 182   Sensitivity 3   PEEP/CPAP 8   Humidification Source Heated wire   Humidification Temp Measured 37   Circuit Condensation Drained   Vent Patient Data   Peak Inspiratory Pressure 25 cmH2O   Mean Airway Pressure 12 cmH20   Rate Measured 26 br/min   Vt Exhaled 530 mL   Minute Volume 9.8 Liters   I:E Ratio 1:3   Plateau Pressure 22 SFE88   Static Compliance 24 mL/cmH2O   Cough/Sputum   Sputum How Obtained Suctioned;Endotracheal   Cough Non-productive   Spontaneous Breathing Trial (SBT) RT Doc   Pulse 97   Breath Sounds   Right Upper Lobe Rhonchi   Right Middle Lobe Rhonchi   Right Lower Lobe Diminished   Left Upper Lobe Diminished   Left Lower Lobe Diminished   Additional Respiratory  Assessments   Resp 22   Position Reverse Trendelenburg   Oral Care Completed? Yes   Oral Care Mouth suctioned   Subglottic Suction Done?  Yes   Cuff Pressure (cm H2O) 27 cm H2O   Alarm Settings   High Pressure Alarm 45 cmH2O   Low Minute Volume Alarm 3 L/min   Apnea (secs) 20 secs   High Respiratory Rate 45 br/min   Low Exhaled Vt  250 mL   ETT (adult)   Placement Date/Time: 01/21/22 0330   Tube Size: 8 mm  Location: Oral  Secured at: 23 cm  Measured From: Lips   Secured at 24 cm   Measured From Lips   ET Placement Left   Secured By [employer] tube nicole   Site Condition Dry

## 2022-01-28 NOTE — PROGRESS NOTES
01/28/22 1654   Vent Information   Vent Type 840   Vent Mode AC/VC   Vt Ordered 320 mL   Rate Set 26 bmp   Peak Flow 60 L/min   FiO2  55 %   SpO2 99 %   SpO2/FiO2 ratio 180   Sensitivity 3   PEEP/CPAP 5   Humidification Source Heated wire   Humidification Temp Measured 37   Vent Patient Data   Peak Inspiratory Pressure 27 cmH2O   Mean Airway Pressure 11 cmH20   Rate Measured 26 br/min   Vt Exhaled 348 mL   Minute Volume 9 Liters   I:E Ratio 1:3   Cough/Sputum   Sputum How Obtained Suctioned;Endotracheal   Cough Productive   Sputum Amount Large   Sputum Color Creamy   Tenacity Thick   Spontaneous Breathing Trial (SBT) RT Doc   Pulse 79   Breath Sounds   Right Upper Lobe Diminished   Right Middle Lobe Diminished   Right Lower Lobe Diminished   Left Upper Lobe Diminished   Left Lower Lobe Diminished   Additional Respiratory  Assessments   Resp 26   Position Reverse Trendelenburg   Oral Care Completed? Yes   Oral Care Mouth suctioned;Mouth swabbed;Lip moisturizer applied;Mouth moisturizer   Subglottic Suction Done?  Yes   Alarm Settings   High Pressure Alarm 45 cmH2O   Low Minute Volume Alarm 3 L/min   Apnea (secs) 20 secs   High Respiratory Rate 45 br/min   Low Exhaled Vt  300 mL   ETT (adult)   Placement Date/Time: 01/21/22 0330   Tube Size: 8 mm  Location: Oral  Secured at: 23 cm  Measured From: Lips   Secured at 24 cm   Measured From Lips   ET Placement Right   Secured By [employer] tube nicole   Site Condition Dry

## 2022-01-28 NOTE — PROGRESS NOTES
Shift assessment completed, see flow sheet. RASS -1. Following commands.   - Propofol infusing at 50 mcg/kg/min. - Fentanyl infusing at 100 mcg/h. Intubated and sedated on AC # 8.0 ETT, at 24 LL. 26 / 320 / 50%/ +8. SpO2 95%. Respirations are easy, even, and unlabored. Bilateral lung sounds diminished. VSS  OG in place at 60, with TF at goal of 65 mL/hr, low residual.      PICC and PIV WDL    All lines and monitoring devices in place. Mcguire is patent and secured. Bilateral soft wrist restraints in place for patient safety. Bed in lowest position with wheels locked.

## 2022-01-28 NOTE — PROGRESS NOTES
Blood pressure (!) 108/54, pulse 107, temperature 101.6 °F (38.7 °C), temperature source Axillary, resp. rate (!) 31, height 5' 3\" (1.6 m), weight 169 lb (76.7 kg), SpO2 92 %, not currently breastfeeding. Patient currently intubated with ETT at 24. Vent settings are 26/320/+8/50%. Propofol infusing at 50 mcg/kg/min. Fentanyl infusing at 100 mcg//h. Reassessment completed. Pt remains febrile, tylenol given. No other changes noted to physical assessment.

## 2022-01-28 NOTE — PROGRESS NOTES
Hospitalist Progress Note      PCP: Kierra Mcgowan DO    Date of Admission: 1/12/2022    Admitted with COVID-19 pneumonia and acute respiratory failure  Intubated    Subjective: failed SBT again today due to hypoxia and agitation,  Off Levophed  On Precedex    1/27  Unable to perform SBT due to agitation of propofol  On Precedex  1/28  Did not tolerate precedex- caused bradycardia  Oxygenation requirements on the vent is better    Medications:  Reviewed    Infusion Medications    fentaNYL 75 mcg/hr (01/28/22 1002)    propofol 25 mcg/kg/min (01/28/22 0907)    dextrose      sodium chloride Stopped (01/22/22 1231)     Scheduled Medications    [START ON 1/29/2022] dexamethasone  3 mg IntraVENous Q24H    diazePAM  10 mg Oral TID    QUEtiapine  25 mg Oral BID    chlorhexidine  15 mL Mouth/Throat BID    famotidine (PEPCID) injection  20 mg IntraVENous BID    insulin lispro  0-6 Units SubCUTAneous Q4H    enoxaparin  30 mg SubCUTAneous BID    aspirin  81 mg Oral Daily    clopidogrel  75 mg Oral Daily    ascorbic acid  1,000 mg Oral Daily    atorvastatin  10 mg Oral Nightly    sodium chloride flush  5-40 mL IntraVENous 2 times per day    Vitamin D  2,000 Units Oral Daily     PRN Meds: midazolam, albuterol sulfate HFA, fentanNYL, glucose, glucagon (rDNA), dextrose, dextrose bolus (hypoglycemia) **OR** dextrose bolus (hypoglycemia), albuterol sulfate HFA, guaiFENesin-dextromethorphan, benzonatate, sodium chloride flush, sodium chloride, ondansetron **OR** ondansetron, polyethylene glycol, acetaminophen **OR** acetaminophen      Intake/Output Summary (Last 24 hours) at 1/28/2022 1118  Last data filed at 1/28/2022 0600  Gross per 24 hour   Intake 2153.01 ml   Output 930 ml   Net 1223.01 ml       Physical Exam Performed:    BP (!) 119/56   Pulse 100   Temp 99.4 °F (37.4 °C) (Axillary)   Resp 16   Ht 5' 3\" (1.6 m)   Wt 169 lb (76.7 kg)   SpO2 91%   BMI 29.94 kg/m²       Patient seen in droplet plus precautions  General: [address] [place] female, on vent  Oral ETT and OG noted   Mucous Membranes:  Pink , anicteric  Neck: No JVD, no carotid bruit, no thyromegaly  Chest: diminished in bases, bilateral mild crackles present . Cardiovascular:  RRR S1S2 heard, no murmurs or gallops  Abdomen:  Soft, undistended, non tender, no organomegaly, BS present  Extremities: No edema or cyanosis. Distal pulses well felt  Neurological :  Awake, able to follow commands    Labs:   Recent Labs     01/26/22 0400 01/27/22 0430 01/28/22  0425   WBC 8.6 8.6 10.6   HGB 11.4* 11.1* 10.4*   HCT 33.9* 31.9* 30.7*    269 252     Recent Labs     01/26/22 0400 01/27/22 0430 01/28/22  0425    136 134*   K 3.7 3.7 4.0   CL 96* 97* 96*   CO2 33* 31 32   BUN 18 20 15   CREATININE <0.5* <0.5* <0.5*   CALCIUM 9.3 8.4 8.9     No results for input(s): AST, ALT, BILIDIR, BILITOT, ALKPHOS in the last 72 hours. No results for input(s): INR in the last 72 hours. No results for input(s): Katherine Grover in the last 72 hours. Urinalysis:      Lab Results   Component Value Date    NITRU Neg 08/29/2010    WBCUA  08/29/2010    BACTERIA 1+ 08/29/2010    RBCUA 5-10 08/29/2010    BLOODU Neg 08/29/2010    SPECGRAV 1.010 08/29/2010    GLUCOSEU Neg 08/29/2010       Radiology:  XR CHEST PORTABLE   Final Result   Moderate persistent multifocal airspace disease compatible with multifocal   pneumonia including COVID pneumonia. While similar to recent studies, there   has been gradual progression and worsening since the early study of   01/12/2022. XR CHEST PORTABLE   Final Result   Extensive infiltrates within the lungs bilaterally, worsening on the right   with increasing consolidation. XR CHEST PORTABLE   Final Result   No significant interval change multifocal airspace disease. Stable lines and tubes.          XR CHEST PORTABLE   Final Result   Mildly improved parenchymal infiltrates at the right lung base, otherwise similar appearing chest.         XR CHEST PORTABLE   Final Result   Extensive bilateral airspace opacities are seen without significant change. XR CHEST PORTABLE   Final Result   1. Recommend retraction of endotracheal tube 1.0 cm.   2. Stable severe ill-defined lung consolidation, greatest at the lung bases   consistent with pneumonia versus alveolar edema. 3. Suspected mild bilateral pleural effusion, unchanged. XR CHEST PORTABLE   Final Result   1. No significant change. XR CHEST PORTABLE   Final Result   Appropriate positioning of the endotracheal tube. Enteric tube courses   beneath the diaphragm; tip is excluded by collimation inferiorly. No significant change in extensive bilateral airspace disease. IR PICC WO SQ PORT/PUMP > 5 YEARS   Final Result   Successful placement of PICC line. XR CHEST PORTABLE   Final Result   New multifocal moderate to severe pneumonia. Clinical and imaging follow-up   to resolution recommended. CTA HEAD NECK W CONTRAST   Final Result   Unremarkable CTA of the head and neck. Findings consistent with COVID pneumonia. This scan was analyzed using DFMSim. ai contact LVO. Identification of suspected   findings is not for diagnostic use beyond notification. Viz LVO is limited to   analysis of imaging data and should not be used in-lieu of full patient   evaluation or relied upon to make or confirm diagnosis. CT HEAD WO CONTRAST   Final Result   No acute intracranial abnormality. XR CHEST PORTABLE   Final Result   Multifocal bilateral atypical pneumonia.                  Assessment/Plan:    Active Hospital Problems    Diagnosis     Hypotension [I95.9]     Pneumonia due to COVID-19 virus [U07.1, J12.82]     ARDS (adult respiratory distress syndrome) (HCC) [J80]     Hyperglycemia [R73.9]     Leukocytosis [D72.829]     Moderate protein-calorie malnutrition (HCC) [E44.0]     Hyperlipidemia [E78.5]     Arthritis [M19.90]     Brain fog [R41.89]     Anxiety [F41.9]     COVID-19 [U07.1]            COVID PNA  Acute hypoxic respiratory Failure   - CXR: noted with multifocal PNA  - no home O2 at baseline, 83-87% at rest,   - J &J in March 2021- no booster   - Admitted to , droplet +, tele, cont pulse ox  - supp O2, wean as tolerated - worsening slowly -was on 10 L   -progressively worsening hypoxia, was on vapotherm with full support   - eventually placed on vent support 1/21    persistent severe hypoxemia -she was proned on 1/20/2022.  She is back in supine position.  -Continue Decadron day #17  - Remdesivir not started due to out of window- sx 7+ days prior to admit  - levaquin - finished 7 days   - PRN Robitussin  - procal 0.10 wnl   - CRP elevated . Started on Baricitinib - finished 14 days .  Monitor CBC and LFTs  - Lovenox Bid  Failed SBT again today. Valium and Seroquel added  precedex caused bradycardia    Hypotension  On Levophed.     Episodes of expressive aphasia   - ? TIA, vs mental status changes related to covid   - cont ASA, Plavix      Thrombocytopenia resolved  - likely with covid  - continue Lovenox and monitor    Hyperglycemia  Secondary to Decadron   Sliding scale insulin     HLD  - Continue statin        Arthritis   - was on  mobic - holding     Hx of Asthma  - not on inhalers at home      DVT Prophylaxis: Lovenox bid  Diet: Diet NPO  ADULT TUBE FEEDING; Orogastric; Other Tube Feeding (specify);  Glucerna 1.2; Continuous; 20; Yes; 20; Q 4 hours; 65; 30; Q 3 hours  Code Status: Full Code    PT/OT Eval Status: not indicated    Estrada Aguilar MD

## 2022-01-28 NOTE — PROGRESS NOTES
01/27/22 2045   Vent Information   Vent Type 840   Vent Mode AC/VC   Vt Ordered 320 mL   Rate Set 26 bmp   Peak Flow 60 L/min   FiO2  50 %   SpO2 91 %   SpO2/FiO2 ratio 182   Sensitivity 3   PEEP/CPAP 8   Humidification Source Heated wire   Humidification Temp 37   Humidification Temp Measured 35.2   Circuit Condensation Drained   Vent Patient Data   Peak Inspiratory Pressure 21 cmH2O   Mean Airway Pressure 12 cmH20   Rate Measured 41 br/min   Vt Exhaled 355 mL   Minute Volume 15.1 Liters   I:E Ratio 1:1.4   Plateau Pressure 18 FEF61   Static Compliance 36 mL/cmH2O   Dynamic Compliance 13 mL/cmH2O   Cough/Sputum   Sputum How Obtained Suctioned;Endotracheal   Sputum Amount Moderate   Sputum Color Creamy   Tenacity Thick   Spontaneous Breathing Trial (SBT) RT Doc   Pulse 130   Breath Sounds   Right Upper Lobe Diminished   Right Middle Lobe Diminished   Right Lower Lobe Diminished   Left Upper Lobe Diminished   Left Lower Lobe Diminished   Additional Respiratory  Assessments   Resp 23   Position Semi-Mohamud's   Alarm Settings   High Pressure Alarm 45 cmH2O   Low Minute Volume Alarm 3 L/min   Apnea (secs) 20 secs   High Respiratory Rate 45 br/min   Low Exhaled Vt  250 mL   Patient Observation   Observations ETT 8.0   ETT (adult)   Placement Date/Time: 01/21/22 0330   Tube Size: 8 mm  Location: Oral  Secured at: 23 cm  Measured From: Lips   Secured at 24 cm   Measured From Lips   ET Placement Left   Secured By [employer] tube nicole   Site Condition Dry

## 2022-01-29 NOTE — PROGRESS NOTES
Pulmonary Progress Note  CC: COVID in partially vaccinated patient     Subjective: following commands on SBT. PSV for 3hrs yesterday. Propofol 45  Fentanyl 50    IV line: PICC line 1/20/2022  MV: 1/21/2022-    Intake/Output Summary (Last 24 hours) at 1/29/2022 0924  Last data filed at 1/29/2022 0900  Gross per 24 hour   Intake 2620.9 ml   Output 1552 ml   Net 1068.9 ml       EXAM:   BP (!) 116/53   Pulse 105   Temp 100.3 °F (37.9 °C) (Axillary)   Resp 21   Ht 5' 3\" (1.6 m)   Wt 169 lb (76.7 kg)   SpO2 90%   BMI 29.94 kg/m²  on vent  EXAM:  General: ill appearing. Intubated sedated. Eyes: PERRL. No sclera icterus. No conjunctival injection. ENT: No discharge. Pharynx clear. ET tube in place  Neck: Trachea midline. Normal thyroid. Resp: No accessory muscle use. + crackles. No wheezing. No rhonchi. CV: Regular rate. Regular rhythm. No mumur or rub. No edema. GI: Non-tender. Non-distended. No masses. M/S: No cyanosis. No joint deformity. No clubbing. Neuro:   Awake on sedation. + following commands.    Psych: Noncommunicative unable to obtain    Scheduled Meds:   dexamethasone  3 mg IntraVENous Q24H    diazePAM  10 mg Oral TID    QUEtiapine  25 mg Oral BID    chlorhexidine  15 mL Mouth/Throat BID    famotidine (PEPCID) injection  20 mg IntraVENous BID    insulin lispro  0-6 Units SubCUTAneous Q4H    enoxaparin  30 mg SubCUTAneous BID    aspirin  81 mg Oral Daily    clopidogrel  75 mg Oral Daily    ascorbic acid  1,000 mg Oral Daily    atorvastatin  10 mg Oral Nightly    sodium chloride flush  5-40 mL IntraVENous 2 times per day    Vitamin D  2,000 Units Oral Daily     Continuous Infusions:   fentaNYL 50 mcg/hr (01/29/22 0900)    propofol 45 mcg/kg/min (01/29/22 0900)    dextrose      sodium chloride Stopped (01/22/22 1231)     PRN Meds:  midazolam, albuterol sulfate HFA, fentanNYL, glucose, glucagon (rDNA), dextrose, dextrose bolus (hypoglycemia) **OR** dextrose bolus (hypoglycemia), albuterol sulfate HFA, guaiFENesin-dextromethorphan, benzonatate, sodium chloride flush, sodium chloride, ondansetron **OR** ondansetron, polyethylene glycol, acetaminophen **OR** acetaminophen    Labs:  CBC:   Recent Labs     01/27/22  0430 01/28/22  0425 01/29/22  0441   WBC 8.6 10.6 9.8   HGB 11.1* 10.4* 10.4*   HCT 31.9* 30.7* 30.5*   MCV 88.6 89.4 89.6    252 254     BMP:   Recent Labs     01/27/22  0430 01/28/22  0425 01/29/22  0441    134* 137   K 3.7 4.0 4.1   CL 97* 96* 96*   CO2 31 32 35*   BUN 20 15 19   CREATININE <0.5* <0.5* <0.5*     Micro:  1/17/2022 SARS-CoV-2 positive at urgent care  1/12/2022 SARS-CoV-2 detected     Imaging:   CXR 1/29/2022 imaging reviewed by me and showed   Mild worsening of bilateral infiltrates likely due to pneumonia.  Continued   follow-up recommended. ASSESSMENT:  · Acute hypoxemic respiratory failure, progressive life-threatening  · COVID-19 pneumonia in a partially vaccinated patient, s/p J&J vaccination 3/2021  · ARDS  · Hypotension   · Thrombocytopenia -resolved  · H/O asthma, no home inhalers  · Former smoker  · Precedex caused bradycardia    PLAN:  Mechanical ventilation as per my orders. The ventilator was adjusted by me at the bedside for unstable, life threatening respiratory failure  Follow ABG and chest x-ray while on the ventilator  IV Propofol for sedation, target RASS -2, with daily spontaneous awakening trial   Seroquel 25 BID and Valium 10 TID   Completed Levaquin day #7/7  Decadron D#17--> 3 mg daily   Completed Baricitinib D#14  PRN albuterol MDI  Tube feed at goal   Blood sugar control ISS, with goal 150-180  Prophylaxis: Pepcid, Lovenox 40 BID, Bactroban   Due to at least single organ failure and risk of rapid deterioration, I spent 31 minutes of Critical care time reviewing labs/films, examining patient, collaborating with other physicians. This does not include time performing critical procedures.

## 2022-01-29 NOTE — PROGRESS NOTES
Blood pressure 131/71, pulse 111, temperature 99.8 °F (37.7 °C), temperature source Axillary, resp. rate 23, height 5' 3\" (1.6 m), weight 169 lb (76.7 kg), SpO2 95 %, not currently breastfeeding. Patient currently intubated with ETT at 24. Vent settings are 26/320/+5/55%. Propofol infusing at 45 mcg/kg/min. Fentanyl infusing at 50 mcg//h. Shift assessment completed. Pt resting on vent. Awakens to verbal stimuli and follows commands. Tolerating current vent settings. Thick secretions noted from ETT. SR. BP stable. Temp improved today. OGT with TF at goal, low residuals. [place] patent. Repositioned. Restraints in place. No further needs known as this time.  Electronically signed by Augusta Vazquez RN on 1/28/2022 at 9:37 PM

## 2022-01-29 NOTE — PROGRESS NOTES
Hospitalist Progress Note      PCP: Yuliana Villanueva DO    Date of Admission: 1/12/2022    Admitted with COVID-19 pneumonia and acute respiratory failure  Intubated    Subjective: failed SBT again today due to hypoxia and agitation,  Off Levophed  On Precedex    1/27  Unable to perform SBT due to agitation of propofol  On Precedex    1/28  Did not tolerate precedex- caused bradycardia  Oxygenation requirements on the vent is better    1/29  Following commands on SBT.     Medications:  Reviewed    Infusion Medications    fentaNYL 50 mcg/hr (01/29/22 1333)    propofol Stopped (01/29/22 0932)    dextrose      sodium chloride Stopped (01/22/22 1231)     Scheduled Medications    dexamethasone  3 mg IntraVENous Q24H    diazePAM  10 mg Oral TID    QUEtiapine  25 mg Oral BID    chlorhexidine  15 mL Mouth/Throat BID    famotidine (PEPCID) injection  20 mg IntraVENous BID    insulin lispro  0-6 Units SubCUTAneous Q4H    enoxaparin  30 mg SubCUTAneous BID    aspirin  81 mg Oral Daily    clopidogrel  75 mg Oral Daily    ascorbic acid  1,000 mg Oral Daily    atorvastatin  10 mg Oral Nightly    sodium chloride flush  5-40 mL IntraVENous 2 times per day    Vitamin D  2,000 Units Oral Daily     PRN Meds: midazolam, albuterol sulfate HFA, fentanNYL, glucose, glucagon (rDNA), dextrose, dextrose bolus (hypoglycemia) **OR** dextrose bolus (hypoglycemia), albuterol sulfate HFA, guaiFENesin-dextromethorphan, benzonatate, sodium chloride flush, sodium chloride, ondansetron **OR** ondansetron, polyethylene glycol, acetaminophen **OR** acetaminophen      Intake/Output Summary (Last 24 hours) at 1/29/2022 1358  Last data filed at 1/29/2022 1200  Gross per 24 hour   Intake 1699.28 ml   Output 1227 ml   Net 472.28 ml       Physical Exam Performed:    BP (!) 105/56   Pulse 91   Temp 101.7 °F (38.7 °C)   Resp 27   Ht 5' 3\" (1.6 m)   Wt 169 lb (76.7 kg)   SpO2 92%   BMI 29.94 kg/m²       Patient seen in [facility] plus precautions  General: [address] [place] female, on vent  Oral ETT and OG noted   Mucous Membranes:  Pink , anicteric  Neck: No JVD, no carotid bruit, no thyromegaly  Chest: diminished in bases, bilateral mild crackles present . Cardiovascular:  RRR S1S2 heard, no murmurs or gallops  Abdomen:  Soft, undistended, non tender, no organomegaly, BS present  Extremities: No edema or cyanosis. Distal pulses well felt  Neurological :  Awake, able to follow commands    Labs:   Recent Labs     01/27/22 0430 01/28/22  0425 01/29/22 0441   WBC 8.6 10.6 9.8   HGB 11.1* 10.4* 10.4*   HCT 31.9* 30.7* 30.5*    252 254     Recent Labs     01/27/22 0430 01/28/22 0425 01/29/22 0441    134* 137   K 3.7 4.0 4.1   CL 97* 96* 96*   CO2 31 32 35*   BUN 20 15 19   CREATININE <0.5* <0.5* <0.5*   CALCIUM 8.4 8.9 9.4     No results for input(s): AST, ALT, BILIDIR, BILITOT, ALKPHOS in the last 72 hours. No results for input(s): INR in the last 72 hours. No results for input(s): [place] in the last 72 hours. Urinalysis:      Lab Results   Component Value Date    NITRU Neg 08/29/2010    WBCUA  08/29/2010    BACTERIA 1+ 08/29/2010    RBCUA 5-10 08/29/2010    BLOODU Neg 08/29/2010    SPECGRAV 1.010 08/29/2010    GLUCOSEU Neg 08/29/2010       Radiology:  XR CHEST PORTABLE   Final Result   Mild worsening of bilateral infiltrates likely due to pneumonia. Continued   follow-up recommended. XR CHEST PORTABLE   Final Result   Moderate persistent multifocal airspace disease compatible with multifocal   pneumonia including COVID pneumonia. While similar to recent studies, there   has been gradual progression and worsening since the early study of   01/12/2022. XR CHEST PORTABLE   Final Result   Extensive infiltrates within the lungs bilaterally, worsening on the right   with increasing consolidation. XR CHEST PORTABLE   Final Result   No significant interval change multifocal airspace disease. Stable lines and tubes. XR CHEST PORTABLE   Final Result   Mildly improved parenchymal infiltrates at the right lung base, otherwise   similar appearing chest.         XR CHEST PORTABLE   Final Result   Extensive bilateral airspace opacities are seen without significant change. XR CHEST PORTABLE   Final Result   1. Recommend retraction of endotracheal tube 1.0 cm.   2. Stable severe ill-defined lung consolidation, greatest at the lung bases   consistent with pneumonia versus alveolar edema. 3. Suspected mild bilateral pleural effusion, unchanged. XR CHEST PORTABLE   Final Result   1. No significant change. XR CHEST PORTABLE   Final Result   Appropriate positioning of the endotracheal tube. Enteric tube courses   beneath the diaphragm; tip is excluded by collimation inferiorly. No significant change in extensive bilateral airspace disease. IR PICC WO SQ PORT/PUMP > 5 YEARS   Final Result   Successful placement of PICC line. XR CHEST PORTABLE   Final Result   New multifocal moderate to severe pneumonia. Clinical and imaging follow-up   to resolution recommended. CTA HEAD NECK W CONTRAST   Final Result   Unremarkable CTA of the head and neck. Findings consistent with COVID pneumonia. This scan was analyzed using [employer]. ai contact LVO. Identification of suspected   findings is not for diagnostic use beyond notification. Viz LVO is limited to   analysis of imaging data and should not be used in-lieu of full patient   evaluation or relied upon to make or confirm diagnosis. CT HEAD WO CONTRAST   Final Result   No acute intracranial abnormality. XR CHEST PORTABLE   Final Result   Multifocal bilateral atypical pneumonia.          XR CHEST PORTABLE    (Results Pending)           Assessment/Plan:    Active Hospital Problems    Diagnosis     Hypotension [I95.9]     Pneumonia due to COVID-19 virus [U07.1, J12.82]     ARDS (adult respiratory distress syndrome) ([facility]) [J80]     Hyperglycemia [R73.9]     Leukocytosis [D72.829]     Moderate protein-calorie malnutrition ([address] [E44.0]     Hyperlipidemia [E78.5]     Arthritis [M19.90]     Brain fog [R41.89]     Anxiety [F41.9]     COVID-19 [U07.1]            COVID PNA  Acute hypoxic respiratory Failure   - CXR: noted with multifocal PNA  - no home O2 at baseline, 83-87% at rest,   - J &J in March 2021- no booster   - Admitted to , [facility] +, tele, cont pulse ox  - supp O2, wean as tolerated - worsening slowly -was on 10 L   -progressively worsening hypoxia, was on vapotherm with full support   - eventually placed on vent support 1/21    persistent severe hypoxemia -she was proned on 1/20/2022.  She is back in supine position.  -Continue Decadron day #18  - Remdesivir not started due to out of window- sx 7+ days prior to admit  - levaquin - finished 7 days   - PRN Robitussin  - procal 0.10 wnl   - CRP elevated . Started on Baricitinib - finished 14 days .  Monitor CBC and LFTs  - Lovenox Bid  Valium and Seroquel added  precedex caused bradycardia  Ongoing weaning trials    Hypotension  On Levophed. Now off pressors     Episodes of expressive aphasia   - ? TIA, vs mental status changes related to covid   - cont ASA, Plavix      Thrombocytopenia resolved  - likely with covid  - continue Lovenox and monitor    Hyperglycemia  Secondary to Decadron   Sliding scale insulin     HLD  - Continue statin        Arthritis   - was on  mobic - holding     Hx of Asthma  - not on inhalers at home      DVT Prophylaxis: Lovenox bid  Diet: Diet NPO  ADULT TUBE FEEDING; Orogastric; Other Tube Feeding (specify);  Glucerna 1.2; Continuous; 20; Yes; 20; Q 4 hours; 50; 30; Q 3 hours  Code Status: Full Code    PT/OT Eval Status: not indicated    Luis Frausto MD

## 2022-01-29 NOTE — PLAN OF CARE
Problem: Airway Clearance - Ineffective  Goal: Achieve or maintain patent airway  Outcome: Ongoing     Problem: Gas Exchange - Impaired  Goal: Absence of hypoxia  Outcome: Ongoing  Goal: Promote optimal lung function  Outcome: Ongoing     Problem: Breathing Pattern - Ineffective  Goal: Ability to achieve and maintain a regular respiratory rate  Outcome: Ongoing     Problem:  Body Temperature -  Risk of, Imbalanced  Goal: Ability to maintain a body temperature within defined limits  Outcome: Ongoing  Goal: Will regain or maintain usual level of consciousness  Outcome: Ongoing  Goal: Complications related to the disease process, condition or treatment will be avoided or minimized  Outcome: Ongoing     Problem: Isolation Precautions - Risk of Spread of Infection  Goal: Prevent transmission of infection  Outcome: Ongoing     Problem: Nutrition Deficits  Goal: Optimize nutritional status  Outcome: Ongoing     Problem: Risk for Fluid Volume Deficit  Goal: Maintain normal heart rhythm  Outcome: Ongoing  Goal: Maintain absence of muscle cramping  Outcome: Ongoing  Goal: Maintain normal serum potassium, sodium, calcium, phosphorus, and pH  Outcome: Ongoing     Problem: Loneliness or Risk for Loneliness  Goal: Demonstrate positive use of time alone when socialization is not possible  Outcome: Ongoing     Problem: Fatigue  Goal: Verbalize increase energy and improved vitality  Outcome: Ongoing     Problem: Patient Education: Go to Patient Education Activity  Goal: Patient/Family Education  Outcome: Ongoing     Problem: Pain:  Goal: Pain level will decrease  Description: Pain level will decrease  Outcome: Ongoing  Goal: Control of acute pain  Description: Control of acute pain  Outcome: Ongoing  Goal: Control of chronic pain  Description: Control of chronic pain  Outcome: Ongoing     Problem: Nutrition  Goal: Optimal nutrition therapy  1/29/2022 0525 by Alfred Miller RN  Outcome: Ongoing  1/28/2022 1558 by Coy Butt JENNIFER Xiao, LD  Outcome: Met This Shift     Problem: Infection - Central Venous Catheter-Associated Bloodstream Infection:  Goal: Will show no infection signs and symptoms  Description: Will show no infection signs and symptoms  Outcome: Ongoing     Problem: Non-Violent Restraints  Goal: Removal from restraints as soon as assessed to be safe  Outcome: Ongoing  Goal: No harm/injury to patient while restraints in use  Outcome: Ongoing  Goal: Patient's dignity will be maintained  Outcome: Ongoing     Problem: Skin Integrity:  Goal: Will show no infection signs and symptoms  Description: Will show no infection signs and symptoms  Outcome: Ongoing  Goal: Absence of new skin breakdown  Description: Absence of new skin breakdown  Outcome: Ongoing

## 2022-01-29 NOTE — PROGRESS NOTES
01/28/22 2024   Vent Information   Vent Type 840   Vent Mode AC/VC   Vt Ordered 320 mL   Rate Set 26 bmp   Peak Flow 60 L/min   FiO2  55 %   SpO2 92 %   SpO2/FiO2 ratio 167.27   Sensitivity 3   PEEP/CPAP 5   Humidification Source Heated wire   Humidification Temp Measured 37   Circuit Condensation Drained   Vent Patient Data   Peak Inspiratory Pressure 23 cmH2O   Mean Airway Pressure 11 cmH20   Rate Measured 28 br/min   Vt Exhaled 348 mL   Minute Volume 9.6 Liters   I:E Ratio 1:3   Plateau Pressure 21 [id number]   Static Compliance 21 mL/cmH2O   Dynamic Compliance 19 mL/cmH2O   Cough/Sputum   Sputum How Obtained Endotracheal   Cough Productive   Sputum Amount Moderate   Sputum Color Creamy; Yellow   Tenacity Thick   Spontaneous Breathing Trial (SBT) RT Doc   Pulse 96   Breath Sounds   Right Upper Lobe Diminished   Right Middle Lobe Diminished   Right Lower Lobe Diminished   Left Upper Lobe Diminished   Left Lower Lobe Diminished   Additional Respiratory  Assessments   Resp 23   Alarm Settings   High Pressure Alarm 45 cmH2O   Low Minute Volume Alarm 3 L/min   Apnea (secs) 20 secs   High Respiratory Rate 45 br/min   Low Exhaled Vt  300 mL   Patient Observation   Observations 8ett 24 at lip

## 2022-01-29 NOTE — PROGRESS NOTES
01/29/22 0336   Vent Information   Vent Type 840   Vent Mode AC/VC   Vt Ordered 320 mL   Rate Set 26 bmp   Peak Flow 60 L/min   FiO2  55 %   SpO2 (!) 89 %   SpO2/FiO2 ratio 161.82   Sensitivity 3   PEEP/CPAP 5   Vent Patient Data   Peak Inspiratory Pressure 26 cmH2O   Mean Airway Pressure 11 cmH20   Rate Measured 26 br/min   Vt Exhaled 351 mL   Minute Volume 9.12 Liters   I:E Ratio 1:3   Cough/Sputum   Sputum How Obtained Suctioned;Endotracheal   Sputum Amount Small   Sputum Color Creamy; Yellow   Tenacity Thick   Spontaneous Breathing Trial (SBT) RT Doc   Pulse 79   Breath Sounds   Right Upper Lobe Diminished   Right Middle Lobe Diminished   Right Lower Lobe Diminished   Left Upper Lobe Diminished   Left Lower Lobe Diminished   Additional Respiratory  Assessments   Resp 27   ETT (adult)   Placement Date/Time: 01/21/22 0330   Tube Size: 8 mm  Location: Oral  Secured at: 23 cm  Measured From: [address]

## 2022-01-29 NOTE — PROGRESS NOTES
01/28/22 2348   Vent Information   Vent Type 840   Vent Mode AC/VC   Vt Ordered 320 mL   Rate Set 26 bmp   Peak Flow 60 L/min   FiO2  55 %   SpO2 91 %   SpO2/FiO2 ratio 165.45   Sensitivity 3   PEEP/CPAP 5   Humidification Source Heated wire   Humidification Temp Measured 37   Circuit Condensation Drained   Vent Patient Data   Peak Inspiratory Pressure 28 cmH2O   Mean Airway Pressure 11 cmH20   Rate Measured 29 br/min   Vt Exhaled 371 mL   Minute Volume 9.7 Liters   I:E Ratio 1:2.3   Cough/Sputum   Sputum How Obtained Endotracheal   Cough Productive   Sputum Amount Moderate   Sputum Color Yellow   Tenacity Thick   Spontaneous Breathing Trial (SBT) RT Doc   Pulse 85   Breath Sounds   Right Upper Lobe Diminished   Right Middle Lobe Diminished   Right Lower Lobe Diminished   Left Upper Lobe Diminished   Left Lower Lobe Diminished   Additional Respiratory  Assessments   Resp 26   Alarm Settings   High Pressure Alarm 45 cmH2O   Low Minute Volume Alarm 3 L/min   Apnea (secs) 20 secs   High Respiratory Rate 45 br/min   Low Exhaled Vt  300 mL   Patient Observation   Observations 8ett 24 at lip

## 2022-01-30 NOTE — PROGRESS NOTES
Pulmonary Progress Note  CC: COVID in partially vaccinated patient     Subjective: low grade fever. following commands on SBT. PSV for 1 hr only  Propofol 45  Fentanyl 50    IV line: PICC line 1/20/2022  MV: 1/21/2022-    Intake/Output Summary (Last 24 hours) at 1/30/2022 0850  Last data filed at 1/30/2022 0800  Gross per 24 hour   Intake 1810.05 ml   Output 1135 ml   Net 675.05 ml       EXAM:   BP (!) 121/52   Pulse 96   Temp 100 °F (37.8 °C) (Axillary)   Resp 25   Ht 5' 3\" (1.6 m)   Wt 169 lb (76.7 kg)   SpO2 93%   BMI 29.94 kg/m²  on vent  EXAM:  General: ill appearing. Intubated sedated. Eyes: PERRL. No sclera icterus. No conjunctival injection. ENT: No discharge. Pharynx clear. ET tube in place  Neck: Trachea midline. Normal thyroid. Resp: No accessory muscle use. + crackles. No wheezing. No rhonchi. CV: Regular rate. Regular rhythm. No mumur or rub. No edema. GI: Non-tender. Non-distended. No masses. M/S: No cyanosis. No joint deformity. No clubbing. Neuro:   Awake on sedation. + following commands.    Psych: Noncommunicative unable to obtain    Scheduled Meds:   dexamethasone  3 mg IntraVENous Q24H    diazePAM  10 mg Oral TID    QUEtiapine  25 mg Oral BID    chlorhexidine  15 mL Mouth/Throat BID    famotidine (PEPCID) injection  20 mg IntraVENous BID    insulin lispro  0-6 Units SubCUTAneous Q4H    enoxaparin  30 mg SubCUTAneous BID    aspirin  81 mg Oral Daily    clopidogrel  75 mg Oral Daily    ascorbic acid  1,000 mg Oral Daily    atorvastatin  10 mg Oral Nightly    sodium chloride flush  5-40 mL IntraVENous 2 times per day    Vitamin D  2,000 Units Oral Daily     Continuous Infusions:   fentaNYL 50 mcg/hr (01/29/22 1333)    propofol Stopped (01/30/22 0830)    dextrose      sodium chloride Stopped (01/22/22 1231)     PRN Meds:  midazolam, albuterol sulfate HFA, fentanNYL, glucose, glucagon (rDNA), dextrose, dextrose bolus (hypoglycemia) **OR** dextrose bolus (hypoglycemia), albuterol sulfate HFA, guaiFENesin-dextromethorphan, benzonatate, sodium chloride flush, sodium chloride, ondansetron **OR** ondansetron, polyethylene glycol, acetaminophen **OR** acetaminophen    Labs:  CBC:   Recent Labs     01/28/22 0425 01/29/22  0441 01/30/22  0445   WBC 10.6 9.8 7.6   HGB 10.4* 10.4* 10.3*   HCT 30.7* 30.5* 30.9*   MCV 89.4 89.6 89.8    254 275     BMP:   Recent Labs     01/28/22 0425 01/29/22  0441 01/30/22  0445   * 137 138   K 4.0 4.1 4.1   CL 96* 96* 95*   CO2 32 35* 35*   BUN 15 19 20   CREATININE <0.5* <0.5* <0.5*     Micro:  1/17/2022 SARS-CoV-2 positive at urgent care  1/12/2022 SARS-CoV-2 detected     Imaging:   CXR 1/30/2022 imaging reviewed by me and showed   ETT and PICC in place. stable bilateral ASD. ASSESSMENT:  · Acute hypoxemic respiratory failure, progressive life-threatening  · COVID-19 pneumonia in a partially vaccinated patient, s/p J&J vaccination 3/2021  · ARDS  · Hypotension   · Thrombocytopenia -resolved  · H/O asthma, no home inhalers  · Former smoker  · Precedex caused bradycardia    PLAN:  Mechanical ventilation as per my orders. The ventilator was adjusted by me at the bedside for unstable, life threatening respiratory failure  Follow ABG and chest x-ray while on the ventilator  IV Propofol for sedation, target RASS -2, with daily spontaneous awakening trial   Seroquel 25 BID and Valium 10 TID   Completed Levaquin day #7/7  Decadron D#18--> 3 mg daily   Completed Baricitinib D#14  PRN albuterol MDI  Tube feed at goal   Blood sugar control ISS, with goal 150-180  Prophylaxis: Pepcid, Lovenox 40 BID, Bactroban   Due to at least single organ failure and risk of rapid deterioration, I spent 31 minutes of Critical care time reviewing labs/films, examining patient, collaborating with other physicians. This does not include time performing critical procedures.

## 2022-01-30 NOTE — PROGRESS NOTES
Blood pressure (!) 110/56, pulse 86, temperature 100.8 °F (38.2 °C), temperature source Axillary, resp. rate 23, height 5' 3\" (1.6 m), weight 169 lb (76.7 kg), SpO2 94 %, not currently breastfeeding. Patient currently intubated with ETT at 24. Vent settings are 26/320/+5/55%. Propofol infusing at 40 mcg/kg/min. Fentanyl infusing at 50 mcg//h. Reassessment completed. Pt temp elevated, PO tylenol given. Urine OP has decreased. Will continue to monitor. Otherwise no changes noted in physical assessment.  Electronically signed by Libia Buck RN on 1/30/2022 at 12:54 AM

## 2022-01-30 NOTE — PROGRESS NOTES
01/30/22 0317   Vent Information   Vent Type 840   Vent Mode AC/VC   Vt Ordered 320 mL   Rate Set 26 bmp   Peak Flow 60 L/min   FiO2  55 %   SpO2 92 %   SpO2/FiO2 ratio 167.27   Sensitivity 3   PEEP/CPAP 5   Humidification Source Heated wire   Humidification Temp Measured 37   Circuit Condensation Drained   Vent Patient Data   Peak Inspiratory Pressure 23 cmH2O   Mean Airway Pressure 12 cmH20   Rate Measured 29 br/min   Vt Exhaled 441 mL   Minute Volume 10.8 Liters   I:E Ratio 1:3   Cough/Sputum   Sputum How Obtained Endotracheal   Cough Productive   Sputum Amount Moderate   Sputum Color Dark Yellow   Tenacity Thick   Spontaneous Breathing Trial (SBT) RT Doc   Pulse 84   Breath Sounds   Right Upper Lobe Diminished   Right Middle Lobe Diminished   Right Lower Lobe Diminished   Left Upper Lobe Diminished   Left Lower Lobe Diminished   Additional Respiratory  Assessments   Resp 28   Alarm Settings   High Pressure Alarm 45 cmH2O   Low Minute Volume Alarm 3 L/min   Apnea (secs) 20 secs   High Respiratory Rate 45 br/min   Low Exhaled Vt  300 mL   Patient Observation   Observations 8ett 24 at lip

## 2022-01-30 NOTE — PROGRESS NOTES
Pt is awake and following all commands, even smiling at staff. SBT of 10/5 started. Pt is 91%, F/V is 85, RR are in the mid 20s, and VTs are 370s. Pt is tolerating well at this time.

## 2022-01-30 NOTE — PROGRESS NOTES
01/29/22 2324   Vent Information   Vent Type 840   Vent Mode AC/VC   Vt Ordered 320 mL   Rate Set 26 bmp   Peak Flow 60 L/min   FiO2  55 %   SpO2 (!) 89 %   SpO2/FiO2 ratio 161.82   Sensitivity 3   PEEP/CPAP 5   Vent Patient Data   Peak Inspiratory Pressure 22 cmH2O   Mean Airway Pressure 9.4 cmH20   Rate Measured 26 br/min   Vt Exhaled 371 mL   Minute Volume 9.13 Liters   I:E Ratio 1:3   Cough/Sputum   Sputum How Obtained Suctioned;Endotracheal   Sputum Amount Small   Sputum Color Creamy   Tenacity Thick   Spontaneous Breathing Trial (SBT) RT Doc   Pulse 83   Breath Sounds   Right Upper Lobe Diminished   Right Middle Lobe Diminished   Right Lower Lobe Diminished   Left Upper Lobe Diminished   Left Lower Lobe Diminished   Additional Respiratory  Assessments   Resp 25   Position Semi-Mohamud's   ETT (adult)   Placement Date/Time: 01/21/22 0330   Tube Size: 8 mm  Location: Oral  Secured at: 23 cm  Measured From: Lips   ET Placement Right

## 2022-01-30 NOTE — PROGRESS NOTES
Hospitalist Progress Note      PCP: Roma Litten, DO    Date of Admission: 1/12/2022    Admitted with COVID-19 pneumonia and acute respiratory failure  Intubated    Subjective: failed SBT again today due to hypoxia and agitation,  Off Levophed  On Precedex    1/27  Unable to perform SBT due to agitation of propofol  On Precedex    1/28  Did not tolerate precedex- caused bradycardia  Oxygenation requirements on the vent is better    1/29  Following commands on SBT.    1/30  Low-grade fever  Failed SBT today    Medications:  Reviewed    Infusion Medications    fentaNYL 60 mcg/hr (01/30/22 1305)    propofol 45 mcg/kg/min (01/30/22 1243)    dextrose      sodium chloride Stopped (01/22/22 1231)     Scheduled Medications    dexamethasone  3 mg IntraVENous Q24H    diazePAM  10 mg Oral TID    QUEtiapine  25 mg Oral BID    chlorhexidine  15 mL Mouth/Throat BID    famotidine (PEPCID) injection  20 mg IntraVENous BID    insulin lispro  0-6 Units SubCUTAneous Q4H    enoxaparin  30 mg SubCUTAneous BID    aspirin  81 mg Oral Daily    clopidogrel  75 mg Oral Daily    ascorbic acid  1,000 mg Oral Daily    atorvastatin  10 mg Oral Nightly    sodium chloride flush  5-40 mL IntraVENous 2 times per day    Vitamin D  2,000 Units Oral Daily     PRN Meds: midazolam, albuterol sulfate HFA, fentanNYL, glucose, glucagon (rDNA), dextrose, dextrose bolus (hypoglycemia) **OR** dextrose bolus (hypoglycemia), albuterol sulfate HFA, guaiFENesin-dextromethorphan, benzonatate, sodium chloride flush, sodium chloride, ondansetron **OR** ondansetron, polyethylene glycol, acetaminophen **OR** acetaminophen      Intake/Output Summary (Last 24 hours) at 1/30/2022 1343  Last data filed at 1/30/2022 1200  Gross per 24 hour   Intake 1783.8 ml   Output 1215 ml   Net 568.8 ml       Physical Exam Performed:    BP (!) 115/58   Pulse 103   Temp 101.4 °F (38.6 °C) (Axillary)   Resp 20   Ht 5' 3\" (1.6 m)   Wt 169 lb (76.7 kg)   SpO2 91% BMI 29.94 kg/m²       Patient seen in droplet plus precautions  General: [address] female, on vent  Oral ETT and OG noted   Mucous Membranes:  Pink , anicteric  Neck: No JVD, no carotid bruit, no thyromegaly  Chest: diminished in bases, bilateral mild crackles present . Cardiovascular:  RRR S1S2 heard, no murmurs or gallops  Abdomen:  Soft, undistended, non tender, no organomegaly, BS present  Extremities: No edema or cyanosis. Distal pulses well felt  Neurological :  Awake, able to follow commands    Labs:   Recent Labs     01/28/22  0425 01/29/22  0441 01/30/22  0445   WBC 10.6 9.8 7.6   HGB 10.4* 10.4* 10.3*   HCT 30.7* 30.5* 30.9*    254 275     Recent Labs     01/28/22 0425 01/29/22 0441 01/30/22  0445   * 137 138   K 4.0 4.1 4.1   CL 96* 96* 95*   CO2 32 35* 35*   BUN 15 19 20   CREATININE <0.5* <0.5* <0.5*   CALCIUM 8.9 9.4 9.0     No results for input(s): AST, ALT, BILIDIR, BILITOT, ALKPHOS in the last 72 hours. No results for input(s): INR in the last 72 hours. No results for input(s): Chyrel Lions in the last 72 hours. Urinalysis:      Lab Results   Component Value Date    NITRU Neg 08/29/2010    WBCUA  08/29/2010    BACTERIA 1+ 08/29/2010    RBCUA 5-10 08/29/2010    BLOODU Neg 08/29/2010    SPECGRAV 1.010 08/29/2010    GLUCOSEU Neg 08/29/2010       Radiology:  XR CHEST PORTABLE   Final Result   No significant change. Continued follow-up recommended. XR CHEST PORTABLE   Final Result   Mild worsening of bilateral infiltrates likely due to pneumonia. Continued   follow-up recommended. XR CHEST PORTABLE   Final Result   Moderate persistent multifocal airspace disease compatible with multifocal   pneumonia including COVID pneumonia. While similar to recent studies, there   has been gradual progression and worsening since the early study of   01/12/2022.          XR CHEST PORTABLE   Final Result   Extensive infiltrates within the lungs bilaterally, worsening Assessment/Plan:    Active Hospital Problems    Diagnosis     Hypotension [I95.9]     Pneumonia due to COVID-19 virus [U07.1, J12.82]     ARDS (adult respiratory distress syndrome) ([facility]) [J80]     Hyperglycemia [R73.9]     Leukocytosis [D72.829]     Moderate protein-calorie malnutrition (HCC) [E44.0]     Hyperlipidemia [E78.5]     Arthritis [M19.90]     Brain fog [R41.89]     Anxiety [F41.9]     COVID-19 [U07.1]            COVID PNA  Acute hypoxic respiratory Failure   - CXR: noted with multifocal PNA  - no home O2 at baseline, 83-87% at rest,   - J &J in March 2021- no booster   - Admitted to 2W, droplet +, tele, cont pulse ox  - supp O2, wean as tolerated - worsening slowly -was on 10 L   -progressively worsening hypoxia, was on vapotherm with full support   - eventually placed on vent support 1/21    persistent severe hypoxemia -she was proned on 1/20/2022.  She is back in supine position.  -Continue Decadron day #19--> 3 mg daily   - Remdesivir not started due to out of window- sx 7+ days prior to admit  - levaquin - finished 7 days   - PRN Robitussin  - procal 0.10 wnl   - CRP elevated . Started on Baricitinib - finished 14 days .  Monitor CBC and LFTs  - Lovenox Bid  Valium and Seroquel added  precedex caused bradycardia  Ongoing weaning trials-failed today    Hypotension  On Levophed. Now off pressors     Episodes of expressive aphasia   - ? TIA, vs mental status changes related to covid   - cont ASA, Plavix      Thrombocytopenia resolved  - likely with covid  - continue Lovenox and monitor    Hyperglycemia  Secondary to Decadron   Sliding scale insulin     HLD  - Continue statin        Arthritis   - was on  mobic - holding     Hx of Asthma  - not on inhalers at home      DVT Prophylaxis: Lovenox bid  Diet: Diet NPO  ADULT TUBE FEEDING; Orogastric; Other Tube Feeding (specify);  Glucerna 1.2; Continuous; 20; Yes; 20; Q 4 hours; 50; 30; Q 3 hours  Code Status: Full Code    PT/OT [place]al Status: not indicated    Dione Guadarrama MD

## 2022-01-30 NOTE — PROGRESS NOTES
01/29/22 1942   Vent Information   Vent Type 840   Vent Mode AC/VC   Vt Ordered 320 mL   Rate Set 26 bmp   Peak Flow 60 L/min   FiO2  55 %   SpO2 91 %   SpO2/FiO2 ratio 165.45   Sensitivity 3   PEEP/CPAP 5   Humidification Source Heated wire   Humidification Temp Measured 37   Circuit Condensation Drained   Vent Patient Data   Peak Inspiratory Pressure 23 cmH2O   Mean Airway Pressure 11 cmH20   Rate Measured 28 br/min   Vt Exhaled 377 mL   Minute Volume 9.7 Liters   I:E Ratio 1:3   Plateau Pressure 21 [id number]   Static Compliance 24 mL/cmH2O   Dynamic Compliance 23 mL/cmH2O   Cough/Sputum   Sputum How Obtained Endotracheal   Cough Productive   Sputum Amount Small   Sputum Color Dark Yellow   Tenacity Thick   Spontaneous Breathing Trial (SBT) RT Doc   Pulse 83   Breath Sounds   Right Upper Lobe Diminished   Right Middle Lobe Diminished   Right Lower Lobe Diminished   Left Upper Lobe Diminished   Left Lower Lobe Diminished   Additional Respiratory  Assessments   Resp 29   Alarm Settings   High Pressure Alarm 45 cmH2O   Low Minute Volume Alarm 3 L/min   Apnea (secs) 20 secs   High Respiratory Rate 45 br/min   Low Exhaled Vt  300 mL   Patient Observation   Observations 8ett 24 at lip

## 2022-01-30 NOTE — PROGRESS NOTES
Blood pressure (!) 114/56, pulse 89, temperature 96.4 °F (35.8 °C), temperature source Axillary, resp. rate 25, height 5' 3\" (1.6 m), weight 169 lb (76.7 kg), SpO2 92 %, not currently breastfeeding. Patient currently intubated with ETT at 24. Vent settings are 26/320/+5/55%. Propofol infusing at 40 mcg/kg/min. Fentanyl infusing at 50 mcg//h. Shift assessment completed. Pt awakens to verbal stimuli and follows commands. Resting comfortably on vent. Lungs diminished. Thick secretions noted through ETT. OG with TF infusing at goal- low residuals. Mcguire patent- good urine OP. Repositioned. Restraints in place. No further needs noted at this time.  Electronically signed by Roselia Simmons RN on 1/29/2022 at 9:51 PM

## 2022-01-31 NOTE — PROGRESS NOTES
Call back from Dr. Srikanth Vieyra. No new orders at this time. He will decide if she needs proned when he comes back to unit later this afternoon.

## 2022-01-31 NOTE — PROGRESS NOTES
Call back from Dr. Sesar Knox. Order to give 50mcg bolus of fentanyl and increase fentanyl gtt to 300 now. Per MD, if this does not work, we will talk about adding paralytic.

## 2022-01-31 NOTE — PROGRESS NOTES
Pulmonary Progress Note  CC: COVID in partially vaccinated patient     Subjective: Failed SBT again yesterday  Possible rash    IV line: PICC line 1/20/2022    MV: 1/21/2022-    Intake/Output Summary (Last 24 hours) at 1/31/2022 0818  Last data filed at 1/31/2022 0604  Gross per 24 hour   Intake 1404.58 ml   Output 965 ml   Net 439.58 ml       EXAM:   BP (!) 129/58   Pulse 104   Temp 99 °F (37.2 °C) (Axillary)   Resp 26   Ht 5' 3\" (1.6 m)   Wt 169 lb (76.7 kg)   SpO2 (!) 89%   BMI 29.94 kg/m²  on vent  EXAM:  General: intubated, ill appearing    ENT: Pharynx with ETT. Resp: No crackles. No wheezing. CV: S1, S2. No edema  GI: NT, ND, +BS  Skin: Warm and dry. Left forarm with raised indurated area, ? Vascular. Neuro: PERRL. Sedated, not following commands.  Patellar reflexes are symmetric    Scheduled Meds:   dexamethasone  3 mg IntraVENous Q24H    diazePAM  10 mg Oral TID    QUEtiapine  25 mg Oral BID    chlorhexidine  15 mL Mouth/Throat BID    famotidine (PEPCID) injection  20 mg IntraVENous BID    insulin lispro  0-6 Units SubCUTAneous Q4H    enoxaparin  30 mg SubCUTAneous BID    aspirin  81 mg Oral Daily    clopidogrel  75 mg Oral Daily    ascorbic acid  1,000 mg Oral Daily    atorvastatin  10 mg Oral Nightly    sodium chloride flush  5-40 mL IntraVENous 2 times per day    Vitamin D  2,000 Units Oral Daily     Continuous Infusions:   fentaNYL 75 mcg/hr (01/31/22 0510)    propofol 50 mcg/kg/min (01/31/22 0509)    dextrose      sodium chloride Stopped (01/22/22 1231)     PRN Meds:  midazolam, albuterol sulfate HFA, fentanNYL, glucose, glucagon (rDNA), dextrose, dextrose bolus (hypoglycemia) **OR** dextrose bolus (hypoglycemia), albuterol sulfate HFA, guaiFENesin-dextromethorphan, benzonatate, sodium chloride flush, sodium chloride, ondansetron **OR** ondansetron, polyethylene glycol, acetaminophen **OR** acetaminophen    Labs:  CBC:   Recent Labs     01/29/22  0441 01/30/22  0445 01/31/22  0436   WBC 9.8 7.6 9.1   HGB 10.4* 10.3* 10.4*   HCT 30.5* 30.9* 30.7*   MCV 89.6 89.8 89.4    275 291     BMP:   Recent Labs     01/29/22  0441 01/30/22  0445 01/31/22  0436    138 136   K 4.1 4.1 4.0   CL 96* 95* 94*   CO2 35* 35* 36*   BUN 19 20 20   CREATININE <0.5* <0.5* <0.5*     Micro:  1/17/2022 SARS-CoV-2 positive at urgent care  1/12/2022 SARS-CoV-2 detected     Imaging:   CXR 1/31/22  Stable multifocal infiltrates    ASSESSMENT:  · Acute hypoxemic respiratory failure, severe and life-threatening  · COVID-19 pneumonia in a partially vaccinated patient, s/p J&J vaccination 3/2021  · ARDS  · Hypotension   · Thrombocytopenia -resolved  · H/O asthma, no home inhalers  · Former smoker  · Precedex caused bradycardia    PLAN:  Mechanical ventilation as per my orders. The ventilator was adjusted by me at the bedside for unstable, life threatening respiratory failure  IV Propofol for sedation, target RASS -2, with daily spontaneous awakening trial   Seroquel 25 BID and Valium 10 TID   Completed 7 days Levaquin   Decadron D#19 @ 3 mg    Completed 14 days baricitinib   TF  Blood sugar control ISS, with goal 150-180  Prophylaxis: Pepcid, Lovenox 40 BID     Total critical care time caring for this patient with life threatening, unstable organ failure, including direct patient contact, management of life support systems, review of data including imaging and labs, discussions with other team members and physicians is 31 minutes so far today, excluding procedures.

## 2022-01-31 NOTE — PROGRESS NOTES
01/30/22 2035   Vent Information   Vent Type 840   Vent Mode AC/VC   Vt Ordered 320 mL   Rate Set 24 bmp   Peak Flow 60 L/min   FiO2  55 %   SpO2 92 %   SpO2/FiO2 ratio 167.27   Sensitivity 3   PEEP/CPAP 5   Humidification Source Heated wire   Humidification Temp Measured 36.9   Circuit Condensation Drained   Vent Patient Data   Peak Inspiratory Pressure 23 cmH2O   Mean Airway Pressure 12 cmH20   Rate Measured 28 br/min   Vt Exhaled 397 mL   Minute Volume 10.8 Liters   I:E Ratio 1:3.3   Plateau Pressure 19 [id number]   Static Compliance 26 mL/cmH2O   Dynamic Compliance 23 mL/cmH2O   Cough/Sputum   Sputum How Obtained Endotracheal   Cough Productive   Sputum Amount Moderate   Sputum Color Creamy   Tenacity Thick   Spontaneous Breathing Trial (SBT) RT Doc   Pulse 79   Breath Sounds   Right Upper Lobe Diminished   Right Middle Lobe Diminished   Right Lower Lobe Diminished   Left Upper Lobe Diminished   Left Lower Lobe Diminished   Additional Respiratory  Assessments   Resp 23   Alarm Settings   High Pressure Alarm 45 cmH2O   Low Minute Volume Alarm 3 L/min   Apnea (secs) 20 secs   High Respiratory Rate 45 br/min   Low Exhaled Vt  300 mL   Patient Observation   Observations 8ett 24 at lip

## 2022-01-31 NOTE — PROGRESS NOTES
Rounds completed with Dr. Jair Pierre. Updated him on events from overnight and findings from AM assessment. New orders:   -new fentanyl max of 300.   -obtain tracheal aspirate. -RT to make vent changes.

## 2022-01-31 NOTE — PROGRESS NOTES
Gifty Rudolph, daughter, called for update at this time. Update given and any questions answered at this time.

## 2022-01-31 NOTE — PROGRESS NOTES
Pt placed on AC/PC  28/16/.6/10/65%           01/31/22 1110   Vent Information   Vent Type 840   Vent Mode AC/PC   Pressure Ordered 15   Rate Set 28 bmp   FiO2  65 %   SpO2 (!) 89 %   SpO2/FiO2 ratio 136.92   Sensitivity 4   PEEP/CPAP 10   I Time/ I Time % 0.6 s   Humidification Source Heated wire   Humidification Temp Measured 37   Vent Patient Data   Peak Inspiratory Pressure 27 cmH2O   Mean Airway Pressure 10 cmH20   Rate Measured 28 br/min   Vt Exhaled 422 mL   Minute Volume 10.3 Liters   Cough/Sputum   Sputum How Obtained Endotracheal   Cough Productive   Sputum Amount Moderate   Sputum Color Tan   Tenacity Thick   Spontaneous Breathing Trial (SBT) RT Doc   Pulse 103   Breath Sounds   Right Upper Lobe Diminished   Right Middle Lobe Diminished   Right Lower Lobe Diminished   Left Upper Lobe Diminished   Left Lower Lobe Diminished   Additional Respiratory  Assessments   Resp 30   Position Reverse Trendelenburg   Oral Care Completed? Yes   Oral Care Mouth suctioned   Subglottic Suction Done?  Yes   Alarm Settings   High Pressure Alarm 45 cmH2O   Low Minute Volume Alarm 4 L/min   Apnea (secs) 20 secs   High Respiratory Rate 45 br/min   Low Exhaled Vt  300 mL   ETT (adult)   Placement Date/Time: 01/21/22 0330   Tube Size: 8 mm  Location: Oral  Secured at: 23 cm  Measured From: Lips   Secured at 24 cm   Measured From [address] [employer] tube nicole   Site Condition Dry

## 2022-01-31 NOTE — PROGRESS NOTES
Pt intubated and sedated. Intubated with # 8 at the # 24 LL. Vent settings are AC 26 / 320 / 60% / 8. Pt continuously desating down to upper 80s. RT had recently increased Fi02. No double stacking of vent noted. RT aware so that vent adjustments can be made. Breath sounds are rhonchorous. Moderate amount of thick secretions noted with suction of ETT. Heart rhythm is ST on the bedside monitor with rates in the 120s. Edema noted to bilateral hands. New rash noted to bilateral forearms. Left side, darker than right, but right side in a larger area than left. Will make MD aware during rounds. Scheduled meds given per orders. OG at 60cm. TF running at goal rate of 50ml/hr. Pt tolerating well. 0 residual noted. RUE PICC patent and working well. Propofol infusing. Fetanyl infusing. RASS -2  CPOT 0    Mcguire patent and draining clear yellow urine.

## 2022-01-31 NOTE — PROGRESS NOTES
Call out to Dr. Cormier Grew regarding continuous double stacking despite use of PRNs and going up on sedation. RT at bedside and made adjustments to vent.

## 2022-01-31 NOTE — PROGRESS NOTES
Hospitalist Progress Note      PCP: Kierra Mcgowan DO    Date of Admission: 1/12/2022    Admitted with COVID-19 pneumonia and acute respiratory failure  Intubated    Subjective: failed SBT again today due to hypoxia and agitation,  Off Levophed  On Precedex    1/27  Unable to perform SBT due to agitation of propofol  On Precedex    1/28  Did not tolerate precedex- caused bradycardia  Oxygenation requirements on the vent is better    1/29  Following commands on SBT.    1/30  Low-grade fever  Failed SBT today    1/31-on pressure control ventilation. Is double stacking the vent. Oxygen saturation dropped to 85%.     Medications:  Reviewed    Infusion Medications    fentaNYL 200 mcg/hr (01/31/22 1326)    propofol 50 mcg/kg/min (01/31/22 1138)    dextrose      sodium chloride Stopped (01/22/22 1231)     Scheduled Medications    dexamethasone  3 mg IntraVENous Q24H    diazePAM  10 mg Oral TID    QUEtiapine  25 mg Oral BID    chlorhexidine  15 mL Mouth/Throat BID    famotidine (PEPCID) injection  20 mg IntraVENous BID    insulin lispro  0-6 Units SubCUTAneous Q4H    enoxaparin  30 mg SubCUTAneous BID    aspirin  81 mg Oral Daily    clopidogrel  75 mg Oral Daily    ascorbic acid  1,000 mg Oral Daily    atorvastatin  10 mg Oral Nightly    sodium chloride flush  5-40 mL IntraVENous 2 times per day    Vitamin D  2,000 Units Oral Daily     PRN Meds: midazolam, albuterol sulfate HFA, fentanNYL, glucose, glucagon (rDNA), dextrose, dextrose bolus (hypoglycemia) **OR** dextrose bolus (hypoglycemia), albuterol sulfate HFA, guaiFENesin-dextromethorphan, benzonatate, sodium chloride flush, sodium chloride, ondansetron **OR** ondansetron, polyethylene glycol, acetaminophen **OR** acetaminophen      Intake/Output Summary (Last 24 hours) at 1/31/2022 1328  Last data filed at 1/31/2022 0946  Gross per 24 hour   Intake 1837.75 ml   Output 810 ml   Net 1027.75 ml       Physical Exam Performed:    /61   Pulse 102   Temp 98.4 °F (36.9 °C) (Axillary)   Resp 23   Ht 5' 3\" (1.6 m)   Wt 169 lb (76.7 kg)   SpO2 90%   BMI 29.94 kg/m²     Patient seen in droplet plus precautions  General:  Elderly female, on vent  Oral ETT and OG noted   Mucous Membranes:  Pink , anicteric  Neck: No JVD, no carotid bruit, no thyromegaly  Chest: diminished in bases, bilateral mild crackles present . Cardiovascular:  RRR S1S2 heard, no murmurs or gallops  Abdomen: Soft, undistended, non tender, no organomegaly, BS present  Extremities: No edema or cyanosis. Distal pulses well felt  Neurological :  Sedated    Labs:   Recent Labs     01/29/22 0441 01/30/22 0445 01/31/22  0436   WBC 9.8 7.6 9.1   HGB 10.4* 10.3* 10.4*   HCT 30.5* 30.9* 30.7*    275 291     Recent Labs     01/29/22 0441 01/30/22 0445 01/31/22  0436    138 136   K 4.1 4.1 4.0   CL 96* 95* 94*   CO2 35* 35* 36*   BUN 19 20 20   CREATININE <0.5* <0.5* <0.5*   CALCIUM 9.4 9.0 9.1     No results for input(s): AST, ALT, BILIDIR, BILITOT, ALKPHOS in the last 72 hours. No results for input(s): INR in the last 72 hours. No results for input(s): Myrla Greulich in the last 72 hours. Urinalysis:      Lab Results   Component Value Date    NITRU Neg 08/29/2010    WBCUA  08/29/2010    BACTERIA 1+ 08/29/2010    RBCUA 5-10 08/29/2010    BLOODU Neg 08/29/2010    SPECGRAV 1.010 08/29/2010    GLUCOSEU Neg 08/29/2010       Radiology:  XR CHEST PORTABLE   Final Result   1. Stable lines, tubes, and support devices. 2. Stable cardiopulmonary status including bilateral airspace opacities and   small effusions. XR CHEST PORTABLE   Final Result   No significant change. Continued follow-up recommended. XR CHEST PORTABLE   Final Result   Mild worsening of bilateral infiltrates likely due to pneumonia. Continued   follow-up recommended.          XR CHEST PORTABLE   Final Result   Moderate persistent multifocal airspace disease compatible with multifocal pneumonia including COVID pneumonia. While similar to recent studies, there   has been gradual progression and worsening since the early study of   01/12/2022. XR CHEST PORTABLE   Final Result   Extensive infiltrates within the lungs bilaterally, worsening on the right   with increasing consolidation. XR CHEST PORTABLE   Final Result   No significant interval change multifocal airspace disease. Stable lines and tubes. XR CHEST PORTABLE   Final Result   Mildly improved parenchymal infiltrates at the right lung base, otherwise   similar appearing chest.         XR CHEST PORTABLE   Final Result   Extensive bilateral airspace opacities are seen without significant change. XR CHEST PORTABLE   Final Result   1. Recommend retraction of endotracheal tube 1.0 cm.   2. Stable severe ill-defined lung consolidation, greatest at the lung bases   consistent with pneumonia versus alveolar edema. 3. Suspected mild bilateral pleural effusion, unchanged. XR CHEST PORTABLE   Final Result   1. No significant change. XR CHEST PORTABLE   Final Result   Appropriate positioning of the endotracheal tube. Enteric tube courses   beneath the diaphragm; tip is excluded by collimation inferiorly. No significant change in extensive bilateral airspace disease. IR PICC WO SQ PORT/PUMP > 5 YEARS   Final Result   Successful placement of PICC line. XR CHEST PORTABLE   Final Result   New multifocal moderate to severe pneumonia. Clinical and imaging follow-up   to resolution recommended. CTA HEAD NECK W CONTRAST   Final Result   Unremarkable CTA of the head and neck. Findings consistent with COVID pneumonia. This scan was analyzed using Viz. ai contact LVO. Identification of suspected   findings is not for diagnostic use beyond notification.  Viz LVO is limited to   analysis of imaging data and should not be used in-lieu of full patient   evaluation or relied upon to make or confirm diagnosis. CT HEAD WO CONTRAST   Final Result   No acute intracranial abnormality. XR CHEST PORTABLE   Final Result   Multifocal bilateral atypical pneumonia. XR CHEST PORTABLE    (Results Pending)       Assessment/Plan:    Active Problems:    COVID-19    Anxiety    Hyperlipidemia    Arthritis    Brain fog    Moderate protein-calorie malnutrition (HCC)    Pneumonia due to COVID-19 virus    ARDS (adult respiratory distress syndrome) (HCC)    Hyperglycemia    Leukocytosis    Hypotension  Resolved Problems:    * No resolved hospital problems. *           COVID PNA  Acute hypoxic respiratory Failure   - CXR: noted with multifocal PNA  - no home O2 at baseline, 83-87% at rest,   - J &J in March 2021- no booster   - Admitted to , droplet +, tele, cont pulse ox  - supp O2, wean as tolerated - worsening slowly -was on 10 L   -progressively worsening hypoxia, was on vapotherm with full support   - eventually placed on vent support 1/21    persistent severe hypoxemia -she was proned on 1/20/2022.  She is back in supine position.  -Continue Decadron day #19--> 3 mg daily   - Remdesivir not started due to out of window- sx 7+ days prior to admit  - levaquin - finished 7 days   - PRN Robitussin  - procal 0.10 wnl   - CRP elevated . Started on Baricitinib - finished 14 days .  Monitor CBC and LFTs  - Lovenox Bid  Valium and Seroquel added  precedex caused bradycardia  Ongoing weaning trials-failed today. On PC ventilation. Hypotension  Was on Levophed. Now off pressors     Episodes of expressive aphasia   - ?  TIA, vs mental status changes related to covid   - cont ASA, Plavix      Thrombocytopenia resolved  - likely with covid  - continue Lovenox and monitor    Hyperglycemia  Secondary to Decadron   Sliding scale insulin     HLD  - Continue statin        Arthritis   - was on  mobic - holding     Hx of Asthma  - not on inhalers at home      DVT Prophylaxis: Lovenox bid  Diet: Diet NPO  ADULT TUBE FEEDING; Orogastric; Other Tube Feeding (specify);  Glucerna 1.2; Continuous; 20; Yes; 20; Q 4 hours; 50; 30; Q 3 hours  Code Status: Full Code    PT/OT Eval Status: not indicated    Dispo - cont care      Yulia Rodney MD 1/31/2022 1:29 PM

## 2022-01-31 NOTE — PROGRESS NOTES
01/31/22 0308   Vent Information   Vent Type 840   Vent Mode AC/VC   Vt Ordered 320 mL   Rate Set 24 bmp   Peak Flow 60 L/min   FiO2  55 %   SpO2 91 %   SpO2/FiO2 ratio 165.45   Sensitivity 3   PEEP/CPAP 5   Humidification Source Heated wire   Humidification Temp 37   Humidification Temp Measured 36.8   Circuit Condensation Drained   Vent Patient Data   Peak Inspiratory Pressure 24 cmH2O   Mean Airway Pressure 12 cmH20   Rate Measured 26 br/min   Vt Exhaled 340 mL   Minute Volume 8.72 Liters   I:E Ratio 1:3.1   Plateau Pressure 23 [id number]   Cough/Sputum   Sputum How Obtained Endotracheal;Suctioned   Cough Productive   Sputum Amount Moderate   Sputum Color Creamy   Tenacity Thick   Spontaneous Breathing Trial (SBT) RT Doc   Pulse 83   Breath Sounds   Right Upper Lobe Diminished   Right Middle Lobe Diminished   Right Lower Lobe Diminished   Left Upper Lobe Diminished   Left Lower Lobe Diminished   Additional Respiratory  Assessments   Resp 25   Position Semi-Mohamud's   Alarm Settings   High Pressure Alarm 45 cmH2O   Low Minute Volume Alarm 3 L/min   Apnea (secs) 20 secs   High Respiratory Rate 45 br/min   Low Exhaled Vt  300 mL   Patient Observation   Observations ETT SIZE 8.0, SECURED AT 24 LIP LINE. AMBU BAG AT HEAD OF BED. WATER GOOD.     ETT (adult)   Placement Date/Time: 01/21/22 0330   Tube Size: 8 mm  Location: Oral  Secured at: 23 cm  Measured From: Lips   Secured at 24 cm   Measured From Lips   ET Placement Right   Secured By [employer] tube nicole   Site Condition Dry

## 2022-01-31 NOTE — PROGRESS NOTES
Pt still double stacking 2-3x/minute and Sp02 dropping to 88-89 at times. Dr. Yu Matias on unit now and would like pt to be put in prone position. RT aware. RN will place mepilex dressings to all azeb prominences.

## 2022-01-31 NOTE — PROGRESS NOTES
Blood pressure 128/60, pulse 85, temperature 99.2 °F (37.3 °C), temperature source Axillary, resp. rate 25, height 5' 3\" (1.6 m), weight 169 lb (76.7 kg), SpO2 95 %, not currently breastfeeding. Patient currently intubated with ETT at 24. Vent settings are 26/320/+5/55%. Propofol infusing at 40 mcg/kg/min. Fentanyl infusing at 60 mcg//h. Shift assessment completed. Pt RASS -1, awakens easily to verbal stimuli and follows commands. Tolerating current vent settings. Lungs diminished. Less secretions that previous shifts. SR, BP stable. Mcguire patent. OGT w TF at goal. Repositioned. Restraints in place. No further needs known at this time.  Electronically signed by Jenifer Easton RN on 1/30/2022 at 8:47 PM

## 2022-01-31 NOTE — PROGRESS NOTES
01/30/22 2326   Vent Information   Vent Type 840   Vent Mode AC/VC   Vt Ordered 320 mL   Rate Set 24 bmp   Peak Flow 60 L/min   FiO2  55 %   SpO2 92 %   SpO2/FiO2 ratio 167.27   Sensitivity 3   PEEP/CPAP 5   Humidification Source Heated wire   Humidification Temp 37   Humidification Temp Measured 37   Circuit Condensation Drained   Vent Patient Data   Peak Inspiratory Pressure 24 cmH2O   Mean Airway Pressure 12 cmH20   Rate Measured 27 br/min   Vt Exhaled 398 mL   Minute Volume 9.83 Liters   I:E Ratio 1:3.3   Plateau Pressure 18 FRO36   Cough/Sputum   Sputum How Obtained Endotracheal;Suctioned   Cough Non-productive   Sputum Amount None   Spontaneous Breathing Trial (SBT) RT Doc   Pulse 80   Breath Sounds   Right Upper Lobe Diminished   Right Middle Lobe Diminished   Right Lower Lobe Diminished   Left Upper Lobe Diminished   Left Lower Lobe Diminished   Additional Respiratory  Assessments   Resp 27   Position Semi-Mohamud's   Alarm Settings   High Pressure Alarm 45 cmH2O   Low Minute Volume Alarm 3 L/min   Apnea (secs) 20 secs   High Respiratory Rate 45 br/min   Low Exhaled Vt  300 mL   Patient Observation   Observations ETT SIZE 8.0, SECURED AT 24 LIP LINE. AMBU BAG AT HEAD OF BED. WATER GOOD.     ETT (adult)   Placement Date/Time: 01/21/22 0330   Tube Size: 8 mm  Location: Oral  Secured at: 23 cm  Measured From: Lips   Secured at 24 cm   Measured From Lips   ET Placement Left   Secured By [employer] tube nicole   Site Condition Dry

## 2022-01-31 NOTE — PROGRESS NOTES
01/31/22 1650   Vent Information   Skin Assessment Clean, dry, & intact   Vent Type 840   Vent Mode AC/PC   Pressure Ordered 18   Rate Set 28 bmp   FiO2  70 %   SpO2 (!) 89 %   SpO2/FiO2 ratio 127.14   Sensitivity 3   PEEP/CPAP 12   I Time/ I Time % 0.6 s   Humidification Source Heated wire   Humidification Temp Measured 37   Vent Patient Data   Peak Inspiratory Pressure 27 cmH2O   Mean Airway Pressure 16 cmH20   Rate Measured 28 br/min   Vt Exhaled 306 mL   Minute Volume 9.1 Liters   I:E Ratio 1:2.6   Plateau Pressure 29 [id number]   Spontaneous Breathing Trial (SBT) RT Doc   Pulse 86   Breath Sounds   Right Upper Lobe Diminished   Right Middle Lobe Diminished   Right Lower Lobe Diminished   Left Upper Lobe Diminished   Left Lower Lobe Diminished   Additional Respiratory  Assessments   Resp 28   Position Reverse Trendelenburg   Oral Care Completed? Yes   Oral Care Mouth suctioned   Subglottic Suction Done?  Yes   Alarm Settings   High Pressure Alarm 45 cmH2O   Low Minute Volume Alarm 7 L/min   Apnea (secs) 20 secs   High Respiratory Rate 45 br/min   Resp Rate Low Alarm 28   Low Exhaled Vt  250 mL   ETT (adult)   Placement Date/Time: 01/21/22 0330   Tube Size: 8 mm  Location: Oral  Secured at: 23 cm  Measured From: Lips   Secured at 24 cm   Measured From Lips   ET Placement Right   Secured By [employer] tube nicole   Site Condition Dry

## 2022-01-31 NOTE — PROGRESS NOTES
Peep increased to 8cwp         01/31/22 0640   Vent Information   Vent Type 840   Vent Mode AC/VC   Vt Ordered 320 mL   Rate Set 24 bmp   Peak Flow 60 L/min   FiO2  60 %   SpO2 (!) 89 %   SpO2/FiO2 ratio 148.33   Sensitivity 3   PEEP/CPAP 8   Humidification Source Heated wire   Humidification Temp Measured 37   Vent Patient Data   Peak Inspiratory Pressure 15 cmH2O   Mean Airway Pressure 9 cmH20   Rate Measured 28 br/min   Vt Exhaled 344 mL   Minute Volume 9.6 Liters   I:E Ratio 1:3   Plateau Pressure 23 [id number]   Static Compliance 21 mL/cmH2O   Cough/Sputum   Sputum How Obtained Endotracheal;Suctioned   Cough Productive   Sputum Amount Large   Sputum Color Tan   Tenacity Thick   Spontaneous Breathing Trial (SBT) RT Doc   Pulse 104   Breath Sounds   Right Upper Lobe Rhonchi   Right Middle Lobe Diminished   Right Lower Lobe Diminished   Left Upper Lobe Rhonchi   Left Lower Lobe Rhonchi   Additional Respiratory  Assessments   Resp 26   Position Semi-Mohamud's   Oral Care Completed? Yes   Oral Care Mouth suctioned   Subglottic Suction Done?  Yes   Cuff Pressure (cm H2O) 27 cm H2O   Alarm Settings   High Pressure Alarm 45 cmH2O   Low Minute Volume Alarm 3 L/min   Apnea (secs) 20 secs   High Respiratory Rate 45 br/min   Low Exhaled Vt  300 mL   ETT (adult)   Placement Date/Time: 01/21/22 0330   Tube Size: 8 mm  Location: Oral  Secured at: 23 cm  Measured From: Lips   Secured at 24 cm   Measured From Lips   ET Placement Left   Secured By [employer] tube nicole   Site Condition Dry

## 2022-02-01 NOTE — PROGRESS NOTES
Pulmonary Progress Note  CC: COVID in partially vaccinated patient     Subjective:   Changed to pressure control due to severe ventilator dyssynchrony. Fever. Proned     Levo 9    IV line: PICC line 1/20/2022    MV: 1/21/2022-    Intake/Output Summary (Last 24 hours) at 2/1/2022 0923  Last data filed at 2/1/2022 0744  Gross per 24 hour   Intake 1839.83 ml   Output 645 ml   Net 1194.83 ml       EXAM:   BP (!) 94/56   Pulse 105   Temp 98.5 °F (36.9 °C) (Axillary)   Resp 28   Ht 5' 3\" (1.6 m)   Wt 169 lb (76.7 kg)   SpO2 94%   BMI 29.94 kg/m²  on vent  EXAM:  General: intubated, ill appearing    ENT: Pharynx with ETT. Resp: No crackles. No wheezing. CV: S1, S2. No edema  GI: NT, ND, +BS  Skin: Warm and dry. Left forarm with raised indurated area, ? Vascular. Neuro: PERRL. Sedated, not following commands.  Patellar reflexes are symmetric    Scheduled Meds:   dexamethasone  3 mg IntraVENous Q24H    diazePAM  10 mg Oral TID    QUEtiapine  25 mg Oral BID    chlorhexidine  15 mL Mouth/Throat BID    famotidine (PEPCID) injection  20 mg IntraVENous BID    insulin lispro  0-6 Units SubCUTAneous Q4H    enoxaparin  30 mg SubCUTAneous BID    aspirin  81 mg Oral Daily    clopidogrel  75 mg Oral Daily    ascorbic acid  1,000 mg Oral Daily    atorvastatin  10 mg Oral Nightly    sodium chloride flush  5-40 mL IntraVENous 2 times per day    Vitamin D  2,000 Units Oral Daily     Continuous Infusions:   norepinephrine 9 mcg/min (02/01/22 0906)    fentaNYL 300 mcg/hr (02/01/22 0839)    propofol 50 mcg/kg/min (02/01/22 2516)    dextrose      sodium chloride Stopped (01/22/22 1231)     PRN Meds:  midazolam, albuterol sulfate HFA, fentanNYL, glucose, glucagon (rDNA), dextrose, dextrose bolus (hypoglycemia) **OR** dextrose bolus (hypoglycemia), albuterol sulfate HFA, guaiFENesin-dextromethorphan, benzonatate, sodium chloride flush, sodium chloride, ondansetron **OR** ondansetron, polyethylene glycol,

## 2022-02-01 NOTE — PROGRESS NOTES
Blood pressure (!) 99/54, pulse 88, temperature 99.2 °F (37.3 °C), temperature source Axillary, resp. rate 25, height 5' 3\" (1.6 m), weight 169 lb (76.7 kg), SpO2 98 %, not currently breastfeeding. Patient currently intubated with ETT at 24. Vent settings are 28/18 PC/+12/70%. Propofol infusing at 50 mcg/kg/min. Fentanyl infusing at 300 mcg//h. Reassessment completed. BP trending down slightly. SBP in the 90s. MAP 63-68. Monitoring closely.  Electronically signed by Ede Hilton RN on 2/1/2022 at 12:38 AM

## 2022-02-01 NOTE — PROGRESS NOTES
CXR shows left pneumothorax. Dr. Terry Cantu aware and ordered stat chest tube placement. Radiology called to see when tube can be placed, they will let RN know. Call to daughter to get consent and update her on patient.

## 2022-02-01 NOTE — PROGRESS NOTES
Reassessment completed at this time. No major changes. Vent settings 60% and Peep 8. Will not need to prone pt, will titrate sedation down.

## 2022-02-01 NOTE — PROGRESS NOTES
Blood pressure (!) 104/56, pulse 118, temperature 100.8 °F (38.2 °C), temperature source Axillary, resp. rate 28, height 5' 3\" (1.6 m), weight 169 lb (76.7 kg), SpO2 96 %, not currently breastfeeding. Patient currently intubated with ETT at 24. Vent settings are 28/18 PC/+12/60%. Propofol infusing at 50 mcg/kg/min. Fentanyl infusing at 300 mcg//h. Reassessment completed. Pt BP improved but HR increased to 110-120. Temperature elevated 100.8- tylenol given. No other changes noted in assessment.

## 2022-02-01 NOTE — PROGRESS NOTES
Micro called and reported MRSA in tracheal aspirate. Per pharmacist Rafaela Aguiar will cover this. Will make Dr. Trice Mckeon aware when he calls unit.

## 2022-02-01 NOTE — PROGRESS NOTES
Pharmacy Note  Vancomycin Consult    [address] Sly Shavre is a 71 y.o. female started on Vancomycin for HAP consult received from Dr. Inés Niño to manage therapy. Also receiving the following antibiotics: Cefepime. Patient Active Problem List   Diagnosis    Primary localized osteoarthrosis, lower leg    COVID-19    Acute respiratory failure with hypoxia (HCC)    Thrombocytopenia (HCC)    Anxiety    Hyperlipidemia    Arthritis    Brain fog    Moderate protein-calorie malnutrition (HCC)    Pneumonia due to COVID-19 virus    ARDS (adult respiratory distress syndrome) (HCC)    Hyperglycemia    Leukocytosis    Hypotension     Allergies:  Hydrocodone-acetaminophen, Morphine, Morphine and related, Vicodin [hydrocodone-acetaminophen], and Pcn [penicillins]     Temp max:     Recent Labs     01/31/22  0436 02/01/22  0412   BUN 20 27*   CREATININE <0.5* <0.5*   WBC 9.1 12.6*       Intake/Output Summary (Last 24 hours) at 2/1/2022 1134  Last data filed at 2/1/2022 1115  Gross per 24 hour   Intake 1677.66 ml   Output 765 ml   Net 912.66 ml     Culture Date      Source                       Results  Resp (1/31)- NGTD    Ht Readings from Last 1 Encounters:   01/28/22 5' 3\" (1.6 m)        Wt Readings from Last 1 Encounters:   01/19/22 169 lb (76.7 kg)       Body mass index is 29.94 kg/m². CrCl cannot be calculated (This lab value cannot be used to calculate CrCl because it is not a number: <0.5). Goal AUC Level: 400-600mcg/mL    Assessment/Plan:  Will initiate Vancomycin 1000mg q12h , Level due on 2/2 at 1100.    1000 mg every 12 hours, starting on Feb 1st 2022, 12:00 EST  Target exposure: AUC24 (range) 400-600 mg/L.hr (  AUC24,ss: 422 mg/L.hr(  Probability of AUC24 > 400: 56 %(  Ctrough,ss: 12.4 mg/L(  Probability of Ctrough,ss > 20: 15 %(  Probability of nephrotoxicity (Lodise ISABEL 2009): 8 %  Thank you for the consult. Will continue to follow.   Erendira Saunders Pharm D 5/5/203791:05 AM  .

## 2022-02-01 NOTE — PROGRESS NOTES
Pt intubated and sedated. Intubated with # 8 at the # 24 LL. Vent settings are Pressure Control 28 / 320 / 60% / 12. Breath sounds are diminished. Heart rhythm is NSR-ST on the bedside monitor with rates in the 90s-100s. Slight edema noted to bilateral hands, non-pitting. Scheduled meds given per orders. OG at 60cm. TF running at trophic rate of 10ml/hr. Pt tolerating well. 0 residual.    LUE PICC patent and working well. Propofol infusing. Fetanyl infusing. Levophed infusing. RASS -5, d/t prone position. Pupils, round, equal, and reactive. CPOT 0    Mcguire patent and draining clear yellow urine.

## 2022-02-01 NOTE — CARE COORDINATION
INTERDISCIPLINARY PLAN OF CARE CONFERENCE    Date/Time: 2/1/2022 4:55 PM  Completed by: Miryam Ware RN, Case Management      Patient Name:  Brett Miller  YOB: 1950  Admitting Diagnosis: Acute respiratory failure with hypoxia ([facility] [address] [J96.01]  Pneumonia due to COVID-19 virus [U07.1, J12.82]  COVID-19 [U07.1]     Admit Date/Time:  1/12/2022  9:40 AM    Chart reviewed. Interdisciplinary team contacted or reviewed plan related to patient progress and discharge plans. Disciplines included Case Management, Nursing, and Dietitian. Current Status: IP 01/12/2022  ICU/vent. Failed SBT Left chest tube placed today. Running low grd temp. CM following for post acute DC needs will likely need rehab. Will need PT/OT when pt able to tolerate.

## 2022-02-01 NOTE — PROGRESS NOTES
Reassessment completed at this time. No major issues. New chest tube in place. Intermittent bubbling noted in chamber. No drainage.

## 2022-02-01 NOTE — PROGRESS NOTES
Blood pressure (!) 100/58, pulse 80, temperature 98.1 °F (36.7 °C), temperature source Axillary, resp. rate 28, height 5' 3\" (1.6 m), weight 169 lb (76.7 kg), SpO2 93 %, not currently breastfeeding. Patient currently intubated with ETT at 24. Vent settings are 28/18 PC/+12/70%. Propofol infusing at 50 mcg/kg/min. Fentanyl infusing at 300 mcg//h. Shift assessment completed. Pt in the prone position. RASS -4, cough & gag present. Tolerating current settings. Thick / brown secretions noted. Lungs diminished at the bases, otherwise clear. SR, BP soft but stable. Trophic TF continue. Mcguire patent- good OP. Repositioned. Restraints in place. No further needs known at this time.  Electronically signed by Elio Cline RN on 1/31/2022 at 9:12 PM

## 2022-02-01 NOTE — PROGRESS NOTES
Hospitalist Progress Note      PCP: Art Beasley DO    Date of Admission: 1/12/2022    Admitted with COVID-19 pneumonia and acute respiratory failure  Intubated    Subjective: failed SBT again today due to hypoxia and agitation,  Off Levophed  On Precedex    1/27  Unable to perform SBT due to agitation of propofol  On Precedex    1/28  Did not tolerate precedex- caused bradycardia  Oxygenation requirements on the vent is better    1/29  Following commands on SBT.    1/30  Low-grade fever  Failed SBT today    1/31-on pressure control ventilation. Is double stacking the vent. Oxygen saturation dropped to 85%. 2/1-on pressure control ventilation for ventricular dyssynchrony. Supine since 11 am.  Low-grade fever. Has MRSA in her sputum.  CXR showed a pneumothorax left chest.    Medications:  Reviewed    Infusion Medications    norepinephrine 9 mcg/min (02/01/22 0906)    fentaNYL 300 mcg/hr (02/01/22 1146)    propofol 50 mcg/kg/min (02/01/22 1250)    dextrose      sodium chloride Stopped (01/22/22 1231)     Scheduled Medications    cefepime  2,000 mg IntraVENous Q8H    vancomycin  1,000 mg IntraVENous Q12H    dexamethasone  3 mg IntraVENous Q24H    diazePAM  10 mg Oral TID    QUEtiapine  25 mg Oral BID    chlorhexidine  15 mL Mouth/Throat BID    famotidine (PEPCID) injection  20 mg IntraVENous BID    insulin lispro  0-6 Units SubCUTAneous Q4H    enoxaparin  30 mg SubCUTAneous BID    aspirin  81 mg Oral Daily    clopidogrel  75 mg Oral Daily    ascorbic acid  1,000 mg Oral Daily    atorvastatin  10 mg Oral Nightly    sodium chloride flush  5-40 mL IntraVENous 2 times per day    Vitamin D  2,000 Units Oral Daily     PRN Meds: midazolam, albuterol sulfate HFA, fentanNYL, glucose, glucagon (rDNA), dextrose, dextrose bolus (hypoglycemia) **OR** dextrose bolus (hypoglycemia), albuterol sulfate HFA, guaiFENesin-dextromethorphan, benzonatate, sodium chloride flush, sodium chloride, ondansetron **OR** ondansetron, polyethylene glycol, acetaminophen **OR** acetaminophen      Intake/Output Summary (Last 24 hours) at 2/1/2022 1334  Last data filed at 2/1/2022 1115  Gross per 24 hour   Intake 1677.66 ml   Output 765 ml   Net 912.66 ml       Physical Exam Performed:    BP (!) 104/56   Pulse 83   Temp 98.8 °F (37.1 °C) (Axillary)   Resp 28   Ht 5' 3\" (1.6 m)   Wt 169 lb (76.7 kg)   SpO2 92%   BMI 29.94 kg/m²     Patient seen in droplet plus precautions  General:  Elderly female, on vent. supine  Oral ETT and OG noted   Mucous Membranes:  Pink , anicteric  Neck: No JVD, no carotid bruit, no thyromegaly  Chest: diminished in bases, bilateral mild crackles present. Left chest tube. Cardiovascular:  RRR S1S2 heard, no murmurs or gallops  Abdomen: Soft, undistended, non tender, no organomegaly, BS present  Extremities: No edema or cyanosis. Distal pulses well felt  Neurological :  Sedated  Labs:   Recent Labs     01/30/22  0445 01/31/22  0436 02/01/22  0412   WBC 7.6 9.1 12.6*   HGB 10.3* 10.4* 10.9*   HCT 30.9* 30.7* 32.9*    291 299     Recent Labs     01/30/22  0445 01/31/22  0436 02/01/22  0412    136 135*   K 4.1 4.0 4.3   CL 95* 94* 96*   CO2 35* 36* 31   BUN 20 20 27*   CREATININE <0.5* <0.5* <0.5*   CALCIUM 9.0 9.1 9.0     No results for input(s): AST, ALT, BILIDIR, BILITOT, ALKPHOS in the last 72 hours. No results for input(s): INR in the last 72 hours. No results for input(s): Calista Bouchra in the last 72 hours.     Urinalysis:      Lab Results   Component Value Date    NITRU Neg 08/29/2010    WBCUA  08/29/2010    BACTERIA 1+ 08/29/2010    RBCUA 5-10 08/29/2010    BLOODU Neg 08/29/2010    SPECGRAV 1.010 08/29/2010    GLUCOSEU Neg 08/29/2010     Sputum  CULTURE, RESPIRATORY Normal respiratory jl absent Abnormal      Gram Stain Result 4+ WBC's (Polymorphonuclear)   3+ Gram positive cocci     Organism Staph aureus MRSA Abnormal     CULTURE, RESPIRATORY --Mey Hilton Moderate growth   Sensitivity to follow   CONTACT PRECAUTIONS INDICATED   PBP2= POSITIVE    Abnormal          Radiology:  XR CHEST PORTABLE   Preliminary Result   Interval placement of a left-sided chest tube with significant decrease seen   in size of the left pneumothorax only with a small apical component   remaining. Patchy airspace disease within the lung fields is stable and   unchanged. XR CHEST PORTABLE   Final Result   New onset moderate to large left pneumothorax with mild tension. Endotracheal, nasogastric tubes and PICC line are stable. Redemonstration of   bilateral infiltrates. Findings communicated to the referring physician on February 1, 2022      RECOMMENDATION:   Stat left chest tube. XR CHEST PORTABLE   Final Result   1. Stable lines, tubes, and support devices. 2. Stable cardiopulmonary status including bilateral airspace opacities and   small effusions. XR CHEST PORTABLE   Final Result   No significant change. Continued follow-up recommended. XR CHEST PORTABLE   Final Result   Mild worsening of bilateral infiltrates likely due to pneumonia. Continued   follow-up recommended. XR CHEST PORTABLE   Final Result   Moderate persistent multifocal airspace disease compatible with multifocal   pneumonia including COVID pneumonia. While similar to recent studies, there   has been gradual progression and worsening since the early study of   01/12/2022. XR CHEST PORTABLE   Final Result   Extensive infiltrates within the lungs bilaterally, worsening on the right   with increasing consolidation. XR CHEST PORTABLE   Final Result   No significant interval change multifocal airspace disease. Stable lines and tubes.          XR CHEST PORTABLE   Final Result   Mildly improved parenchymal infiltrates at the right lung base, otherwise   similar appearing chest.         XR CHEST PORTABLE   Final Result   Extensive bilateral airspace opacities are seen without significant change. XR CHEST PORTABLE   Final Result   1. Recommend retraction of endotracheal tube 1.0 cm.   2. Stable severe ill-defined lung consolidation, greatest at the lung bases   consistent with pneumonia versus alveolar edema. 3. Suspected mild bilateral pleural effusion, unchanged. XR CHEST PORTABLE   Final Result   1. No significant change. XR CHEST PORTABLE   Final Result   Appropriate positioning of the endotracheal tube. Enteric tube courses   beneath the diaphragm; tip is excluded by collimation inferiorly. No significant change in extensive bilateral airspace disease. IR PICC WO SQ PORT/PUMP > 5 YEARS   Final Result   Successful placement of PICC line. XR CHEST PORTABLE   Final Result   New multifocal moderate to severe pneumonia. Clinical and imaging follow-up   to resolution recommended. CTA HEAD NECK W CONTRAST   Final Result   Unremarkable CTA of the head and neck. Findings consistent with COVID pneumonia. This scan was analyzed using [employer]. ai contact LVO. Identification of suspected   findings is not for diagnostic use beyond notification. Viz LVO is limited to   analysis of imaging data and should not be used in-lieu of full patient   evaluation or relied upon to make or confirm diagnosis. CT HEAD WO CONTRAST   Final Result   No acute intracranial abnormality. XR CHEST PORTABLE   Final Result   Multifocal bilateral atypical pneumonia. XR CHEST PORTABLE    (Results Pending)       Assessment/Plan:    Active Problems:    COVID-19    Anxiety    Hyperlipidemia    Arthritis    Brain fog    Moderate protein-calorie malnutrition (HCC)    Pneumonia due to COVID-19 virus    ARDS (adult respiratory distress syndrome) (HCC)    Hyperglycemia    Leukocytosis    Hypotension  Resolved Problems:    * No resolved hospital problems.  *           COVID PNA  Acute hypoxic respiratory Failure   - CXR: noted with multifocal PNA  - no home O2 at baseline, 83-87% at rest,   - J &J in March 2021- no booster   - Admitted to 2W, droplet +, tele, cont pulse ox  - supp O2, wean as tolerated - worsening slowly -was on 10 L   -progressively worsening hypoxia, was on vapotherm with full support   - eventually placed on vent support 1/21    persistent severe hypoxemia -she was proned on 1/20/2022.  She is back in supine position.  -Continue Decadron day #20--> 3 mg daily   - Remdesivir not started due to out of window- sx 7+ days prior to admit  - levaquin - finished 7 days   - Has MRSA in her sputums and elevated WBC. Start Vancomycin and Cefepime.   - PRN Robitussin  - procal 0.10 wnl   - CRP elevated . Started on Baricitinib - finished 14 days .  Monitor CBC and LFTs  - Lovenox Bid  Valium and Seroquel added  precedex caused bradycardia  Ongoing weaning trials-failed today. On PC ventilation. Left pneumothorax. - left chest tube placed. Hypotension  Was on Levophed. Now off pressors     Episodes of expressive aphasia   - ? TIA, vs mental status changes related to covid   - cont ASA, Plavix      Thrombocytopenia resolved  - likely with covid  - continue Lovenox and monitor    Hyperglycemia  Secondary to Decadron   Sliding scale insulin     HLD  - Continue statin        Arthritis   - was on  mobic - holding     Hx of Asthma  - not on inhalers at home      DVT Prophylaxis: Lovenox bid  Diet: Diet NPO  ADULT TUBE FEEDING; Orogastric; Other Tube Feeding (specify);  Glucerna 1.2; Continuous; 20; Yes; 20; Q 4 hours; 50; 30; Q 3 hours  Code Status: Full Code    PT/OT Eval Status: not indicated    Dispo - cont care      Marco Antonio Angel MD 2/1/2022 1:34 PM

## 2022-02-01 NOTE — PROGRESS NOTES
01/31/22 2004   Vent Information   Vent Type 840   Vent Mode AC/PC   Pressure Ordered 18   Rate Set 28 bmp   FiO2  70 %   SpO2 94 %   SpO2/FiO2 ratio 134.29   PEEP/CPAP 12   I Time/ I Time % 0.6 s   Humidification Source Heated wire   Humidification Temp 37   Humidification Temp Measured 36.5   Circuit Condensation Drained   Vent Patient Data   Peak Inspiratory Pressure 30 cmH2O   Mean Airway Pressure 17 cmH20   Rate Measured 28 br/min   Vt Exhaled 287 mL   Minute Volume 8.08 Liters   I:E Ratio 1:2.6   Plateau Pressure 28 [id number]   Cough/Sputum   Sputum How Obtained Endotracheal;Suctioned   Cough Productive   Sputum Amount Moderate   Sputum Color Creamy;Brown   Tenacity Thick   Spontaneous Breathing Trial (SBT) RT Doc   Pulse 73   Breath Sounds   Right Upper Lobe Diminished   Right Middle Lobe Diminished   Right Lower Lobe Diminished   Left Upper Lobe Diminished   Left Lower Lobe Diminished   Additional Respiratory  Assessments   Resp 28   Position Prone   Alarm Settings   High Pressure Alarm 45 cmH2O   Low Minute Volume Alarm 7 L/min   Apnea (secs) 20 secs   High Respiratory Rate 45 br/min   Low Exhaled Vt  250 mL   Patient Observation   Observations ETT SIZE 8.0, SECURED AT 24 LIP LINE. AMBU BAG AT HEAD OF BED. WATER GOOD.     ETT (adult)   Placement Date/Time: 01/21/22 0330   Tube Size: 8 mm  Location: Oral  Secured at: 23 cm  Measured From: Lips   Secured at 24 cm   Measured From Lips   ET Placement Right   Secured By Cloth tape;Twill tape   Site Condition Dry

## 2022-02-01 NOTE — PROGRESS NOTES
BP trending back down. MAP now <65. 89/45 (59). . O2 sat 93%. Message sent to hospitalist. Awaiting response. Electronically signed by Willy Wong RN on 2/1/2022 at 6:24 AM      8127- spoke to Dr. Tim Brooke, order received for levo. Order placed. Awaiting med from pharmacy. BP 84/44 (55).  Electronically signed by Willy Wong RN on 2/1/2022 at 6:49 AM

## 2022-02-01 NOTE — PROGRESS NOTES
01/31/22 2300   Vent Information   Vent Type 840   Vent Mode AC/PC   Pressure Ordered 18   Rate Set 28 bmp   FiO2  70 %   SpO2 94 %   SpO2/FiO2 ratio 134.29   Sensitivity 3   PEEP/CPAP 12   I Time/ I Time % 0.6 s   Humidification Source Heated wire   Humidification Temp 37   Humidification Temp Measured 36.5   Circuit Condensation Drained   Vent Patient Data   Peak Inspiratory Pressure 31 cmH2O   Mean Airway Pressure 21 cmH20   Rate Measured 28 br/min   Vt Exhaled 308 mL   Minute Volume 8.4 Liters   I:E Ratio 1:2.6   Plateau Pressure 28 [id number]   Cough/Sputum   Sputum How Obtained Endotracheal;Suctioned   Cough Productive   Sputum Amount Small   Sputum Color Creamy   Tenacity Thick   Spontaneous Breathing Trial (SBT) RT Doc   Pulse 84   Breath Sounds   Right Upper Lobe Diminished   Right Middle Lobe Diminished   Right Lower Lobe Diminished   Left Upper Lobe Diminished   Left Lower Lobe Diminished   Additional Respiratory  Assessments   Resp 28   Position Prone   Alarm Settings   High Pressure Alarm 45 cmH2O   Low Minute Volume Alarm 7 L/min   Apnea (secs) 20 secs   High Respiratory Rate 45 br/min   Low Exhaled Vt  250 mL   ETT (adult)   Placement Date/Time: 01/21/22 0330   Tube Size: 8 mm  Location: Oral  Secured at: 23 cm  Measured From: Lips   Secured at 24 cm   Measured From Lips   ET Placement Right   Secured By [employer] tube nicole   Site Condition Dry

## 2022-02-02 NOTE — PROGRESS NOTES
Pharmacy Vancomycin Consult     Vancomycin Day: 2  Current Dosingm q12h  Current indication: PNA MRSA    Temp max:      Recent Labs     22  0412 22  0423   BUN 27* 18   CREATININE <0.5* <0.5*   WBC 12.6* 11.8*       Intake/Output Summary (Last 24 hours) at 2022 1210  Last data filed at 2022 0800  Gross per 24 hour   Intake 3093.95 ml   Output 1379 ml   Net 1714.95 ml     Culture Date      Source                       Results  Resp: MRSA    Ht Readings from Last 1 Encounters:   22 5' 3\" (1.6 m)        Wt Readings from Last 1 Encounters:   22 169 lb (76.7 kg)       Body mass index is 29.94 kg/m². CrCl cannot be calculated (This lab value cannot be used to calculate CrCl because it is not a number: <0.5).     Level: 9.2    Assessment/Plan:  Continue with same rx -   1000 mg every 12 hours, starting on 2022, 12:00 EST  Target exposure: AUC24 (range) 400-600 mg/L.hr (  AUC24,ss: 440 mg/L.hr(  Probability of AUC24 > 400: 66 %(  Ctrough,ss: 13.1 mg/L(  Probability of Ctrough,ss > 20: 8 %(  Probability of nephrotoxicity (Lodise ISABEL ): 8 %

## 2022-02-02 NOTE — PROGRESS NOTES
Rounds completed with Dr. Calvin Coates at this time. New orders to stop cefepime, give 1 dose of IVP lasix, and decreased decadron. iGfty Rudolph, daughter, called for update at this time. Update given and any questions answered at this time.

## 2022-02-02 NOTE — PROGRESS NOTES
Reassessment completed at this time. No major changes. Daughter Tasha Singletary here at this time and is updated,  questions answered.

## 2022-02-02 NOTE — PROGRESS NOTES
Pulmonary Progress Note    CC: COVID in partially vaccinated patient     Subjective:   PCV for severe ventilator dyssynchrony. Chest tube with no air leak or tidaling  Audible ETT cuff leak    Levo 9    IV line: PICC line 1/20/2022    MV: 1/21/2022-    Intake/Output Summary (Last 24 hours) at 2/2/2022 8577  Last data filed at 2/2/2022 0800  Gross per 24 hour   Intake 3093.95 ml   Output 1499 ml   Net 1594.95 ml       EXAM:   BP (!) 130/58   Pulse 66   Temp 99.4 °F (37.4 °C) (Axillary)   Resp 28   Ht 5' 3\" (1.6 m)   Wt 169 lb (76.7 kg)   SpO2 96%   BMI 29.94 kg/m²  on vent  EXAM:  General: intubated, ill appearing    ENT: Pharynx with ETT. Resp: No crackles. No wheezing. CV: S1, S2. No edema  GI: NT, ND, +BS  Skin: Warm and dry. Left forarm with raised indurated area, ? Vascular. Neuro: PERRL. Sedated, not following commands.  Patellar reflexes are symmetric    Scheduled Meds:   cefepime  2,000 mg IntraVENous Q8H    vancomycin  1,000 mg IntraVENous Q12H    dexamethasone  3 mg IntraVENous Q24H    diazePAM  10 mg Oral TID    QUEtiapine  25 mg Oral BID    chlorhexidine  15 mL Mouth/Throat BID    famotidine (PEPCID) injection  20 mg IntraVENous BID    insulin lispro  0-6 Units SubCUTAneous Q4H    enoxaparin  30 mg SubCUTAneous BID    aspirin  81 mg Oral Daily    clopidogrel  75 mg Oral Daily    ascorbic acid  1,000 mg Oral Daily    atorvastatin  10 mg Oral Nightly    sodium chloride flush  5-40 mL IntraVENous 2 times per day    Vitamin D  2,000 Units Oral Daily     Continuous Infusions:   norepinephrine Stopped (02/02/22 0810)    fentaNYL 150 mcg/hr (02/02/22 0800)    propofol 50 mcg/kg/min (02/02/22 0800)    dextrose      sodium chloride Stopped (01/22/22 1231)     PRN Meds:  midazolam, albuterol sulfate HFA, fentanNYL, glucose, glucagon (rDNA), dextrose, dextrose bolus (hypoglycemia) **OR** dextrose bolus (hypoglycemia), albuterol sulfate HFA, guaiFENesin-dextromethorphan, benzonatate, sodium chloride flush, sodium chloride, ondansetron **OR** ondansetron, polyethylene glycol, acetaminophen **OR** acetaminophen    Labs:  CBC:   Recent Labs     01/31/22 0436 02/01/22 0412 02/02/22 0423   WBC 9.1 12.6* 11.8*   HGB 10.4* 10.9* 9.2*   HCT 30.7* 32.9* 27.9*   MCV 89.4 89.8 89.6    299 272     BMP:   Recent Labs     01/31/22 0436 02/01/22 0412 02/02/22 0423    135* 131*   K 4.0 4.3 4.0   CL 94* 96* 94*   CO2 36* 31 32   BUN 20 27* 18   CREATININE <0.5* <0.5* <0.5*     Micro:  1/17/2022 SARS-CoV-2 positive at urgent care  1/12/2022 SARS-CoV-2 detected   1/31/2022 tracheal aspirate MRSAt     Imaging:   CXR 2/2/22: increased multifocal infiltrates. Chest tube in place with mostly resolved pneumothorax. ETT too deep. ASSESSMENT:  · Acute hypoxemic respiratory failure, severe and life-threatening  · COVID-19 pneumonia in a partially vaccinated patient, s/p J&J vaccination 3/2021  · ARDS  · MRSA in tracheal aspirate  · Left pneumothorax, CT placed 2/1/22  · Thrombocytopenia -resolved  · H/O asthma, no home inhalers  · Former smoker  · Precedex caused bradycardia    PLAN:  Retract ETT 1cm  Mechanical ventilation as per my orders. The ventilator was adjusted by me at the bedside for unstable, life threatening respiratory failure   On pressure control currently  IV Propofol for sedation, target RASS -2, with daily spontaneous awakening trial   Seroquel 25 BID and Valium 10 TID   Completed 7 days Levaquin   Vanc D#2. Stop Cefepime. Lasix 40mg IV x 1  Check UA and Bloood cx and f/u tarch aspirate  Decadron D#21 @ 2    Completed 14 days baricitinib   TF  Chest tube to -[id number] suction. Flushed today and was patent. .  Blood sugar control ISS, with goal 150-180  Prophylaxis: Pepcid, Lovenox 40 BID   Total critical care time caring for this patient with life threatening, unstable organ failure, including direct patient contact, management of life support systems, review of data including imaging and labs, discussions with other team members and physicians is 35 minutes so far today, excluding procedures.

## 2022-02-02 NOTE — PROGRESS NOTES
02/02/22 0409   Vent Information   Vent Type 840   Vent Mode AC/PC   Pressure Ordered 20   Rate Set 28 bmp   FiO2  60 %   SpO2 95 %   SpO2/FiO2 ratio 158.33   Sensitivity 3   PEEP/CPAP 8   Humidification Source Heated wire   Humidification Temp 37   Humidification Temp Measured 37   Circuit Condensation Drained   Vent Patient Data   Peak Inspiratory Pressure 29 cmH2O   Mean Airway Pressure 14 cmH20   Rate Measured 25 br/min   Vt Exhaled 314 mL   Minute Volume 7.8 Liters   I:E Ratio 1:2.6   Plateau Pressure 26 [id number]   Cough/Sputum   Sputum How Obtained Endotracheal;Suctioned   Cough Productive   Sputum Amount Small   Sputum Color Creamy   Tenacity Thick   Spontaneous Breathing Trial (SBT) RT Doc   Pulse 67   Breath Sounds   Right Upper Lobe Diminished   Right Middle Lobe Diminished   Right Lower Lobe Diminished   Left Upper Lobe Diminished   Left Lower Lobe Diminished   Additional Respiratory  Assessments   Resp 28   Position Semi-Mohamud's   Alarm Settings   High Pressure Alarm 45 cmH2O   Low Minute Volume Alarm 7 L/min   Apnea (secs) 20 secs   High Respiratory Rate 45 br/min   Low Exhaled Vt  250 mL   Patient Observation   Observations ETT SIZE 8.0, SECURED AT 24 LIP LINE. AMBU BAG AT HEAD OF BED. WATER GOOD.     ETT (adult)   Placement Date/Time: 01/21/22 0330   Tube Size: 8 mm  Location: Oral  Secured at: 23 cm  Measured From: Lips   Secured at 24 cm   Measured From [address] By [employer] tube nicole   Site Condition Dry

## 2022-02-02 NOTE — PROGRESS NOTES
02/01/22 2329   Vent Information   Vent Type 840   Vent Mode AC/PC   Pressure Ordered 20   Rate Set 28 bmp   FiO2  60 %   SpO2 96 %   SpO2/FiO2 ratio 160   Sensitivity 3   PEEP/CPAP 8   Vent Patient Data   Peak Inspiratory Pressure 29 cmH2O   Mean Airway Pressure 14 cmH20   Rate Measured 28 br/min   Vt Exhaled 323 mL   Minute Volume 9.05 Liters   I:E Ratio 1:2.6   Spontaneous Breathing Trial (SBT) RT Doc   Pulse 64   Additional Respiratory  Assessments   Resp 28

## 2022-02-02 NOTE — PROGRESS NOTES
02/01/22 2024   Vent Information   Vent Type 840   Vent Mode AC/PC   Pressure Ordered 20   Rate Set 28 bmp   FiO2  60 %   SpO2 94 %   SpO2/FiO2 ratio 156.67   Sensitivity 3   PEEP/CPAP 8   Humidification Source Heated wire   Humidification Temp 37   Humidification Temp Measured 36.8   Circuit Condensation Drained   Vent Patient Data   Peak Inspiratory Pressure 28 cmH2O   Mean Airway Pressure 14 cmH20   Rate Measured 28 br/min   Vt Exhaled 396 mL   Minute Volume 8.27 Liters   I:E Ratio 1:2.6   Plateau Pressure 28 [id number]   Static Compliance 20 mL/cmH2O   Dynamic Compliance 20 mL/cmH2O   Cough/Sputum   Sputum How Obtained Suctioned;Endotracheal   Sputum Amount Small   Sputum Color Creamy   Tenacity Thick   Spontaneous Breathing Trial (SBT) RT Doc   Pulse 81   Breath Sounds   Right Upper Lobe Diminished   Right Middle Lobe Diminished   Right Lower Lobe Diminished   Left Upper Lobe Diminished   Left Lower Lobe Diminished   Additional Respiratory  Assessments   Resp 28   Position Semi-Mohamud's   Alarm Settings   High Pressure Alarm 45 cmH2O   Apnea (secs) 20 secs   High Respiratory Rate 45 br/min   Low Exhaled Vt  250 mL   ETT (adult)   Placement Date/Time: 01/21/22 0330   Tube Size: 8 mm  Location: Oral  Secured at: 23 cm  Measured From: Lips   Secured at 24 cm

## 2022-02-02 NOTE — PROGRESS NOTES
Pt intubated and sedated. Intubated with # 8 at the # 24 LL. Vent settings are PC 28 / 320 / 60% / 8. Breath sounds are diminished. Heart rhythm is NSR on the bedside monitor with rates in the 70s. Scheduled meds given per orders. OG at 60cm. TF running at goal rate of 50ml/hr. Pt tolerating well. RUE PICC patent and working well. Propofol infusing. Fetanyl infusing. Levophed infusing. RASS -3  CPOT 0     Mcguire draining clear yellow urine. Chest tube in place. No drainage noted in chamber. No bubbling or tidaling noted.

## 2022-02-02 NOTE — PROGRESS NOTES
Hospitalist Progress Note      PCP: Hali Leon DO    Date of Admission: 1/12/2022    Admitted with COVID-19 pneumonia and acute respiratory failure  Intubated    Subjective: failed SBT again today due to hypoxia and agitation,  Off Levophed  On Precedex    1/27  Unable to perform SBT due to agitation of propofol  On Precedex    1/28  Did not tolerate precedex- caused bradycardia  Oxygenation requirements on the vent is better    1/29  Following commands on SBT.    1/30  Low-grade fever  Failed SBT today    1/31-on pressure control ventilation. Is double stacking the vent. Oxygen saturation dropped to 85%. 2/1-on pressure control ventilation for ventricular dyssynchrony. Supine since 11 am.  Low-grade fever. Has MRSA in her sputum. CXR showed a pneumothorax left chest.    2/2-chest tube with no air leaking. Pressure control ventilation for ventricular dyssynchrony. On Levophed. Sedation increased.     Medications:  Reviewed    Infusion Medications    norepinephrine 4 mcg/min (02/02/22 1013)    fentaNYL 175 mcg/hr (02/02/22 1300)    propofol 50 mcg/kg/min (02/02/22 1338)    dextrose      sodium chloride Stopped (01/22/22 1231)     Scheduled Medications    dexamethasone  2 mg IntraVENous Q24H    vancomycin  1,000 mg IntraVENous Q12H    diazePAM  10 mg Oral TID    QUEtiapine  25 mg Oral BID    chlorhexidine  15 mL Mouth/Throat BID    famotidine (PEPCID) injection  20 mg IntraVENous BID    insulin lispro  0-6 Units SubCUTAneous Q4H    enoxaparin  30 mg SubCUTAneous BID    aspirin  81 mg Oral Daily    clopidogrel  75 mg Oral Daily    ascorbic acid  1,000 mg Oral Daily    atorvastatin  10 mg Oral Nightly    sodium chloride flush  5-40 mL IntraVENous 2 times per day    Vitamin D  2,000 Units Oral Daily     PRN Meds: midazolam, albuterol sulfate HFA, fentanNYL, glucose, glucagon (rDNA), dextrose, dextrose bolus (hypoglycemia) **OR** dextrose bolus (hypoglycemia), albuterol sulfate HFA, guaiFENesin-dextromethorphan, benzonatate, sodium chloride flush, sodium chloride, ondansetron **OR** ondansetron, polyethylene glycol, acetaminophen **OR** acetaminophen      Intake/Output Summary (Last 24 hours) at 2/2/2022 1359  Last data filed at 2/2/2022 1300  Gross per 24 hour   Intake 3709.21 ml   Output 2814 ml   Net 895.21 ml       Physical Exam Performed:    BP (!) 119/54   Pulse 94   Temp 99.4 °F (37.4 °C) (Axillary)   Resp 25   Ht 5' 3\" (1.6 m)   Wt 169 lb (76.7 kg)   SpO2 91%   BMI 29.94 kg/m²     Patient seen in droplet plus precautions  General:  Elderly female, on vent. supine  Oral ETT and OG noted   Mucous Membranes:  Pink , anicteric  Neck: No JVD, no carotid bruit, no thyromegaly  Chest: diminished in bases, bilateral mild crackles present. Left chest tube. Cardiovascular:  RRR S1S2 heard, no murmurs or gallops  Abdomen: Soft, undistended, non tender, no organomegaly, BS present  Extremities: No edema or cyanosis. Distal pulses well felt  Neurological :  Sedated  Labs:   Recent Labs     01/31/22  0436 02/01/22  0412 02/02/22  0423   WBC 9.1 12.6* 11.8*   HGB 10.4* 10.9* 9.2*   HCT 30.7* 32.9* 27.9*    299 272     Recent Labs     01/31/22  0436 02/01/22  0412 02/02/22  0423    135* 131*   K 4.0 4.3 4.0   CL 94* 96* 94*   CO2 36* 31 32   BUN 20 27* 18   CREATININE <0.5* <0.5* <0.5*   CALCIUM 9.1 9.0 8.5     No results for input(s): AST, ALT, BILIDIR, BILITOT, ALKPHOS in the last 72 hours. No results for input(s): INR in the last 72 hours. No results for input(s): Brennanjessenia Colmenares in the last 72 hours.     Urinalysis:      Lab Results   Component Value Date    NITRU Negative 02/01/2022    WBCUA 3-5 02/01/2022    BACTERIA 1+ 02/01/2022    RBCUA 5-10 02/01/2022    BLOODU SMALL 02/01/2022    SPECGRAV <=1.005 02/01/2022    GLUCOSEU 100 02/01/2022    GLUCOSEU Neg 08/29/2010     Sputum  CULTURE, RESPIRATORY Normal respiratory jl absent Abnormal      Gram Stain Result 4+ WBC's (Polymorphonuclear)   3+ Gram positive cocci     Organism Staph aureus MRSA Abnormal     CULTURE, RESPIRATORY -- Abnormal     Moderate growth   Sensitivity to follow   CONTACT PRECAUTIONS INDICATED   PBP2= POSITIVE    Abnormal          Radiology:  XR CHEST PORTABLE   Final Result   Worsening multifocal airspace opacities. XR CHEST PORTABLE   Final Result   Interval placement of a left-sided chest tube with significant decrease seen   in size of the left pneumothorax only with a small apical component   remaining. Patchy airspace disease within the lung fields is stable and   unchanged. XR CHEST PORTABLE   Final Result   New onset moderate to large left pneumothorax with mild tension. Endotracheal, nasogastric tubes and PICC line are stable. Redemonstration of   bilateral infiltrates. Findings communicated to the referring physician on February 1, 2022      RECOMMENDATION:   Stat left chest tube. XR CHEST PORTABLE   Final Result   1. Stable lines, tubes, and support devices. 2. Stable cardiopulmonary status including bilateral airspace opacities and   small effusions. XR CHEST PORTABLE   Final Result   No significant change. Continued follow-up recommended. XR CHEST PORTABLE   Final Result   Mild worsening of bilateral infiltrates likely due to pneumonia. Continued   follow-up recommended. XR CHEST PORTABLE   Final Result   Moderate persistent multifocal airspace disease compatible with multifocal   pneumonia including COVID pneumonia. While similar to recent studies, there   has been gradual progression and worsening since the early study of   01/12/2022. XR CHEST PORTABLE   Final Result   Extensive infiltrates within the lungs bilaterally, worsening on the right   with increasing consolidation. XR CHEST PORTABLE   Final Result   No significant interval change multifocal airspace disease. Stable lines and tubes.          XR CHEST PORTABLE   Final Result   Mildly improved parenchymal infiltrates at the right lung base, otherwise   similar appearing chest.         XR CHEST PORTABLE   Final Result   Extensive bilateral airspace opacities are seen without significant change. XR CHEST PORTABLE   Final Result   1. Recommend retraction of endotracheal tube 1.0 cm.   2. Stable severe ill-defined lung consolidation, greatest at the lung bases   consistent with pneumonia versus alveolar edema. 3. Suspected mild bilateral pleural effusion, unchanged. XR CHEST PORTABLE   Final Result   1. No significant change. XR CHEST PORTABLE   Final Result   Appropriate positioning of the endotracheal tube. Enteric tube courses   beneath the diaphragm; tip is excluded by collimation inferiorly. No significant change in extensive bilateral airspace disease. IR PICC WO SQ PORT/PUMP > 5 YEARS   Final Result   Successful placement of PICC line. XR CHEST PORTABLE   Final Result   New multifocal moderate to severe pneumonia. Clinical and imaging follow-up   to resolution recommended. CTA HEAD NECK W CONTRAST   Final Result   Unremarkable CTA of the head and neck. Findings consistent with COVID pneumonia. This scan was analyzed using [employer]. ai contact LVO. Identification of suspected   findings is not for diagnostic use beyond notification. Viz LVO is limited to   analysis of imaging data and should not be used in-lieu of full patient   evaluation or relied upon to make or confirm diagnosis. CT HEAD WO CONTRAST   Final Result   No acute intracranial abnormality. XR CHEST PORTABLE   Final Result   Multifocal bilateral atypical pneumonia.          XR CHEST PORTABLE    (Results Pending)       Assessment/Plan:    Active Problems:    COVID-19    Anxiety    Hyperlipidemia    Arthritis    Brain fog    Moderate protein-calorie malnutrition ([facility] [address]    Pneumonia due to COVID-19 virus    ARDS (adult respiratory distress syndrome) (HCC)    Hyperglycemia    Leukocytosis    Hypotension  Resolved Problems:    * No resolved hospital problems. *           COVID PNA  Acute hypoxic respiratory Failure   - CXR: noted with multifocal PNA  - no home O2 at baseline, 83-87% at rest,   - J &J in March 2021- no booster   - Admitted to 2W, droplet +, tele, cont pulse ox  - supp O2, wean as tolerated - worsening slowly -was on 10 L   -progressively worsening hypoxia, was on vapotherm with full support   - eventually placed on vent support 1/21    persistent severe hypoxemia -she was proned on 1/20/2022.  She is back in supine position.  -Continue Decadron day #21--> 3 mg daily   - Remdesivir not started due to out of window- sx 7+ days prior to admit  - levaquin - finished 7 days   - Has MRSA in her sputums and elevated WBC. Start Vancomycin and Cefepime. Stop cefepime. Vancomycin day #2  - PRN Robitussin  - procal 0.10 wnl   - CRP elevated . Started on Baricitinib - finished 14 days .  Monitor CBC and LFTs  - Lovenox Bid  Valium and Seroquel added  precedex caused bradycardia  On PC ventilation. Having ventricular dyssynchrony. Sedation increased. Left pneumothorax. - left chest tube placed. CT flushed and it is patent. Hypotension  -on Levophed 4 mcg.     Episodes of expressive aphasia   - ? TIA, vs mental status changes related to covid   - cont ASA, Plavix      Thrombocytopenia resolved  - likely with covid  - continue Lovenox and monitor    Hyperglycemia  Secondary to Decadron   Sliding scale insulin     HLD  - Continue statin        Arthritis   - was on  mobic - holding     Hx of Asthma  - not on inhalers at home      DVT Prophylaxis: Lovenox bid  Diet: Diet NPO  ADULT TUBE FEEDING; Orogastric; Other Tube Feeding (specify);  Glucerna 1.2; Continuous; 20; Yes; 20; Q 4 hours; 50; 30; Q 3 hours  Code Status: Full Code    PT/OT Eval Status: not indicated    Maurie Seip care Saverio Golds, MD 2/2/2022 1:59 PM

## 2022-02-03 NOTE — PROGRESS NOTES
02/02/22 2010   Vent Information   Vent Type 840   Vent Mode AC/PC   Pressure Ordered 20   Rate Set 28 bmp   FiO2  55 %   SpO2 95 %   SpO2/FiO2 ratio 172.73   Sensitivity 3   PEEP/CPAP 8   I Time/ I Time % 0.6 s   Humidification Source Heated wire   Humidification Temp 37   Humidification Temp Measured 37   Circuit Condensation Drained   Vent Patient Data   Peak Inspiratory Pressure 28 cmH2O   Mean Airway Pressure 14 cmH20   Rate Measured 30 br/min   Vt Exhaled 305 mL   Minute Volume 8.48 Liters   I:E Ratio 1:2.6   Plateau Pressure 26 SDI05   Cough/Sputum   Sputum How Obtained Suctioned;Endotracheal   Cough Non-productive   Spontaneous Breathing Trial (SBT) RT Doc   Pulse 69   Breath Sounds   Right Upper Lobe Diminished   Right Middle Lobe Diminished   Right Lower Lobe Diminished   Left Upper Lobe Diminished   Left Lower Lobe Diminished   Additional Respiratory  Assessments   Resp 28   Position Semi-Mohamud's   Oral Care Completed? Yes   Oral Care Mouth suctioned   Subglottic Suction Done?  Yes   Alarm Settings   High Pressure Alarm 45 cmH2O   Low Minute Volume Alarm 7 L/min   Apnea (secs) 20 secs   High Respiratory Rate 45 br/min   Low Exhaled Vt  250 mL   ETT (adult)   Placement Date/Time: 01/21/22 0330   Tube Size: 8 mm  Location: Oral  Secured at: 23 cm  Measured From: Lips   Secured at 23 cm   Measured From Lips   ET Placement Right   Secured By [employer] tube nicole   Site Condition Dry

## 2022-02-03 NOTE — PLAN OF CARE
Nutrition Problem #1: Inadequate oral intake  Intervention: Food and/or Nutrient Delivery: Continue NPO,Continue Current Tube Feeding  Nutritional Goals: patient will tolerate Glucerna 1.2 with a goal rate of 50 ml/hr x 20 hours without GI distress, without s/s of aspiration, and without additional lab/fluid disturbances

## 2022-02-03 NOTE — PROGRESS NOTES
Comprehensive Nutrition Assessment    Type and Reason for Visit:  Reassess    Nutrition Recommendations/Plan:   1. Continue current TF order - ADULT TUBE FEEDING; Orogastric; Other formula - Glucerna 1.2 with a goal rate of 50 ml/hr x 20 hours + 30 ml water flushes every 3 hours for tube patency. 2. Monitor TF rate, intake, and tolerance + water flushes. 3. Monitor vent status, sedation type/amount (propofol is currently infusing at 50 mcg x 24 hours which = 610 kcals from lipids), TG checks (TG check on 2/2/22 was 245 mg/dl), and plan of care. 4. Please obtain an updated weight for this patient - last weight was obtained on 1/19/22.   5. Monitor nutrition-related labs, bowel function, and weight trends. Nutrition Assessment:  patient is improved from a nutritional standpoint AEB she is tolerating Glucerna 1.2 TF regimen at goal rate without issues; she remains at risk for further compromise d/t need for EN as sole source of nutrition, increased nutrition needs r/t COVID-19 virus, and altered nutrition-related labs; will continue Glucerna 1.2 at goal rate of 50 ml/hr x 20 hours + 30 ml water flushes every 3 hours for tube patency    Malnutrition Assessment:  Malnutrition Status:  Insufficient data    Context:  Acute Illness     Findings of the 6 clinical characteristics of malnutrition:  Energy Intake:  1 - 75% or less of estimated energy requirements for 7 or more days  Weight Loss:  Unable to assess (last weight was obtained on 1/19/22)     Body Fat Loss:  Unable to assess (COVID-19 +)     Muscle Mass Loss:  Unable to assess (COVID-19 +)    Fluid Accumulation:  No significant fluid accumulation     Strength:  Not Performed    Estimated Daily Nutrient Needs:  Energy (kcal):  1540 - 1771 kcals based on 20-23 kcals/kg/CBW; Weight Used for Energy Requirements:  Current     Protein (g):  68 - 78 g protein based on 1.3-1.5 g/kg/IBW;  Weight Used for Protein Requirements:  Ideal        Fluid (ml/day): 1540 - 1771 ml; Method Used for Fluid Requirements:  1 ml/kcal      Nutrition Related Findings:  patient remains intubated; propofol is infusing at 50 mcg x 24 hours at this time; + L chest tube in place; abdomen is soft, round, non-tender, and bowel sounds are present; last documented BM was on 1/31/22; TF is infusing at goal rate; blood glucose trends are elevated; h/h, Na, and Cl are low; patient has high-dose SSI ordered for blood glucose management; meds in D5 are infusing at this time      Wounds:  None       Current Nutrition Therapies:    Current Tube Feeding (TF) Orders:  · Feeding Route: Orogastric  · Formula: Other Tube Feeding (Comment) (Glucerna 1.2)  · Schedule: Continuous  · Additives/Modulars:  (none)  · Water Flushes: 30 ml every 3 hours for tube patency  · Current TF & Flush Orders Provides: Glucerna 1.2 at goal rate of 50 ml/hr x 20 hours = 1000 ml TV, 1200 kcals, 60 g protein, and 805 ml free water + 30 ml water flushes every 3 hours for tube patency  · Goal TF & Flush Orders Provides: Glucerna 1.2 with a goal rate of 50 ml/hr x 20 hours = 1000 ml TV, 1200 kcals, 60 g protein, and 805 ml free water + 30 ml water flushes every 3 hours for tube patency      Anthropometric Measures:  · Height: 5' 3\" (160 cm)  · Current Body Weight: 169 lb (76.7 kg) (obtained on 1/19/22; actual weight)   · Admission Body Weight: 183 lb 9.6 oz (83.3 kg) (obtained on 1/16/22; actual weight)    · Usual Body Weight: 183 lb 9.6 oz (83.3 kg) (obtained on 1/16/22; actual weight)     · Ideal Body Weight: 115 lbs; % Ideal Body Weight 147 %   · BMI: 29.9   · BMI Categories: Overweight (BMI 25.0-29. 9)       Nutrition Diagnosis:   · Inadequate oral intake related to inadequate protein-energy intake,impaired respiratory function,increase demand for energy/nutrients as evidenced by NPO or clear liquid status due to medical condition,intubation      Nutrition Interventions:   Food and/or Nutrient Delivery:  Continue NPO,Continue Current Tube Feeding  Nutrition Education/Counseling:  No recommendation at this time   Coordination of Nutrition Care:  Continue to monitor while inpatient,Interdisciplinary Rounds    Goals:  patient will tolerate Glucerna 1.2 with a goal rate of 50 ml/hr x 20 hours without GI distress, without s/s of aspiration, and without additional lab/fluid disturbances       Nutrition Monitoring and Evaluation:   Behavioral-Environmental Outcomes:  None Identified   Food/Nutrient Intake Outcomes:  Enteral Nutrition Intake/Tolerance  Physical Signs/Symptoms Outcomes:  Biochemical Data,Constipation,GI Status,Fluid Status or Edema,Hemodynamic Status,Weight,Skin     Discharge Planning:     Too soon to determine     Electronically signed by Celia Litten, RD, LD on 2/3/22 at 11:27 AM EST    Contact: [phone]

## 2022-02-03 NOTE — PROGRESS NOTES
02/03/22 0335   Vent Information   Vent Type 840   Vent Mode AC/PC   Pressure Ordered 20   Rate Set 28 bmp   FiO2  55 %   SpO2 95 %   SpO2/FiO2 ratio 172.73   Sensitivity 3   PEEP/CPAP 8   I Time/ I Time % 0.6 s   Humidification Source Heated wire   Humidification Temp 37   Humidification Temp Measured 37   Circuit Condensation Drained   Vent Patient Data   Peak Inspiratory Pressure 28 cmH2O   Mean Airway Pressure 14 cmH20   Rate Measured 28 br/min   Vt Exhaled 291 mL   Minute Volume 8.15 Liters   I:E Ratio 1:2.6   Plateau Pressure 26 [id number]   Cough/Sputum   Sputum How Obtained Suctioned;Endotracheal   Cough Non-productive   Spontaneous Breathing Trial (SBT) RT Doc   Pulse 68   Breath Sounds   Right Upper Lobe Diminished   Right Middle Lobe Diminished   Right Lower Lobe Diminished   Left Upper Lobe Diminished   Left Lower Lobe Diminished   Additional Respiratory  Assessments   Resp 28   Position Reverse Trendelenburg   Oral Care Completed? Yes   Oral Care Mouth suctioned   Subglottic Suction Done?  Yes   Alarm Settings   High Pressure Alarm 45 cmH2O   Low Minute Volume Alarm 7 L/min   Apnea (secs) 20 secs   High Respiratory Rate 45 br/min   Low Exhaled Vt  250 mL   ETT (adult)   Placement Date/Time: 01/21/22 0330   Tube Size: 8 mm  Location: Oral  Secured at: 23 cm  Measured From: Lips   Secured at 24 cm   Measured From Lips   ET Placement Left   Secured By [employer] tube nicole   Site Condition Dry

## 2022-02-03 NOTE — PROGRESS NOTES
Meds:  midazolam, albuterol sulfate HFA, fentanNYL, glucose, glucagon (rDNA), dextrose, dextrose bolus (hypoglycemia) **OR** dextrose bolus (hypoglycemia), albuterol sulfate HFA, guaiFENesin-dextromethorphan, benzonatate, sodium chloride flush, sodium chloride, ondansetron **OR** ondansetron, polyethylene glycol, acetaminophen **OR** acetaminophen    Results:  CBC:   Recent Labs     02/01/22 0412 02/02/22 0423 02/03/22  0358   WBC 12.6* 11.8* 9.3   HGB 10.9* 9.2* 9.1*   HCT 32.9* 27.9* 25.6*   MCV 89.8 89.6 89.8    272 267     BMP:   Recent Labs     02/01/22 0412 02/02/22 0423 02/03/22  0358   * 131* 132*   K 4.3 4.0 3.8   CL 96* 94* 92*   CO2 31 32 35*   BUN 27* 18 13   CREATININE <0.5* <0.5* <0.5*     LIVER PROFILE: No results for input(s): AST, ALT, LIPASE, BILIDIR, BILITOT, ALKPHOS in the last 72 hours. Invalid input(s): AMYLASE,  ALB    Micro:  1/17/2022 SARS-CoV-2 positive at urgent care  1/12/2022 SARS-CoV-2 detected   1/31/2022 tracheal aspirate MRSA    Imaging:   CXR 2/2/22: multifocal infiltrates. Chest tube in place     ASSESSMENT:  · Acute hypoxemic respiratory failure, severe and life-threatening  · COVID-19 pneumonia in a partially vaccinated patient, s/p J&J vaccination 3/2021  · ARDS  · MRSA pneumonia  · Left pneumothorax, chest tube placed 2/1/22  · Former smoker  · Precedex caused bradycardia    PLAN:  - COVID-19 isolation, droplet plus  -   Mechanical ventilation as per my orders.  The ventilator was adjusted by me at the bedside for unstable, life threatening respiratory failure   On pressure control currently  IV Propofol for sedation, target RASS -2, with daily spontaneous awakening trial   Seroquel 25 BID and Valium 10 TID   Completed 7 days Levaquin   Vanc D#3   Check UA and Bloood cx and f/u tarch aspirate  Decadron D#22 @ 2    Completed 14 days baricitinib   Lasix x1 again today, goal net negative    TF  Chest tube to -20 cmh20 suction, no air leak   Change to H-SSI for blood sugar control ISS, with goal 150-180  Prophylaxis: Pepcid, Lovenox 40 BID     Total critical care time caring for this patient with life threatening, unstable organ failure, including direct patient contact, management of life support systems, review of data including imaging and labs, discussions with other team members and physicians is 32 minutes so far today, excluding procedures.

## 2022-02-03 NOTE — PROGRESS NOTES
Blood pressure 120/62, pulse 64, temperature 98.8 °F (37.1 °C), temperature source Axillary, resp. rate 28, height 5' 3\" (1.6 m), weight 169 lb (76.7 kg), SpO2 95 %, not currently breastfeeding. No change in patient at this time from prior assessment. Blood sugar monitored and SS given per order. All care continue per order. Will continues to monitor.      Electronically signed by Socrates Ordoñez RN on 2/3/2022 at 1:38 AM

## 2022-02-03 NOTE — PROGRESS NOTES
Vancomycin Day: 3    Patient's labs, cultures, vitals, and vancomycin regimen reviewed. No changes today.   Koby Ba Pharm D 8/4/941989:88 AM  .

## 2022-02-03 NOTE — PROGRESS NOTES
Hospitalist Progress Note      PCP: Patsy Jimenez DO    Date of Admission: 1/12/2022    Admitted with COVID-19 pneumonia and acute respiratory failure  Intubated    Subjective: failed SBT again today due to hypoxia and agitation,  Off Levophed  On Precedex    1/27  Unable to perform SBT due to agitation of propofol  On Precedex    1/28  Did not tolerate precedex- caused bradycardia  Oxygenation requirements on the vent is better    1/29  Following commands on SBT.    1/30  Low-grade fever  Failed SBT today    1/31-on pressure control ventilation. Is double stacking the vent. Oxygen saturation dropped to 85%. 2/1-on pressure control ventilation for ventricular dyssynchrony. Supine since 11 am.  Low-grade fever. Has MRSA in her sputum. CXR showed a pneumothorax left chest.    2/2-chest tube with no air leaking. Pressure control ventilation for ventricular dyssynchrony. On Levophed. Sedation increased. 2/3- sedated on the ventilator. FiO2 is 50%. Seen in supine position.      Medications:  Reviewed    Infusion Medications    norepinephrine 2 mcg/min (02/03/22 0620)    fentaNYL 225 mcg/hr (02/03/22 1125)    propofol 50 mcg/kg/min (02/03/22 0835)    dextrose      sodium chloride Stopped (01/22/22 1231)     Scheduled Medications    insulin lispro  0-18 Units SubCUTAneous Q4H    dexamethasone  2 mg IntraVENous Q24H    vancomycin  1,000 mg IntraVENous Q12H    diazePAM  10 mg Oral TID    QUEtiapine  25 mg Oral BID    chlorhexidine  15 mL Mouth/Throat BID    famotidine (PEPCID) injection  20 mg IntraVENous BID    enoxaparin  30 mg SubCUTAneous BID    aspirin  81 mg Oral Daily    clopidogrel  75 mg Oral Daily    ascorbic acid  1,000 mg Oral Daily    atorvastatin  10 mg Oral Nightly    sodium chloride flush  5-40 mL IntraVENous 2 times per day    Vitamin D  2,000 Units Oral Daily     PRN Meds: midazolam, albuterol sulfate HFA, fentanNYL, glucose, glucagon (rDNA), dextrose, dextrose bolus (hypoglycemia) **OR** dextrose bolus (hypoglycemia), albuterol sulfate HFA, guaiFENesin-dextromethorphan, benzonatate, sodium chloride flush, sodium chloride, ondansetron **OR** ondansetron, polyethylene glycol, acetaminophen **OR** acetaminophen      Intake/Output Summary (Last 24 hours) at 2/3/2022 1214  Last data filed at 2/3/2022 0503  Gross per 24 hour   Intake 2541.9 ml   Output 3455 ml   Net -913.1 ml       Physical Exam Performed:    /61   Pulse 84   Temp 98.8 °F (37.1 °C) (Axillary)   Resp 25   Ht 5' 3\" (1.6 m)   Wt 169 lb (76.7 kg)   SpO2 95%   BMI 29.94 kg/m²     Patient seen in droplet plus precautions  General:  Elderly female, on vent. supine  Oral ETT and OG noted   Mucous Membranes:  Pink , anicteric  Neck: No JVD, no carotid bruit, no thyromegaly  Chest: diminished in bases, bilateral mild crackles present. Left chest tube. Cardiovascular:  RRR S1S2 heard, no murmurs or gallops  Abdomen: Soft, undistended, non tender, no organomegaly, BS present  Extremities: No edema or cyanosis. Distal pulses well felt  Neurological :  Sedated  Labs:   Recent Labs     02/01/22  0412 02/02/22  0423 02/03/22  0358   WBC 12.6* 11.8* 9.3   HGB 10.9* 9.2* 9.1*   HCT 32.9* 27.9* 25.6*    272 267     Recent Labs     02/01/22  0412 02/02/22  0423 02/03/22  0358   * 131* 132*   K 4.3 4.0 3.8   CL 96* 94* 92*   CO2 31 32 35*   BUN 27* 18 13   CREATININE <0.5* <0.5* <0.5*   CALCIUM 9.0 8.5 8.3     No results for input(s): AST, ALT, BILIDIR, BILITOT, ALKPHOS in the last 72 hours. No results for input(s): INR in the last 72 hours. No results for input(s): [place] in the last 72 hours.     Urinalysis:      Lab Results   Component Value Date    NITRU Negative 02/01/2022    WBCUA 3-5 02/01/2022    BACTERIA 1+ 02/01/2022    RBCUA 5-10 02/01/2022    BLOODU SMALL 02/01/2022    SPECGRAV <=1.005 02/01/2022    GLUCOSEU 100 02/01/2022    GLUCOSEU Neg 08/29/2010     Sputum  CULTURE, RESPIRATORY Normal respiratory jl absent Abnormal      Gram Stain Result 4+ WBC's (Polymorphonuclear)   3+ Gram positive cocci     Organism Staph aureus MRSA Abnormal     CULTURE, RESPIRATORY -- Abnormal     Moderate growth   Sensitivity to follow   CONTACT PRECAUTIONS INDICATED   PBP2= POSITIVE    Abnormal          Radiology:  XR CHEST PORTABLE   Final Result   1. No significant change. XR CHEST PORTABLE   Final Result   Worsening multifocal airspace opacities. XR CHEST PORTABLE   Final Result   Interval placement of a left-sided chest tube with significant decrease seen   in size of the left pneumothorax only with a small apical component   remaining. Patchy airspace disease within the lung fields is stable and   unchanged. XR CHEST PORTABLE   Final Result   New onset moderate to large left pneumothorax with mild tension. Endotracheal, nasogastric tubes and PICC line are stable. Redemonstration of   bilateral infiltrates. Findings communicated to the referring physician on February 1, 2022      RECOMMENDATION:   Stat left chest tube. XR CHEST PORTABLE   Final Result   1. Stable lines, tubes, and support devices. 2. Stable cardiopulmonary status including bilateral airspace opacities and   small effusions. XR CHEST PORTABLE   Final Result   No significant change. Continued follow-up recommended. XR CHEST PORTABLE   Final Result   Mild worsening of bilateral infiltrates likely due to pneumonia. Continued   follow-up recommended. XR CHEST PORTABLE   Final Result   Moderate persistent multifocal airspace disease compatible with multifocal   pneumonia including COVID pneumonia. While similar to recent studies, there   has been gradual progression and worsening since the early study of   01/12/2022. XR CHEST PORTABLE   Final Result   Extensive infiltrates within the lungs bilaterally, worsening on the right   with increasing consolidation.          XR CHEST PORTABLE   Final Result   No significant interval change multifocal airspace disease. Stable lines and tubes. XR CHEST PORTABLE   Final Result   Mildly improved parenchymal infiltrates at the right lung base, otherwise   similar appearing chest.         XR CHEST PORTABLE   Final Result   Extensive bilateral airspace opacities are seen without significant change. XR CHEST PORTABLE   Final Result   1. Recommend retraction of endotracheal tube 1.0 cm.   2. Stable severe ill-defined lung consolidation, greatest at the lung bases   consistent with pneumonia versus alveolar edema. 3. Suspected mild bilateral pleural effusion, unchanged. XR CHEST PORTABLE   Final Result   1. No significant change. XR CHEST PORTABLE   Final Result   Appropriate positioning of the endotracheal tube. Enteric tube courses   beneath the diaphragm; tip is excluded by collimation inferiorly. No significant change in extensive bilateral airspace disease. IR PICC WO SQ PORT/PUMP > 5 YEARS   Final Result   Successful placement of PICC line. XR CHEST PORTABLE   Final Result   New multifocal moderate to severe pneumonia. Clinical and imaging follow-up   to resolution recommended. CTA HEAD NECK W CONTRAST   Final Result   Unremarkable CTA of the head and neck. Findings consistent with COVID pneumonia. This scan was analyzed using Viz. ai contact LVO. Identification of suspected   findings is not for diagnostic use beyond notification. Viz LVO is limited to   analysis of imaging data and should not be used in-lieu of full patient   evaluation or relied upon to make or confirm diagnosis. CT HEAD WO CONTRAST   Final Result   No acute intracranial abnormality. XR CHEST PORTABLE   Final Result   Multifocal bilateral atypical pneumonia.          XR CHEST PORTABLE    (Results Pending)       Assessment/Plan:    Active Problems:    COVID-19    Anxiety Continuous; 20; Yes; 20; Q 4 hours; 50; 30; Q 3 hours  Code Status: Full Code    PT/OT Eval Status: not indicated    Dispo - cont care      Nuris Olivier MD 2/3/2022 12:14 PM

## 2022-02-03 NOTE — PROGRESS NOTES
Blood pressure (!) 111/59, pulse 69, temperature 98.8 °F (37.1 °C), temperature source Axillary, resp. rate 28, height 5' 3\" (1.6 m), weight 169 lb (76.7 kg), SpO2 95 %, not currently breastfeeding. Patient currently intubated with ETT at 23LL. Vent settings are 28/320/+5%/8. Patient is tolerating vent well at this time. Patient lung sounds rhonchi. Chest tube in place, hooked to suction per order. No drainage noted. Abdomen soft. OG in place with tube feed running per order. No residual noted. Propofol infusing at 50 mcg/kg/min. Fentanyl infusing at 225 mcg//h. LEVOPHED infusing at a rate of 4 mcg/min. Mcguire intact draining yellow urine.    Electronically signed by Socrates Ordoñez RN on 2/2/2022 at 9:46 PM

## 2022-02-04 NOTE — PROGRESS NOTES
02/04/22 0243   Vent Information   Vent Type 840   Vent Mode AC/PC   Pressure Ordered 20   Rate Set 24 bmp   FiO2  40 %   SpO2 96 %   SpO2/FiO2 ratio 240   Sensitivity 3   PEEP/CPAP 8   I Time/ I Time % 0.7 s   Humidification Source Heated wire   Humidification Temp 37   Humidification Temp Measured 36   Circuit Condensation Drained   Vent Patient Data   Peak Inspiratory Pressure 29 cmH2O   Mean Airway Pressure 13 cmH20   Rate Measured 24 br/min   Vt Exhaled 295 mL   Minute Volume 7.05 Liters   I:E Ratio 1:2.6   Cough/Sputum   Sputum How Obtained Suctioned;Endotracheal   Cough Productive   Sputum Amount Moderate   Sputum Color Creamy   Tenacity Thick   Spontaneous Breathing Trial (SBT) RT Doc   Pulse 74   Breath Sounds   Right Upper Lobe Diminished   Right Middle Lobe Diminished   Right Lower Lobe Diminished   Left Upper Lobe Diminished   Left Lower Lobe Diminished   Additional Respiratory  Assessments   Resp 23   Position Reverse Trendelenburg   Oral Care Completed? Yes   Oral Care Mouth suctioned   Subglottic Suction Done?  Yes   Alarm Settings   High Pressure Alarm 45 cmH2O   Low Minute Volume Alarm 5 L/min   Apnea (secs) 20 secs   High Respiratory Rate 45 br/min   Low Exhaled Vt  250 mL   ETT (adult)   Placement Date/Time: 01/21/22 0330   Tube Size: 8 mm  Location: Oral  Secured at: 23 cm  Measured From: Lips   Secured at 24 cm   Measured From [address] [employer] tube nicole   Site Condition Dry

## 2022-02-04 NOTE — CARE COORDINATION
INTERDISCIPLINARY PLAN OF CARE CONFERENCE    Date/Time: 2/4/2022 1:05 PM  Completed by: Madison Tineo RN, Case Management      Patient Name:  Alexis Amaya  YOB: 1950  Admitting Diagnosis: Acute respiratory failure with hypoxia ([facility] [address] [J96.01]  Pneumonia due to COVID-19 virus [U07.1, J12.82]  COVID-19 [U07.1]     Admit Date/Time:  1/12/2022  9:40 AM    Chart reviewed. Interdisciplinary team contacted or reviewed plan related to patient progress and discharge plans. Disciplines included Case Management, Nursing, and Dietitian. Current Status:inpt-ICU LOC  PT/OT recommendation for discharge plan of care: tbd    Expected D/C Disposition:  tbd    Discharge Plan Comments: Reviewed chart. Pt in ICU and cont on Ventilator,C-19+,+chest tube. CM to develop d/c plan pending medical progress.     Home O2 in place on admit: No  Pt informed of need to bring portable home O2 tank on day of discharge for nursing to connect prior to leaving:  Not Indicated  Verbalized agreement/Understanding:  Not Indicated

## 2022-02-04 NOTE — PROGRESS NOTES
4155  Shift assessment, completed, see flow sheet. Pt remains on ventilator. Not following commands. Intubated and sedated on PC # 8 ETT, at 22 LL. , /56 (75), SpO2 91%. Respirations are easy, even, and unlabored. Bilateral lung sounds are diminished. OG in place at 60, with TF at 50 mL/hr, 0ml residual.      PICC to RUE and PIV x1 remain patent with sites and dressings C/D/I with Propofol, Fentanyl & KVO infusing. Mcguire in place and patent with STAT lock. Bilateral soft wrist restraints in place for pt safety. Bed in lowest position. Will continue to monitor. New [facility] rounds completed with Dr. Nicol Vasquez and multidisciplinary team. Reviewed labs, meds, VS, assessment, & plan of care for today. See dictated note and new orders for details. New orders received:  -versed drip added to [facility] -PRN fentanyl dose increased from 25mcg to 50mcg. Spoke with daughter, Rima Farias, following AM rounds and updated her on pt's condition and new orders received. 1244  Reassessment completed, see flowsheets, unchanged from prior. VSS. All ICU Lines and monitoring remain in place. Bed locked in lowest position. 1615  Reassessment completed, see flowsheets, unchanged from prior. VSS. All ICU Lines and monitoring remain in place. Bed locked in lowest position.

## 2022-02-04 NOTE — PROGRESS NOTES
Vancomycin Day: 4    Patient's labs, cultures, vitals, and vancomycin regimen reviewed. No changes today.   Kya SMITH 2/4/20229:32 AM  .

## 2022-02-04 NOTE — PROGRESS NOTES
Pulmonary & Critical Care Medicine ICU Progress Note    CC: COVID in partially vaccinated patient     Events of Last 24 hours: PC ventilation      Invasive Lines: PICC 2022    MV: 2022  Vent Mode: AC/PC Rate Set: 24 bmp/Vt Ordered: 320 mL/ /FiO2 : (S) 40 %  Recent Labs     22  0358 22  0439   PHART 7.431 7.447   [id number] 56.2* 53.7*   PO2ART 75.2 58.0*       IV:   norepinephrine 2 mcg/min (22 0620)    fentaNYL 225 mcg/hr (22 0640)    propofol 20 mcg/kg/min (22 0641)    dextrose      sodium chloride Stopped (22 1231)       Vitals:  Blood pressure (!) 127/55, pulse 94, temperature 98 °F (36.7 °C), temperature source Axillary, resp. rate 23, height 5' 3\" (1.6 m), weight 169 lb (76.7 kg), SpO2 96 %, not currently breastfeeding. on vent  Temp  Av.2 °F (36.8 °C)  Min: 98 °F (36.7 °C)  Max: 98.7 °F (37.1 °C)    Intake/Output Summary (Last 24 hours) at 2022 0734  Last data filed at 2022 0600  Gross per 24 hour   Intake 1641.31 ml   Output 1790 ml   Net -148.69 ml     General: intubated, ill appearing    ENT: Pharynx with ETT. Resp: No crackles. No wheezing. CV: S1, S2. Trace edema  GI: NT, ND, +BS  Skin: Warm and dry. Neuro: PERRL. Sedated, not following commands.  Patellar reflexes are symmetric     Scheduled Meds:   insulin lispro  0-18 Units SubCUTAneous Q4H    dexamethasone  2 mg IntraVENous Q24H    vancomycin  1,000 mg IntraVENous Q12H    diazePAM  10 mg Oral TID    QUEtiapine  25 mg Oral BID    chlorhexidine  15 mL Mouth/Throat BID    famotidine (PEPCID) injection  20 mg IntraVENous BID    enoxaparin  30 mg SubCUTAneous BID    aspirin  81 mg Oral Daily    clopidogrel  75 mg Oral Daily    ascorbic acid  1,000 mg Oral Daily    atorvastatin  10 mg Oral Nightly    sodium chloride flush  5-40 mL IntraVENous 2 times per day    Vitamin D  2,000 Units Oral Daily     PRN Meds:  midazolam, albuterol sulfate HFA, fentanNYL, glucose, glucagon (rDNA), dextrose, dextrose bolus (hypoglycemia) **OR** dextrose bolus (hypoglycemia), albuterol sulfate HFA, guaiFENesin-dextromethorphan, benzonatate, sodium chloride flush, sodium chloride, ondansetron **OR** ondansetron, polyethylene glycol, acetaminophen **OR** acetaminophen    Results:  CBC:   Recent Labs     02/02/22 0423 02/03/22 0358 02/04/22  0439   WBC 11.8* 9.3 9.8   HGB 9.2* 9.1* 9.1*   HCT 27.9* 25.6* 26.3*   MCV 89.6 89.8 88.8    267 288     BMP:   Recent Labs     02/02/22 0423 02/03/22 0358 02/04/22  0439   * 132* 132*   K 4.0 3.8 3.8   CL 94* 92* 90*   CO2 32 35* 37*   BUN 18 13 17   CREATININE <0.5* <0.5* <0.5*     LIVER PROFILE: No results for input(s): AST, ALT, LIPASE, BILIDIR, BILITOT, ALKPHOS in the last 72 hours. Invalid input(s): AMYLASE,  ALB    Micro:  1/17/2022 SARS-CoV-2 positive at urgent care  1/12/2022 SARS-CoV-2 detected   1/31/2022 tracheal aspirate MRSA  2/1/2022 blood NGTD    Imaging:   CXR 2/4/22: multifocal infiltrates. Chest tube in place, ET tube okay    ASSESSMENT:  · Acute hypoxemic respiratory failure, severe and life-threatening  · COVID-19 pneumonia in a partially vaccinated patient, s/p J&J vaccination 3/2021  · ARDS  · MRSA pneumonia  · Left pneumothorax, chest tube placed 2/1/22  · Former smoker  · Precedex caused bradycardia    PLAN:  - COVID-19 isolation, droplet plus  -   Mechanical ventilation as per my orders.  The ventilator was adjusted by me at the bedside for unstable, life threatening respiratory failure   On pressure control currently  IV Propofol for sedation, target RASS -2, with daily spontaneous awakening trial; propofol limited to 20 with elevated TG  Seroquel 25 BID and Valium 10 TID   Completed 7 days Levaquin   Vanc D#4   Decadron D#22 @ 1 mg    Completed 14 days baricitinib   Lasix x1 again today, goal net negative    TF  Chest tube to -20 cmh20 suction, no air leak   Change to H-SSI for blood sugar control ISS, with goal 150-180  Prophylaxis: Pepcid, Lovenox 40 BID     Total critical care time caring for this patient with life threatening, unstable organ failure, including direct patient contact, management of life support systems, review of data including imaging and labs, discussions with other team members and physicians is 31 minutes so far today, excluding procedures.

## 2022-02-04 NOTE — PROGRESS NOTES
02/03/22 2235   Vent Information   Vent Type 840   Vent Mode AC/PC   Vt Ordered 320 mL   Pressure Ordered 20   Rate Set 24 bmp   Peak Flow 60 L/min   FiO2  45 %   SpO2 94 %   SpO2/FiO2 ratio 208.89   Sensitivity 3   PEEP/CPAP 8   Vent Patient Data   Peak Inspiratory Pressure 28 cmH2O   Mean Airway Pressure 13 cmH20   Rate Measured 24 br/min   Vt Exhaled 396 mL   Minute Volume 7.03 Liters   I:E Ratio 1:2.6   Cough/Sputum   Sputum How Obtained Suctioned;Endotracheal   Sputum Amount Small   Sputum Color Creamy   Tenacity Thick   Spontaneous Breathing Trial (SBT) RT Doc   Pulse 76   Breath Sounds   Right Upper Lobe Diminished   Right Middle Lobe Diminished   Right Lower Lobe Diminished   Left Upper Lobe Diminished   Left Lower Lobe Diminished   Additional Respiratory  Assessments   Resp 23

## 2022-02-04 NOTE — PROGRESS NOTES
Hospitalist Progress Note      PCP: Shauna Nunez DO    Date of Admission: 1/12/2022    Admitted with COVID-19 pneumonia and acute respiratory failure  Intubated    Subjective: failed SBT again today due to hypoxia and agitation,  Off Levophed  On Precedex    1/27  Unable to perform SBT due to agitation of propofol  On Precedex    1/28  Did not tolerate precedex- caused bradycardia  Oxygenation requirements on the vent is better    1/29  Following commands on SBT.    1/30  Low-grade fever  Failed SBT today    1/31-on pressure control ventilation. Is double stacking the vent. Oxygen saturation dropped to 85%. 2/1-on pressure control ventilation for ventricular dyssynchrony. Supine since 11 am.  Low-grade fever. Has MRSA in her sputum. CXR showed a pneumothorax left chest.    2/2-chest tube with no air leaking. Pressure control ventilation for ventricular dyssynchrony. On Levophed. Sedation increased. 2/3- sedated on the ventilator. FiO2 is 50%. Seen in supine position. 2/4-sedated on the ventilator. On pressure control ventilation. FiO2 40%.     Medications:  Reviewed    Infusion Medications    midazolam 1 mg/hr (02/04/22 1129)    norepinephrine 2 mcg/min (02/03/22 0620)    fentaNYL 275 mcg/hr (02/04/22 1125)    propofol 20 mcg/kg/min (02/04/22 0641)    dextrose      sodium chloride Stopped (01/22/22 1231)     Scheduled Medications    dexamethasone  1 mg IntraVENous Q24H    insulin lispro  0-18 Units SubCUTAneous Q4H    vancomycin  1,000 mg IntraVENous Q12H    diazePAM  10 mg Oral TID    QUEtiapine  25 mg Oral BID    chlorhexidine  15 mL Mouth/Throat BID    famotidine (PEPCID) injection  20 mg IntraVENous BID    enoxaparin  30 mg SubCUTAneous BID    aspirin  81 mg Oral Daily    clopidogrel  75 mg Oral Daily    ascorbic acid  1,000 mg Oral Daily    atorvastatin  10 mg Oral Nightly    sodium chloride flush  5-40 mL IntraVENous 2 times per day    Vitamin D  2,000 Units Oral Daily     PRN Meds: fentanNYL, midazolam, albuterol sulfate HFA, glucose, glucagon (rDNA), dextrose, dextrose bolus (hypoglycemia) **OR** dextrose bolus (hypoglycemia), albuterol sulfate HFA, guaiFENesin-dextromethorphan, sodium chloride flush, sodium chloride, ondansetron **OR** ondansetron, polyethylene glycol, acetaminophen **OR** acetaminophen      Intake/Output Summary (Last 24 hours) at 2/4/2022 1253  Last data filed at 2/4/2022 0600  Gross per 24 hour   Intake 1641.31 ml   Output 1790 ml   Net -148.69 ml       Physical Exam Performed:    BP (!) 107/55   Pulse 97   Temp 98 °F (36.7 °C) (Axillary)   Resp 24   Ht 5' 3\" (1.6 m)   Wt 169 lb (76.7 kg)   SpO2 90%   BMI 29.94 kg/m²     Patient seen in droplet plus precautions  General:  Elderly female, on vent. supine  Oral ETT and OG noted   Mucous Membranes:  Pink , anicteric  Neck: No JVD, no carotid bruit, no thyromegaly  Chest: diminished in bases, bilateral mild crackles present. Left chest tube. Cardiovascular:  RRR S1S2 heard, no murmurs or gallops  Abdomen: Soft, undistended, non tender, no organomegaly, BS present  Extremities: No edema or cyanosis. Distal pulses well felt  Neurological :  Sedated  Labs:   Recent Labs     02/02/22 0423 02/03/22  0358 02/04/22  0439   WBC 11.8* 9.3 9.8   HGB 9.2* 9.1* 9.1*   HCT 27.9* 25.6* 26.3*    267 288     Recent Labs     02/02/22  0423 02/03/22  0358 02/04/22  0439   * 132* 132*   K 4.0 3.8 3.8   CL 94* 92* 90*   CO2 32 35* 37*   BUN 18 13 17   CREATININE <0.5* <0.5* <0.5*   CALCIUM 8.5 8.3 8.5     No results for input(s): AST, ALT, BILIDIR, BILITOT, ALKPHOS in the last 72 hours. No results for input(s): INR in the last 72 hours. No results for input(s): Janett Regalado in the last 72 hours.     Urinalysis:      Lab Results   Component Value Date    NITRU Negative 02/01/2022    WBCUA 3-5 02/01/2022    BACTERIA 1+ 02/01/2022    RBCUA 5-10 02/01/2022    BLOODU SMALL 02/01/2022    SPECGRAV <=1.005 02/01/2022    GLUCOSEU 100 02/01/2022    GLUCOSEU Neg 08/29/2010     Sputum  CULTURE, RESPIRATORY Normal respiratory jl absent Abnormal      Gram Stain Result 4+ WBC's (Polymorphonuclear)   3+ Gram positive cocci     Organism Staph aureus MRSA Abnormal     CULTURE, RESPIRATORY -- Abnormal     Moderate growth   Sensitivity to follow   CONTACT PRECAUTIONS INDICATED   PBP2= POSITIVE    Abnormal          Radiology:  XR CHEST PORTABLE   Final Result   Stable examination with stable lines and tubes. Stable findings of COVID   pneumonia. XR CHEST PORTABLE   Final Result   1. No significant change. XR CHEST PORTABLE   Final Result   Worsening multifocal airspace opacities. XR CHEST PORTABLE   Final Result   Interval placement of a left-sided chest tube with significant decrease seen   in size of the left pneumothorax only with a small apical component   remaining. Patchy airspace disease within the lung fields is stable and   unchanged. XR CHEST PORTABLE   Final Result   New onset moderate to large left pneumothorax with mild tension. Endotracheal, nasogastric tubes and PICC line are stable. Redemonstration of   bilateral infiltrates. Findings communicated to the referring physician on February 1, 2022      RECOMMENDATION:   Stat left chest tube. XR CHEST PORTABLE   Final Result   1. Stable lines, tubes, and support devices. 2. Stable cardiopulmonary status including bilateral airspace opacities and   small effusions. XR CHEST PORTABLE   Final Result   No significant change. Continued follow-up recommended. XR CHEST PORTABLE   Final Result   Mild worsening of bilateral infiltrates likely due to pneumonia. Continued   follow-up recommended. XR CHEST PORTABLE   Final Result   Moderate persistent multifocal airspace disease compatible with multifocal   pneumonia including COVID pneumonia.   While similar to recent studies, there   has been gradual progression and worsening since the early study of   01/12/2022. XR CHEST PORTABLE   Final Result   Extensive infiltrates within the lungs bilaterally, worsening on the right   with increasing consolidation. XR CHEST PORTABLE   Final Result   No significant interval change multifocal airspace disease. Stable lines and tubes. XR CHEST PORTABLE   Final Result   Mildly improved parenchymal infiltrates at the right lung base, otherwise   similar appearing chest.         XR CHEST PORTABLE   Final Result   Extensive bilateral airspace opacities are seen without significant change. XR CHEST PORTABLE   Final Result   1. Recommend retraction of endotracheal tube 1.0 cm.   2. Stable severe ill-defined lung consolidation, greatest at the lung bases   consistent with pneumonia versus alveolar edema. 3. Suspected mild bilateral pleural effusion, unchanged. XR CHEST PORTABLE   Final Result   1. No significant change. XR CHEST PORTABLE   Final Result   Appropriate positioning of the endotracheal tube. Enteric tube courses   beneath the diaphragm; tip is excluded by collimation inferiorly. No significant change in extensive bilateral airspace disease. IR PICC WO SQ PORT/PUMP > 5 YEARS   Final Result   Successful placement of PICC line. XR CHEST PORTABLE   Final Result   New multifocal moderate to severe pneumonia. Clinical and imaging follow-up   to resolution recommended. CTA HEAD NECK W CONTRAST   Final Result   Unremarkable CTA of the head and neck. Findings consistent with COVID pneumonia. This scan was analyzed using Viz. ai contact LVO. Identification of suspected   findings is not for diagnostic use beyond notification. Viz LVO is limited to   analysis of imaging data and should not be used in-lieu of full patient   evaluation or relied upon to make or confirm diagnosis.          CT HEAD WO CONTRAST   Final Result   No acute intracranial abnormality. XR CHEST PORTABLE   Final Result   Multifocal bilateral atypical pneumonia. Assessment/Plan:    Active Problems:    COVID-19    Anxiety    Hyperlipidemia    Arthritis    Brain fog    Moderate protein-calorie malnutrition (HCC)    Pneumonia due to COVID-19 virus    ARDS (adult respiratory distress syndrome) (HCC)    Hyperglycemia    Leukocytosis    Hypotension  Resolved Problems:    * No resolved hospital problems. *           COVID PNA  Acute hypoxic respiratory Failure   - CXR: noted with multifocal PNA  - no home O2 at baseline, 83-87% at rest,   - J &J in March 2021- no booster   - Admitted to , droplet +, tele, cont pulse ox  - supp O2, wean as tolerated - worsening slowly -was on 10 L   -progressively worsening hypoxia, was on vapotherm with full support   - eventually placed on vent support 1/21    persistent severe hypoxemia -she was proned on 1/20/2022.  She is back in supine position.  -Continue Decadron day #23--> decrease to 1 mg daily   - Remdesivir not started due to out of window- sx 7+ days prior to admit  - levaquin - finished 7 days   - Has MRSA in her sputums and elevated WBC. Start Vancomycin and Cefepime. Stopped cefepime. Vancomycin day #3  - PRN Robitussin  - procal 0.10 wnl   - CRP elevated . Started on Baricitinib - finished 14 days .  Monitored CBC and LFTs  - Lovenox Bid  Valium and Seroquel added  precedex caused bradycardia  On PC ventilation. Having ventricular dyssynchrony.    -Lasix IV ordered x1. I and O show positive balance of 5.5 L. Left pneumothorax. - left chest tube placed. CT flushed and it is patent. Placed to suction. Hypotension  -on Levophed 4 mcg.     Episodes of expressive aphasia   - ?  TIA, vs mental status changes related to covid   - cont ASA, Plavix      Thrombocytopenia resolved  - likely with covid  - continue Lovenox and monitor    Hyperglycemia  Secondary to Decadron   Sliding scale insulin     HLD  - Continue statin        Arthritis   - was on  mobic - holding     Hx of Asthma  - not on inhalers at home      DVT Prophylaxis: Lovenox bid  Diet: Diet NPO  ADULT TUBE FEEDING; Orogastric; Other Tube Feeding (specify);  Glucerna 1.2; Continuous; 20; Yes; 20; Q 4 hours; 50; 30; Q 3 hours  Code Status: Full Code    PT/OT Eval Status: not indicated    Dispo - cont care      Baltazar Bhat MD 2/4/2022 12:53 PM

## 2022-02-05 NOTE — PROGRESS NOTES
Pulmonary & Critical Care Medicine ICU Progress Note    CC: COVID in partially vaccinated patient     Events of Last 24 hours: PC ventilation, intermittent desaturations, some dyssynchrony on vent      Invasive Lines: PICC 2022    MV: 2022  Vent Mode: AC/PC Rate Set: 24 bmp/Vt Ordered: 0 mL/ /FiO2 : 40 %  Recent Labs     22  0413 22  0615   PHART 7.435 7.459*   XAQ9MNN 60.7* 52.0*   PO2ART 46.5* 58.8*       IV:   midazolam 1 mg/hr (22 1500)    norepinephrine 2 mcg/min (22)    fentaNYL 200 mcg/hr (22)    propofol 20 mcg/kg/min (22)    dextrose      sodium chloride Stopped (22 1231)       Vitals:  Blood pressure (!) 111/57, pulse 84, temperature 98.4 °F (36.9 °C), temperature source Axillary, resp. rate 23, height 5' 3\" (1.6 m), weight 169 lb (76.7 kg), SpO2 92 %, not currently breastfeeding. on vent  Temp  Av.2 °F (36.8 °C)  Min: 98 °F (36.7 °C)  Max: 98.4 °F (36.9 °C)    Intake/Output Summary (Last 24 hours) at 2022 0750  Last data filed at 2022 0600  Gross per 24 hour   Intake 2283.73 ml   Output 1550 ml   Net 733.73 ml     General: intubated, ill appearing    ENT: Pharynx with ETT. Eyes with some crusting   Resp: No crackles. No wheezing. CV: S1, S2. Trace edema  GI: NT, ND, +BS  Skin: Warm and dry. Neuro: PERRL. Sedated, not following commands.  Patellar reflexes are symmetric     Scheduled Meds:   dexamethasone  1 mg IntraVENous Q24H    insulin lispro  0-18 Units SubCUTAneous Q4H    vancomycin  1,000 mg IntraVENous Q12H    diazePAM  10 mg Oral TID    QUEtiapine  25 mg Oral BID    chlorhexidine  15 mL Mouth/Throat BID    famotidine (PEPCID) injection  20 mg IntraVENous BID    enoxaparin  30 mg SubCUTAneous BID    aspirin  81 mg Oral Daily    clopidogrel  75 mg Oral Daily    ascorbic acid  1,000 mg Oral Daily    atorvastatin  10 mg Oral Nightly    sodium chloride flush  5-40 mL IntraVENous 2 times per day    Vitamin D  2,000 Units Oral Daily     PRN Meds:  fentanNYL, midazolam, albuterol sulfate HFA, glucose, glucagon (rDNA), dextrose, dextrose bolus (hypoglycemia) **OR** dextrose bolus (hypoglycemia), albuterol sulfate HFA, guaiFENesin-dextromethorphan, sodium chloride flush, sodium chloride, ondansetron **OR** ondansetron, polyethylene glycol, acetaminophen **OR** acetaminophen    Results:  CBC:   Recent Labs     02/03/22 0358 02/04/22 0439 02/05/22  0407   WBC 9.3 9.8 9.1   HGB 9.1* 9.1* 8.7*   HCT 25.6* 26.3* 25.3*   MCV 89.8 88.8 89.8    288 284     BMP:   Recent Labs     02/03/22 0358 02/04/22 0439 02/05/22  0407   * 132* 131*   K 3.8 3.8 4.0   CL 92* 90* 89*   CO2 35* 37* 36*   BUN 13 17 16   CREATININE <0.5* <0.5* <0.5*     LIVER PROFILE: No results for input(s): AST, ALT, LIPASE, BILIDIR, BILITOT, ALKPHOS in the last 72 hours. Invalid input(s): AMYLASE,  ALB    Micro:  1/17/2022 SARS-CoV-2 positive at urgent care  1/12/2022 SARS-CoV-2 detected   1/31/2022 tracheal aspirate MRSA  2/1/2022 blood NGTD    Imaging:   CXR 2/4/22: multifocal infiltrates. Chest tube in place, ET tube okay    ASSESSMENT:  · Acute hypoxemic respiratory failure, severe and life-threatening  · COVID-19 pneumonia in a partially vaccinated patient, s/p J&J vaccination 3/2021  · ARDS  · MRSA pneumonia  · Left pneumothorax, chest tube placed 2/1/22  · Former smoker  · Precedex caused bradycardia    PLAN:  - COVID-19 isolation, droplet plus  -   Mechanical ventilation as per my orders.  The ventilator was adjusted by me at the bedside for unstable, life threatening respiratory failure   On pressure control currently  IV Propofol & versed for sedation, target RASS -2, with daily spontaneous awakening trial; propofol limited to 20 with elevated TG  Seroquel 25 BID and Valium 10 TID   Completed 7 days Levaquin   Vanc D#5   Decadron D#23 @ 1 mg, check cortrosyn stim   Completed 14 days baricitinib   Lasix x1 again today, goal net negative; diamox today as well    TF  Chest tube to -20 cmh20 suction, no air leak   H-SSI for FSBS goal 150-180  Prophylaxis: Pepcid, Lovenox 40 BID     Total critical care time caring for this patient with life threatening, unstable organ failure, including direct patient contact, management of life support systems, review of data including imaging and labs, discussions with other team members and physicians is 32 minutes so far today, excluding procedures.

## 2022-02-05 NOTE — PROGRESS NOTES
02/04/22 2248   Vent Information   Vent Type 840   Vent Mode AC/PC   Pressure Ordered 18   Rate Set 24 bmp   FiO2  40 %   SpO2 92 %   SpO2/FiO2 ratio 230   Sensitivity 3   PEEP/CPAP 8   I Time/ I Time % 0.7 s   Humidification Source Heated wire   Humidification Temp 37   Humidification Temp Measured 37   Circuit Condensation Drained   Vent Patient Data   Peak Inspiratory Pressure 27 cmH2O   Mean Airway Pressure 15 cmH20   Rate Measured 26 br/min   Vt Exhaled 320 mL   Minute Volume 7.9 Liters   I:E Ratio 1:2.6   Cough/Sputum   Sputum How Obtained Suctioned;Endotracheal   Cough Non-productive   Spontaneous Breathing Trial (SBT) RT Doc   Pulse 81   Breath Sounds   Right Upper Lobe Diminished   Right Middle Lobe Diminished   Right Lower Lobe Diminished   Left Upper Lobe Diminished   Left Lower Lobe Diminished   Additional Respiratory  Assessments   Resp 23   Position Semi-Mohamud's   Oral Care Completed? Yes   Oral Care Mouth suctioned   Subglottic Suction Done?  Yes   Alarm Settings   High Pressure Alarm 45 cmH2O   Low Minute Volume Alarm 5 L/min   Apnea (secs) 20 secs   High Respiratory Rate 45 br/min   Low Exhaled Vt  250 mL   ETT (adult)   Placement Date/Time: 01/21/22 0330   Tube Size: 8 mm  Location: Oral  Secured at: 23 cm  Measured From: Lips   Secured at 23 cm   Measured From Lips   ET Placement Right   Secured By [employer] tube nicole   Site Condition Dry

## 2022-02-05 NOTE — PROGRESS NOTES
02/04/22 2000   Vent Information   Vent Type 840   Vent Mode AC/PC   Pressure Ordered 18   Rate Set 24 bmp   FiO2  40 %   SpO2 94 %   SpO2/FiO2 ratio 235   Sensitivity 3   PEEP/CPAP 8   Humidification Source Heated wire   Humidification Temp Measured 37   Circuit Condensation Drained   Vent Patient Data   Peak Inspiratory Pressure 26 cmH2O   Mean Airway Pressure 15 cmH20   Rate Measured 28 br/min   Vt Exhaled 322 mL   Minute Volume 8 Liters   I:E Ratio 1:2.6   Plateau Pressure 25 OUL86   Static Compliance 18 mL/cmH2O   Dynamic Compliance 16 mL/cmH2O   Cough/Sputum   Sputum How Obtained Endotracheal   Cough Non-productive   Sputum Amount None   Spontaneous Breathing Trial (SBT) RT Doc   Pulse 90   Breath Sounds   Right Upper Lobe Diminished   Right Middle Lobe Diminished   Right Lower Lobe Diminished   Left Upper Lobe Diminished   Left Lower Lobe Diminished   Additional Respiratory  Assessments   Resp 23   Alarm Settings   High Pressure Alarm 45 cmH2O   Low Minute Volume Alarm 5 L/min   Apnea (secs) 20 secs   High Respiratory Rate 45 br/min   Low Exhaled Vt  250 mL   Patient Observation   Observations 8ett 22 atlip

## 2022-02-05 NOTE — PROGRESS NOTES
Bedside handoff given to [employer], [place]. Patient in stable condition at this time.

## 2022-02-06 NOTE — PROGRESS NOTES
Consent verified for bronchoscopy. Timeout per policy. Procedure performed by Dr. Nadeen Faust. Bronchoscopy assist performed on 100 FiO2.    6ml of 1% lidocaine instilled and 80ml of 0.9% normal saline instilled. Pt tolerated bronchoscopy without difficulty, airway support per RTD. Patient SpO2 within acceptable range throughout bronchoscopy procedure. Sedation provided/monitored per nurse. No s/s distress, no complications noted. See physician notes.   Continue plan of care

## 2022-02-06 NOTE — PROGRESS NOTES
02/06/22 0301   Vent Information   $Ventilation $Subsequent Day   Vent Type 840   Vent Mode AC/PC   Pressure Ordered 18   Rate Set 20 bmp   FiO2  45 %   SpO2 95 %   SpO2/FiO2 ratio 211.11   Sensitivity 3   PEEP/CPAP 8   I Time/ I Time % 1 s   Humidification Source Heated wire   Humidification Temp 37   Humidification Temp Measured 36   Circuit Condensation Drained   Vent Patient Data   Peak Inspiratory Pressure 26 cmH2O   Mean Airway Pressure 15 cmH20   Rate Measured 25 br/min   Vt Exhaled 361 mL   Minute Volume 6.21 Liters   I:E Ratio 1:2   Cough/Sputum   Sputum How Obtained Suctioned;Endotracheal   Cough Productive   Sputum Amount Large   Sputum Color Creamy   Tenacity Thick   Spontaneous Breathing Trial (SBT) RT Doc   Pulse 115   Breath Sounds   Right Upper Lobe Diminished   Right Middle Lobe Diminished   Right Lower Lobe Diminished   Left Upper Lobe Diminished   Left Lower Lobe Diminished   Additional Respiratory  Assessments   Resp 20   Position Semi-Mohamud's   Oral Care Completed? Yes   Oral Care Mouth suctioned   Subglottic Suction Done?  Yes   Alarm Settings   High Pressure Alarm 45 cmH2O   Low Minute Volume Alarm 5 L/min   Apnea (secs) 20 secs   High Respiratory Rate 45 br/min   Low Exhaled Vt  250 mL   ETT (adult)   Placement Date/Time: 01/21/22 0330   Tube Size: 8 mm  Location: Oral  Secured at: 23 cm  Measured From: Lips   Secured at 23 cm   Measured From [facility] [address] By [employer] tube nicole   Site Condition Dry

## 2022-02-06 NOTE — PROGRESS NOTES
Rounding completed with Dr. Chandrakant Regan and multidisciplinary team. POC, labs, lines and assessment reviewed.    - Add cefepime  - complete cultures  - Bronch at bedside

## 2022-02-06 NOTE — PROGRESS NOTES
Shift assessment completed, see flow sheet. RASS -4.   - Versed infusing at 3 mg/h. - Fentanyl infusing at 250 mcg//h.   - Propofol infusing at 20 mcg/kg/min. Intubated and sedated on AC # 8 ETT, at 23 LL. 24/ 40 %/ +5. SpO2 90%. Respirations are easy, even, and unlabored. Bilateral lung sounds diminshed. VSS  OG in place at 60, with TF at 50 mL/hr. PICC/PIV, WNL. All lines and monitoring devices in place. Mcguire is patent and secured. Bilateral soft wrist restraints in place for patient safety. Bed in lowest position with wheels locked.

## 2022-02-06 NOTE — PROCEDURES
PROCEDURE:  BRONCHOSCOPY WITH BRONCHOALVEOLAR LAVAGE    Pre-procedure evaluation/H&P: Critically ill patient with respiratory failure, requires fiberoptic bronchoscopy for direct airway evaluation and to enable acquisition of quality pulmonary fluid sample for analysis.   Please see my ICU note from today for additional details    Current Facility-Administered Medications   Medication Dose Route Frequency Provider Last Rate Last Admin    magnesium sulfate 1000 mg in dextrose 5% 100 mL IVPB  1,000 mg IntraVENous PRN Lexy Infante MD        potassium chloride 20 mEq/50 mL IVPB (Central Line)  20 mEq IntraVENous PRN Lexy Infante MD   Stopped at 02/06/22 0724    lidocaine (XYLOCAINE) 4 % external solution             cefepime (MAXIPIME) 2000 mg IVPB minibag  2,000 mg IntraVENous Q8H Melly Mcgee MD   Stopped at 02/06/22 1258    carboxymethylcellulose PF (THERATEARS) 1 % ophthalmic gel 1 drop  1 drop Both Eyes 4x Daily Melly Mcgee MD   1 drop at 02/06/22 1611    dexamethasone (DECADRON) injection 1 mg  1 mg IntraVENous Q24H Melly Mcgee MD   1 mg at 02/06/22 1039    midazolam (VERSED) 1 mg/mL in D5W infusion  1-10 mg/hr IntraVENous Continuous Melly Mcgee MD 3 mL/hr at 02/06/22 1720 3 mg/hr at 02/06/22 1720    fentaNYL (SUBLIMAZE) injection 50 mcg  50 mcg IntraVENous Q1H PRN Melly Mcgee MD        insulin lispro (HUMALOG) injection vial 0-18 Units  0-18 Units SubCUTAneous Q4H Melly Mcgee MD   3 Units at 02/06/22 1611    norepinephrine (LEVOPHED) 16 mg in dextrose 5 % 250 mL infusion  2-100 mcg/min IntraVENous Continuous Lexy Infante MD 1.9 mL/hr at 02/03/22 0620 2 mcg/min at 02/03/22 0620    vancomycin 1000 mg IVPB in 250 mL D5W addavial  1,000 mg IntraVENous Q12H Claritza Askew MD   Stopped at 02/06/22 1223    diazePAM (VALIUM) tablet 10 mg  10 mg Oral TID Claritza Askew MD   10 mg at 02/06/22 1409    fentaNYL (SUBLIMAZE) 1,000 mcg in sodium chloride 0.9% 100 mL infusion  12.5-300 mcg/hr IntraVENous Continuous Simone Johnson MD 25 mL/hr at 02/06/22 1808 250 mcg/hr at 02/06/22 1808    QUEtiapine (SEROQUEL) tablet 25 mg  25 mg Oral BID Gaby Manjarrez MD   25 mg at 02/06/22 0758    propofol injection  5-20 mcg/kg/min IntraVENous Titrated Simone Johnson MD 9.2 mL/hr at 02/06/22 1720 20 mcg/kg/min at 02/06/22 1720    chlorhexidine (PERIDEX) 0.12 % solution 15 mL  15 mL Mouth/Throat BID Clark Tanner MD   15 mL at 02/06/22 0759    midazolam (VERSED) injection 2 mg  2 mg IntraVENous Q1H PRN Clark Tanner MD   2 mg at 02/05/22 1530    albuterol sulfate  (90 Base) MCG/ACT inhaler 4 puff  4 puff Inhalation Q4H PRN Clark Tanner MD        famotidine (PEPCID) injection 20 mg  20 mg IntraVENous BID Gaby Manjarrez MD   20 mg at 02/06/22 0758    enoxaparin (LOVENOX) injection 30 mg  30 mg SubCUTAneous BID Simone Johnson MD   30 mg at 02/06/22 0758    glucose (GLUTOSE) 40 % oral gel 15 g  15 g Oral PRN Gaby Manjarrez MD        glucagon (rDNA) injection 1 mg  1 mg IntraMUSCular PRN Gaby Manjarrez MD        dextrose 5 % solution  100 mL/hr IntraVENous PRN Gaby Manjarrez MD        dextrose bolus (hypoglycemia) 10% 125 mL  125 mL IntraVENous PRN Gaby Manjarrez MD        Or    dextrose bolus (hypoglycemia) 10% 250 mL  250 mL IntraVENous PRN Gaby Manjarrez MD        aspirin chewable tablet 81 mg  81 mg Oral Daily Clark Tanner MD   81 mg at 02/06/22 0758    clopidogrel (PLAVIX) tablet 75 mg  75 mg Oral Daily Clark Tanner MD   75 mg at 02/06/22 0758    ascorbic acid (VITAMIN C) tablet 1,000 mg  1,000 mg Oral Daily Sylwia Webster MD   1,000 mg at 02/06/22 0759    albuterol sulfate  (90 Base) MCG/ACT inhaler 2 puff  2 puff Inhalation Q4H PRN Ivette Desir MD        guaiFENesin-dextromethorphan [facility] DM) 100-10 MG/5ML syrup 5 mL  5 mL Oral Q4H PRN Ivette Desir MD   5 mL at 01/18/22 2020    atorvastatin (LIPITOR) tablet 10 mg  10 mg Oral Nightly Sophie Castro PA   10 mg at 02/05/22 2020    sodium chloride flush 0.9 % injection 5-40 mL  5-40 mL IntraVENous 2 times per day ANTHONY Roper   10 mL at 02/06/22 0759    sodium chloride flush 0.9 % injection 5-40 mL  5-40 mL IntraVENous PRN ANTHONY Roper        0.9 % sodium chloride infusion  25 mL IntraVENous PRN ANTHONY Roper   Stopped at 01/22/22 1231    ondansetron (ZOFRAN-ODT) disintegrating tablet 4 mg  4 mg Oral Q8H PRN ANTHONY Roper        Or    ondansetron (ZOFRAN) injection 4 mg  4 mg IntraVENous Q6H PRN ANTHONY Roper   4 mg at 01/16/22 0714    polyethylene glycol (GLYCOLAX) packet 17 g  17 g Oral Daily PRN ANTHONY Roper   17 g at 01/19/22 0946    acetaminophen (TYLENOL) tablet 650 mg  650 mg Oral Q6H PRN ANTHONY Roper   650 mg at 02/06/22 0445    Or    acetaminophen (TYLENOL) suppository 650 mg  650 mg Rectal Q6H PRN ANTHONY Roper        Vitamin D (CHOLECALCIFEROL) tablet 2,000 Units  2,000 Units Oral Daily ANTHONY Roper   2,000 Units at 02/06/22 8213     Allergies   Allergen Reactions    Hydrocodone-Acetaminophen Nausea Only    Morphine Nausea Only    Morphine And Related Nausea Only    Vicodin [Hydrocodone-Acetaminophen] Nausea Only    Pcn [Penicillins] Rash       EXAM:    HENT: ETT in place, Mallampati cannot be determined  Cardiovascular: Normal rate, regular rhythm, normal heart sounds. Pulmonary/Chest: No wheezes. + rhonchi. No rales. Abdominal: Soft. Bowel sounds are normal. No distension. ASA CLASS  IV. Severe Systemic Disease which is a threat to life    Sedation plan: Continue ICU sedation, as needed versed/fentanyl boluses    Post Procedure Plan: ongoing ICU care    The risks and benefits as well as alternatives to the procedure have been discussed with the family. The next of kin understands and agrees to proceed. DESCRIPTION OF PROCEDURE & FINDINGS:  A time out was taken. IV sedation was continued.  The scope was passed with ease via the endotracheal tube. A complete airway inspection was performed. No endobronchial lesions were identified. There were thin, purulent secretions throughout the airways, with mucus plugging in the RLL and LLL and RUL segmental airways. Therapeutic aspiration was performed in the RLL, LLL and RUL. Washings were obtained throughout the airways. A bronchoalveolar lavage was obtained from the LLL with good return. The patient tolerated the procedure well.  Recovery in ICU     FOLLOW UP:  Cultures

## 2022-02-06 NOTE — PROGRESS NOTES
02/05/22 2244   Vent Information   Equipment Changed Vent Circuit   Vent Type 840   Vent Mode AC/PC   Pressure Ordered 18   Rate Set 20 bmp   FiO2  45 %   SpO2 90 %   SpO2/FiO2 ratio 200   Sensitivity 3   PEEP/CPAP 8   I Time/ I Time % 1 s   Humidification Source Heated wire   Humidification Temp 37   Humidification Temp Measured 37   Circuit Condensation Drained   Vent Patient Data   Peak Inspiratory Pressure 28 cmH2O   Mean Airway Pressure 15 cmH20   Rate Measured 24 br/min   Vt Exhaled 384 mL   Minute Volume 7.1 Liters   I:E Ratio 1:2.0   Plateau Pressure 28 [id number]   Cough/Sputum   Sputum How Obtained Suctioned;Endotracheal   Cough Non-productive   Spontaneous Breathing Trial (SBT) RT Doc   Pulse 107   Breath Sounds   Right Upper Lobe Diminished   Right Middle Lobe Diminished   Right Lower Lobe Diminished   Left Upper Lobe Diminished   Left Lower Lobe Diminished   Additional Respiratory  Assessments   Resp 20   Position Semi-Mohamud's   Oral Care Completed? Yes   Oral Care Mouth suctioned   Subglottic Suction Done?  Yes   Alarm Settings   High Pressure Alarm 45 cmH2O   Low Minute Volume Alarm 5 L/min   Apnea (secs) 20 secs   High Respiratory Rate 45 br/min   Low Exhaled Vt  250 mL   ETT (adult)   Placement Date/Time: 01/21/22 0330   Tube Size: 8 mm  Location: Oral  Secured at: 23 cm  Measured From: Lips   Secured at 23 cm   Measured From Lips   ET Placement Left   Secured By [employer] tube nicole   Site Condition Dry

## 2022-02-06 NOTE — PROGRESS NOTES
Pulmonary & Critical Care Medicine ICU Progress Note    CC: COVID in partially vaccinated patient     Events of Last 24 hours:   More comfortable with pressure control ventilation  Fever 102, re-cultured    Invasive Lines: PICC 2022    MV: 2022  Vent Mode: AC/PC Rate Set: 20 bmp/Vt Ordered: 0 mL/ /FiO2 : 45 %  Recent Labs     22  0615 22  0445   PHART 7.459* 7.434   KMA1ENI 52.0* 52.5*   PO2ART 58.8* 135.5*       IV:   midazolam 2 mg/hr (22 175)    norepinephrine 2 mcg/min (22 06)    fentaNYL 250 mcg/hr (22 035)    propofol 20 mcg/kg/min (22 035)    dextrose      sodium chloride Stopped (22 1231)       Vitals:  Blood pressure (!) 110/52, pulse 114, temperature 102 °F (38.9 °C), temperature source Axillary, resp. rate 20, height 5' 3\" (1.6 m), weight 169 lb (76.7 kg), SpO2 (!) 88 %, not currently breastfeeding. on vent  Temp  Av.8 °F (37.1 °C)  Min: 97.5 °F (36.4 °C)  Max: 102 °F (38.9 °C)    Intake/Output Summary (Last 24 hours) at 2022 7381  Last data filed at 2022 0600  Gross per 24 hour   Intake 2428.77 ml   Output 2000 ml   Net 428.77 ml     General: intubated, ill appearing    ENT: Pharynx with ETT. Eyes with some crusting   Resp: No crackles. No wheezing. CV: S1, S2. Trace edema  GI: NT, ND, +BS  Skin: Warm and dry. Neuro: PERRL. Sedated, not following commands.  Patellar reflexes are symmetric     Scheduled Meds:   carboxymethylcellulose PF  1 drop Both Eyes 4x Daily    dexamethasone  1 mg IntraVENous Q24H    insulin lispro  0-18 Units SubCUTAneous Q4H    vancomycin  1,000 mg IntraVENous Q12H    diazePAM  10 mg Oral TID    QUEtiapine  25 mg Oral BID    chlorhexidine  15 mL Mouth/Throat BID    famotidine (PEPCID) injection  20 mg IntraVENous BID    enoxaparin  30 mg SubCUTAneous BID    aspirin  81 mg Oral Daily    clopidogrel  75 mg Oral Daily    ascorbic acid  1,000 mg Oral Daily    atorvastatin  10 mg Oral Nightly  sodium chloride flush  5-40 mL IntraVENous 2 times per day    Vitamin D  2,000 Units Oral Daily     PRN Meds:  magnesium sulfate, potassium chloride, fentanNYL, midazolam, albuterol sulfate HFA, glucose, glucagon (rDNA), dextrose, dextrose bolus (hypoglycemia) **OR** dextrose bolus (hypoglycemia), albuterol sulfate HFA, guaiFENesin-dextromethorphan, sodium chloride flush, sodium chloride, ondansetron **OR** ondansetron, polyethylene glycol, acetaminophen **OR** acetaminophen    Results:  CBC:   Recent Labs     02/04/22  0439 02/05/22  0407 02/06/22  0445   WBC 9.8 9.1 11.8*   HGB 9.1* 8.7* 9.0*   HCT 26.3* 25.3* 26.8*   MCV 88.8 89.8 89.2    284 351     BMP:   Recent Labs     02/04/22 0439 02/05/22 0407 02/06/22  0445   * 131* 131*   K 3.8 4.0 3.3*   CL 90* 89* 90*   CO2 37* 36* 32   BUN 17 16 16   CREATININE <0.5* <0.5* <0.5*     LIVER PROFILE: No results for input(s): AST, ALT, LIPASE, BILIDIR, BILITOT, ALKPHOS in the last 72 hours. Invalid input(s): AMYLASE,  ALB    Micro:  1/17/2022 SARS-CoV-2 positive at urgent care  1/12/2022 SARS-CoV-2 detected   1/31/2022 tracheal aspirate MRSA  2/1/2022 blood NG    Imaging:   CXR 2/6/2022: multifocal infiltrates. Chest tube in place, ET tube okay    ASSESSMENT:  · Acute hypoxemic respiratory failure, severe and life-threatening  · COVID-19 pneumonia in a partially vaccinated patient, s/p J&J vaccination 3/2021  · ARDS  · MRSA pneumonia  · Now with new fever and new leukocytosis    · Left pneumothorax, chest tube placed 2/1/22  · Former smoker  · Precedex caused bradycardia    PLAN:  - COVID-19 isolation, droplet plus  -   Mechanical ventilation as per my orders.  The ventilator was adjusted by me at the bedside for unstable, life threatening respiratory failure   On pressure control currently, I decreased P high to 16 today   IV Propofol & versed for sedation, target RASS -2, with daily spontaneous awakening trial; propofol limited to 20 with elevated TG  Seroquel 25 BID and Valium 10 TID   Completed 7 days Levaquin   Vanc D#6, add Cefepime after cultures    Decadron D#24 @ 1 mg, f/u cortrosyn stim   Completed 14 days baricitinib   Lasix x1 again today, goal net even (was slightly positive yesterday); diamox X 1 again today as well    TF  Chest tube to -20 cmh20 suction, no air leak   H-SSI for FSBS goal 150-180  Prophylaxis: Pepcid, Lovenox 40 BID  I spoke with Mamta Kaur and got consent for bronchoscpoy     ADDENDUM 1818 cortosyn stim appropriate, d/c decadron    Total critical care time caring for this patient with life threatening, unstable organ failure, including direct patient contact, management of life support systems, review of data including imaging and labs, discussions with other team members and physicians is 31 minutes so far today, excluding procedures.

## 2022-02-06 NOTE — PROGRESS NOTES
02/05/22 1944   Vent Information   Vent Type 840   Vent Mode AC/PC   Pressure Ordered 18   Rate Set 20 bmp   FiO2  40 %   SpO2 94 %   SpO2/FiO2 ratio 235   Sensitivity 3   PEEP/CPAP 8   I Time/ I Time % 1 s   Humidification Source Heated wire   Humidification Temp 37   Humidification Temp Measured 35   Circuit Condensation Drained   Vent Patient Data   Peak Inspiratory Pressure 26 cmH2O   Mean Airway Pressure 14 cmH20   Rate Measured 26 br/min   Vt Exhaled 342 mL   Minute Volume 6.56 Liters   I:E Ratio 1:3.3   Plateau Pressure 25 [id number]   Cough/Sputum   Sputum How Obtained Suctioned;Endotracheal   Cough Productive   Sputum Amount Small   Sputum Color Creamy   Tenacity Thick   Spontaneous Breathing Trial (SBT) RT Doc   Pulse 104   Breath Sounds   Right Upper Lobe Diminished   Right Middle Lobe Diminished   Right Lower Lobe Diminished   Left Upper Lobe Diminished   Left Lower Lobe Diminished   Additional Respiratory  Assessments   Resp 22   Position Semi-Mohamud's   Oral Care Completed? Yes   Oral Care Mouth suctioned   Subglottic Suction Done?  Yes   Alarm Settings   High Pressure Alarm 45 cmH2O   Low Minute Volume Alarm 5 L/min   Apnea (secs) 20 secs   High Respiratory Rate 45 br/min   Low Exhaled Vt  250 mL   ETT (adult)   Placement Date/Time: 01/21/22 0330   Tube Size: 8 mm  Location: Oral  Secured at: 23 cm  Measured From: Lips   Secured at 22 cm   Measured From [address] By [employer] tube nicole   Site Condition Dry

## 2022-02-07 NOTE — PROGRESS NOTES
Patients TF paused, versed and propofol turned off and fentanyl cut to 100 mcg/hr from 250. Awaiting patient to be arousable for an SBT.

## 2022-02-07 NOTE — PROGRESS NOTES
02/06/22 2238   Vent Information   Vent Type 840   Vent Mode AC/PC   Pressure Ordered 16   Rate Set 20 bmp   FiO2  45 %   SpO2 90 %   SpO2/FiO2 ratio 200   Sensitivity 3   PEEP/CPAP 8   Humidification Source Heated wire   Humidification Temp 37   Humidification Temp Measured 37   Circuit Condensation Drained   Vent Patient Data   Peak Inspiratory Pressure 27 cmH2O   Mean Airway Pressure 13 cmH20   Rate Measured 20 br/min   Vt Exhaled 526 mL   Minute Volume 7.81 Liters   I:E Ratio 1:2.0   Plateau Pressure 36 [id number]   Cough/Sputum   Sputum How Obtained Suctioned;Endotracheal   Cough Non-productive   Spontaneous Breathing Trial (SBT) RT Doc   Pulse 99   Breath Sounds   Right Upper Lobe Diminished   Right Middle Lobe Diminished   Right Lower Lobe Diminished   Left Upper Lobe Diminished   Left Lower Lobe Diminished   Additional Respiratory  Assessments   Resp 20   Position Mohamud's   Oral Care Completed? Yes   Oral Care Mouth suctioned   Subglottic Suction Done?  Yes   Alarm Settings   High Pressure Alarm 45 cmH2O   Low Minute Volume Alarm 5 L/min   Apnea (secs) 20 secs   High Respiratory Rate 45 br/min   Low Exhaled Vt  250 mL   ETT (adult)   Placement Date/Time: 01/21/22 0330   Tube Size: 8 mm  Location: Oral  Secured at: 23 cm  Measured From: Lips   Secured at 22 cm   Measured From Lips   ET Placement Left   Secured By [employer] tube nicole   Site Condition Dry

## 2022-02-07 NOTE — PROGRESS NOTES
02/07/22 1139   Vent Information   Skin Assessment Clean, dry, & intact   Vent Type 840   Vent Mode PS   Vt Ordered 0 mL   Rate Set 0 bmp   Pressure Support 10 cmH20   FiO2  50 %   SpO2 95 %   SpO2/FiO2 ratio 190   Sensitivity 3   PEEP/CPAP 5   Humidification Source Heated wire   Humidification Temp 37   Humidification Temp Measured 37   Circuit Condensation Drained   Vent Patient Data   Peak Inspiratory Pressure 19 cmH2O   Mean Airway Pressure 6.7 cmH20   Rate Measured 16 br/min   Vt Exhaled 498 mL   Minute Volume 7.95 Liters   I:E Ratio 1:6   Cough/Sputum   Sputum How Obtained Endotracheal   Cough Productive   Sputum Amount Moderate   Sputum Color Creamy   Tenacity Thick   Spontaneous Breathing Trial (SBT) RT Doc   Pulse 99   Breath Sounds   Right Upper Lobe Diminished   Right Middle Lobe Diminished   Right Lower Lobe Diminished   Left Upper Lobe Diminished   Left Lower Lobe Diminished   Additional Respiratory  Assessments   Resp 15   Position Semi-Mohamud's   Alarm Settings   High Pressure Alarm 45 cmH2O   Low Minute Volume Alarm 4 L/min   Apnea (secs) 20 secs   High Respiratory Rate 45 br/min   Low Exhaled Vt  250 mL   ETT (adult)   Placement Date/Time: 01/21/22 0330   Tube Size: 8 mm  Location: Oral  Secured at: 23 cm  Measured From: Lips   Secured at 22 cm   Measured From Lips   ET Placement Right   Secured By [employer] tube nicole   Site Condition Dry

## 2022-02-07 NOTE — PROGRESS NOTES
02/06/22 1939   Vent Information   Vent Type 840   Vent Mode AC/PC   Pressure Ordered 16   Rate Set 80 bmp   FiO2  45 %   SpO2 91 %   SpO2/FiO2 ratio 202.22   Sensitivity 3   PEEP/CPAP 8   I Time/ I Time % 1 s   Humidification Source Heated wire   Humidification Temp 37   Humidification Temp Measured 37   Circuit Condensation Drained   Vent Patient Data   Peak Inspiratory Pressure 26 cmH2O   Mean Airway Pressure 13 cmH20   Rate Measured 20 br/min   Vt Exhaled 476 mL   Minute Volume 8.04 Liters   I:E Ratio 1:2   Plateau Pressure 24 [id number]   Cough/Sputum   Sputum How Obtained Suctioned;Endotracheal   Cough Non-productive   Spontaneous Breathing Trial (SBT) RT Doc   Pulse 99   Breath Sounds   Right Upper Lobe Diminished   Right Middle Lobe Diminished   Right Lower Lobe Diminished   Left Upper Lobe Diminished   Left Lower Lobe Diminished   Additional Respiratory  Assessments   Resp 21   Position Semi-Mohamud's   Oral Care Completed? Yes   Oral Care Mouth suctioned   Subglottic Suction Done?  Yes   Alarm Settings   High Pressure Alarm 45 cmH2O   Low Minute Volume Alarm 5 L/min   Apnea (secs) 20 secs   High Respiratory Rate 45 br/min   Low Exhaled Vt  250 mL   ETT (adult)   Placement Date/Time: 01/21/22 0330   Tube Size: 8 mm  Location: Oral  Secured at: 23 cm  Measured From: Lips   Secured at 22 cm   Measured From Lips   ET Placement Right   Secured By [employer] tube nicole   Site Condition Dry

## 2022-02-07 NOTE — PROGRESS NOTES
Patient switched back to AC/PC on vent at this time due to increased RR and low VT. Patient tolerated SBT 10/5 50% from 1130a-530p.

## 2022-02-07 NOTE — PROGRESS NOTES
Hospitalist Progress Note      PCP: Huy Tan DO    Date of Admission: 1/12/2022    Subjective: cont to spike a fever tmax 102F, planning bronch today    Medications:  Reviewed    Infusion Medications    midazolam 3 mg/hr (02/06/22 1720)    norepinephrine 2 mcg/min (02/03/22 0620)    fentaNYL 250 mcg/hr (02/06/22 2209)    propofol 20 mcg/kg/min (02/06/22 1720)    dextrose      sodium chloride Stopped (01/22/22 1231)     Scheduled Medications    cefepime  2,000 mg IntraVENous Q8H    carboxymethylcellulose PF  1 drop Both Eyes 4x Daily    insulin lispro  0-18 Units SubCUTAneous Q4H    vancomycin  1,000 mg IntraVENous Q12H    diazePAM  10 mg Oral TID    QUEtiapine  25 mg Oral BID    chlorhexidine  15 mL Mouth/Throat BID    famotidine (PEPCID) injection  20 mg IntraVENous BID    enoxaparin  30 mg SubCUTAneous BID    aspirin  81 mg Oral Daily    clopidogrel  75 mg Oral Daily    ascorbic acid  1,000 mg Oral Daily    atorvastatin  10 mg Oral Nightly    sodium chloride flush  5-40 mL IntraVENous 2 times per day    Vitamin D  2,000 Units Oral Daily     PRN Meds: magnesium sulfate, potassium chloride, fentanNYL, midazolam, albuterol sulfate HFA, glucose, glucagon (rDNA), dextrose, dextrose bolus (hypoglycemia) **OR** dextrose bolus (hypoglycemia), albuterol sulfate HFA, guaiFENesin-dextromethorphan, sodium chloride flush, sodium chloride, ondansetron **OR** ondansetron, polyethylene glycol, acetaminophen **OR** acetaminophen      Intake/Output Summary (Last 24 hours) at 2/7/2022 0158  Last data filed at 2/6/2022 1720  Gross per 24 hour   Intake 2408.08 ml   Output 1500 ml   Net 908.08 ml       Physical Exam Performed:    BP (!) 104/56   Pulse 101   Temp 98.9 °F (37.2 °C) (Axillary)   Resp 21   Ht 5' 3\" (1.6 m)   Wt 169 lb (76.7 kg)   SpO2 92%   BMI 29.94 kg/m²       Patient seen in droplet plus precautions  General:  Elderly female, on vent.  supine  Oral ETT and OG noted   Mucous Membranes:  Pink , anicteric  Neck: No JVD, no carotid bruit, no thyromegaly  Chest: diminished in bases, bilateral mild crackles present. Left chest tube. Cardiovascular:  RRR S1S2 heard, no murmurs or gallops  Abdomen: Soft, undistended, non tender, no organomegaly, BS present  Extremities: No edema or cyanosis. Distal pulses well felt  Neurological :  Sedated    Labs:   Recent Labs     02/04/22  0439 02/05/22 0407 02/06/22  0445   WBC 9.8 9.1 11.8*   HGB 9.1* 8.7* 9.0*   HCT 26.3* 25.3* 26.8*    284 351     Recent Labs     02/05/22 0407 02/06/22 0445 02/06/22  1438   * 131* 131*   K 4.0 3.3* 3.5   CL 89* 90* 91*   CO2 36* 32 34*   BUN 16 16 18   CREATININE <0.5* <0.5* <0.5*   CALCIUM 8.2* 8.4 8.3   PHOS  --   --  4.5     No results for input(s): AST, ALT, BILIDIR, BILITOT, ALKPHOS in the last 72 hours. No results for input(s): INR in the last 72 hours. No results for input(s): Jorge Grates in the last 72 hours. Urinalysis:      Lab Results   Component Value Date    NITRU Negative 02/06/2022    WBCUA 0-2 02/06/2022    BACTERIA 1+ 02/06/2022    RBCUA 5-10 02/06/2022    BLOODU LARGE 02/06/2022    SPECGRAV 1.025 02/06/2022    GLUCOSEU Negative 02/06/2022    GLUCOSEU Neg 08/29/2010       Radiology:  XR CHEST PORTABLE   Final Result   Stable examination with stable lines and tubes. Stable pneumonia. XR CHEST PORTABLE   Final Result   Stable lines and tubes. Stable examination. Extensive airspace disease   noted in the lungs. XR CHEST PORTABLE   Final Result   Stable examination with stable lines and tubes. Stable findings of COVID   pneumonia. XR CHEST PORTABLE   Final Result   1. No significant change. XR CHEST PORTABLE   Final Result   Worsening multifocal airspace opacities.          XR CHEST PORTABLE   Final Result   Interval placement of a left-sided chest tube with significant decrease seen   in size of the left pneumothorax only with a small apical component   remaining. Patchy airspace disease within the lung fields is stable and   unchanged. XR CHEST PORTABLE   Final Result   New onset moderate to large left pneumothorax with mild tension. Endotracheal, nasogastric tubes and PICC line are stable. Redemonstration of   bilateral infiltrates. Findings communicated to the referring physician on February 1, 2022      RECOMMENDATION:   Stat left chest tube. XR CHEST PORTABLE   Final Result   1. Stable lines, tubes, and support devices. 2. Stable cardiopulmonary status including bilateral airspace opacities and   small effusions. XR CHEST PORTABLE   Final Result   No significant change. Continued follow-up recommended. XR CHEST PORTABLE   Final Result   Mild worsening of bilateral infiltrates likely due to pneumonia. Continued   follow-up recommended. XR CHEST PORTABLE   Final Result   Moderate persistent multifocal airspace disease compatible with multifocal   pneumonia including COVID pneumonia. While similar to recent studies, there   has been gradual progression and worsening since the early study of   01/12/2022. XR CHEST PORTABLE   Final Result   Extensive infiltrates within the lungs bilaterally, worsening on the right   with increasing consolidation. XR CHEST PORTABLE   Final Result   No significant interval change multifocal airspace disease. Stable lines and tubes. XR CHEST PORTABLE   Final Result   Mildly improved parenchymal infiltrates at the right lung base, otherwise   similar appearing chest.         XR CHEST PORTABLE   Final Result   Extensive bilateral airspace opacities are seen without significant change. XR CHEST PORTABLE   Final Result   1. Recommend retraction of endotracheal tube 1.0 cm.   2. Stable severe ill-defined lung consolidation, greatest at the lung bases   consistent with pneumonia versus alveolar edema.    3. Suspected mild bilateral pleural effusion, unchanged. XR CHEST PORTABLE   Final Result   1. No significant change. XR CHEST PORTABLE   Final Result   Appropriate positioning of the endotracheal tube. Enteric tube courses   beneath the diaphragm; tip is excluded by collimation inferiorly. No significant change in extensive bilateral airspace disease. IR PICC WO SQ PORT/PUMP > 5 YEARS   Final Result   Successful placement of PICC line. XR CHEST PORTABLE   Final Result   New multifocal moderate to severe pneumonia. Clinical and imaging follow-up   to resolution recommended. CTA HEAD NECK W CONTRAST   Final Result   Unremarkable CTA of the head and neck. Findings consistent with COVID pneumonia. This scan was analyzed using [employer] ai contact LVO. Identification of suspected   findings is not for diagnostic use beyond notification. Viz LVO is limited to   analysis of imaging data and should not be used in-lieu of full patient   evaluation or relied upon to make or confirm diagnosis. CT HEAD WO CONTRAST   Final Result   No acute intracranial abnormality. XR CHEST PORTABLE   Final Result   Multifocal bilateral atypical pneumonia.                  Assessment/Plan:    Active Hospital Problems    Diagnosis     Mucus plugging of bronchi [T17.500A]     Hypotension [I95.9]     Pneumonia due to COVID-19 virus [U07.1, J12.82]     ARDS (adult respiratory distress syndrome) (HCC) [J80]     Hyperglycemia [R73.9]     Leukocytosis [D72.829]     Moderate protein-calorie malnutrition (HCC) [E44.0]     Hyperlipidemia [E78.5]     Arthritis [M19.90]     Brain fog [R41.89]     Anxiety [F41.9]     COVID-19 [U07.1]        COVID PNA  Acute hypoxic respiratory Failure   - CXR: noted with multifocal PNA  - no home O2 at baseline, 83-87% at rest,   - J &J in March 2021- no booster   - Admitted to 2W, droplet +, tele, cont pulse ox  - supp O2, wean as tolerated - worsening slowly -was on 10 L -progressively worsening hypoxia, was on vapotherm with full support   - eventually placed on vent support 1/21    persistent severe hypoxemia -she was proned on 1/20/2022.  She is back in supine position.  -Continue Decadron day #23--> decrease to 1 mg daily   - Remdesivir not started due to out of window- sx 7+ days prior to admit  - levaquin - finished 7 days   - Has MRSA in her sputums and elevated WBC. Start Vancomycin and Cefepime. Stopped cefepime. Vancomycin day #3  - PRN Robitussin  - procal 0.10 wnl   - CRP elevated . Started on Baricitinib - finished 14 days .  Monitored CBC and LFTs  - Lovenox Bid  Valium and Seroquel added  precedex caused bradycardia  On PC ventilation. Having ventricular dyssynchrony.    -Lasix IV ordered x1.  - spiking fever - sent pan cx, planning bronch later today     Left pneumothorax. - left chest tube placed. CT flushed and it is patent. Placed to suction.      Hypotension  -on Levophed 4 mcg.     Episodes of expressive aphasia   - ? TIA, vs mental status changes related to covid   - cont ASA, Plavix      Thrombocytopenia resolved  - likely with covid  - continue Lovenox and monitor     Hyperglycemia  Secondary to Decadron   Sliding scale insulin      HLD  - Continue statin        Arthritis   - was on  mobic - holding     Hx of Asthma  - not on inhalers at home        DVT Prophylaxis: Lovenox bid  Diet: Diet NPO  ADULT TUBE FEEDING; Orogastric; Other Tube Feeding (specify);  Glucerna 1.2; Continuous; 20; Yes; 20; Q 4 hours; 50; 30; Q 3 hours  Code Status: Full Code    PT/OT Eval Status: ordered    Kya Hart MD

## 2022-02-07 NOTE — PROGRESS NOTES
Pulmonary & Critical Care Medicine ICU Progress Note    CC: COVID in partially vaccinated patient     Events of Last 24 hours:   More comfortable with pressure control ventilation  No fever overnight    Invasive Lines: PICC 1/20/2022    MV: 1/21/2022  Vent Mode: AC/PC Rate Set: 20 bmp/Vt Ordered: 0 mL/ /FiO2 : 45 %  Recent Labs     02/06/22  0445 02/07/22  0415   PHART 7.434 7.435   AXT1GBX 52.5* 52.3*   PO2ART 135.5* 60.7*       IV:   midazolam 3 mg/hr (02/06/22 1720)    norepinephrine 2 mcg/min (02/03/22 0620)    fentaNYL 250 mcg/hr (02/07/22 0753)    propofol 20 mcg/kg/min (02/07/22 0535)    dextrose      sodium chloride Stopped (01/22/22 1231)       Vitals:  Blood pressure (!) 114/54, pulse 100, temperature 99.9 °F (37.7 °C), temperature source Axillary, resp. rate 20, height 5' 3\" (1.6 m), weight 169 lb (76.7 kg), SpO2 93 %, not currently breastfeeding. on vent    Intake/Output Summary (Last 24 hours) at 2/7/2022 0841  Last data filed at 2/7/2022 0600  Gross per 24 hour   Intake 2958.08 ml   Output 900 ml   Net 2058.08 ml     General: intubated, ill appearing    ENT: Pharynx with ETT. Eyes with some crusting   Resp: No crackles. No wheezing. CV: S1, S2. Trace edema  GI: NT, ND, +BS  Skin: Warm and dry. Neuro: PERRL. Sedated, not following commands.      Scheduled Meds:   cefepime  2,000 mg IntraVENous Q8H    carboxymethylcellulose PF  1 drop Both Eyes 4x Daily    insulin lispro  0-18 Units SubCUTAneous Q4H    vancomycin  1,000 mg IntraVENous Q12H    diazePAM  10 mg Oral TID    QUEtiapine  25 mg Oral BID    chlorhexidine  15 mL Mouth/Throat BID    famotidine (PEPCID) injection  20 mg IntraVENous BID    enoxaparin  30 mg SubCUTAneous BID    aspirin  81 mg Oral Daily    clopidogrel  75 mg Oral Daily    ascorbic acid  1,000 mg Oral Daily    atorvastatin  10 mg Oral Nightly    sodium chloride flush  5-40 mL IntraVENous 2 times per day    Vitamin D  2,000 Units Oral Daily     PRN Meds:  magnesium sulfate, potassium chloride, fentanNYL, midazolam, albuterol sulfate HFA, glucose, glucagon (rDNA), dextrose, dextrose bolus (hypoglycemia) **OR** dextrose bolus (hypoglycemia), albuterol sulfate HFA, guaiFENesin-dextromethorphan, sodium chloride flush, sodium chloride, ondansetron **OR** ondansetron, polyethylene glycol, acetaminophen **OR** acetaminophen    Results:  CBC:   Recent Labs     02/05/22  0407 02/06/22  0445 02/07/22  0415   WBC 9.1 11.8* 10.6   HGB 8.7* 9.0* 8.4*   HCT 25.3* 26.8* 24.6*   MCV 89.8 89.2 89.4    351 297     BMP:   Recent Labs     02/06/22  0445 02/06/22  1438 02/07/22  0415   * 131* 130*   K 3.3* 3.5 4.1   CL 90* 91* 90*   CO2 32 34* 33*   PHOS  --  4.5  --    BUN 16 18 23*   CREATININE <0.5* <0.5* <0.5*     LIVER PROFILE: No results for input(s): AST, ALT, LIPASE, BILIDIR, BILITOT, ALKPHOS in the last 72 hours. Invalid input(s): AMYLASE,  ALB    Micro:  1/17/2022 SARS-CoV-2 positive at urgent care  1/12/2022 SARS-CoV-2 detected   1/31/2022 tracheal aspirate MRSA  2/1/2022 blood NG  2/6/22 BAL:    Imaging:   CXR 2/7/2022: No change in multifocal infiltrates. Chest tube in place, ET tube okay    ASSESSMENT:  · Acute hypoxemic respiratory failure, severe and life-threatening  · COVID-19 pneumonia in a partially vaccinated patient, s/p J&J vaccination 3/2021  · ARDS  · MRSA pneumonia  · Now with new fever and new leukocytosis    · Left pneumothorax, chest tube placed 2/1/22  · Former smoker  · Precedex caused bradycardia    PLAN:  Droplet Plus Airborne Precautions   Mechanical ventilation as per my orders.  The ventilator was adjusted by me at the bedside for unstable, life threatening respiratory failure   On pressure control currently, I decreased P high to 16 today   IV Propofol & versed for sedation, target RASS -2, with daily spontaneous awakening trial; propofol limited to 20 with elevated TG  Seroquel 25 BID and Valium 10 TID   Completed 7 days Levaquin   Vanc D#7,  D#2 Cefepime    Completed Decadron D#25  Completed 14 days baricitinib   Lasix 40mg IV and Diamox 250mg IV x 1  Chest tube to -20 cmh20 suction, no air leak   H-SSI for FSBS goal 150-180  Prophylaxis: Pepcid, Lovenox 40 BID  Total critical care time caring for this patient with life threatening, unstable organ failure, including direct patient contact, management of life support systems, review of data including imaging and labs, discussions with other team members and physicians is 31 minutes so far today, excluding procedures.

## 2022-02-07 NOTE — PROGRESS NOTES
SBT initiated at this time. PS 10/8 45%. Resp rate 16, -500, MV 7.63. SpO2 94%. Sedation is off, patient not following commands at this time. Will monitor.

## 2022-02-07 NOTE — PROGRESS NOTES
Vancomycin Day: 7    Patient's labs, cultures, vitals, and vancomycin regimen reviewed. No changes today.

## 2022-02-07 NOTE — PROGRESS NOTES
Hospitalist Progress Note      PCP: Nova Kebede DO    Date of Admission: 1/12/2022     resp failure on vent , covid pna, unvaccinated  High fevers S.p bronch with BAL     Subjective:    Ms Carmen De La Rosa seen drowsy off sedation , on spontaneous trial and tolerating  Unable to be aroused    Medications:  Reviewed    Infusion Medications    midazolam 3 mg/hr (02/06/22 1720)    norepinephrine 2 mcg/min (02/03/22 0620)    fentaNYL 250 mcg/hr (02/07/22 0313)    propofol 20 mcg/kg/min (02/07/22 0535)    dextrose      sodium chloride Stopped (01/22/22 1231)     Scheduled Medications    cefepime  2,000 mg IntraVENous Q8H    carboxymethylcellulose PF  1 drop Both Eyes 4x Daily    insulin lispro  0-18 Units SubCUTAneous Q4H    vancomycin  1,000 mg IntraVENous Q12H    diazePAM  10 mg Oral TID    QUEtiapine  25 mg Oral BID    chlorhexidine  15 mL Mouth/Throat BID    famotidine (PEPCID) injection  20 mg IntraVENous BID    enoxaparin  30 mg SubCUTAneous BID    aspirin  81 mg Oral Daily    clopidogrel  75 mg Oral Daily    ascorbic acid  1,000 mg Oral Daily    atorvastatin  10 mg Oral Nightly    sodium chloride flush  5-40 mL IntraVENous 2 times per day    Vitamin D  2,000 Units Oral Daily     PRN Meds: magnesium sulfate, potassium chloride, fentanNYL, midazolam, albuterol sulfate HFA, glucose, glucagon (rDNA), dextrose, dextrose bolus (hypoglycemia) **OR** dextrose bolus (hypoglycemia), albuterol sulfate HFA, guaiFENesin-dextromethorphan, sodium chloride flush, sodium chloride, ondansetron **OR** ondansetron, polyethylene glycol, acetaminophen **OR** acetaminophen      Intake/Output Summary (Last 24 hours) at 2/7/2022 0739  Last data filed at 2/7/2022 0600  Gross per 24 hour   Intake 2958.08 ml   Output 900 ml   Net 2058.08 ml       Physical Exam Performed:    /62   Pulse 96   Temp 99.9 °F (37.7 °C) (Axillary)   Resp 20   Ht 5' 3\" (1.6 m)   Wt 169 lb (76.7 kg)   SpO2 93%   BMI 29.94 kg/m² Patient seen in droplet plus precautions  General:  Elderly female, on vent. supine  Oral ETT and OG noted   Mucous Membranes:  Pink , anicteric  Neck: No JVD, no carotid bruit, no thyromegaly  Chest: diminished in bases, bilateral mild crackles present. Left chest tube. Cardiovascular:  RRR S1S2 heard, no murmurs or gallops  Abdomen: Soft, undistended, non tender, no organomegaly, BS present  Extremities: No edema or cyanosis. Distal pulses well felt  Neurological :  Sedated    Labs:   Recent Labs     02/05/22  0407 02/06/22 0445 02/07/22  0415   WBC 9.1 11.8* 10.6   HGB 8.7* 9.0* 8.4*   HCT 25.3* 26.8* 24.6*    351 297     Recent Labs     02/06/22 0445 02/06/22  1438 02/07/22  0415   * 131* 130*   K 3.3* 3.5 4.1   CL 90* 91* 90*   CO2 32 34* 33*   BUN 16 18 23*   CREATININE <0.5* <0.5* <0.5*   CALCIUM 8.4 8.3 8.1*   PHOS  --  4.5  --      No results for input(s): AST, ALT, BILIDIR, BILITOT, ALKPHOS in the last 72 hours. No results for input(s): INR in the last 72 hours. No results for input(s): Rafita Snuffer in the last 72 hours. Urinalysis:      Lab Results   Component Value Date    NITRU Negative 02/06/2022    WBCUA 0-2 02/06/2022    BACTERIA 1+ 02/06/2022    RBCUA 5-10 02/06/2022    BLOODU LARGE 02/06/2022    SPECGRAV 1.025 02/06/2022    GLUCOSEU Negative 02/06/2022    GLUCOSEU Neg 08/29/2010       Radiology:  XR CHEST PORTABLE   Final Result   1. Unchanged position of support devices. 2. No significant change in bilateral airspace disease. XR CHEST PORTABLE   Final Result   Stable examination with stable lines and tubes. Stable pneumonia. XR CHEST PORTABLE   Final Result   Stable lines and tubes. Stable examination. Extensive airspace disease   noted in the lungs. XR CHEST PORTABLE   Final Result   Stable examination with stable lines and tubes. Stable findings of COVID   pneumonia. XR CHEST PORTABLE   Final Result   1.  No significant change. XR CHEST PORTABLE   Final Result   Worsening multifocal airspace opacities. XR CHEST PORTABLE   Final Result   Interval placement of a left-sided chest tube with significant decrease seen   in size of the left pneumothorax only with a small apical component   remaining. Patchy airspace disease within the lung fields is stable and   unchanged. XR CHEST PORTABLE   Final Result   New onset moderate to large left pneumothorax with mild tension. Endotracheal, nasogastric tubes and PICC line are stable. Redemonstration of   bilateral infiltrates. Findings communicated to the referring physician on February 1, 2022      RECOMMENDATION:   Stat left chest tube. XR CHEST PORTABLE   Final Result   1. Stable lines, tubes, and support devices. 2. Stable cardiopulmonary status including bilateral airspace opacities and   small effusions. XR CHEST PORTABLE   Final Result   No significant change. Continued follow-up recommended. XR CHEST PORTABLE   Final Result   Mild worsening of bilateral infiltrates likely due to pneumonia. Continued   follow-up recommended. XR CHEST PORTABLE   Final Result   Moderate persistent multifocal airspace disease compatible with multifocal   pneumonia including COVID pneumonia. While similar to recent studies, there   has been gradual progression and worsening since the early study of   01/12/2022. XR CHEST PORTABLE   Final Result   Extensive infiltrates within the lungs bilaterally, worsening on the right   with increasing consolidation. XR CHEST PORTABLE   Final Result   No significant interval change multifocal airspace disease. Stable lines and tubes.          XR CHEST PORTABLE   Final Result   Mildly improved parenchymal infiltrates at the right lung base, otherwise   similar appearing chest.         XR CHEST PORTABLE   Final Result   Extensive bilateral airspace opacities are seen without significant change. XR CHEST PORTABLE   Final Result   1. Recommend retraction of endotracheal tube 1.0 cm.   2. Stable severe ill-defined lung consolidation, greatest at the lung bases   consistent with pneumonia versus alveolar edema. 3. Suspected mild bilateral pleural effusion, unchanged. XR CHEST PORTABLE   Final Result   1. No significant change. XR CHEST PORTABLE   Final Result   Appropriate positioning of the endotracheal tube. Enteric tube courses   beneath the diaphragm; tip is excluded by collimation inferiorly. No significant change in extensive bilateral airspace disease. IR PICC WO SQ PORT/PUMP > 5 YEARS   Final Result   Successful placement of PICC line. XR CHEST PORTABLE   Final Result   New multifocal moderate to severe pneumonia. Clinical and imaging follow-up   to resolution recommended. CTA HEAD NECK W CONTRAST   Final Result   Unremarkable CTA of the head and neck. Findings consistent with COVID pneumonia. This scan was analyzed using [employer]. ai contact LVO. Identification of suspected   findings is not for diagnostic use beyond notification. Viz LVO is limited to   analysis of imaging data and should not be used in-lieu of full patient   evaluation or relied upon to make or confirm diagnosis. CT HEAD WO CONTRAST   Final Result   No acute intracranial abnormality. XR CHEST PORTABLE   Final Result   Multifocal bilateral atypical pneumonia.                  Assessment/Plan:    Active Hospital Problems    Diagnosis     Mucus plugging of bronchi [T17.500A]     Hypotension [I95.9]     Pneumonia due to COVID-19 virus [U07.1, J12.82]     ARDS (adult respiratory distress syndrome) ([facility]) [J80]     Hyperglycemia [R73.9]     Leukocytosis [D72.829]     Moderate protein-calorie malnutrition (HCC) [E44.0]     Hyperlipidemia [E78.5]     Arthritis [M19.90]     Brain fog [R41.89]     Anxiety [F41.9]     COVID-19 [U07.1]        COVID

## 2022-02-07 NOTE — PROGRESS NOTES
02/07/22 0245   Vent Information   $Ventilation $Subsequent Day   Vent Type 840   Vent Mode AC/PC   Pressure Ordered 16   Rate Set 20 bmp   FiO2  45 %   SpO2 91 %   SpO2/FiO2 ratio 202.22   Sensitivity 3   PEEP/CPAP 8   I Time/ I Time % 1 s   Humidification Source Heated wire   Humidification Temp 37   Humidification Temp Measured 37   Circuit Condensation Drained   Vent Patient Data   Peak Inspiratory Pressure 28 cmH2O   Mean Airway Pressure 13 cmH20   Rate Measured 21 br/min   Vt Exhaled 500 mL   Minute Volume 6.6 Liters   I:E Ratio 1:2.0   Cough/Sputum   Sputum How Obtained Suctioned;Endotracheal   $Obtained Sample $Induced Sputum   Cough Productive   Sputum Amount Small   Sputum Color Creamy   Tenacity Thick   Spontaneous Breathing Trial (SBT) RT Doc   Pulse 102   Breath Sounds   Right Upper Lobe Diminished   Right Middle Lobe Diminished   Right Lower Lobe Diminished   Left Upper Lobe Diminished   Left Lower Lobe Diminished   Additional Respiratory  Assessments   Resp 18   Position Reverse Trendelenburg   Oral Care Completed? Yes   Oral Care Mouth suctioned   Subglottic Suction Done?  Yes   Alarm Settings   High Pressure Alarm 45 cmH2O   Low Minute Volume Alarm 4 L/min   Apnea (secs) 20 secs   High Respiratory Rate 45 br/min   Low Exhaled Vt  250 mL   ETT (adult)   Placement Date/Time: 01/21/22 0330   Tube Size: 8 mm  Location: Oral  Secured at: 23 cm  Measured From: Lips   Secured at 22 cm   Measured From Lips   ET Placement Right  (moved from left to right)   Secured By [employer] tube nicole   Site Condition Dry

## 2022-02-08 NOTE — PROGRESS NOTES
Pulmonary & Critical Care Medicine ICU Progress Note    CC: COVID in partially vaccinated patient     Events of Last 24 hours:   More comfortable with pressure control ventilation. Tolerated PSV 10/5 for 6 hours yesterday    Fent 100  Propofol 20  Invasive Lines: PICC 1/20/2022    MV: 1/21/2022  Vent Mode: AC/PC Rate Set: 20 bmp/Vt Ordered: 0 mL/ /FiO2 : 50 %  Recent Labs     02/07/22  1341 02/08/22  0400   PHART 7.409 7.538*   [id number] 53.8* 38.9   PO2ART 78.9 97.8       IV:   midazolam Stopped (02/07/22 1017)    norepinephrine 2.027 mcg/min (02/07/22 1241)    fentaNYL 100 mcg/hr (02/07/22 2105)    propofol 20 mcg/kg/min (02/08/22 0009)    dextrose      sodium chloride Stopped (01/22/22 1231)       Vitals:  Blood pressure (!) 125/56, pulse 109, temperature 99.4 °F (37.4 °C), temperature source Axillary, resp. rate 21, height 5' 3\" (1.6 m), weight 169 lb (76.7 kg), SpO2 92 %, not currently breastfeeding. on vent    Intake/Output Summary (Last 24 hours) at 2/8/2022 0813  Last data filed at 2/8/2022 0600  Gross per 24 hour   Intake 2448. 36 ml   Output 1800 ml   Net 648.36 ml     General: intubated, ill appearing    ENT: Pharynx with ETT. Eyes with some crusting   Resp: No crackles. No wheezing. CV: S1, S2. Trace edema  GI: NT, ND, +BS  Skin: Warm and dry. Neuro: PERRL. Sedated, not following commands.      Scheduled Meds:   cefepime  2,000 mg IntraVENous Q8H    carboxymethylcellulose PF  1 drop Both Eyes 4x Daily    insulin lispro  0-18 Units SubCUTAneous Q4H    vancomycin  1,000 mg IntraVENous Q12H    diazePAM  10 mg Oral TID    QUEtiapine  25 mg Oral BID    chlorhexidine  15 mL Mouth/Throat BID    famotidine (PEPCID) injection  20 mg IntraVENous BID    enoxaparin  30 mg SubCUTAneous BID    aspirin  81 mg Oral Daily    clopidogrel  75 mg Oral Daily    ascorbic acid  1,000 mg Oral Daily    atorvastatin  10 mg Oral Nightly    sodium chloride flush  5-40 mL IntraVENous 2 times per day    Vitamin D  2,000 Units Oral Daily     PRN Meds:  magnesium sulfate, potassium chloride, fentanNYL, midazolam, albuterol sulfate HFA, glucose, glucagon (rDNA), dextrose, dextrose bolus (hypoglycemia) **OR** dextrose bolus (hypoglycemia), albuterol sulfate HFA, guaiFENesin-dextromethorphan, sodium chloride flush, sodium chloride, ondansetron **OR** ondansetron, polyethylene glycol, acetaminophen **OR** acetaminophen    Results:  CBC:   Recent Labs     02/06/22  0445 02/07/22  0415 02/08/22  0400   WBC 11.8* 10.6 10.7   HGB 9.0* 8.4* 8.8*   HCT 26.8* 24.6* 25.3*   MCV 89.2 89.4 88.8    297 370     BMP:   Recent Labs     02/06/22  1438 02/07/22  0415 02/08/22  0400   * 130* 129*   K 3.5 4.1 3.1*   CL 91* 90* 90*   CO2 34* 33* 33*   PHOS 4.5  --   --    BUN 18 23* 19   CREATININE <0.5* <0.5* <0.5*     LIVER PROFILE: No results for input(s): AST, ALT, LIPASE, BILIDIR, BILITOT, ALKPHOS in the last 72 hours. Invalid input(s): AMYLASE,  ALB    Micro:  1/17/2022 SARS-CoV-2 positive at urgent care  1/12/2022 SARS-CoV-2 detected   1/31/2022 tracheal aspirate MRSA  2/1/2022 blood NG  2/6/22 BAL: MRSA    Imaging:   CXR 2/7/2022: No change in multifocal infiltrates. Chest tube in place, ET tube okay    ASSESSMENT:  · Acute hypoxemic respiratory failure, severe and life-threatening  · COVID-19 pneumonia in a partially vaccinated patient, s/p J&J vaccination 3/2021  · ARDS  · MRSA pneumonia  · Now with new fever and new leukocytosis    · Left pneumothorax, chest tube placed 2/1/22  · Former smoker  · Precedex caused bradycardia    PLAN:  Droplet Plus Airborne Precautions   Mechanical ventilation as per my orders.  The ventilator was adjusted by me at the bedside for unstable, life threatening respiratory failure   On pressure control currently, I decreased P high to 16 today   IV Propofol & versed for sedation, target RASS -2, with daily spontaneous awakening trial; propofol limited to 20 with elevated TG  Seroquel 25 BID and Stop Valium   Completed 7 days Levaquin   Vanc D#8,  Stopped Cefepime    Completed Decadron D#25  Completed 14 days baricitinib   Replace K  Lasix 40mg IV daily  Chest tube to water seal today and then repeat CXR in 4 hours  H-SSI for FSBS goal 150-180  Prophylaxis: Pepcid, Lovenox 40 BID  Total critical care time caring for this patient with life threatening, unstable organ failure, including direct patient contact, management of life support systems, review of data including imaging and labs, discussions with other team members and physicians is 31 minutes so far today, excluding procedures.

## 2022-02-08 NOTE — PROGRESS NOTES
Shift assessment completed, see flow sheet. RASS -3.   - Propofol infusing at 20 mcg/kg/min.        Intubated and sedated on AC # 8 ETT, at 23 LL. 24/ 40 %/ +5. SpO2 90%. Respirations are easy, even, and unlabored. Bilateral lung sounds with crackles.      VSS  OG in place at 60, with TF at 50 mL/hr.     PICC/PIV, WNL.     All lines and monitoring devices in place. Mcguire is patent and secured. Bilateral soft wrist restraints in place for patient safety.   Bed in lowest position with wheels locked.

## 2022-02-08 NOTE — PROGRESS NOTES
Hospitalist Progress Note      PCP: Jose A Scanlon DO    Date of Admission: 1/12/2022     resp failure on vent , covid pna, unvaccinated  High fevers S.p bronch with BAL     Subjective:    Ms Matias seen on low  sedation , on spontaneous trial and tolerating  Unable to be aroused  Chest tube to water seal today     No fevers     Medications:  Reviewed    Infusion Medications    midazolam Stopped (02/07/22 1017)    norepinephrine 2.027 mcg/min (02/07/22 1241)    fentaNYL 100 mcg/hr (02/07/22 2105)    propofol 20 mcg/kg/min (02/08/22 0009)    dextrose      sodium chloride Stopped (01/22/22 1231)     Scheduled Medications    cefepime  2,000 mg IntraVENous Q8H    carboxymethylcellulose PF  1 drop Both Eyes 4x Daily    insulin lispro  0-18 Units SubCUTAneous Q4H    vancomycin  1,000 mg IntraVENous Q12H    diazePAM  10 mg Oral TID    QUEtiapine  25 mg Oral BID    chlorhexidine  15 mL Mouth/Throat BID    famotidine (PEPCID) injection  20 mg IntraVENous BID    enoxaparin  30 mg SubCUTAneous BID    aspirin  81 mg Oral Daily    clopidogrel  75 mg Oral Daily    ascorbic acid  1,000 mg Oral Daily    atorvastatin  10 mg Oral Nightly    sodium chloride flush  5-40 mL IntraVENous 2 times per day    Vitamin D  2,000 Units Oral Daily     PRN Meds: magnesium sulfate, potassium chloride, fentanNYL, midazolam, albuterol sulfate HFA, glucose, glucagon (rDNA), dextrose, dextrose bolus (hypoglycemia) **OR** dextrose bolus (hypoglycemia), albuterol sulfate HFA, guaiFENesin-dextromethorphan, sodium chloride flush, sodium chloride, ondansetron **OR** ondansetron, polyethylene glycol, acetaminophen **OR** acetaminophen      Intake/Output Summary (Last 24 hours) at 2/8/2022 0801  Last data filed at 2/8/2022 0600  Gross per 24 hour   Intake 2448. 36 ml   Output 1800 ml   Net 648.36 ml       Physical Exam Performed:    BP (!) 125/56   Pulse 109   Temp 99.4 °F (37.4 °C) (Axillary)   Resp 21   Ht 5' 3\" (1.6 m)   Wt 169 lb (76.7 kg)   SpO2 92%   BMI 29.94 kg/m²       Patient seen in droplet plus precautions  General:  Elderly female, on vent. supine  Oral ETT and OG noted   Mucous Membranes:  Pink , anicteric  Neck: No JVD, no carotid bruit, no thyromegaly  Chest: diminished in bases, bilateral mild crackles present. Left chest tube. Cardiovascular:  RRR S1S2 heard, no murmurs or gallops  Abdomen: Soft, undistended, non tender, no organomegaly, BS present  Extremities: No edema or cyanosis. Distal pulses well felt  Neurological :  Sedated    Labs:   Recent Labs     02/06/22  0445 02/07/22  0415 02/08/22  0400   WBC 11.8* 10.6 10.7   HGB 9.0* 8.4* 8.8*   HCT 26.8* 24.6* 25.3*    297 370     Recent Labs     02/06/22  1438 02/07/22  0415 02/08/22  0400   * 130* 129*   K 3.5 4.1 3.1*   CL 91* 90* 90*   CO2 34* 33* 33*   BUN 18 23* 19   CREATININE <0.5* <0.5* <0.5*   CALCIUM 8.3 8.1* 8.7   PHOS 4.5  --   --      No results for input(s): AST, ALT, BILIDIR, BILITOT, ALKPHOS in the last 72 hours. No results for input(s): INR in the last 72 hours. No results for input(s): Skippy Gault in the last 72 hours. Urinalysis:      Lab Results   Component Value Date    NITRU Negative 02/06/2022    WBCUA 0-2 02/06/2022    BACTERIA 1+ 02/06/2022    RBCUA 5-10 02/06/2022    BLOODU LARGE 02/06/2022    SPECGRAV 1.025 02/06/2022    GLUCOSEU Negative 02/06/2022    GLUCOSEU Neg 08/29/2010       Radiology:  XR CHEST PORTABLE   Final Result   1. Unchanged position of support devices. 2. No significant change in bilateral airspace disease. XR CHEST PORTABLE   Final Result   Stable examination with stable lines and tubes. Stable pneumonia. XR CHEST PORTABLE   Final Result   Stable lines and tubes. Stable examination. Extensive airspace disease   noted in the lungs. XR CHEST PORTABLE   Final Result   Stable examination with stable lines and tubes. Stable findings of COVID   pneumonia.          XR CHEST airspace opacities are seen without significant change. XR CHEST PORTABLE   Final Result   1. Recommend retraction of endotracheal tube 1.0 cm.   2. Stable severe ill-defined lung consolidation, greatest at the lung bases   consistent with pneumonia versus alveolar edema. 3. Suspected mild bilateral pleural effusion, unchanged. XR CHEST PORTABLE   Final Result   1. No significant change. XR CHEST PORTABLE   Final Result   Appropriate positioning of the endotracheal tube. Enteric tube courses   beneath the diaphragm; tip is excluded by collimation inferiorly. No significant change in extensive bilateral airspace disease. IR PICC WO SQ PORT/PUMP > 5 YEARS   Final Result   Successful placement of PICC line. XR CHEST PORTABLE   Final Result   New multifocal moderate to severe pneumonia. Clinical and imaging follow-up   to resolution recommended. CTA HEAD NECK W CONTRAST   Final Result   Unremarkable CTA of the head and neck. Findings consistent with COVID pneumonia. This scan was analyzed using [employer]. ai contact LVO. Identification of suspected   findings is not for diagnostic use beyond notification. Viz LVO is limited to   analysis of imaging data and should not be used in-lieu of full patient   evaluation or relied upon to make or confirm diagnosis. CT HEAD WO CONTRAST   Final Result   No acute intracranial abnormality. XR CHEST PORTABLE   Final Result   Multifocal bilateral atypical pneumonia.                  Assessment/Plan:    Active Hospital Problems    Diagnosis     Mucus plugging of bronchi [T17.500A]     Hypotension [I95.9]     Pneumonia due to COVID-19 virus [U07.1, J12.82]     ARDS (adult respiratory distress syndrome) (HCC) [J80]     Hyperglycemia [R73.9]     Leukocytosis [D72.829]     Moderate protein-calorie malnutrition (HCC) [E44.0]     Hyperlipidemia [E78.5]     Arthritis [M19.90]     Brain fog [R41.89]     Anxiety [F41.9]     COVID-19 [U07.1]        COVID PNA  Acute hypoxic respiratory Failure   - CXR: noted with multifocal PNA  - no home O2 at baseline, 83-87% at rest,   - J &J in March 2021- no booster   - Admitted to 2W, droplet +, tele, cont pulse ox  - supp O2, wean as tolerated - worsening slowly -was on 10 L   -progressively worsening hypoxia, was on vapotherm with full support   - eventually placed on vent support 1/21    persistent severe hypoxemia -she was proned on 1/20/2022.  She is back in supine position.  -Continue Decadron day #24--> decrease to 1 mg daily   - Remdesivir not started due to out of window- sx 7+ days prior to admit  - levaquin - finished 7 days   - Has MRSA in her sputums and elevated WBC. Started Vancomycin and Cefepime. Stopped cefepime. continue Vancomycin day #5  - PRN Robitussin  - procal 0.10 wnl   - CRP elevated . Started on Baricitinib - finished 14 days .    Monitored CBC and LFTs  - Lovenox Bid  Valium and Seroquel added  precedex caused bradycardia  - sp bronch with BAL for fevers- BAL with MRSA again   - ongoing weaning trials now on Spontaneous mode     Left pneumothorax. - left chest tube placed. CT flushed and it is patent. Placed to suction. - resolved pneumo, plans for removal of CT     Hypotension  -weaned off pressors     Episodes of expressive aphasia   - ? TIA, vs mental status changes related to covid   - cont ASA, Plavix      Thrombocytopenia resolved  - likely with covid  - continue Lovenox and monitor     Hyperglycemia  Secondary to Decadron   Sliding scale insulin      HLD  - Continue statin        Arthritis   - was on  mobic - holding          DVT Prophylaxis: Lovenox bid  Diet: Diet NPO  ADULT TUBE FEEDING; Orogastric; Other Tube Feeding (specify);  Glucerna 1.2; Continuous; 20; Yes; 20; Q 4 hours; 50; 30; Q 3 hours  Code Status: Full Code      Dispo - cont denisha Kent MD, 2/8/2022 8:01 AM

## 2022-02-08 NOTE — PROGRESS NOTES
02/08/22 0336   Vent Information   Vent Type 840   Vent Mode AC/PC   Pressure Ordered 16   Rate Set 20 bmp   FiO2  50 %   SpO2 93 %   SpO2/FiO2 ratio 186   Sensitivity 3   PEEP/CPAP 5   Vent Patient Data   Peak Inspiratory Pressure 22 cmH2O   Mean Airway Pressure 14 cmH20   Rate Measured 31 br/min   Vt Exhaled 304 mL   Minute Volume 9.86 Liters   I:E Ratio 1:4.1   Cough/Sputum   Sputum How Obtained Suctioned;Endotracheal   Sputum Amount Moderate   Sputum Color Creamy   Tenacity Thick   Spontaneous Breathing Trial (SBT) RT Doc   Pulse 114   Breath Sounds   Right Upper Lobe Diminished   Right Middle Lobe Diminished   Right Lower Lobe Diminished   Left Upper Lobe Diminished   Left Lower Lobe Diminished   Additional Respiratory  Assessments   Resp 17   Position Semi-Mohamud's   ETT (adult)   Placement Date/Time: 01/21/22 0330   Tube Size: 8 mm  Location: Oral  Secured at: 23 cm  Measured From: Lips   ET Placement Left

## 2022-02-08 NOTE — PROGRESS NOTES
02/07/22 2317   Vent Information   Vent Type 840   Vent Mode AC/PC   Pressure Ordered 16   Rate Set 20 bmp   FiO2  50 %   SpO2 92 %   SpO2/FiO2 ratio 184   Sensitivity 3   PEEP/CPAP 5   Vent Patient Data   Peak Inspiratory Pressure 25 cmH2O   Mean Airway Pressure 11 cmH20   Rate Measured 20 br/min   Vt Exhaled 482 mL   Minute Volume 8.94 Liters   I:E Ratio 1:2   Cough/Sputum   Sputum How Obtained Suctioned;Endotracheal   Sputum Amount Small   Sputum Color Creamy   Tenacity Thick   Spontaneous Breathing Trial (SBT) RT Doc   Pulse 100   Breath Sounds   Right Upper Lobe Diminished   Right Middle Lobe Diminished   Right Lower Lobe Diminished   Left Upper Lobe Diminished   Left Lower Lobe Diminished   Additional Respiratory  Assessments   Resp 21   Position Semi-Mohamud's   ETT (adult)   Placement Date/Time: 01/21/22 0330   Tube Size: 8 mm  Location: Oral  Secured at: 23 cm  Measured From: Lips   ET Placement Right

## 2022-02-08 NOTE — PROGRESS NOTES
02/08/22 0650   Vent Information   Vent Type 840   Vent Mode AC/PC   Pressure Ordered 16   Rate Set 20 bmp   FiO2  50 %   SpO2 92 %   SpO2/FiO2 ratio 184   Sensitivity 3   PEEP/CPAP 5   I Time/ I Time % 1 s   Humidification Source Heated wire   Humidification Temp Measured 37   Vent Patient Data   Peak Inspiratory Pressure 24 cmH2O   Mean Airway Pressure 12 cmH20   Rate Measured 23 br/min   Vt Exhaled 332 mL   Minute Volume 7.8 Liters   I:E Ratio 1:1.8   Plateau Pressure 9 [id number]   Static Compliance 19 mL/cmH2O   Cough/Sputum   Sputum How Obtained Suctioned;Endotracheal   Cough Productive   Sputum Amount Moderate   Sputum Color Yellow   Tenacity Thin   Spontaneous Breathing Trial (SBT) RT Doc   Pulse 109   Breath Sounds   Right Upper Lobe Coarse Crackles   Right Middle Lobe Coarse Crackles   Right Lower Lobe Diminished   Left Upper Lobe Coarse Crackles   Left Lower Lobe Diminished   Additional Respiratory  Assessments   Resp 21   Position Semi-Mohamud's   Oral Care Completed? Yes   Oral Care Mouth suctioned   Subglottic Suction Done?  Yes   Alarm Settings   High Pressure Alarm 45 cmH2O   Low Minute Volume Alarm 5 L/min   Apnea (secs) 20 secs   High Respiratory Rate 45 br/min   Low Exhaled Vt  250 mL   ETT (adult)   Placement Date/Time: 01/21/22 0330   Tube Size: 8 mm  Location: Oral  Secured at: 23 cm  Measured From: Lips   Secured at 22 cm   Measured From Lips   ET Placement Right   Secured By [employer] tube nicole   Site Condition Dry

## 2022-02-08 NOTE — PROGRESS NOTES
Report given to Shreya Cunningham RN. Patient in stable condition at this time and care is transferred.

## 2022-02-08 NOTE — PROGRESS NOTES
Rounding completed with Dr. Fatuma Chandler and multidisciplinary team. POC, labs, lines and assessment reviewed.    - d/c Valium  - Ct to water seal- xray at 1300  - give 40 of K PO

## 2022-02-09 NOTE — PROGRESS NOTES
Shift assessment completed, see flow sheet. RASS -3.   - Propofol infusing at 20 mcg/kg/min. (paused for SBT)  - Fentanyl infusing at 100 mcg//h. (cut to 50 for SBT)    Intubated and sedated on AC # 8 ETT, at 23 LL. 24/ 40 %/ +5. SpO2 90%. Respirations are easy, even, and unlabored. Bilateral lung sounds diminshed.      VSS  OG in place at 60, with TF at 50 mL/hr.     PICC/PIV, WNL.     All lines and monitoring devices in place. Mcguire is patent and secured. Bilateral soft wrist restraints in place for patient safety.   Bed in lowest position with wheels locked.

## 2022-02-09 NOTE — PROGRESS NOTES
Comprehensive Nutrition Assessment    Type and Reason for Visit:  Reassess    Nutrition Recommendations/Plan:   1. Modified TF order - ADULT TUBE FEEDING; Orogastric; Other formula - Glucerna 1.2 with a goal rate of 60 ml/hr x 20 hours. Increase to goal rate within 4 hours after acknowledgement of new TF order. Water flushes, 30 ml every 4 hours for tube patency. 2. Monitor TF rate, intake, and tolerance + water flushes. 3. Monitor vent status, sedation type/amount (propofol is currently at 20 mcg x 24 hours which = 244 kcals from lipids), TG checks (TG check on 138 mg/dl), and plan of care. 4. Please obtain an updated weight for this patient - last weight was obtained on 1/19/22.   5. Monitor nutrition-related labs, bowel function, and weight trends. Nutrition Assessment:  patient remains unchanged from a nutritional standpoint since last RD assessment; patient remains at risk for further compromise d/t increased nutrition needs r/t COVID-19 virus, need for EN as sole source of nutrition, and altered nutrition-related labs; will modify TF order to Glucerna 1.2 with a goal rate of 60 ml/hr x 20 hours + 30 ml water flushes every 4 hours for tube patency    Malnutrition Assessment:  Malnutrition Status: At risk for malnutrition    Context:  Acute Illness     Findings of the 6 clinical characteristics of malnutrition:  Energy Intake:  1 - 75% or less of estimated energy requirements for 7 or more days  Weight Loss:  Unable to assess (last weight was obtained on 1/19/22)     Body Fat Loss:  Unable to assess (COVID-19 +)     Muscle Mass Loss:  Unable to assess (COVID-19 +)    Fluid Accumulation:  No significant fluid accumulation     Strength:  Not Performed    Estimated Daily Nutrient Needs:  Energy (kcal):  1540 - 1771 kcals based on 20-23 kcals/kg/CBW; Weight Used for Energy Requirements:  Current     Protein (g):  68 - 78 g protein based on 1.3-1.5 g/kg/IBW;  Weight Used for Protein Requirements: Ideal        Fluid (ml/day):  1540 - 1771 ml; Method Used for Fluid Requirements:  1 ml/kcal      Nutrition Related Findings:  patient remains intubated and sedated with 20 mcg propofol at this time; TF is infusing at goal rate and patient is tolerating well; + BM on 2/8/22; + L chest tube; abdomen is soft, round, non-tender, and bowel sounds are present; h/h, Na, K, and Cl are low; blood glucose trends are elevated; patient has vitamin C, lipitor, peridex, plavix, lovenox, pepcid, lasix, high-dose SSI, seroquel, vitamin D, vanc in D5, precedex, fentanyl and levo in D5 ordered at this time; patient responds to pain      Wounds:  None       Current Nutrition Therapies:    Current Tube Feeding (TF) Orders:  · Feeding Route: Orogastric  · Formula: Other Tube Feeding (Glucerna 1.2)  · Schedule: Continuous  · Additives/Modulars:  (none)  · Water Flushes: 30 ml every 4 hours for tube patency  · Current TF & Flush Orders Provides: Glucerna 1.2 at goal rate of 50 ml/hr x 20 hours = 1000 ml TV, 1200 kcals, 60 g protein, and 805 ml free water + 30 ml water flushes every 3 hours for tube patency  · Goal TF & Flush Orders Provides: Glucerna 1.2 with a goal rate of 60 ml/hr x 20 hours = 1200 ml TV, 1440 kcals, 72 g protein, and 966 ml free water + 30 ml water flushes every 4 hours for tube patency      Anthropometric Measures:  · Height: 5' 3\" (160 cm)  · Current Body Weight: 169 lb (76.7 kg) (obtained on 1/19/22; actual weight)   · Admission Body Weight: 183 lb 9.6 oz (83.3 kg) (obtained on 1/16/22; actual weight)    · Usual Body Weight: 183 lb 9.6 oz (83.3 kg) (obtained on 1/16/22; actual weight)     · Ideal Body Weight: 115 lbs; % Ideal Body Weight 147 %   · BMI: 29.9   · BMI Categories: Overweight (BMI 25.0-29. 9)       Nutrition Diagnosis:   · Inadequate oral intake related to inadequate protein-energy intake,impaired respiratory function,increase demand for energy/nutrients as evidenced by NPO or clear liquid status due to medical condition,intubation,nutrition support - enteral nutrition,lab values      Nutrition Interventions:   Food and/or Nutrient Delivery:  Continue NPO,Modify Tube Feeding  Nutrition Education/Counseling:  No recommendation at this time   Coordination of Nutrition Care:  Continue to monitor while inpatient,Interdisciplinary Rounds    Goals:  patient will tolerate Glucerna 1.2 with a goal rate of 60 ml/hr x 20 hours without GI distress, without s/s of aspiration, and without additional lab/fluid disturbances       Nutrition Monitoring and Evaluation:   Behavioral-Environmental Outcomes:  None Identified   Food/Nutrient Intake Outcomes:  Enteral Nutrition Intake/Tolerance  Physical Signs/Symptoms Outcomes:  Biochemical Data,GI Status,Fluid Status or Edema,Hemodynamic Status,Weight,Skin     Discharge Planning:     Too soon to determine     Electronically signed by Celia Litten, RD, LD on 2/9/22 at 2:00 PM EST    Contact: [phone]

## 2022-02-09 NOTE — PROGRESS NOTES
Pulmonary & Critical Care Medicine ICU Progress Note    CC: COVID in partially vaccinated patient     Events of Last 24 hours:   More comfortable with pressure control ventilation. Tolerated PSV 10/5 for 8 hours yesterday    Fent 100  Propofol 20  Invasive Lines: PICC 1/20/2022    MV: 1/21/2022  Vent Mode: AC/PC Rate Set: 20 bmp/Vt Ordered: 0 mL/ /FiO2 : 50 %  Recent Labs     02/08/22  0400 02/09/22  0540   PHART 7.538* 7.546*   [id number] 38.9 40.8   PO2ART 97.8 59.2*       IV:   dexmedetomidine (PRECEDEX) IV infusion      midazolam Stopped (02/07/22 1017)    norepinephrine 2.027 mcg/min (02/07/22 1241)    fentaNYL 100 mcg/hr (02/09/22 0909)    propofol 20 mcg/kg/min (02/09/22 0704)    dextrose      sodium chloride Stopped (01/22/22 1231)       Vitals:  Blood pressure 124/64, pulse 113, temperature 99.2 °F (37.3 °C), temperature source Axillary, resp. rate 26, height 5' 3\" (1.6 m), weight 169 lb (76.7 kg), SpO2 92 %, not currently breastfeeding. on vent    Intake/Output Summary (Last 24 hours) at 2/9/2022 1033  Last data filed at 2/9/2022 1489  Gross per 24 hour   Intake 2690.94 ml   Output 2175 ml   Net 515.94 ml     General: intubated, ill appearing    ENT: Pharynx with ETT. Eyes with some crusting   Resp: No crackles. No wheezing. CV: S1, S2. Trace edema  GI: NT, ND, +BS  Skin: Warm and dry. Neuro: PERRL. Sedated, not following commands.      Scheduled Meds:   albuterol  2.5 mg Nebulization Q4H    potassium bicarb-citric acid  40 mEq Oral Once    furosemide  40 mg IntraVENous Daily    insulin lispro  0-18 Units SubCUTAneous Q4H    carboxymethylcellulose PF  1 drop Both Eyes 4x Daily    vancomycin  1,000 mg IntraVENous Q12H    QUEtiapine  25 mg Oral BID    chlorhexidine  15 mL Mouth/Throat BID    famotidine (PEPCID) injection  20 mg IntraVENous BID    enoxaparin  30 mg SubCUTAneous BID    aspirin  81 mg Oral Daily    clopidogrel  75 mg Oral Daily    ascorbic acid  1,000 mg Oral Daily    atorvastatin  10 mg Oral Nightly    sodium chloride flush  5-40 mL IntraVENous 2 times per day    Vitamin D  2,000 Units Oral Daily     PRN Meds:  albuterol, magnesium sulfate, potassium chloride, fentanNYL, midazolam, glucose, glucagon (rDNA), dextrose, dextrose bolus (hypoglycemia) **OR** dextrose bolus (hypoglycemia), guaiFENesin-dextromethorphan, sodium chloride flush, sodium chloride, ondansetron **OR** ondansetron, polyethylene glycol, acetaminophen **OR** acetaminophen    Results:  CBC:   Recent Labs     02/07/22  0415 02/08/22  0400 02/09/22  0527   WBC 10.6 10.7 8.9   HGB 8.4* 8.8* 9.0*   HCT 24.6* 25.3* 27.4*   MCV 89.4 88.8 88.6    370 434     BMP:   Recent Labs     02/06/22  1438 02/06/22  1438 02/07/22  0415 02/08/22  0400 02/09/22  0527   *   < > 130* 129* 134*   K 3.5   < > 4.1 3.1* 3.4*   CL 91*   < > 90* 90* 92*   CO2 34*   < > 33* 33* 33*   PHOS 4.5  --   --   --   --    BUN 18   < > 23* 19 18   CREATININE <0.5*   < > <0.5* <0.5* <0.5*    < > = values in this interval not displayed. LIVER PROFILE: No results for input(s): AST, ALT, LIPASE, BILIDIR, BILITOT, ALKPHOS in the last 72 hours. Invalid input(s): AMYLASE,  ALB    Micro:  1/17/2022 SARS-CoV-2 positive at urgent care  1/12/2022 SARS-CoV-2 detected   1/31/2022 tracheal aspirate MRSA  2/1/2022 blood NG  2/6/22 BAL: MRSA    Imaging:   CXR 2/7/2022: No change in multifocal infiltrates. Chest tube in place, ET tube okay    ASSESSMENT:  · Acute hypoxemic respiratory failure, severe and life-threatening  · COVID-19 pneumonia in a partially vaccinated patient, s/p J&J vaccination 3/2021  · ARDS  · MRSA pneumonia  · Now with new fever and new leukocytosis    · Left pneumothorax, chest tube placed 2/1/22  · Former smoker  · Precedex caused bradycardia    PLAN:  Droplet Plus Airborne Precautions   Mechanical ventilation as per my orders.  The ventilator was adjusted by me at the bedside for unstable, life threatening respiratory failure   On pressure control currently, I decreased P high to 14 today   IV Propofol & versed for sedation, target RASS -2, with daily spontaneous awakening trial; propofol limited to 20 with elevated TG  Start precedex gtt  Seroquel 25 BID   Completed 7 days Levaquin   Vanc D#9/14,  Stopped Cefepime    Completed Decadron D#25  Completed 14 days baricitinib   Replace K  Lasix 40mg IV daily. Diamox 250mg IV x 1   Chest tube to water seal today and then repeat CXR in 4 hours  H-SSI for FSBS goal 150-180  Prophylaxis: Pepcid, Lovenox 40 BID  Total critical care time caring for this patient with life threatening, unstable organ failure, including direct patient contact, management of life support systems, review of data including imaging and labs, discussions with other team members and physicians is 31 minutes so far today, excluding procedures.

## 2022-02-09 NOTE — PLAN OF CARE
Nutrition Problem #1: Inadequate oral intake  Intervention: Food and/or Nutrient Delivery: Continue NPO,Modify Tube Feeding  Nutritional Goals: patient will tolerate Glucerna 1.2 with a goal rate of 60 ml/hr x 20 hours without GI distress, without s/s of aspiration, and without additional lab/fluid disturbances

## 2022-02-09 NOTE — PROGRESS NOTES
Hospitalist Progress Note      PCP: Patsy Jimenez DO    Date of Admission: 1/12/2022     resp failure on vent , covid pna, unvaccinated  High fevers S.p bronch with BAL     Subjective:    Ms Wanda Nowak seen sedated , not on breathing trial due to agitation   Chest tube to water seal    No fevers     Medications:  Reviewed    Infusion Medications    midazolam Stopped (02/07/22 1017)    norepinephrine 2.027 mcg/min (02/07/22 1241)    fentaNYL 100 mcg/hr (02/09/22 0704)    propofol 20 mcg/kg/min (02/09/22 0704)    dextrose      sodium chloride Stopped (01/22/22 1231)     Scheduled Medications    furosemide  40 mg IntraVENous Daily    insulin lispro  0-18 Units SubCUTAneous Q4H    carboxymethylcellulose PF  1 drop Both Eyes 4x Daily    vancomycin  1,000 mg IntraVENous Q12H    QUEtiapine  25 mg Oral BID    chlorhexidine  15 mL Mouth/Throat BID    famotidine (PEPCID) injection  20 mg IntraVENous BID    enoxaparin  30 mg SubCUTAneous BID    aspirin  81 mg Oral Daily    clopidogrel  75 mg Oral Daily    ascorbic acid  1,000 mg Oral Daily    atorvastatin  10 mg Oral Nightly    sodium chloride flush  5-40 mL IntraVENous 2 times per day    Vitamin D  2,000 Units Oral Daily     PRN Meds: magnesium sulfate, potassium chloride, fentanNYL, midazolam, albuterol sulfate HFA, glucose, glucagon (rDNA), dextrose, dextrose bolus (hypoglycemia) **OR** dextrose bolus (hypoglycemia), albuterol sulfate HFA, guaiFENesin-dextromethorphan, sodium chloride flush, sodium chloride, ondansetron **OR** ondansetron, polyethylene glycol, acetaminophen **OR** acetaminophen      Intake/Output Summary (Last 24 hours) at 2/9/2022 0708  Last data filed at 2/9/2022 0704  Gross per 24 hour   Intake 2259.94 ml   Output 1950 ml   Net 309.94 ml       Physical Exam Performed:    /62   Pulse 108   Temp 99.2 °F (37.3 °C) (Axillary)   Resp 23   Ht 5' 3\" (1.6 m)   Wt 169 lb (76.7 kg)   SpO2 93%   BMI 29.94 kg/m²       Patient seen in droplet plus precautions  General:  Elderly female, on vent. supine  Oral ETT and OG noted   Mucous Membranes:  Pink , anicteric  Neck: No JVD, no carotid bruit, no thyromegaly  Chest: diminished in bases, bilateral mild crackles present. Left chest tube. Cardiovascular:  RRR S1S2 heard, no murmurs or gallops  Abdomen: Soft, undistended, non tender, no organomegaly, BS present  Extremities: No edema or cyanosis. Distal pulses well felt  Neurological :  Sedated    Labs:   Recent Labs     02/07/22 0415 02/08/22  0400 02/09/22  0527   WBC 10.6 10.7 8.9   HGB 8.4* 8.8* 9.0*   HCT 24.6* 25.3* 27.4*    370 434     Recent Labs     02/06/22  1438 02/06/22  1438 02/07/22 0415 02/08/22  0400 02/09/22  0527   *   < > 130* 129* 134*   K 3.5   < > 4.1 3.1* 3.4*   CL 91*   < > 90* 90* 92*   CO2 34*   < > 33* 33* 33*   BUN 18   < > 23* 19 18   CREATININE <0.5*   < > <0.5* <0.5* <0.5*   CALCIUM 8.3   < > 8.1* 8.7 9.1   PHOS 4.5  --   --   --   --     < > = values in this interval not displayed. No results for input(s): AST, ALT, BILIDIR, BILITOT, ALKPHOS in the last 72 hours. No results for input(s): INR in the last 72 hours. No results for input(s): Katherine Grover in the last 72 hours. Urinalysis:      Lab Results   Component Value Date    NITRU Negative 02/06/2022    WBCUA 0-2 02/06/2022    BACTERIA 1+ 02/06/2022    RBCUA 5-10 02/06/2022    BLOODU LARGE 02/06/2022    SPECGRAV 1.025 02/06/2022    GLUCOSEU Negative 02/06/2022    GLUCOSEU Neg 08/29/2010       Radiology:  XR CHEST PORTABLE   Final Result   Stable support apparatus. The left-sided chest tube catheter tubing appears   kinked. Persistent diffuse bilateral airspace opacities. Findings on the left have   progressed compared to prior. XR CHEST PORTABLE   Final Result   1. Unchanged position of support devices. 2. No significant change in bilateral airspace disease.          XR CHEST PORTABLE   Final Result   Stable examination with stable lines and tubes. Stable pneumonia. XR CHEST PORTABLE   Final Result   Stable lines and tubes. Stable examination. Extensive airspace disease   noted in the lungs. XR CHEST PORTABLE   Final Result   Stable examination with stable lines and tubes. Stable findings of COVID   pneumonia. XR CHEST PORTABLE   Final Result   1. No significant change. XR CHEST PORTABLE   Final Result   Worsening multifocal airspace opacities. XR CHEST PORTABLE   Final Result   Interval placement of a left-sided chest tube with significant decrease seen   in size of the left pneumothorax only with a small apical component   remaining. Patchy airspace disease within the lung fields is stable and   unchanged. XR CHEST PORTABLE   Final Result   New onset moderate to large left pneumothorax with mild tension. Endotracheal, nasogastric tubes and PICC line are stable. Redemonstration of   bilateral infiltrates. Findings communicated to the referring physician on February 1, 2022      RECOMMENDATION:   Stat left chest tube. XR CHEST PORTABLE   Final Result   1. Stable lines, tubes, and support devices. 2. Stable cardiopulmonary status including bilateral airspace opacities and   small effusions. XR CHEST PORTABLE   Final Result   No significant change. Continued follow-up recommended. XR CHEST PORTABLE   Final Result   Mild worsening of bilateral infiltrates likely due to pneumonia. Continued   follow-up recommended. XR CHEST PORTABLE   Final Result   Moderate persistent multifocal airspace disease compatible with multifocal   pneumonia including COVID pneumonia. While similar to recent studies, there   has been gradual progression and worsening since the early study of   01/12/2022.          XR CHEST PORTABLE   Final Result   Extensive infiltrates within the lungs bilaterally, worsening on the right   with increasing consolidation. XR CHEST PORTABLE   Final Result   No significant interval change multifocal airspace disease. Stable lines and tubes. XR CHEST PORTABLE   Final Result   Mildly improved parenchymal infiltrates at the right lung base, otherwise   similar appearing chest.         XR CHEST PORTABLE   Final Result   Extensive bilateral airspace opacities are seen without significant change. XR CHEST PORTABLE   Final Result   1. Recommend retraction of endotracheal tube 1.0 cm.   2. Stable severe ill-defined lung consolidation, greatest at the lung bases   consistent with pneumonia versus alveolar edema. 3. Suspected mild bilateral pleural effusion, unchanged. XR CHEST PORTABLE   Final Result   1. No significant change. XR CHEST PORTABLE   Final Result   Appropriate positioning of the endotracheal tube. Enteric tube courses   beneath the diaphragm; tip is excluded by collimation inferiorly. No significant change in extensive bilateral airspace disease. IR PICC WO SQ PORT/PUMP > 5 YEARS   Final Result   Successful placement of PICC line. XR CHEST PORTABLE   Final Result   New multifocal moderate to severe pneumonia. Clinical and imaging follow-up   to resolution recommended. CTA HEAD NECK W CONTRAST   Final Result   Unremarkable CTA of the head and neck. Findings consistent with COVID pneumonia. This scan was analyzed using Viz. ai contact LVO. Identification of suspected   findings is not for diagnostic use beyond notification. Viz LVO is limited to   analysis of imaging data and should not be used in-lieu of full patient   evaluation or relied upon to make or confirm diagnosis. CT HEAD WO CONTRAST   Final Result   No acute intracranial abnormality. XR CHEST PORTABLE   Final Result   Multifocal bilateral atypical pneumonia.                  Assessment/Plan:      COVID PNA  Acute hypoxic respiratory Failure   - CXR: noted with multifocal PNA  - no home O2 at baseline, 83-87% at rest,   - had vaccine J &J in March 2021- no booster   - Admitted to 2W, droplet +, tele, cont pulse ox    -progressively worsening hypoxia, was on vapotherm with full support   - eventually placed on vent support 1/21    persistent severe hypoxemia -she was proned on 1/20/2022.    -Continue Decadron day #25--> decrease to 1 mg daily   - Remdesivir not started due to out of window- sx 7+ days prior to admit  - - finished 7 days levaquin   -- CRP elevated . Started on Baricitinib - finished 14 days .    Monitored CBC and LFTs  - Has MRSA in her sputums and elevated WBC. Started Vancomycin and Cefepime. Stopped cefepime. continue Vancomycin day #6  - ongoing weaning trials   Valium and Seroquel added  precedex caused bradycardia  - sp bronch with BAL for fevers- BAL with MRSA again   - ongoing weaning trials now      Left pneumothorax. - left chest tube placed. CT flushed and it is patent. Placed to suction. - resolved pneumo, plans for removal of CT     Hypotension  -weaned off pressors     Episodes of expressive aphasia   - ? TIA, vs mental status changes related to covid   - cont ASA, Plavix      Thrombocytopenia resolved  - likely with covid  - continue Lovenox and monitor     Hyperglycemia  Secondary to Decadron   Sliding scale insulin      HLD  - Continue statin        Arthritis   - was on  mobic - holding          DVT Prophylaxis: Lovenox bid  Diet: Diet NPO  ADULT TUBE FEEDING; Orogastric; Other Tube Feeding (specify);  Glucerna 1.2; Continuous; 20; Yes; 20; Q 4 hours; 50; 30; Q 3 hours  Code Status: Full Code      Dispo - cont care    Jeremy Marrufo MD, 2/9/2022 7:08 AM

## 2022-02-09 NOTE — CARE COORDINATION
INTERDISCIPLINARY PLAN OF CARE CONFERENCE    Date/Time: 2/9/2022 1:26 PM  Completed by:  MARILYN Fernandez. Case Management      Patient Name:  Berto Lindsey  YOB: 1950  Admitting Diagnosis: Acute respiratory failure with hypoxia ([facility] [address] [J96.01]  Pneumonia due to COVID-19 virus [U07.1, J12.82]  COVID-19 [U07.1]     Admit Date/Time:  1/12/2022  9:40 AM    Chart reviewed. Interdisciplinary team contacted or reviewed plan related to patient progress and discharge plans. Disciplines included Case Management, Nursing, and Dietitian. Current Status:Ongoing. Vent. Chest Tube  PT/OT recommendation for discharge plan of care: Need new orders when appropriate. Expected D/C Disposition:  TBD    Discharge Plan Comments: Chart review completed. Pt remains in the ICU and is on a Vent. Pt is from home alone. PT/OT will need new orders when appropriate. CM will continue to follow and assist. Please notify CM if needs or concerns arise.      Home O2 in place on admit: No

## 2022-02-09 NOTE — PROGRESS NOTES
Itime decreased to 0.8 d/t increased RR with inverse IE ratio             02/09/22 1510   Vent Information   Vent Type 840   Vent Mode AC/PC   Pressure Ordered 15   Rate Set 20 bmp   FiO2  50 %   SpO2 91 %   SpO2/FiO2 ratio 182   Sensitivity 2   PEEP/CPAP 5   I Time/ I Time % 0.8 s   Humidification Source Heated wire   Humidification Temp Measured 37   Vent Patient Data   Peak Inspiratory Pressure 24 cmH2O   Mean Airway Pressure 16 cmH20   Rate Measured 35 br/min   Vt Exhaled 338 mL   Minute Volume 10.6 Liters   I:E Ratio 1.2:1   Cough/Sputum   Sputum How Obtained Suctioned;Endotracheal   Cough Productive   Sputum Amount Moderate   Sputum Color Creamy   Tenacity Thick   Spontaneous Breathing Trial (SBT) RT Doc   Pulse 102   Breath Sounds   Right Upper Lobe Diminished   Right Middle Lobe Diminished   Right Lower Lobe Diminished   Left Upper Lobe Diminished   Left Lower Lobe Diminished   Additional Respiratory  Assessments   Resp 30   Position Reverse Trendelenburg   Oral Care Completed? Yes   Oral Care Mouth suctioned   Subglottic Suction Done?  Yes   Alarm Settings   High Pressure Alarm 45 cmH2O   Low Minute Volume Alarm 5 L/min   Apnea (secs) 20 secs   High Respiratory Rate 45 br/min   Low Exhaled Vt  250 mL   ETT (adult)   Placement Date/Time: 01/21/22 0330   Tube Size: 8 mm  Location: Oral  Secured at: 23 cm  Measured From: Lips   Secured at 23 cm   Measured From Lips   ET Placement Right   Secured By [employer] tube nicole   Site Condition Dry

## 2022-02-09 NOTE — PROGRESS NOTES
02/09/22 0326   Vent Information   Vent Type 840   Vent Mode AC/PC   Pressure Ordered 16   Rate Set 20 bmp   FiO2  50 %   SpO2 92 %   SpO2/FiO2 ratio 184   Sensitivity 3   PEEP/CPAP 5   I Time/ I Time % 1 s   Humidification Source Heated wire   Humidification Temp 37   Humidification Temp Measured 36   Circuit Condensation Drained   Vent Patient Data   Peak Inspiratory Pressure 24 cmH2O   Mean Airway Pressure 13 cmH20   Rate Measured 24 br/min   Vt Exhaled 348 mL   Minute Volume 8.45 Liters   I:E Ratio 1:1.7   Plateau Pressure 24 [id number]   Cough/Sputum   Sputum How Obtained Suctioned;Endotracheal   Cough Productive   Sputum Amount Small   Sputum Color Creamy   Tenacity Thick   Spontaneous Breathing Trial (SBT) RT Doc   Pulse 113   Breath Sounds   Right Upper Lobe Diminished   Right Middle Lobe Diminished   Right Lower Lobe Diminished   Left Upper Lobe Diminished   Left Lower Lobe Diminished   Additional Respiratory  Assessments   Resp 27   Position Semi-Mohamud's   Oral Care Completed? Yes   Oral Care Mouth suctioned   Subglottic Suction Done?  Yes   Alarm Settings   High Pressure Alarm 45 cmH2O   Low Minute Volume Alarm 5 L/min   Apnea (secs) 20 secs   High Respiratory Rate 45 br/min   Low Exhaled Vt  250 mL   ETT (adult)   Placement Date/Time: 01/21/22 0330   Tube Size: 8 mm  Location: Oral  Secured at: 23 cm  Measured From: Lips   Secured at 23 cm   Measured From Lips   ET Placement Right   Secured By [employer] tube nicole   Site Condition Dry

## 2022-02-09 NOTE — PROGRESS NOTES
02/08/22 2039   Vent Information   Vent Type 840   Vent Mode AC/PC   Pressure Ordered 16   Rate Set 20 bmp   FiO2  50 %   SpO2 93 %   SpO2/FiO2 ratio 186   Sensitivity 3   PEEP/CPAP 5   I Time/ I Time % 1 s   Humidification Source Heated wire   Humidification Temp 37   Humidification Temp Measured 36   Vent Patient Data   Peak Inspiratory Pressure 25 cmH2O   Mean Airway Pressure 15 cmH20   Rate Measured 34 br/min   Vt Exhaled 315 mL   Minute Volume 9.88 Liters   I:E Ratio 1:1   Plateau Pressure 22 [id number]   Cough/Sputum   Sputum How Obtained Suctioned;Endotracheal   Cough Non-productive   Spontaneous Breathing Trial (SBT) RT Doc   Pulse 112   Breath Sounds   Right Upper Lobe Diminished   Right Middle Lobe Diminished   Right Lower Lobe Diminished   Left Upper Lobe Diminished   Left Lower Lobe Diminished   Additional Respiratory  Assessments   Resp 20   Position Semi-Mohamud's   Oral Care Completed? Yes   Oral Care Mouth suctioned   Subglottic Suction Done?  Yes   Alarm Settings   High Pressure Alarm 45 cmH2O   Low Minute Volume Alarm 5 L/min   Apnea (secs) 20 secs   High Respiratory Rate 45 br/min   Low Exhaled Vt  250 mL   ETT (adult)   Placement Date/Time: 01/21/22 0330   Tube Size: 8 mm  Location: Oral  Secured at: 23 cm  Measured From: Lips   Secured at 23 cm   Measured From [address] By [employer] tube nicole   Site Condition Dry

## 2022-02-09 NOTE — PROGRESS NOTES
RT Nebulizer Bronchodilator Protocol Note    There is a bronchodilator order in the chart from a provider indicating to follow the RT Bronchodilator Protocol and there is an Initiate RT Bronchodilator Protocol order as well (see protocol at bottom of note). CXR Findings:  XR CHEST PORTABLE    Result Date: 2/8/2022  Stable support apparatus. The left-sided chest tube catheter tubing appears kinked. Persistent diffuse bilateral airspace opacities. Findings on the left have progressed compared to prior. The findings from the last RT Protocol Assessment were as follows:  Smoking: (P) Smoker 15 pack years or more  Respiratory Pattern: (P) Use of accessory muscles, prolonged exhalation, or RR 26-30 bpm  Breath Sounds: (P) Intermittent or unilateral wheezes  Cough: (P) Weak, productive  Indication for Bronchodilator Therapy: (P) Wheezing associated with pulm disorder  Bronchodilator Assessment Score: (P) 13    Aerosolized bronchodilator medication orders have been revised according to the RT Nebulizer Bronchodilator Protocol below. Respiratory Therapist to perform RT Therapy Protocol Assessment initially then follow the protocol. Repeat RT Therapy Protocol Assessment PRN for score 0-3 or on second treatment, BID, and PRN for scores above 3. No Indications  adjust the frequency to every 6 hours PRN wheezing or bronchospasm, if no treatments needed after 48 hours then discontinue using Per Protocol order mode. If indication present, adjust the RT bronchodilator orders based on the Bronchodilator Assessment Score as indicated below. If a patient is on this medication at home then do not decrease Frequency below that used at home. 0-3  enter or revise RT bronchodilator order(s) to equivalent RT Bronchodilator order with Frequency of every 4 hours PRN for wheezing or increased work of breathing using Per Protocol order mode.        4-6  enter or revise RT Bronchodilator order(s) to two equivalent RT bronchodilator orders with one order with BID Frequency and one order with Frequency of every 4 hours PRN wheezing or increased work of breathing using Per Protocol order mode. 7-10  enter or revise RT Bronchodilator order(s) to two equivalent RT bronchodilator orders with one order with TID Frequency and one order with Frequency of every 4 hours PRN wheezing or increased work of breathing using Per Protocol order mode. 11-13  enter or revise RT Bronchodilator order(s) to one equivalent RT bronchodilator order with QID Frequency and an Albuterol order with Frequency of every 4 hours PRN wheezing or increased work of breathing using Per Protocol order mode. Greater than 13  enter or revise RT Bronchodilator order(s) to one equivalent RT bronchodilator order with every 4 hours Frequency and an Albuterol order with Frequency of every 2 hours PRN wheezing or increased work of breathing using Per Protocol order mode. RT to enter RT Home Evaluation for COPD & MDI Assessment order using Per Protocol order mode.     Electronically signed by Kathy Webber RCP on 2/9/2022 at 8:42 AM

## 2022-02-09 NOTE — PROGRESS NOTES
65 mL of versed wasted with Stefani Gallardo RN.   Electronically signed by Paige Kussmaul, RN on 2/9/2022 at 9:42 AM     Electronically signed by Merle Plummer RN on 2/9/2022 at 9:43 AM

## 2022-02-09 NOTE — PROGRESS NOTES
Rounding completed with Dr. Castrejon Search and multidisciplinary team. POC, labs, lines and assessment reviewed.      -250 mg of Diamox  - Start precedex for possibl;e SBT later today  - replace K with 40 PO

## 2022-02-09 NOTE — PROGRESS NOTES
02/09/22 0034   Vent Information   Vent Type 840   Vent Mode AC/PC   Pressure Ordered 16   Rate Set 20 bmp   FiO2  50 %   SpO2 94 %   SpO2/FiO2 ratio 188   Sensitivity 3   PEEP/CPAP 5   Vent Patient Data   Peak Inspiratory Pressure 23 cmH2O   Mean Airway Pressure 12 cmH20   Rate Measured 22 br/min   Vt Exhaled 445 mL   Minute Volume 9.34 Liters   I:E Ratio 1:1.8   Cough/Sputum   Sputum How Obtained Suctioned;Endotracheal   Sputum Amount Small   Sputum Color Yellow;Creamy   Tenacity Thick   Spontaneous Breathing Trial (SBT) RT Doc   Pulse 106   Breath Sounds   Right Upper Lobe Diminished   Right Middle Lobe Diminished   Right Lower Lobe Diminished   Left Upper Lobe Diminished   Left Lower Lobe Diminished   Additional Respiratory  Assessments   Resp 22   Position Semi-Mohamud's   ETT (adult)   Placement Date/Time: 01/21/22 0330   Tube Size: 8 mm  Location: Oral  Secured at: 23 cm  Measured From: Lips   ET Placement Left

## 2022-02-09 NOTE — PROGRESS NOTES
Gordon decraesed to 15cwp           02/09/22 1135   Vent Information   Vent Type 840   Vent Mode AC/PC   Pressure Ordered 15   Rate Set 20 bmp   FiO2  50 %   SpO2 93 %   SpO2/FiO2 ratio 186   Sensitivity 2   PEEP/CPAP 5   I Time/ I Time % 1 s   Humidification Source Heated wire   Humidification Temp Measured 37   Vent Patient Data   Peak Inspiratory Pressure 23 cmH2O   Mean Airway Pressure 5 cmH20   Rate Measured 23 br/min   Vt Exhaled 347 mL   Minute Volume 8.4 Liters   I:E Ratio 1:1.6   Cough/Sputum   Sputum How Obtained Suctioned;Endotracheal   Cough Productive   Sputum Amount Small   Sputum Color Creamy   Tenacity Thick   Spontaneous Breathing Trial (SBT) RT Doc   Pulse 111   Breath Sounds   Right Upper Lobe Diminished   Right Middle Lobe Diminished   Right Lower Lobe Diminished   Left Upper Lobe Diminished   Left Lower Lobe Diminished   Additional Respiratory  Assessments   Resp 26   Position Right Side   Oral Care Completed? Yes   Oral Care Mouth suctioned   Subglottic Suction Done?  Yes   Alarm Settings   High Pressure Alarm 45 cmH2O   Low Minute Volume Alarm 5 L/min   Apnea (secs) 20 secs   High Respiratory Rate 45 br/min   Low Exhaled Vt  250 mL   ETT (adult)   Placement Date/Time: 01/21/22 0330   Tube Size: 8 mm  Location: Oral  Secured at: 23 cm  Measured From: Lips   Secured at 23 cm   Measured From [address]   Site Condition Dry

## 2022-02-10 NOTE — PROGRESS NOTES
Pulmonary & Critical Care Medicine ICU Progress Note    CC: COVID in partially vaccinated patient     Events of Last 24 hours: Tolerated PSV 10/5 for 8 hours yesterday    Fent 150  Precedex 0.6  Invasive Lines: PICC 1/20/2022    MV: 1/21/2022  Vent Mode: AC/PC Rate Set: 20 bmp/Vt Ordered: 0 mL/ /FiO2 : 50 %  Recent Labs     02/09/22  0540 02/10/22  0506   PHART 7.546* 7.541*   JKM6VAK 40.8 37.8   PO2ART 59.2* 65.7*       IV:   dexmedetomidine HCl in NaCl 0.6 mcg/kg/hr (02/10/22 0340)    norepinephrine 2.027 mcg/min (02/07/22 1241)    fentaNYL 100 mcg/hr (02/09/22 1757)    propofol Stopped (02/09/22 1204)    dextrose      sodium chloride Stopped (01/22/22 1231)       Vitals:  Blood pressure (!) 109/58, pulse 98, temperature 99.5 °F (37.5 °C), temperature source Axillary, resp. rate 24, height 5' 3\" (1.6 m), weight 169 lb (76.7 kg), SpO2 97 %, not currently breastfeeding. on vent    Intake/Output Summary (Last 24 hours) at 2/10/2022 0736  Last data filed at 2/10/2022 8254  Gross per 24 hour   Intake 1544.4 ml   Output 2000 ml   Net -455.6 ml     General: intubated, ill appearing    ENT: Pharynx with ETT. Eyes with some crusting   Resp: No crackles. No wheezing. CV: S1, S2. Trace edema  GI: NT, ND, +BS  Skin: Warm and dry. Neuro: PERRL. Sedated, not following commands.      Scheduled Meds:   vancomycin (VANCOCIN) intermittent dosing (placeholder)   Other RX Placeholder    albuterol  2.5 mg Nebulization Q4H    furosemide  40 mg IntraVENous Daily    insulin lispro  0-18 Units SubCUTAneous Q4H    carboxymethylcellulose PF  1 drop Both Eyes 4x Daily    QUEtiapine  25 mg Oral BID    chlorhexidine  15 mL Mouth/Throat BID    famotidine (PEPCID) injection  20 mg IntraVENous BID    enoxaparin  30 mg SubCUTAneous BID    aspirin  81 mg Oral Daily    clopidogrel  75 mg Oral Daily    ascorbic acid  1,000 mg Oral Daily    atorvastatin  10 mg Oral Nightly    sodium chloride flush  5-40 mL IntraVENous 2 times per day    Vitamin D  2,000 Units Oral Daily     PRN Meds:  albuterol, magnesium sulfate, potassium chloride, fentanNYL, midazolam, glucose, glucagon (rDNA), dextrose, dextrose bolus (hypoglycemia) **OR** dextrose bolus (hypoglycemia), guaiFENesin-dextromethorphan, sodium chloride flush, sodium chloride, ondansetron **OR** ondansetron, polyethylene glycol, acetaminophen **OR** acetaminophen    Results:  CBC:   Recent Labs     02/08/22  0400 02/09/22  0527 02/10/22  0456   WBC 10.7 8.9 7.7   HGB 8.8* 9.0* 8.9*   HCT 25.3* 27.4* 26.8*   MCV 88.8 88.6 88.1    434 372     BMP:   Recent Labs     02/08/22  0400 02/09/22  0527 02/10/22  0456   * 134* 136   K 3.1* 3.4* 3.1*   CL 90* 92* 94*   CO2 33* 33* 32   BUN 19 18 20   CREATININE <0.5* <0.5* <0.5*     LIVER PROFILE: No results for input(s): AST, ALT, LIPASE, BILIDIR, BILITOT, ALKPHOS in the last 72 hours. Invalid input(s): AMYLASE,  ALB    Micro:  1/17/2022 SARS-CoV-2 positive at urgent care  1/12/2022 SARS-CoV-2 detected   1/31/2022 tracheal aspirate MRSA  2/1/2022 blood NG  2/6/22 BAL: MRSA    Imaging:   CXR 2/10/2022:   1. Unchanged position of support devices.  Kinked appearance of left chest   tube again demonstrated without evidence for pneumothorax.       2. No significant change in bilateral airspace disease. ASSESSMENT:  · Acute hypoxemic respiratory failure, severe and life-threatening  · COVID-19 pneumonia in a partially vaccinated patient, s/p J&J vaccination 3/2021  · ARDS  · MRSA pneumonia  · Now with new fever and new leukocytosis    · Left pneumothorax, chest tube placed 2/1/22  · Former smoker    PLAN:  Droplet Plus Airborne Precautions   Mechanical ventilation as per my orders.  The ventilator was adjusted by me at the bedside for unstable, life threatening respiratory failure   On pressure control   Dec Pi to 13  Precedex gtt  Seroquel 25 BID   Completed 7 days Levaquin   Vanc D#10/14,  Stopped Cefepime    Completed Decadron D#25  Completed 14 days baricitinib   Replace K  Lasix 40mg IV daily  d/c Chest tube and then repeat CXR in 4 hours  H-SSI for FSBS goal 150-180  POA is leaning toward trach an da consult will be placed today  Prophylaxis: Pepcid, Lovenox 40 BID  Total critical care time caring for this patient with life threatening, unstable organ failure, including direct patient contact, management of life support systems, review of data including imaging and labs, discussions with other team members and physicians is 31 minutes so far today, excluding procedures.

## 2022-02-10 NOTE — PROGRESS NOTES
Blood pressure (!) 105/59, pulse 91, temperature 99.5 °F (37.5 °C), temperature source Axillary, resp. rate 30, height 5' 3\" (1.6 m), weight 169 lb (76.7 kg), SpO2 94 %, not currently breastfeeding. Patient currently intubated with ETT at 25. Vent settings are PC 20/+5/50%. Precedex infusing at .6 mcg/kg/h. Fentanyl infusing at 50 mcg//h. Reassessment completed. No changes noted in patients physical assessment.  Electronically signed by Regino Bonner RN on 2/10/2022 at 5:04 AM

## 2022-02-10 NOTE — PROGRESS NOTES
RT Nebulizer Bronchodilator Protocol Note    There is a bronchodilator order in the chart from a provider indicating to follow the RT Bronchodilator Protocol and there is an Initiate RT Bronchodilator Protocol order as well (see protocol at bottom of note). CXR Findings:  XR CHEST PORTABLE    Result Date: 2/8/2022  Stable support apparatus. The left-sided chest tube catheter tubing appears kinked. Persistent diffuse bilateral airspace opacities. Findings on the left have progressed compared to prior. The findings from the last RT Protocol Assessment were as follows:  Smoking: (P) Smoker 15 pack years or more  Respiratory Pattern: (P) Mild dyspnea at rest, irregular pattern, or RR 21-25 bpm  Breath Sounds: (P) Intermittent or unilateral wheezes  Cough: (P) Weak, productive  Indication for Bronchodilator Therapy: (P) Wheezing associated with pulm disorder  Bronchodilator Assessment Score: (P) 11    Aerosolized bronchodilator medication orders have been revised according to the RT Nebulizer Bronchodilator Protocol below. Respiratory Therapist to perform RT Therapy Protocol Assessment initially then follow the protocol. Repeat RT Therapy Protocol Assessment PRN for score 0-3 or on second treatment, BID, and PRN for scores above 3. No Indications  adjust the frequency to every 6 hours PRN wheezing or bronchospasm, if no treatments needed after 48 hours then discontinue using Per Protocol order mode. If indication present, adjust the RT bronchodilator orders based on the Bronchodilator Assessment Score as indicated below. If a patient is on this medication at home then do not decrease Frequency below that used at home. 0-3  enter or revise RT bronchodilator order(s) to equivalent RT Bronchodilator order with Frequency of every 4 hours PRN for wheezing or increased work of breathing using Per Protocol order mode.        4-6  enter or revise RT Bronchodilator order(s) to two equivalent RT bronchodilator orders with one order with BID Frequency and one order with Frequency of every 4 hours PRN wheezing or increased work of breathing using Per Protocol order mode. 7-10  enter or revise RT Bronchodilator order(s) to two equivalent RT bronchodilator orders with one order with TID Frequency and one order with Frequency of every 4 hours PRN wheezing or increased work of breathing using Per Protocol order mode. 11-13  enter or revise RT Bronchodilator order(s) to one equivalent RT bronchodilator order with QID Frequency and an Albuterol order with Frequency of every 4 hours PRN wheezing or increased work of breathing using Per Protocol order mode. Greater than 13  enter or revise RT Bronchodilator order(s) to one equivalent RT bronchodilator order with every 4 hours Frequency and an Albuterol order with Frequency of every 2 hours PRN wheezing or increased work of breathing using Per Protocol order mode. RT to enter RT Home Evaluation for COPD & MDI Assessment order using Per Protocol order mode.     Electronically signed by Ignacia Simms RCP on 2/10/2022 at 10:27 AM

## 2022-02-10 NOTE — PROGRESS NOTES
Physician Progress Note      Sparkle Scales  CSN #:                  [id number]  :                       1950  ADMIT DATE:       2022 9:40 AM  DISCH DATE:  RESPONDING  PROVIDER #:        Rosanne Richardson MD          QUERY TEXT:    Pt admitted with COVID-19 and noted to have on  WBC up to 23.1, , RR   27. If possible, please document in progress notes and discharge summary if   you are evaluating and/or treating: The medical record reflects the following:  Risk Factors: Covid, Covid pneumonia, ARDS  Clinical Indicators:  WBC up to 23.1, , RR 27.  pulmonology notes   - -  Now with new fever and new leukocytosis   temp 102 and , WBC 11.8   normal  Treatment: monitor labs/images, monitor vitals, Levaquin, intubated,   pulmonology consult    Thank You Aruna Feldman RN, [email or web address]  Options provided:  -- Sepsis not present on admission due to COVID-19 infection  -- Sepsis not present on admission due to COVID-19 pneumonia  -- Covid-19 infection without sepsis  -- Covid-19 pneumonia without sepsis  -- Other - I will add my own diagnosis  -- Disagree - Not applicable / Not valid  -- Disagree - Clinically unable to determine / Unknown  -- Refer to Clinical Documentation Reviewer    PROVIDER RESPONSE TEXT:    This patient has sepsis which was not present on admission due to COVID-19   infection.     Query created by: Cecil Wilkinson on 2022 1:08 PM      Electronically signed by:  Rosanne Richardson MD 2/10/2022 4:29 PM

## 2022-02-10 NOTE — PROGRESS NOTES
Blood pressure (!) 112/56, pulse 100, temperature 99.1 °F (37.3 °C), temperature source Axillary, resp. rate (!) 32, height 5' 3\" (1.6 m), weight 169 lb (76.7 kg), SpO2 94 %, not currently breastfeeding. Patient currently intubated with ETT at 25. Vent settings are PC 20/+5/50%. Precedex infusing at .6 mcg/kg/h. Propofol infusing at 50 mcg/kg/min. Reassessment completed. No changes noted in physical assessment. Another small BM noted. Restraints in place. Repositioned.

## 2022-02-10 NOTE — PROGRESS NOTES
02/10/22 1438   Vent Information   Skin Assessment Clean, dry, & intact   Vent Type 840   Vent Mode AC/PC   Pressure Ordered 13   Rate Set 20 bmp   FiO2  60 %   SpO2 97 %   SpO2/FiO2 ratio 161.67   Sensitivity 2   PEEP/CPAP 8   I Time/ I Time % 0.8 s   Humidification Source Heated wire   Humidification Temp Measured 37   Vent Patient Data   Peak Inspiratory Pressure 25 cmH2O   Mean Airway Pressure 15 cmH20   Rate Measured 32 br/min   Vt Exhaled 359 mL   Minute Volume 11.1 Liters   I:E Ratio 1:1.4   Cough/Sputum   Sputum How Obtained Suctioned;Endotracheal   Cough Productive   Sputum Amount Large   Sputum Color Creamy   Tenacity Thick   Spontaneous Breathing Trial (SBT) RT Doc   Pulse 82   Breath Sounds   Right Upper Lobe Coarse Crackles   Right Middle Lobe Coarse Crackles   Right Lower Lobe Diminished   Left Upper Lobe Coarse Crackles   Left Lower Lobe Diminished   Additional Respiratory  Assessments   Resp 27   Position Reverse Trendelenburg   Oral Care Completed? Yes   Oral Care Mouth suctioned   Subglottic Suction Done?  Yes   Alarm Settings   High Pressure Alarm 45 cmH2O   Low Minute Volume Alarm 5 L/min   Apnea (secs) 20 secs   High Respiratory Rate 45 br/min   Low Exhaled Vt  250 mL   ETT (adult)   Placement Date/Time: 01/21/22 0330   Tube Size: 8 mm  Location: Oral  Secured at: 23 cm  Measured From: Lips   Secured at 23 cm   Measured From [address] [employer] [address]   Site Condition Dry

## 2022-02-10 NOTE — PROGRESS NOTES
Blood pressure (!) 99/54, pulse 87, temperature 99.4 °F (37.4 °C), resp. rate 28, height 5' 3\" (1.6 m), weight 169 lb (76.7 kg), SpO2 (!) 88 %, not currently breastfeeding. Patient currently intubated with ETT at 25. Vent settings are PC 20/+5/50%. Precedex infusing at .6 mcg/kg/h. Fentanyl infusing at 50 mcg//h. Shift assessment completed. Pt sedated on vent. Did wake up and follow simple commands but fell right back asleep. Tolerating current vent settings. Large amount of secretions noted. Lungs clear. L ch tube remains clamped. OGT with tF at goal - 45cc residuals. Mcguire patent. Good OP. Large incontinent BM. Repositioned. Restraints in place. No further needs noted at this time.  Electronically signed by Mikhail Mendoza RN on 2/9/2022 at 9:49 PM

## 2022-02-10 NOTE — CONSULTS
Surgery Consult Note     Cari Hardy, APRN - CNP, CNP  Pt Name: Randall Dick  MRN: [id number]  YOB: 1950  Date of evaluation: 2/10/2022  Primary Care Physician: Homero Galindo DO  Patient evaluated at the request of  Dr. Rock Graham  Reason for evaluation: Trach and PEG  IMPRESSIONS:   1. NECK: no scars, trach symmetric, no masses  2. ABD: soft, no distention, low transverse scar noted, tolerating TF via OG  3. + COVID PNA: intubated on 1/21, vent 55% FIO2, PEEP 5  4. On Plavix: held today by Dr. Rock Graham, Lovenox prophylaxis BID  5. Per Arlet Davis note and RN: pt family unsure if they want to proceed with trach and PEG  6. Getting a venous duplex of RUE for swelling  7. does not have any pertinent problems on file. PLANS:   1. Plavix on Hold  2. Would plan trach and PEG for early next week, should family decide they would like to proceed. Treatment consent will be obtained once decision made. SUBJECTIVE:   History of Chief Complaint:    Randall Dick is a 71 y.o. female who presented to [address] [place] [address] on 1/12 with known COVID x 1 week c/o cough, SOB, chills and sweats. She had two COVID vaccinations without booster. She was diagnosed with COVID PNA resulting in acute respiratory failure with hypoxia. The patient's O2 requirements increased. She was intubated on 1/21. She developed fevers of 102. She developed a pneumothorax and a left chest tube was placed on 2/1. She has been unable to be weaned from ventilator support. We were asked to evaluate this patient for trach and PEG placement. Past Medical History   has a past medical history of Arthritis, Asthma, DDD (degenerative disc disease), Diabetes mellitus ([address] and Hyperlipidemia. Past Surgical History   has a past surgical history that includes Tubal ligation; Hysterectomy; Wrist surgery; Knee arthroscopy; Tonsillectomy; Total knee arthroplasty (10/05/2012);  Colonoscopy (2001); and Colonoscopy (08/17/2016). Medications  Prior to Admission medications    Medication Sig Start Date End Date Taking? Authorizing Provider   simvastatin (ZOCOR) 20 MG tablet TAKE 1 TABLET AT BEDTIME FOR HIGH AMOUNT OF FATS IN THE BLOOD 2/23/21  Yes Historical Provider, MD   fluticasone (FLONASE) 50 MCG/ACT nasal spray USE 1 SPRAY IN EACH NOSTRIL TWICE A DAY AS NEEDED 8/31/21  Yes Historical Provider, MD   Cholecalciferol (VITAMIN D) 10 MCG (400 UNIT) CAPS Capsule Take 1 tablet by mouth daily    Historical Provider, MD   meloxicam (MOBIC) 15 MG tablet Take 15 mg by mouth daily 5/17/19   Historical Provider, MD   aspirin 81 MG tablet Take 81 mg by mouth daily.       Historical Provider, MD    Scheduled Meds:   vancomycin (VANCOCIN) intermittent dosing (placeholder)   Other RX Placeholder    vancomycin  750 mg IntraVENous Q12H    albuterol  2.5 mg Nebulization Q4H    furosemide  40 mg IntraVENous Daily    insulin lispro  0-18 Units SubCUTAneous Q4H    carboxymethylcellulose PF  1 drop Both Eyes 4x Daily    QUEtiapine  25 mg Oral BID    chlorhexidine  15 mL Mouth/Throat BID    famotidine (PEPCID) injection  20 mg IntraVENous BID    enoxaparin  30 mg SubCUTAneous BID    [Held by provider] aspirin  81 mg Oral Daily    [Held by provider] clopidogrel  75 mg Oral Daily    ascorbic acid  1,000 mg Oral Daily    atorvastatin  10 mg Oral Nightly    sodium chloride flush  5-40 mL IntraVENous 2 times per day    Vitamin D  2,000 Units Oral Daily     Continuous Infusions:   dexmedetomidine HCl in NaCl 0.7 mcg/kg/hr (02/10/22 1403)    norepinephrine 2.027 mcg/min (02/07/22 1241)    fentaNYL 25 mcg/hr (02/10/22 1146)    propofol Stopped (02/09/22 1204)    dextrose      sodium chloride Stopped (01/22/22 1231)     PRN Meds:.albuterol, magnesium sulfate, potassium chloride, fentanNYL, midazolam, glucose, glucagon (rDNA), dextrose, dextrose bolus (hypoglycemia) **OR** dextrose bolus (hypoglycemia), guaiFENesin-dextromethorphan, sodium chloride flush, sodium chloride, ondansetron **OR** ondansetron, polyethylene glycol, acetaminophen **OR** acetaminophen    Allergies  is allergic to hydrocodone-acetaminophen, morphine, morphine and related, vicodin [hydrocodone-acetaminophen], and pcn [penicillins]. Family History  family history includes Breast Cancer (age of onset: 79) in her mother; Cancer in her brother, maternal uncle, and mother; Diabetes in her mother; Emphysema in her father; Heart Disease in her father. Social History   reports that she has quit smoking. Her smoking use included cigarettes. She has a 15.00 pack-year smoking history. She has never used smokeless tobacco. She reports current alcohol use. She reports that she does not use drugs. Review of Systems: Pt sedated on Vent, unable to obtain      OBJECTIVE:   VITALS:  height is 5' 3\" (1.6 m) and weight is 169 lb (76.7 kg). Her axillary temperature is 99.1 °F (37.3 °C). Her blood pressure is 95/61 and her pulse is 83. Her respiration is 27 and oxygen saturation is 97%. CONSTITUTIONAL: sedated on vent  SKIN: Skin color pale, texture dry, turgor fair. No rashes or lesions. HEENT: Head is normocephalic, atraumatic. Eyes closed  NECK: supple, symmetrical, trachea midline. CHEST/LUNGS: chest symmetric with normal A/P diameter without tachypnea with rhonchi. + accessory muscle use. CARDIOVASCULAR: Heart tachy rate and regular rhythm ABDOMEN: obese low transverse scar noted, ? Old midline scar  scar(s) present. Abnormal bowel sounds: diminished. As above. no evidence of hernia. RECTAL: deferred, not clinically indicated  NEUROLOGIC: sedated on vent. EXTREMITIES: no cyanosis and no clubbing, 1-2+ BLE/BUL R>L edema noted.     LABS:     Recent Labs     02/08/22  0400 02/09/22  0527 02/10/22  0456   WBC 10.7 8.9 7.7   HGB 8.8* 9.0* 8.9*   HCT 25.3* 27.4* 26.8*    434 372   * 134* 136   K 3.1* 3.4* 3.1*   CL 90* 92* 94*   CO2 33* 33* 32   BUN 19 18 20 CREATININE <0.5* <0.5* <0.5*   CALCIUM 8.7 9.1 8.5     No results for input(s): ALKPHOS, ALT, AST, BILITOT, BILIDIR, LABALBU, AMYLASE, LIPASE in the last 72 hours. CBC with Differential:    Lab Results   Component Value Date    WBC 7.7 02/10/2022    RBC 3.04 02/10/2022    HGB 8.9 02/10/2022    HCT 26.8 02/10/2022     02/10/2022    MCV 88.1 02/10/2022    MCH 29.3 02/10/2022    MCHC 33.2 02/10/2022    RDW 13.7 02/10/2022    NRBC 1 02/06/2022    SEGSPCT 53.0 09/22/2012    BANDSPCT 9 02/10/2022    METASPCT 2 02/10/2022    LYMPHOPCT 16.0 02/10/2022    MONOPCT 8.0 02/10/2022    MYELOPCT 2 02/10/2022    EOSPCT 2.6 08/29/2010    BASOPCT 3.0 02/10/2022    MONOSABS 0.6 02/10/2022    LYMPHSABS 1.7 02/10/2022    EOSABS 0.0 02/10/2022    BASOSABS 0.2 02/10/2022    DIFFTYPE Auto-K 09/22/2012     CMP:    Lab Results   Component Value Date     02/10/2022    K 3.1 02/10/2022    K 4.5 01/20/2022    CL 94 02/10/2022    CO2 32 02/10/2022    BUN 20 02/10/2022    CREATININE <0.5 02/10/2022    GFRAA >60 02/10/2022    GFRAA >60 01/14/2013    AGRATIO 0.8 01/20/2022    LABGLOM >60 02/10/2022    GLUCOSE 195 02/10/2022    PROT 5.9 01/25/2022    PROT 6.5 01/14/2013    LABALBU 2.1 02/06/2022    CALCIUM 8.5 02/10/2022    BILITOT 0.3 01/25/2022    ALKPHOS 50 01/25/2022    AST 18 01/25/2022    ALT 16 01/25/2022       Thank you for the interesting evaluation. Further recommendations to follow.       Electronically signed by ANGEL Goff CNP on 2/10/2022 at 2:48 PM

## 2022-02-10 NOTE — PLAN OF CARE
Problem: Airway Clearance - Ineffective  Goal: Achieve or maintain patent airway  Outcome: Ongoing     Problem: Gas Exchange - Impaired  Goal: Absence of hypoxia  Outcome: Ongoing  Goal: Promote optimal lung function  Outcome: Ongoing     Problem: Breathing Pattern - Ineffective  Goal: Ability to achieve and maintain a regular respiratory rate  Outcome: Ongoing     Problem:  Body Temperature -  Risk of, Imbalanced  Goal: Ability to maintain a body temperature within defined limits  Outcome: Ongoing  Goal: Will regain or maintain usual level of consciousness  Outcome: Ongoing  Goal: Complications related to the disease process, condition or treatment will be avoided or minimized  Outcome: Ongoing     Problem: Isolation Precautions - Risk of Spread of Infection  Goal: Prevent transmission of infection  Outcome: Ongoing     Problem: Nutrition Deficits  Goal: Optimize nutritional status  Outcome: Ongoing     Problem: Risk for Fluid Volume Deficit  Goal: Maintain normal heart rhythm  Outcome: Ongoing  Goal: Maintain absence of muscle cramping  Outcome: Ongoing  Goal: Maintain normal serum potassium, sodium, calcium, phosphorus, and pH  Outcome: Ongoing     Problem: Loneliness or Risk for Loneliness  Goal: Demonstrate positive use of time alone when socialization is not possible  Outcome: Ongoing     Problem: Fatigue  Goal: Verbalize increase energy and improved vitality  Outcome: Ongoing     Problem: Patient Education: Go to Patient Education Activity  Goal: Patient/Family Education  Outcome: Ongoing     Problem: Pain:  Goal: Pain level will decrease  Description: Pain level will decrease  Outcome: Ongoing  Goal: Control of acute pain  Description: Control of acute pain  Outcome: Ongoing  Goal: Control of chronic pain  Description: Control of chronic pain  Outcome: Ongoing     Problem: Nutrition  Goal: Optimal nutrition therapy  2/10/2022 0407 by Elena Hines RN  Outcome: Ongoing  2/9/2022 1443 by Ferny Nieves JENNIFER Xiao, BARBARA  Outcome: Met This Shift  2/9/2022 1443 by Jennifer Ruiz RD, BARBARA  Outcome: Met This Shift     Problem: Infection - Central Venous Catheter-Associated Bloodstream Infection:  Goal: Will show no infection signs and symptoms  Description: Will show no infection signs and symptoms  Outcome: Ongoing     Problem: Non-Violent Restraints  Goal: Removal from restraints as soon as assessed to be safe  Outcome: Ongoing  Goal: No harm/injury to patient while restraints in use  Outcome: Ongoing  Goal: Patient's dignity will be maintained  Outcome: Ongoing     Problem: Skin Integrity:  Goal: Will show no infection signs and symptoms  Description: Will show no infection signs and symptoms  Outcome: Ongoing  Goal: Absence of new skin breakdown  Description: Absence of new skin breakdown  Outcome: Ongoing

## 2022-02-10 NOTE — PROGRESS NOTES
This note also relates to the following rows which could not be included:  Pulse - Cannot attach notes to unvalidated device data       02/09/22 6994   Vent Information   Vent Type 840   Vent Mode AC/PC   Pressure Ordered 15   Rate Set 20 bmp   FiO2  50 %   SpO2 100 %   SpO2/FiO2 ratio 200   Sensitivity 2   PEEP/CPAP 5   I Time/ I Time % 0.8 s   Humidification Source HME   Humidification Temp 37   Humidification Temp Measured 37   Circuit Condensation Drained   Vent Patient Data   Peak Inspiratory Pressure 23 cmH2O   Mean Airway Pressure 15 cmH20   Rate Measured 31 br/min   Vt Exhaled 386 mL   Minute Volume 10.3 Liters   I:E Ratio 1:2   Plateau Pressure 18 NHM16   Cough/Sputum   Sputum How Obtained Endotracheal;Suctioned   Cough Productive   Sputum Amount Moderate   Tenacity Thick   Breath Sounds   Right Upper Lobe Diminished   Right Middle Lobe Diminished   Right Lower Lobe Diminished   Left Upper Lobe Diminished   Left Lower Lobe Diminished   Additional Respiratory  Assessments   Resp 12   Position Semi-Mohamud's   Alarm Settings   High Pressure Alarm 45 cmH2O   Low Minute Volume Alarm 5 L/min   Apnea (secs) 20 secs   High Respiratory Rate 45 br/min   Low Exhaled Vt  250 mL   ETT (adult)   Placement Date/Time: 01/21/22 0330   Tube Size: 8 mm  Location: Oral  Secured at: 23 cm  Measured From: Lips   Secured at 22 cm   Measured From Lips   ET Placement Right  (moved to right)   Secured By [employer] tube nicole   Site Condition Dry

## 2022-02-10 NOTE — PROGRESS NOTES
Right arm, arm with PICC line, seems more swollen and warmer than other other UE. Dr. Malone Congress is on unit, will discuss with him when he rounds.

## 2022-02-10 NOTE — PROGRESS NOTES
Hospitalist Progress Note      PCP: Shauna uNnez DO    Date of Admission: 1/12/2022     resp failure on vent , covid pna, unvaccinated  High fevers S.p bronch with BAL   Ongoing weaning trials    Subjective:    Ms Emma Blankenship seen sedated , not on breathing trial today   Worsening hypoxemia  Off chest tube today     No fevers     Medications:  Reviewed    Infusion Medications    dexmedetomidine HCl in NaCl 0.6 mcg/kg/hr (02/10/22 0340)    norepinephrine 2.027 mcg/min (02/07/22 1241)    fentaNYL 100 mcg/hr (02/09/22 1757)    propofol Stopped (02/09/22 1204)    dextrose      sodium chloride Stopped (01/22/22 1231)     Scheduled Medications    vancomycin (VANCOCIN) intermittent dosing (placeholder)   Other RX Placeholder    albuterol  2.5 mg Nebulization Q4H    furosemide  40 mg IntraVENous Daily    insulin lispro  0-18 Units SubCUTAneous Q4H    carboxymethylcellulose PF  1 drop Both Eyes 4x Daily    QUEtiapine  25 mg Oral BID    chlorhexidine  15 mL Mouth/Throat BID    famotidine (PEPCID) injection  20 mg IntraVENous BID    enoxaparin  30 mg SubCUTAneous BID    aspirin  81 mg Oral Daily    clopidogrel  75 mg Oral Daily    ascorbic acid  1,000 mg Oral Daily    atorvastatin  10 mg Oral Nightly    sodium chloride flush  5-40 mL IntraVENous 2 times per day    Vitamin D  2,000 Units Oral Daily     PRN Meds: albuterol, magnesium sulfate, potassium chloride, fentanNYL, midazolam, glucose, glucagon (rDNA), dextrose, dextrose bolus (hypoglycemia) **OR** dextrose bolus (hypoglycemia), guaiFENesin-dextromethorphan, sodium chloride flush, sodium chloride, ondansetron **OR** ondansetron, polyethylene glycol, acetaminophen **OR** acetaminophen      Intake/Output Summary (Last 24 hours) at 2/10/2022 0736  Last data filed at 2/10/2022 6527  Gross per 24 hour   Intake 1544.4 ml   Output 2000 ml   Net -455.6 ml       Physical Exam Performed:    BP (!) 109/58   Pulse 98   Temp 99.5 °F (37.5 °C) (Axillary)   Resp 24   Ht 5' 3\" (1.6 m)   Wt 169 lb (76.7 kg)   SpO2 97%   BMI 29.94 kg/m²       Patient seen in droplet plus precautions  General:  Elderly female, on vent. supine  Oral ETT and OG noted   Mucous Membranes:  Pink , anicteric  Neck: No JVD, no carotid bruit, no thyromegaly  Chest: diminished in bases, bilateral mild crackles present. Cardiovascular:  RRR S1S2 heard, no murmurs or gallops  Abdomen: Soft, undistended, non tender, no organomegaly, BS present  Extremities: bilateral UE edema worse on right   No edema or cyanosis. Distal pulses well felt  Neurological :  Sedated    Labs:   Recent Labs     02/08/22  0400 02/09/22  0527 02/10/22  0456   WBC 10.7 8.9 7.7   HGB 8.8* 9.0* 8.9*   HCT 25.3* 27.4* 26.8*    434 372     Recent Labs     02/08/22  0400 02/09/22  0527 02/10/22  0456   * 134* 136   K 3.1* 3.4* 3.1*   CL 90* 92* 94*   CO2 33* 33* 32   BUN 19 18 20   CREATININE <0.5* <0.5* <0.5*   CALCIUM 8.7 9.1 8.5     No results for input(s): AST, ALT, BILIDIR, BILITOT, ALKPHOS in the last 72 hours. No results for input(s): INR in the last 72 hours. No results for input(s): Joseline Peek in the last 72 hours. Urinalysis:      Lab Results   Component Value Date    NITRU Negative 02/06/2022    WBCUA 0-2 02/06/2022    BACTERIA 1+ 02/06/2022    RBCUA 5-10 02/06/2022    BLOODU LARGE 02/06/2022    SPECGRAV 1.025 02/06/2022    GLUCOSEU Negative 02/06/2022    GLUCOSEU Neg 08/29/2010       Radiology:  XR CHEST 1 VIEW   Final Result   1. Unchanged position of support devices. Kinked appearance of left chest   tube again demonstrated without evidence for pneumothorax. 2. No significant change in bilateral airspace disease. XR CHEST PORTABLE   Final Result   Stable support apparatus. The left-sided chest tube catheter tubing appears   kinked. Persistent diffuse bilateral airspace opacities. Findings on the left have   progressed compared to prior.          XR CHEST PORTABLE   Final Result   1. Unchanged position of support devices. 2. No significant change in bilateral airspace disease. XR CHEST PORTABLE   Final Result   Stable examination with stable lines and tubes. Stable pneumonia. XR CHEST PORTABLE   Final Result   Stable lines and tubes. Stable examination. Extensive airspace disease   noted in the lungs. XR CHEST PORTABLE   Final Result   Stable examination with stable lines and tubes. Stable findings of COVID   pneumonia. XR CHEST PORTABLE   Final Result   1. No significant change. XR CHEST PORTABLE   Final Result   Worsening multifocal airspace opacities. XR CHEST PORTABLE   Final Result   Interval placement of a left-sided chest tube with significant decrease seen   in size of the left pneumothorax only with a small apical component   remaining. Patchy airspace disease within the lung fields is stable and   unchanged. XR CHEST PORTABLE   Final Result   New onset moderate to large left pneumothorax with mild tension. Endotracheal, nasogastric tubes and PICC line are stable. Redemonstration of   bilateral infiltrates. Findings communicated to the referring physician on February 1, 2022      RECOMMENDATION:   Stat left chest tube. XR CHEST PORTABLE   Final Result   1. Stable lines, tubes, and support devices. 2. Stable cardiopulmonary status including bilateral airspace opacities and   small effusions. XR CHEST PORTABLE   Final Result   No significant change. Continued follow-up recommended. XR CHEST PORTABLE   Final Result   Mild worsening of bilateral infiltrates likely due to pneumonia. Continued   follow-up recommended. XR CHEST PORTABLE   Final Result   Moderate persistent multifocal airspace disease compatible with multifocal   pneumonia including COVID pneumonia.   While similar to recent studies, there   has been gradual progression and worsening since the early study of 01/12/2022. XR CHEST PORTABLE   Final Result   Extensive infiltrates within the lungs bilaterally, worsening on the right   with increasing consolidation. XR CHEST PORTABLE   Final Result   No significant interval change multifocal airspace disease. Stable lines and tubes. XR CHEST PORTABLE   Final Result   Mildly improved parenchymal infiltrates at the right lung base, otherwise   similar appearing chest.         XR CHEST PORTABLE   Final Result   Extensive bilateral airspace opacities are seen without significant change. XR CHEST PORTABLE   Final Result   1. Recommend retraction of endotracheal tube 1.0 cm.   2. Stable severe ill-defined lung consolidation, greatest at the lung bases   consistent with pneumonia versus alveolar edema. 3. Suspected mild bilateral pleural effusion, unchanged. XR CHEST PORTABLE   Final Result   1. No significant change. XR CHEST PORTABLE   Final Result   Appropriate positioning of the endotracheal tube. Enteric tube courses   beneath the diaphragm; tip is excluded by collimation inferiorly. No significant change in extensive bilateral airspace disease. IR PICC WO SQ PORT/PUMP > 5 YEARS   Final Result   Successful placement of PICC line. XR CHEST PORTABLE   Final Result   New multifocal moderate to severe pneumonia. Clinical and imaging follow-up   to resolution recommended. CTA HEAD NECK W CONTRAST   Final Result   Unremarkable CTA of the head and neck. Findings consistent with COVID pneumonia. This scan was analyzed using Viz. ai contact LVO. Identification of suspected   findings is not for diagnostic use beyond notification. Viz LVO is limited to   analysis of imaging data and should not be used in-lieu of full patient   evaluation or relied upon to make or confirm diagnosis. CT HEAD WO CONTRAST   Final Result   No acute intracranial abnormality.          XR CHEST PORTABLE Final Result   Multifocal bilateral atypical pneumonia. Assessment/Plan:      COVID PNA  Acute hypoxic respiratory Failure   - CXR: noted with multifocal PNA  - no home O2 at baseline, 83-87% at rest,   - had vaccine J [employer]J in March 2021- no booster   - Admitted to 2W, droplet +, tele, cont pulse ox    -progressively worsening hypoxia, was on vapotherm with full support   - eventually placed on vent support 1/21    persistent severe hypoxemia -she was proned on 1/20/2022.    - Decadron weaned and stopped off 25 days   - Remdesivir not started due to out of window- sx 7+ days prior to admit  - - finished 7 days levaquin   -- CRP elevated . Started on Baricitinib - finished 14 days .    Monitored CBC and LFTs  - Has MRSA in her sputums and elevated WBC. Stopped cefepime. continue Vancomycin day #10  - ongoing weaning trials   Valium and Seroquel added for weaning IV sedation   precedex caused bradycardia  - sp bronch with BAL for fevers- BAL with MRSA again   - ongoing weaning trials now     Left pneumothorax. - left chest tube placed. CT flushed and it is patent. Placed to suction. - resolved pneumothorax and off chest tube 2/10     Hypotension  -weaned off pressors     Episodes of expressive aphasia   - ? TIA, vs mental status changes related to covid   - cont ASA, Plavix      Thrombocytopenia resolved  - likely with covid  - continue Lovenox and monitor     Hyperglycemia  Secondary to steroids  On Sliding scale insulin      HLD  - Continue statin        Right UE edema- for venous doppler today           DVT Prophylaxis: Lovenox bid  Diet: Diet NPO  ADULT TUBE FEEDING; Orogastric; Other Tube Feeding (specify);  Glucerna 1.2; Continuous; 20; Yes; 20; Q 4 hours; 60; 30; Q 4 hours  Code Status: Full Code      Dispo - cont care    Sherlyn Harrell MD, 2/10/2022 7:36 AM

## 2022-02-10 NOTE — CONSULTS
2-10-22 (0005) vancomycin trough 22.6. Please hold current vancomycin regimen of 1 gram ivpb q12h. Please recheck vancomycin trough 2-10-22 at 1200. [facility]. Ph.  2/10/2022 2:32 AM

## 2022-02-10 NOTE — PROGRESS NOTES
02/10/22 0246   Vent Information   $Ventilation $Subsequent Day   Vent Type 840   Vent Mode AC/PC   Pressure Ordered 15   Rate Set 20 bmp   FiO2  50 %   SpO2 95 %   SpO2/FiO2 ratio 190   Sensitivity 2   PEEP/CPAP 5   I Time/ I Time % 0.8 s   Humidification Source Heated wire   Humidification Temp 37   Humidification Temp Measured 37   Circuit Condensation Drained   Vent Patient Data   Peak Inspiratory Pressure 24 cmH2O   Mean Airway Pressure 13 cmH20   Rate Measured 37 br/min   Vt Exhaled 322 mL   Minute Volume 12 Liters   I:E Ratio 1:1.2   Cough/Sputum   Sputum How Obtained Suctioned;Endotracheal   Cough Non-productive   Spontaneous Breathing Trial (SBT) RT Doc   Pulse 89   Breath Sounds   Right Upper Lobe Diminished   Right Middle Lobe Diminished   Right Lower Lobe Diminished   Left Upper Lobe Diminished   Left Lower Lobe Diminished   Additional Respiratory  Assessments   Resp (!) 33   Position Semi-Mohamud's   Oral Care Completed? Yes   Oral Care Mouth suctioned   Subglottic Suction Done?  Yes   Alarm Settings   High Pressure Alarm 45 cmH2O   Low Minute Volume Alarm 5 L/min   Apnea (secs) 20 secs   High Respiratory Rate 45 br/min   Low Exhaled Vt  250 mL   ETT (adult)   Placement Date/Time: 01/21/22 0330   Tube Size: 8 mm  Location: Oral  Secured at: 23 cm  Measured From: Lips   Secured at 23 cm   Measured From [address] By [employer] tube nicole   Site Condition Dry

## 2022-02-10 NOTE — PROGRESS NOTES
FiO2 decraesed to 50%       02/10/22 1100   Vent Information   Skin Assessment Clean, dry, & intact   Vent Type 840   Vent Mode PS   Pressure Support 12 cmH20   FiO2  0 %   SpO2 91 %   SpO2/FiO2 ratio 0   Sensitivity 2   PEEP/CPAP 5   Humidification Source Heated wire   Humidification Temp Measured 37   Vent Patient Data   Peak Inspiratory Pressure 19 cmH2O   Mean Airway Pressure 9 cmH20   Rate Measured 41 br/min   Spontaneous  mL   Minute Volume 16.5 Liters   Cough/Sputum   Sputum How Obtained Suctioned;Endotracheal   Cough Productive   Sputum Amount Large   Sputum Color Creamy   Tenacity Thick   Spontaneous Breathing Trial (SBT) RT Doc   Pulse 110   RSBI Calculated 93.82   Breath Sounds   Right Upper Lobe Diminished   Right Middle Lobe Diminished   Right Lower Lobe Diminished   Left Upper Lobe Diminished   Left Lower Lobe Diminished   Additional Respiratory  Assessments   Resp 29   Position Semi-Mohamud's   Oral Care Completed? Yes   Oral Care Mouth suctioned;Mouth swabbed;Suction toothette;Mouth moisturizer   Subglottic Suction Done?  Yes   Alarm Settings   High Pressure Alarm 45 cmH2O   Low Minute Volume Alarm 5 L/min   Apnea (secs) 20 secs   High Respiratory Rate 45 br/min   Low Exhaled Vt  250 mL   ETT (adult)   Placement Date/Time: 01/21/22 0330   Tube Size: 8 mm  Location: Oral  Secured at: 23 cm  Measured From: Lips   Secured at 22 cm   Measured From Lips   ET Placement Right   Secured By [employer] tube nicole   Site Condition Dry

## 2022-02-10 NOTE — PROGRESS NOTES
02/09/22 2004   Vent Information   Vent Type 840   Vent Mode AC/PC   Pressure Ordered 15   Rate Set 20 bmp   FiO2  50 %   SpO2 90 %   SpO2/FiO2 ratio 180   Sensitivity 2   PEEP/CPAP 5   I Time/ I Time % 0.8 s   Humidification Source Heated wire   Humidification Temp Measured 37   Circuit Condensation Drained   Vent Patient Data   Peak Inspiratory Pressure 21 cmH2O   Mean Airway Pressure 11 cmH20   Rate Measured 26 br/min   Vt Exhaled 362 mL   Minute Volume 10 Liters   I:E Ratio 1:2   Plateau Pressure 18 [id number]   Static Compliance 29 mL/cmH2O   Dynamic Compliance 17 mL/cmH2O   Cough/Sputum   Sputum How Obtained Endotracheal   Cough Productive   Sputum Amount Moderate   Sputum Color Creamy   Tenacity Thick   Spontaneous Breathing Trial (SBT) RT Doc   Pulse 83   Breath Sounds   Right Upper Lobe Diminished   Right Middle Lobe Diminished   Right Lower Lobe Diminished   Left Upper Lobe Diminished   Left Lower Lobe Diminished   Additional Respiratory  Assessments   Resp 26   Alarm Settings   High Pressure Alarm 45 cmH2O   Low Minute Volume Alarm 5 L/min   Apnea (secs) 20 secs   High Respiratory Rate 45 br/min   Low Exhaled Vt  250 mL   Patient Observation   Observations 8ett 22 at lip

## 2022-02-11 NOTE — PROGRESS NOTES
AM assessment completed. See flowsheet. Sedated on vent. Fentanyl infusing at 50 mcg/hr and Precedex infusing at 0.8 mcg/kg/hr. RASS-1. Lungs sounds diminished throughout. NSR on bedside monitor. Bowel sounds active. TF at goal. +2 non-pitting BLE edema noted. Urinary catheter in place draining clear yellow urine. Bilateral soft wrist restraints in place for safety. Labs reviewed. Cont to monitor.

## 2022-02-11 NOTE — PROGRESS NOTES
02/10/22 2231   Vent Information   Equipment Changed Humidification   Vent Type 840   Vent Mode AC/PC   Pressure Ordered 13   Rate Set 20 bmp   FiO2  55 %   SpO2 96 %   SpO2/FiO2 ratio 174.55   Sensitivity 2   PEEP/CPAP 8   I Time/ I Time % 0.8 s   Humidification Source Heated wire   Humidification Temp 37   Humidification Temp Measured 36   Vent Patient Data   Peak Inspiratory Pressure 23 cmH2O   Mean Airway Pressure 13 cmH20   Rate Measured 26 br/min   Vt Exhaled 417 mL   Minute Volume 10.9 Liters   I:E Ratio 1:1.6   Cough/Sputum   Sputum How Obtained Suctioned;Endotracheal   Cough Non-productive   Spontaneous Breathing Trial (SBT) RT Doc   Pulse 96   Breath Sounds   Right Upper Lobe Diminished   Right Middle Lobe Diminished   Right Lower Lobe Diminished   Left Upper Lobe Diminished   Left Lower Lobe Diminished   Additional Respiratory  Assessments   Resp 25   Position Semi-Mohamud's   Oral Care Completed? Yes   Oral Care Mouth suctioned   Subglottic Suction Done?  Yes   Alarm Settings   High Pressure Alarm 45 cmH2O   Low Minute Volume Alarm 5 L/min   Apnea (secs) 20 secs   High Respiratory Rate 45 br/min   Low Exhaled Vt  250 mL   ETT (adult)   Placement Date/Time: 01/21/22 0330   Tube Size: 8 mm  Location: Oral  Secured at: 23 cm  Measured From: Lips   Secured at 22 cm   Measured From Lips   ET Placement Left   Secured By [employer] tube nicole   Site Condition Dry

## 2022-02-11 NOTE — PROGRESS NOTES
02/10/22 1940   Vent Information   Vent Type 840   Vent Mode AC/PC   Pressure Ordered 13   Rate Set 20 bmp   FiO2  60 %   SpO2 95 %   SpO2/FiO2 ratio 158.33   Sensitivity 2   PEEP/CPAP 8   I Time/ I Time % 0.8 s   Humidification Source Heated wire   Humidification Temp 37   Humidification Temp Measured 37   Circuit Condensation Drained   Vent Patient Data   Peak Inspiratory Pressure 25 cmH2O   Mean Airway Pressure 13 cmH20   Rate Measured 27 br/min   Vt Exhaled 353 mL   Minute Volume 9.85 Liters   I:E Ratio 1:1.6   Plateau Pressure 24 [id number]   Cough/Sputum   Sputum How Obtained Suctioned;Endotracheal   Cough Productive   Sputum Amount Large   Sputum Color Creamy   Tenacity Thick   Spontaneous Breathing Trial (SBT) RT Doc   Pulse 83   Breath Sounds   Right Upper Lobe Diminished   Right Middle Lobe Diminished   Right Lower Lobe Diminished   Left Upper Lobe Diminished   Left Lower Lobe Diminished   Additional Respiratory  Assessments   Resp 24   Position Semi-Mohamud's   Oral Care Completed? Yes   Oral Care Mouth suctioned   Subglottic Suction Done?  Yes   Alarm Settings   High Pressure Alarm 45 cmH2O   Low Minute Volume Alarm 5 L/min   Apnea (secs) 20 secs   High Respiratory Rate 45 br/min   Low Exhaled Vt  250 mL   ETT (adult)   Placement Date/Time: 01/21/22 0330   Tube Size: 8 mm  Location: Oral  Secured at: 23 cm  Measured From: Lips   Secured at 22 cm   Measured From Lips   ET Placement Right   Secured By [employer] tube nicole   Site Condition Dry

## 2022-02-11 NOTE — PROGRESS NOTES
Reassessment completed at this time. No major changes. Daughter, Glynn Coker, here at this time for visiting hours.

## 2022-02-11 NOTE — PROGRESS NOTES
Blood pressure (!) 100/54, pulse 98, temperature 99.7 °F (37.6 °C), temperature source Axillary, resp. rate 24, height 5' 3\" (1.6 m), weight 169 lb (76.7 kg), SpO2 94 %, not currently breastfeeding. Patient currently intubated with ETT at 25. Vent settings are 20/320/+8/55%. Fentanyl infusing at 50 mcg//h. Precedex infusing at 0.7 mcg/kg/h    Shift assessment completed. RASS -2. Clear bilateral lung sounds. Small amount of secretions noted. Oral care provided. OGT with TF at goal w. 40 mL residuals. Mcguire patent. PICC/PIV patent and infusing or alcohol capped. Turned pt supine. Bilateral soft wrist restraints in place for pt safety. Bed locked, in lowest position.  Electronically signed by Domenic Client on 2/10/2022 at 11:30 PM

## 2022-02-11 NOTE — PROGRESS NOTES
02/11/22 0252   Vent Information   $Ventilation $Subsequent Day   Vent Type 840   Vent Mode AC/PC   Pressure Ordered 13   Rate Set 20 bmp   FiO2  50 %   SpO2 100 %   SpO2/FiO2 ratio 200   Sensitivity 2   PEEP/CPAP 8   I Time/ I Time % 0.8 s   Humidification Source Heated wire   Humidification Temp 37   Humidification Temp Measured 37   Circuit Condensation Drained   Vent Patient Data   Peak Inspiratory Pressure 23 cmH2O   Mean Airway Pressure 13 cmH20   Rate Measured 25 br/min   Vt Exhaled 452 mL   Minute Volume 11.1 Liters   I:E Ratio 1:2.0   Cough/Sputum   Sputum How Obtained Suctioned;Endotracheal   Cough Productive   Sputum Amount Moderate   Sputum Color Creamy   Tenacity Thick   Spontaneous Breathing Trial (SBT) RT Doc   Pulse 92   Breath Sounds   Right Upper Lobe Diminished   Right Middle Lobe Diminished   Right Lower Lobe Diminished   Left Upper Lobe Diminished   Left Lower Lobe Diminished   Additional Respiratory  Assessments   Resp 26   Position Semi-Mohamud's   Oral Care Completed? Yes   Oral Care Mouth suctioned   Subglottic Suction Done?  Yes   Alarm Settings   High Pressure Alarm 45 cmH2O   Low Minute Volume Alarm 5 L/min   Apnea (secs) 20 secs   High Respiratory Rate 45 br/min   Low Exhaled Vt  250 mL   ETT (adult)   Placement Date/Time: 01/21/22 0330   Tube Size: 8 mm  Location: Oral  Secured at: 23 cm  Measured From: Lips   Secured at 22 cm   Measured From [address] By [employer] tube nicole   Site Condition Dry

## 2022-02-11 NOTE — PROGRESS NOTES
Hospitalist Progress Note      PCP: Patsy Jimenez DO    Date of Admission: 1/12/2022     resp failure on vent , covid pna, unvaccinated  High fevers S.p bronch with BAL   Ongoing weaning trials    Subjective:    Ms Wanda Nowak seen sedated , not on breathing trial today   Worsening hypoxemia  Off chest tube today     No fevers     Consulted surgery for trach and PEG    Medications:  Reviewed    Infusion Medications    dexmedetomidine HCl in NaCl 0.7 mcg/kg/hr (02/11/22 0634)    norepinephrine 2.027 mcg/min (02/07/22 1241)    fentaNYL 50 mcg/hr (02/11/22 0412)    propofol Stopped (02/09/22 1204)    dextrose      sodium chloride Stopped (01/22/22 1231)     Scheduled Medications    vancomycin (VANCOCIN) intermittent dosing (placeholder)   Other RX Placeholder    vancomycin  750 mg IntraVENous Q12H    albuterol  2.5 mg Nebulization Q4H    furosemide  40 mg IntraVENous Daily    insulin lispro  0-18 Units SubCUTAneous Q4H    carboxymethylcellulose PF  1 drop Both Eyes 4x Daily    QUEtiapine  25 mg Oral BID    chlorhexidine  15 mL Mouth/Throat BID    famotidine (PEPCID) injection  20 mg IntraVENous BID    enoxaparin  30 mg SubCUTAneous BID    [Held by provider] aspirin  81 mg Oral Daily    [Held by provider] clopidogrel  75 mg Oral Daily    ascorbic acid  1,000 mg Oral Daily    atorvastatin  10 mg Oral Nightly    sodium chloride flush  5-40 mL IntraVENous 2 times per day    Vitamin D  2,000 Units Oral Daily     PRN Meds: albuterol, magnesium sulfate, potassium chloride, fentanNYL, midazolam, glucose, glucagon (rDNA), dextrose, dextrose bolus (hypoglycemia) **OR** dextrose bolus (hypoglycemia), guaiFENesin-dextromethorphan, sodium chloride flush, sodium chloride, ondansetron **OR** ondansetron, polyethylene glycol, acetaminophen **OR** acetaminophen      Intake/Output Summary (Last 24 hours) at 2/11/2022 0753  Last data filed at 2/11/2022 0600  Gross per 24 hour   Intake 2603 ml   Output 1842 ml   Net 761 ml       Physical Exam Performed:    BP (!) 120/59   Pulse 97   Temp 99.5 °F (37.5 °C) (Axillary)   Resp 29   Ht 5' 3\" (1.6 m)   Wt 169 lb (76.7 kg)   SpO2 92%   BMI 29.94 kg/m²       Patient seen in droplet plus precautions  General:  Elderly female, on vent. supine  Oral ETT and OG noted   Mucous Membranes:  Pink , anicteric  Neck: No JVD, no carotid bruit, no thyromegaly  Chest: diminished in bases, bilateral mild crackles present. Cardiovascular:  RRR S1S2 heard, no murmurs or gallops  Abdomen: Soft, undistended, non tender, no organomegaly, BS present  Extremities: bilateral UE edema worse on right   No edema or cyanosis. Distal pulses well felt  Neurological :  Sedated    Labs:   Recent Labs     02/09/22  0527 02/10/22  0456 02/11/22  0427   WBC 8.9 7.7 7.7   HGB 9.0* 8.9* 8.6*   HCT 27.4* 26.8* 25.9*    372 360     Recent Labs     02/09/22  0527 02/10/22  0456 02/11/22  0427   * 136 137   K 3.4* 3.1* 3.6   CL 92* 94* 97*   CO2 33* 32 32   BUN 18 20 22*   CREATININE <0.5* <0.5* <0.5*   CALCIUM 9.1 8.5 9.1     No results for input(s): AST, ALT, BILIDIR, BILITOT, ALKPHOS in the last 72 hours. No results for input(s): INR in the last 72 hours. No results for input(s): Cee Sproul in the last 72 hours. Urinalysis:      Lab Results   Component Value Date    NITRU Negative 02/06/2022    WBCUA 0-2 02/06/2022    BACTERIA 1+ 02/06/2022    RBCUA 5-10 02/06/2022    BLOODU LARGE 02/06/2022    SPECGRAV 1.025 02/06/2022    GLUCOSEU Negative 02/06/2022    GLUCOSEU Neg 08/29/2010       Radiology:  XR CHEST PORTABLE   Final Result   No evidence of pneumothorax status post left chest tube removal.      Remainder of support apparatus and extensive diffuse bilateral airspace   disease are similar in appearance. VL Extremity Venous Right   Final Result      XR CHEST 1 VIEW   Final Result   1. Unchanged position of support devices.   Kinked appearance of left chest   tube again demonstrated without evidence for pneumothorax. 2. No significant change in bilateral airspace disease. XR CHEST PORTABLE   Final Result   Stable support apparatus. The left-sided chest tube catheter tubing appears   kinked. Persistent diffuse bilateral airspace opacities. Findings on the left have   progressed compared to prior. XR CHEST PORTABLE   Final Result   1. Unchanged position of support devices. 2. No significant change in bilateral airspace disease. XR CHEST PORTABLE   Final Result   Stable examination with stable lines and tubes. Stable pneumonia. XR CHEST PORTABLE   Final Result   Stable lines and tubes. Stable examination. Extensive airspace disease   noted in the lungs. XR CHEST PORTABLE   Final Result   Stable examination with stable lines and tubes. Stable findings of COVID   pneumonia. XR CHEST PORTABLE   Final Result   1. No significant change. XR CHEST PORTABLE   Final Result   Worsening multifocal airspace opacities. XR CHEST PORTABLE   Final Result   Interval placement of a left-sided chest tube with significant decrease seen   in size of the left pneumothorax only with a small apical component   remaining. Patchy airspace disease within the lung fields is stable and   unchanged. XR CHEST PORTABLE   Final Result   New onset moderate to large left pneumothorax with mild tension. Endotracheal, nasogastric tubes and PICC line are stable. Redemonstration of   bilateral infiltrates. Findings communicated to the referring physician on February 1, 2022      RECOMMENDATION:   Stat left chest tube. XR CHEST PORTABLE   Final Result   1. Stable lines, tubes, and support devices. 2. Stable cardiopulmonary status including bilateral airspace opacities and   small effusions. XR CHEST PORTABLE   Final Result   No significant change. Continued follow-up recommended.          XR CHEST PORTABLE Final Result   Mild worsening of bilateral infiltrates likely due to pneumonia. Continued   follow-up recommended. XR CHEST PORTABLE   Final Result   Moderate persistent multifocal airspace disease compatible with multifocal   pneumonia including COVID pneumonia. While similar to recent studies, there   has been gradual progression and worsening since the early study of   01/12/2022. XR CHEST PORTABLE   Final Result   Extensive infiltrates within the lungs bilaterally, worsening on the right   with increasing consolidation. XR CHEST PORTABLE   Final Result   No significant interval change multifocal airspace disease. Stable lines and tubes. XR CHEST PORTABLE   Final Result   Mildly improved parenchymal infiltrates at the right lung base, otherwise   similar appearing chest.         XR CHEST PORTABLE   Final Result   Extensive bilateral airspace opacities are seen without significant change. XR CHEST PORTABLE   Final Result   1. Recommend retraction of endotracheal tube 1.0 cm.   2. Stable severe ill-defined lung consolidation, greatest at the lung bases   consistent with pneumonia versus alveolar edema. 3. Suspected mild bilateral pleural effusion, unchanged. XR CHEST PORTABLE   Final Result   1. No significant change. XR CHEST PORTABLE   Final Result   Appropriate positioning of the endotracheal tube. Enteric tube courses   beneath the diaphragm; tip is excluded by collimation inferiorly. No significant change in extensive bilateral airspace disease. IR PICC WO SQ PORT/PUMP > 5 YEARS   Final Result   Successful placement of PICC line. XR CHEST PORTABLE   Final Result   New multifocal moderate to severe pneumonia. Clinical and imaging follow-up   to resolution recommended. CTA HEAD NECK W CONTRAST   Final Result   Unremarkable CTA of the head and neck. Findings consistent with COVID pneumonia.       This scan was analyzed using Viz. ai contact LVO. Identification of suspected   findings is not for diagnostic use beyond notification. Viz LVO is limited to   analysis of imaging data and should not be used in-lieu of full patient   evaluation or relied upon to make or confirm diagnosis. CT HEAD WO CONTRAST   Final Result   No acute intracranial abnormality. XR CHEST PORTABLE   Final Result   Multifocal bilateral atypical pneumonia. Assessment/Plan:      COVID PNA  Acute hypoxic respiratory Failure   - CXR: noted with multifocal PNA  - no home O2 at baseline, 83-87% at rest,   - had vaccine J &J in March 2021- no booster   - Admitted to [facility], droplet +, tele, cont pulse ox    -progressively worsening hypoxia, was on vapotherm with full support   - eventually placed on vent support 1/21    persistent severe hypoxemia -she was proned on 1/20/2022.    - Decadron weaned and stopped off 25 days   - Remdesivir not started due to out of window- sx 7+ days prior to admit  - - finished 7 days levaquin   -- CRP elevated . Started on Baricitinib - finished 14 days .    Monitored CBC and LFTs  - Has MRSA in her sputums and elevated WBC. Stopped cefepime. continue Vancomycin day #11  - ongoing weaning trials   Valium and Seroquel added for weaning IV sedation   precedex caused bradycardia  - sp bronch with BAL for fevers- BAL with MRSA again   - ongoing weaning trials now- not successful . For trach and peg soon     Left pneumothorax. - left chest tube placed. CT flushed and it is patent. Placed to suction. - resolved pneumothorax and off chest tube 2/10     Hypotension  -weaned off pressors     Episodes of expressive aphasia   - ?  TIA, vs mental status changes related to covid   - cont ASA, Plavix      Thrombocytopenia resolved  - likely with covid  - continue Lovenox and monitor     Hyperglycemia  Secondary to steroids  On Sliding scale insulin      HLD  - Continue statin            DVT Prophylaxis: Lovenox bid  Diet: Diet NPO  ADULT TUBE FEEDING; Orogastric; Other Tube Feeding (specify);  Glucerna 1.2; Continuous; 20; Yes; 20; Q 4 hours; 60; 30; Q 4 hours  Code Status: Full Code      Dispo - cont care    Navya Tijerina MD, 2/11/2022 7:53 AM

## 2022-02-11 NOTE — PROGRESS NOTES
Pulmonary & Critical Care Medicine ICU Progress Note    CC: COVID in partially vaccinated patient     Events of Last 24 hours: Tolerated PSV 10/5 for 8 hours   Fent 150  Precedex 0.6  Invasive Lines: PICC 1/20/2022    MV: 1/21/2022  Vent Mode: AC/PC Rate Set: 20 bmp/Vt Ordered: 0 mL/ /FiO2 : 50 %  Recent Labs     02/10/22  0506 02/11/22  0431   PHART 7.541* 7.530*   FPM3TXX 37.8 40.5   PO2ART 65.7* 66.3*       IV:   dexmedetomidine HCl in NaCl 0.7 mcg/kg/hr (02/11/22 0634)    norepinephrine 2.027 mcg/min (02/07/22 1241)    fentaNYL 50 mcg/hr (02/11/22 0412)    propofol Stopped (02/09/22 1204)    dextrose      sodium chloride Stopped (01/22/22 1231)       Vitals:  Blood pressure (!) 149/70, pulse 78, temperature 99.8 °F (37.7 °C), temperature source Axillary, resp. rate 30, height 5' 3\" (1.6 m), weight 169 lb (76.7 kg), SpO2 94 %, not currently breastfeeding. on vent    Intake/Output Summary (Last 24 hours) at 2/11/2022 1043  Last data filed at 2/11/2022 0600  Gross per 24 hour   Intake 2603 ml   Output 1842 ml   Net 761 ml     General: intubated, ill appearing    ENT: Pharynx with ETT. Eyes with some crusting   Resp: No crackles. No wheezing. CV: S1, S2. Trace edema  GI: NT, ND, +BS  Skin: Warm and dry. Neuro: PERRL. Sedated, not following commands.      Scheduled Meds:   vancomycin (VANCOCIN) intermittent dosing (placeholder)   Other RX Placeholder    vancomycin  750 mg IntraVENous Q12H    albuterol  2.5 mg Nebulization Q4H    furosemide  40 mg IntraVENous Daily    insulin lispro  0-18 Units SubCUTAneous Q4H    carboxymethylcellulose PF  1 drop Both Eyes 4x Daily    QUEtiapine  25 mg Oral BID    chlorhexidine  15 mL Mouth/Throat BID    famotidine (PEPCID) injection  20 mg IntraVENous BID    enoxaparin  30 mg SubCUTAneous BID    [Held by provider] aspirin  81 mg Oral Daily    [Held by provider] clopidogrel  75 mg Oral Daily    ascorbic acid  1,000 mg Oral Daily    atorvastatin  10 mg Oral Nightly    sodium chloride flush  5-40 mL IntraVENous 2 times per day    Vitamin D  2,000 Units Oral Daily     PRN Meds:  albuterol, magnesium sulfate, potassium chloride, fentanNYL, midazolam, glucose, glucagon (rDNA), dextrose, dextrose bolus (hypoglycemia) **OR** dextrose bolus (hypoglycemia), guaiFENesin-dextromethorphan, sodium chloride flush, sodium chloride, ondansetron **OR** ondansetron, polyethylene glycol, acetaminophen **OR** acetaminophen    Results:  CBC:   Recent Labs     02/09/22  0527 02/10/22  0456 02/11/22 0427   WBC 8.9 7.7 7.7   HGB 9.0* 8.9* 8.6*   HCT 27.4* 26.8* 25.9*   MCV 88.6 88.1 89.4    372 360     BMP:   Recent Labs     02/09/22  0527 02/10/22  0456 02/11/22  0427   * 136 137   K 3.4* 3.1* 3.6   CL 92* 94* 97*   CO2 33* 32 32   BUN 18 20 22*   CREATININE <0.5* <0.5* <0.5*     LIVER PROFILE: No results for input(s): AST, ALT, LIPASE, BILIDIR, BILITOT, ALKPHOS in the last 72 hours. Invalid input(s): AMYLASE,  ALB    Micro:  1/17/2022 SARS-CoV-2 positive at urgent care  1/12/2022 SARS-CoV-2 detected   1/31/2022 tracheal aspirate MRSA  2/1/2022 blood NG  2/6/22 BAL: MRSA    Imaging:   CXR 2/10/2022:   1. Unchanged position of support devices.  Kinked appearance of left chest   tube again demonstrated without evidence for pneumothorax.       2. No significant change in bilateral airspace disease. ASSESSMENT:  · Acute hypoxemic respiratory failure, severe and life-threatening  · COVID-19 pneumonia in a partially vaccinated patient, s/p J&J vaccination 3/2021  · ARDS  · MRSA pneumonia  · Now with new fever and new leukocytosis    · Left pneumothorax, chest tube placed 2/1/22  · Former smoker    PLAN:  Droplet Plus Airborne Precautions   Mechanical ventilation as per my orders.  The ventilator was adjusted by me at the bedside for unstable, life threatening respiratory failure   On pressure control   Dec Pi to 13  Precedex gtt  Seroquel 25 BID   Completed 7 days Levaquin   Vanc D#11/14,  Stopped Cefepime    Completed Decadron D#25  Completed 14 days baricitinib   Lasix 40mg IV daily  d/c Chest tube and then repeat CXR in 4 hours  H-SSI for FSBS goal 150-180  POA is leaning toward trach an da consult will be placed today  Prophylaxis: Pepcid, Lovenox 40 BID  Total critical care time caring for this patient with life threatening, unstable organ failure, including direct patient contact, management of life support systems, review of data including imaging and labs, discussions with other team members and physicians is 31 minutes so far today, excluding procedures.

## 2022-02-11 NOTE — PROGRESS NOTES
Blood pressure 123/64, pulse 97, temperature 99.7 °F (37.6 °C), temperature source Axillary, resp. rate 26, height 5' 3\" (1.6 m), weight 169 lb (76.7 kg), SpO2 100 %, not currently breastfeeding. Patient currently intubated with ETT at 25. Vent settings are 20/320/+8/55%. Fentanyl infusing at 50 mcg//h. Precedex infusing at 0.7 mcg/kg/h. Pt bathed. Large BM. DTI noted on coccyx. Appears to be tunneled. Covered with mepilex. No other changes noted during assessment.  Electronically signed by Hoa Marlow on 2/11/2022 at 2:56 AM

## 2022-02-11 NOTE — PROGRESS NOTES
COVID PAN with resp failure  Consultation for trach and PEG    Pt Plavix on hold (last dose was 2/10 at 0900)    Vent 50% Fio2 and PEEP 8    Discussed with Dr. Cash Bush and Naresh Shi RN: pt family agreeable with trach placement. Plan: will pre-op on Monday for planned trach and PEG Tuesday.      Electronically signed by ANGEL Corcoran CNP on 2/11/2022 at 12:34 PM

## 2022-02-11 NOTE — PROGRESS NOTES
Pt intubated and sedated. Intubated with # 8 at the # 22 LL. Vent settings are PC 20 / 50% / 5. Breath sounds are Rhonchorous. Heart rhythm is NSR on the bedside monitor with rates in the 80s. Edema note to BUE. Scheduled meds given per orders. OG at 58cm. TF running at goal rate of 60ml/hr. Pt tolerating well. RUE PICC ,white lumen, patent and working well. Pink lumen occluded. Fetanyl infusing. Precedex infusing. RASS -2  CPOT 0    Mcguire patent and draining clear yellow urine.

## 2022-02-11 NOTE — PROGRESS NOTES
Vancomycin Day: 11    Patient's labs, cultures, vitals, and vancomycin regimen reviewed. No changes today.   Level due on 12th at 1 W Asya SMITH 2/11/20221:24 PM  .

## 2022-02-12 NOTE — PROGRESS NOTES
FiO2 decraesed to 60%         02/12/22 0653   Vent Information   Skin Assessment Clean, dry, & intact   Vent Type 840   Vent Mode AC/PC   Pressure Ordered 13   Rate Set 20 bmp   FiO2  60 %   Sensitivity 2   PEEP/CPAP 10   I Time/ I Time % 0.8 s   Humidification Source Heated wire   Humidification Temp Measured 37   Vent Patient Data   Peak Inspiratory Pressure 32 cmH2O   Mean Airway Pressure 16 cmH20   Rate Measured 28 br/min   Vt Exhaled 390 mL   Minute Volume 10.7 Liters   I:E Ratio 1:1.4   Plateau Pressure 20 DJO03   Static Compliance 21 mL/cmH2O   Cough/Sputum   Sputum How Obtained Endotracheal;Tracheal   Cough Productive   Sputum Amount Moderate   Sputum Color Creamy; Yellow   Tenacity Thick   Breath Sounds   Right Upper Lobe Clear   Right Middle Lobe Fine Crackles   Right Lower Lobe Fine Crackles   Left Upper Lobe Clear   Left Lower Lobe Fine Crackles   Additional Respiratory  Assessments   Position Reverse Trendelenburg   Oral Care Completed? Yes   Oral Care Mouth suctioned   Subglottic Suction Done?  Yes   Cuff Pressure (cm H2O) 27 cm H2O   Alarm Settings   High Pressure Alarm 45 cmH2O   Low Minute Volume Alarm 5 L/min   Apnea (secs) 20 secs   High Respiratory Rate 45 br/min   Low Exhaled Vt  250 mL   ETT (adult)   Placement Date/Time: 01/21/22 0330   Tube Size: 8 mm  Location: Oral  Secured at: 23 cm  Measured From: Lips   Measured From Lips   ET Placement Right   Secured By [employer] tube nicole   Site Condition Dry

## 2022-02-12 NOTE — PROGRESS NOTES
RT Nebulizer Bronchodilator Protocol Note    There is a bronchodilator order in the chart from a provider indicating to follow the RT Bronchodilator Protocol and there is an Initiate RT Bronchodilator Protocol order as well (see protocol at bottom of note). CXR Findings:  XR CHEST PORTABLE    Result Date: 2/12/2022  Slightly improved aeration in the lungs. The findings from the last RT Protocol Assessment were as follows:  Smoking: (P) Chronic pulmonary disease  Respiratory Pattern: (P) Use of accessory muscles, prolonged exhalation, or RR 26-30 bpm  Breath Sounds: (P) Intermittent or unilateral wheezes  Cough: (P) Weak, productive  Indication for Bronchodilator Therapy: (P) Wheezing associated with pulm disorder  Bronchodilator Assessment Score: (P) 14    Aerosolized bronchodilator medication orders have been revised according to the RT Nebulizer Bronchodilator Protocol below. Respiratory Therapist to perform RT Therapy Protocol Assessment initially then follow the protocol. Repeat RT Therapy Protocol Assessment PRN for score 0-3 or on second treatment, BID, and PRN for scores above 3. No Indications  adjust the frequency to every 6 hours PRN wheezing or bronchospasm, if no treatments needed after 48 hours then discontinue using Per Protocol order mode. If indication present, adjust the RT bronchodilator orders based on the Bronchodilator Assessment Score as indicated below. If a patient is on this medication at home then do not decrease Frequency below that used at home. 0-3  enter or revise RT bronchodilator order(s) to equivalent RT Bronchodilator order with Frequency of every 4 hours PRN for wheezing or increased work of breathing using Per Protocol order mode.        4-6  enter or revise RT Bronchodilator order(s) to two equivalent RT bronchodilator orders with one order with BID Frequency and one order with Frequency of every 4 hours PRN wheezing or increased work of breathing using Per Protocol order mode. 7-10  enter or revise RT Bronchodilator order(s) to two equivalent RT bronchodilator orders with one order with TID Frequency and one order with Frequency of every 4 hours PRN wheezing or increased work of breathing using Per Protocol order mode. 11-13  enter or revise RT Bronchodilator order(s) to one equivalent RT bronchodilator order with QID Frequency and an Albuterol order with Frequency of every 4 hours PRN wheezing or increased work of breathing using Per Protocol order mode. Greater than 13  enter or revise RT Bronchodilator order(s) to one equivalent RT bronchodilator order with every 4 hours Frequency and an Albuterol order with Frequency of every 2 hours PRN wheezing or increased work of breathing using Per Protocol order mode. RT to enter RT Home Evaluation for COPD & MDI Assessment order using Per Protocol order mode.     Electronically signed by Omid Qureshi RCP on 2/12/2022 at 3:45 PM

## 2022-02-12 NOTE — CONSULTS
2-12-22 (0240) vancomycin trough 15.5. Please continue vancomycin 750mg ivpb q12h. [facility]. Ph.  2/12/2022 6:08 AM

## 2022-02-12 NOTE — PROGRESS NOTES
02/12/22 1434   Vent Information   Vent Type 840   Vent Mode AC/VC   Rate Set 20 bmp   Peak Flow 70 L/min   FiO2  50 %   SpO2 94 %   SpO2/FiO2 ratio 188   Sensitivity 2   PEEP/CPAP 8   Humidification Source Heated wire   Humidification Temp Measured 37   Vent Patient Data   Peak Inspiratory Pressure 14 cmH2O   Mean Airway Pressure 7 cmH20   Rate Measured 24 br/min   Vt Exhaled 407 mL   Minute Volume 9.8 Liters   I:E Ratio 1:2.5   Cough/Sputum   Sputum How Obtained Endotracheal;Suctioned   Cough Productive   Sputum Amount Large   Sputum Color Creamy; Yellow   Tenacity Thick   Spontaneous Breathing Trial (SBT) RT Doc   Pulse 81   Breath Sounds   Right Upper Lobe Diminished;Rhonchi   Right Middle Lobe Diminished   Right Lower Lobe Diminished   Left Upper Lobe Diminished;Rhonchi   Left Lower Lobe Diminished   Additional Respiratory  Assessments   Resp 19   Position Reverse Trendelenburg   Oral Care Completed? Yes   Oral Care Mouth suctioned   Subglottic Suction Done?  Yes   Alarm Settings   High Pressure Alarm 45 cmH2O   Low Minute Volume Alarm 5 L/min   Apnea (secs) 20 secs   High Respiratory Rate 45 br/min   Low Exhaled Vt  250 mL   ETT (adult)   Placement Date/Time: 01/21/22 0330   Tube Size: 8 mm  Location: Oral  Secured at: 23 cm  Measured From: Lips   Secured at 23 cm   Measured From Lips   ET Placement Left   Secured By [employer] tube nicole   Site Condition Dry

## 2022-02-12 NOTE — PROGRESS NOTES
Shift assessment completed, see flow sheet. RASS -2.  Pt with increased respirations in the 40's  prn versed given RR 25     Intubated and sedated on AC # 8.0 ETT, at 22 LL. 20 / 60 %/ +10  . SpO2 91%. Respirations are easy, even, and unlabored. Bilateral lung sounds diminshed. VSS  NSR on the monitor  OG in place at 57, with TF at 60 mL/hr, 30 residual.      PICC/PIV, WNL with precedex infusing at 1.5 mcg/kg/min, fentanyl 75 mcg/hr    All lines and monitoring devices in place. Mcguire is patent and secured. Bilateral soft wrist restraints in place for patient safety. Bed in lowest position with wheels locked. No needs expressed at this time. Will continue to monitor.

## 2022-02-12 NOTE — PROGRESS NOTES
Hospitalist Progress Note      PCP: Guzman Srivastava DO    Date of Admission: 1/12/2022     resp failure on vent , covid pna, unvaccinated  High fevers S.p bronch with BAL   Ongoing weaning trials    Subjective:    Ms Samson Daniel seen sedated , not on breathing trial today   Worsening hypoxemia  Off chest tube today     No fevers     Consulted surgery for trach and PEG      2/12  Tentative preop Monday for Trach and Peg Tuesday per surgery eval.   Remains sedated on vent 50% PEEP 8  Fentanyl and precedex. Fever low grade 100.2 last night.        Medications:  Reviewed    Infusion Medications    dexmedetomidine HCl in NaCl 1.5 mcg/kg/hr (02/12/22 1237)    norepinephrine 2.027 mcg/min (02/07/22 1241)    fentaNYL 100 mcg/hr (02/12/22 1237)    propofol Stopped (02/09/22 1204)    dextrose      sodium chloride Stopped (01/22/22 1231)     Scheduled Medications    vancomycin  750 mg IntraVENous Q12H    albuterol  2.5 mg Nebulization Q4H    insulin lispro  0-18 Units SubCUTAneous Q4H    carboxymethylcellulose PF  1 drop Both Eyes 4x Daily    QUEtiapine  25 mg Oral BID    chlorhexidine  15 mL Mouth/Throat BID    famotidine (PEPCID) injection  20 mg IntraVENous BID    enoxaparin  30 mg SubCUTAneous BID    [Held by provider] aspirin  81 mg Oral Daily    [Held by provider] clopidogrel  75 mg Oral Daily    ascorbic acid  1,000 mg Oral Daily    atorvastatin  10 mg Oral Nightly    sodium chloride flush  5-40 mL IntraVENous 2 times per day    Vitamin D  2,000 Units Oral Daily     PRN Meds: albuterol, magnesium sulfate, potassium chloride, fentanNYL, midazolam, glucose, glucagon (rDNA), dextrose, dextrose bolus (hypoglycemia) **OR** dextrose bolus (hypoglycemia), guaiFENesin-dextromethorphan, sodium chloride flush, sodium chloride, ondansetron **OR** ondansetron, polyethylene glycol, acetaminophen **OR** acetaminophen      Intake/Output Summary (Last 24 hours) at 2/12/2022 1251  Last data filed at 2/12/2022 1237  Gross per 24 hour   Intake 2828.49 ml   Output 1615 ml   Net 1213.49 ml       Physical Exam Performed:    /65   Pulse 87   Temp 100 °F (37.8 °C) (Bladder)   Resp 22   Ht 5' 3\" (1.6 m)   Wt 166 lb 14.2 oz (75.7 kg)   SpO2 92%   BMI 29.56 kg/m²       Patient seen in droplet plus precautions  General:  Elderly female, on vent. supine  Oral ETT and OG noted   Mucous Membranes:  Pink , anicteric  Neck: No JVD, no carotid bruit, no thyromegaly  Chest: diminished in bases, bilateral mild crackles present. Cardiovascular:  RRR S1S2 heard, no murmurs or gallops  Abdomen: Soft, undistended, non tender, no organomegaly, BS present  Extremities: bilateral UE edema worse on right   No edema or cyanosis. Distal pulses well felt  Neurological :  Sedated    Labs:   Recent Labs     02/10/22  0456 02/11/22  0427 02/12/22  0240   WBC 7.7 7.7 8.4   HGB 8.9* 8.6* 8.4*   HCT 26.8* 25.9* 25.0*    360 347     Recent Labs     02/10/22  0456 02/11/22  0427 02/12/22  0240    137 133*   K 3.1* 3.6 4.1   CL 94* 97* 93*   CO2 32 32 33*   BUN 20 22* 28*   CREATININE <0.5* <0.5* 0.7   CALCIUM 8.5 9.1 8.8     No results for input(s): AST, ALT, BILIDIR, BILITOT, ALKPHOS in the last 72 hours. No results for input(s): INR in the last 72 hours. No results for input(s): Lexy Councilman in the last 72 hours. Urinalysis:      Lab Results   Component Value Date    NITRU Negative 02/06/2022    WBCUA 0-2 02/06/2022    BACTERIA 1+ 02/06/2022    RBCUA 5-10 02/06/2022    BLOODU LARGE 02/06/2022    SPECGRAV 1.025 02/06/2022    GLUCOSEU Negative 02/06/2022    GLUCOSEU Neg 08/29/2010       Radiology:  XR CHEST PORTABLE   Final Result   Slightly improved aeration in the lungs. XR CHEST PORTABLE   Final Result   No evidence of pneumothorax status post left chest tube removal.      Remainder of support apparatus and extensive diffuse bilateral airspace   disease are similar in appearance.          VL Extremity Venous Right   Final Result      XR CHEST 1 VIEW   Final Result   1. Unchanged position of support devices. Kinked appearance of left chest   tube again demonstrated without evidence for pneumothorax. 2. No significant change in bilateral airspace disease. XR CHEST PORTABLE   Final Result   Stable support apparatus. The left-sided chest tube catheter tubing appears   kinked. Persistent diffuse bilateral airspace opacities. Findings on the left have   progressed compared to prior. XR CHEST PORTABLE   Final Result   1. Unchanged position of support devices. 2. No significant change in bilateral airspace disease. XR CHEST PORTABLE   Final Result   Stable examination with stable lines and tubes. Stable pneumonia. XR CHEST PORTABLE   Final Result   Stable lines and tubes. Stable examination. Extensive airspace disease   noted in the lungs. XR CHEST PORTABLE   Final Result   Stable examination with stable lines and tubes. Stable findings of COVID   pneumonia. XR CHEST PORTABLE   Final Result   1. No significant change. XR CHEST PORTABLE   Final Result   Worsening multifocal airspace opacities. XR CHEST PORTABLE   Final Result   Interval placement of a left-sided chest tube with significant decrease seen   in size of the left pneumothorax only with a small apical component   remaining. Patchy airspace disease within the lung fields is stable and   unchanged. XR CHEST PORTABLE   Final Result   New onset moderate to large left pneumothorax with mild tension. Endotracheal, nasogastric tubes and PICC line are stable. Redemonstration of   bilateral infiltrates. Findings communicated to the referring physician on February 1, 2022      RECOMMENDATION:   Stat left chest tube. XR CHEST PORTABLE   Final Result   1. Stable lines, tubes, and support devices.    2. Stable cardiopulmonary status including bilateral airspace opacities CTA HEAD NECK W CONTRAST   Final Result   Unremarkable CTA of the head and neck. Findings consistent with COVID pneumonia. This scan was analyzed using Viz. ai contact LVO. Identification of suspected   findings is not for diagnostic use beyond notification. Viz LVO is limited to   analysis of imaging data and should not be used in-lieu of full patient   evaluation or relied upon to make or confirm diagnosis. CT HEAD WO CONTRAST   Final Result   No acute intracranial abnormality. XR CHEST PORTABLE   Final Result   Multifocal bilateral atypical pneumonia. XR CHEST PORTABLE    (Results Pending)           Assessment/Plan:      COVID PNA  Acute hypoxic respiratory Failure   - CXR: noted with multifocal PNA  - no home O2 at baseline  - had vaccine J &J in March 2021 - no booster   -progressively worsening hypoxia, was on vapotherm with full support   - eventually placed on vent support 1/21  - persistent severe hypoxemia -she was proned on 1/20/2022.    - Decadron weaned and stopped after 25 days   -  finished 7 days levaquin   -- CRP elevated - Started on Baricitinib - finished 14 days .   - Has MRSA in her sputums and elevated WBC. Stopped cefepime. continue Vancomycin day #12  Valium and Seroquel added for weaning IV sedation   precedex caused bradycardia  - sp bronch with BAL for fevers - BAL with MRSA again   - trach and peg planned - surgery consulted - tentative pre op Monday, Procedures Tuesday per surgery eval.        Left pneumothorax. - left chest tube placed. CT flushed and it is patent. Placed to suction. - resolved pneumothorax and off chest tube 2/10       Hypotension  -weaned off pressors       Episodes of expressive aphasia   - ?  TIA, vs mental status changes related to covid   - cont ASA, Plavix        Thrombocytopenia resolved  - likely with covid  - continue Lovenox and monitor       Hyperglycemia  Secondary to steroids  On Sliding scale insulin        HLD  - Continue statin            DVT Prophylaxis: Lovenox bid  Diet: Diet NPO  ADULT TUBE FEEDING; Orogastric; Other Tube Feeding (specify);  Glucerna 1.2; Continuous; 20; Yes; 20; Q 4 hours; 60; 30; Q 4 hours  Code Status: Full Code      Dispo - cont care    Jessica Hardy MD, 2/12/2022 12:51 PM

## 2022-02-12 NOTE — PROGRESS NOTES
Pt remains intubated and sedated, ETT # 8 in place. LL 22. Vent settings AC/PC 20/60%/+10. Fentanyl infusing at 50 mcg/hr. Precedex at 1.4 mcg/kg/hr. OG present at 58 cm, TF running at 60 ml/hr with water flush 30 ml Q 4 H. Mcguire draining adequate amount of urine. Bilateral wrist restraints in place. Right PICC dressing dry and intact. Pt in supine position.

## 2022-02-12 NOTE — PROGRESS NOTES
RT Inhaler-Nebulizer Bronchodilator Protocol Note    There is a bronchodilator order in the chart from a provider indicating to follow the RT Bronchodilator Protocol and there is an Initiate RT Inhaler-Nebulizer Bronchodilator Protocol order as well (see protocol at bottom of note). CXR Findings:  XR CHEST PORTABLE    Result Date: 2/10/2022  No evidence of pneumothorax status post left chest tube removal. Remainder of support apparatus and extensive diffuse bilateral airspace disease are similar in appearance. The findings from the last RT Protocol Assessment were as follows:   History Pulmonary Disease: (P) Chronic pulmonary disease  Respiratory Pattern: (P) Severe use of accessory muscle or RR>30 bpm  Breath Sounds: (P) Intermittent or unilateral wheezes  Cough: (P) Weak, productive  Indication for Bronchodilator Therapy: (P) Wheezing associated with pulm disorder  Bronchodilator Assessment Score: (P) 16    Aerosolized bronchodilator medication orders have been revised according to the RT Inhaler-Nebulizer Bronchodilator Protocol below. Respiratory Therapist to perform RT Therapy Protocol Assessment initially then follow the protocol. Repeat RT Therapy Protocol Assessment PRN for score 0-3 or on second treatment, BID, and PRN for scores above 3. No Indications  adjust the frequency to every 6 hours PRN wheezing or bronchospasm, if no treatments needed after 48 hours then discontinue using Per Protocol order mode. If indication present, adjust the RT bronchodilator orders based on the Bronchodilator Assessment Score as indicated below. Use Inhaler orders unless patient has one or more of the following: on home nebulizer, not able to hold breath for 10 seconds, is not alert and oriented, cannot activate and use MDI correctly, or respiratory rate 25 breaths per minute or more, then use the equivalent nebulizer order(s) with same Frequency and PRN reasons based on the score.   If a patient is on this medication at home then do not decrease Frequency below that used at home. 0-3  enter or revise RT bronchodilator order(s) to equivalent RT Bronchodilator order with Frequency of every 4 hours PRN for wheezing or increased work of breathing using Per Protocol order mode. 4-6  enter or revise RT Bronchodilator order(s) to two equivalent RT bronchodilator orders with one order with BID Frequency and one order with Frequency of every 4 hours PRN wheezing or increased work of breathing using Per Protocol order mode. 7-10  enter or revise RT Bronchodilator order(s) to two equivalent RT bronchodilator orders with one order with TID Frequency and one order with Frequency of every 4 hours PRN wheezing or increased work of breathing using Per Protocol order mode. 11-13  enter or revise RT Bronchodilator order(s) to one equivalent RT bronchodilator order with QID Frequency and an Albuterol order with Frequency of every 4 hours PRN wheezing or increased work of breathing using Per Protocol order mode. Greater than 13  enter or revise RT Bronchodilator order(s) to one equivalent RT bronchodilator order with every 4 hours Frequency and an Albuterol order with Frequency of every 2 hours PRN wheezing or increased work of breathing using Per Protocol order mode.        Electronically signed by Janes Cooper RCP on 2/11/2022 at 8:39 PM

## 2022-02-12 NOTE — PROGRESS NOTES
Rounding completed with Dr. Divya Stewart and interdisciplinary team.  POC, labs, lines and assessment reviewed.       -repeat pan cultures  -no trial today.  -vent settings changed  Fio2 50% P 8

## 2022-02-12 NOTE — PROGRESS NOTES
Pt had episode of desaturation 86-88%. Fio2 now at 60%. Pt currently SpO2 at 92%. Pt having increasing episodes of double stacking on vent. Prn medication and sedation increased see MAR.

## 2022-02-12 NOTE — PROGRESS NOTES
Pulmonary & Critical Care Medicine ICU Progress Note    CC: COVID in partially vaccinated patient     Events of Last 24 hours: fever 102.1, more hypoxic with agitation overnight  Fent 75  Precedex 1.5  Invasive Lines: PICC 1/20/2022    MV: 1/21/2022  Vent Mode: AC/PC Rate Set: 20 bmp/Vt Ordered: 0 mL/ /FiO2 : 60 %  Recent Labs     02/11/22  0431 02/12/22  0330   PHART 7.530* 7.521*   [id number] 40.5 41.7   PO2ART 66.3* 57.4*       IV:   dexmedetomidine HCl in NaCl 1.5 mcg/kg/hr (02/12/22 0809)    norepinephrine 2.027 mcg/min (02/07/22 1241)    fentaNYL 75 mcg/hr (02/12/22 0335)    propofol Stopped (02/09/22 1204)    dextrose      sodium chloride Stopped (01/22/22 1231)       Vitals:  Blood pressure (!) 151/70, pulse 87, temperature 99.4 °F (37.4 °C), resp. rate (!) 41, height 5' 3\" (1.6 m), weight 166 lb 14.2 oz (75.7 kg), SpO2 90 %, not currently breastfeeding. on vent    Intake/Output Summary (Last 24 hours) at 2/12/2022 0833  Last data filed at 2/12/2022 0825  Gross per 24 hour   Intake 2088.87 ml   Output 1255 ml   Net 833.87 ml     General: intubated, ill appearing    ENT: Pharynx with ETT. Eyes with some crusting   Resp: No crackles. No wheezing. CV: S1, S2. Trace edema  GI: NT, ND, +BS  Skin: Warm and dry. Neuro: PERRL. Sedated, not following commands.      Scheduled Meds:   vancomycin  750 mg IntraVENous Q12H    albuterol  2.5 mg Nebulization Q4H    furosemide  40 mg IntraVENous Daily    insulin lispro  0-18 Units SubCUTAneous Q4H    carboxymethylcellulose PF  1 drop Both Eyes 4x Daily    QUEtiapine  25 mg Oral BID    chlorhexidine  15 mL Mouth/Throat BID    famotidine (PEPCID) injection  20 mg IntraVENous BID    enoxaparin  30 mg SubCUTAneous BID    [Held by provider] aspirin  81 mg Oral Daily    [Held by provider] clopidogrel  75 mg Oral Daily    ascorbic acid  1,000 mg Oral Daily    atorvastatin  10 mg Oral Nightly    sodium chloride flush  5-40 mL IntraVENous 2 times per day    Vitamin D  2,000 Units Oral Daily     PRN Meds:  albuterol, magnesium sulfate, potassium chloride, fentanNYL, midazolam, glucose, glucagon (rDNA), dextrose, dextrose bolus (hypoglycemia) **OR** dextrose bolus (hypoglycemia), guaiFENesin-dextromethorphan, sodium chloride flush, sodium chloride, ondansetron **OR** ondansetron, polyethylene glycol, acetaminophen **OR** acetaminophen    Results:  CBC:   Recent Labs     02/10/22  0456 02/11/22 0427 02/12/22  0240   WBC 7.7 7.7 8.4   HGB 8.9* 8.6* 8.4*   HCT 26.8* 25.9* 25.0*   MCV 88.1 89.4 87.8    360 347     BMP:   Recent Labs     02/10/22  0456 02/11/22  0427 02/12/22  0240    137 133*   K 3.1* 3.6 4.1   CL 94* 97* 93*   CO2 32 32 33*   BUN 20 22* 28*   CREATININE <0.5* <0.5* 0.7     LIVER PROFILE: No results for input(s): AST, ALT, LIPASE, BILIDIR, BILITOT, ALKPHOS in the last 72 hours. Invalid input(s): AMYLASE,  ALB    Micro:  1/17/2022 SARS-CoV-2 positive at urgent care  1/12/2022 SARS-CoV-2 detected   1/31/2022 tracheal aspirate MRSA  2/1/2022 blood NG  2/6/22 BAL: MRSA    Imaging:   CXR 2/12/2022:      Slightly improved aeration in the lungs. ASSESSMENT:  · Acute hypoxemic respiratory failure, severe and life-threatening  · COVID-19 pneumonia in a partially vaccinated patient, s/p J&J vaccination 3/2021  · ARDS  · MRSA pneumonia  · Now with new fever and new leukocytosis    · Left pneumothorax, chest tube placed 2/1/22 and removed 2/10/22  · Former smoker    PLAN:  Droplet Plus Airborne Precautions   Mechanical ventilation as per my orders.  The ventilator was adjusted by me at the bedside for unstable, life threatening respiratory failure  Precedex gtt and Fentanyl gtt  Repeat cultures  Seroquel 25 BID   Completed 7 days Levaquin   Vanc D#12/14,  Stopped Cefepime    Completed Decadron D#25  Completed 14 days baricitinib   H-SSI for FSBS goal 150-180  POA is leaning toward trach an da consult will be placed today  Prophylaxis: Pepcid, Lovenox 40 BID  Total critical care time caring for this patient with life threatening, unstable organ failure, including direct patient contact, management of life support systems, review of data including imaging and labs, discussions with other team members and physicians is 31 minutes so far today, excluding procedures.

## 2022-02-12 NOTE — PROGRESS NOTES
FiO2 increased to 55%         02/12/22 1500   Vent Information   Vent Type 840   Vent Mode AC/VC   Rate Set 20 bmp   Peak Flow 70 L/min   FiO2  55 %   SpO2 (!) 87 %   SpO2/FiO2 ratio 158.18   Sensitivity 2   PEEP/CPAP 8   Vent Patient Data   Peak Inspiratory Pressure 14 cmH2O   Mean Airway Pressure 8 cmH20   Rate Measured 25 br/min   Vt Exhaled 423 mL   Spontaneous VT 9.9 mL   Spontaneous Breathing Trial (SBT) RT Doc   Pulse 96   RSBI Calculated 2525.25   Additional Respiratory  Assessments   Resp 24

## 2022-02-12 NOTE — PROGRESS NOTES
02/11/22 1959   Vent Information   Vent Mode AC/PC   Pressure Ordered 16   Rate Set 20 bmp   FiO2  60 %   SpO2 90 %   PEEP/CPAP 10   I Time/ I Time % 0.8 s   Vent Patient Data   Peak Inspiratory Pressure 29 cmH2O   Mean Airway Pressure 19 cmH20   Rate Measured 37 br/min   Vt Exhaled 304 mL   Minute Volume 11 Liters   I:E Ratio 1:1.1   Plateau Pressure 28 TDJ05   Static Compliance 17 mL/cmH2O   Dynamic Compliance 16 mL/cmH2O

## 2022-02-13 NOTE — PROGRESS NOTES
RT Inhaler-Nebulizer Bronchodilator Protocol Note    There is a bronchodilator order in the chart from a provider indicating to follow the RT Bronchodilator Protocol and there is an Initiate RT Inhaler-Nebulizer Bronchodilator Protocol order as well (see protocol at bottom of note). CXR Findings:  XR CHEST PORTABLE    Result Date: 2/12/2022  Slightly improved aeration in the lungs. The findings from the last RT Protocol Assessment were as follows:   History Pulmonary Disease: (P) Chronic pulmonary disease  Respiratory Pattern: (P) Use of accessory muscles, prolonged exhalation, or RR 26-30 bpm  Breath Sounds: (P) Intermittent or unilateral wheezes  Cough: (P) Weak, productive  Indication for Bronchodilator Therapy: (P) Wheezing associated with pulm disorder  Bronchodilator Assessment Score: (P) 14    Aerosolized bronchodilator medication orders have been revised according to the RT Inhaler-Nebulizer Bronchodilator Protocol below. Respiratory Therapist to perform RT Therapy Protocol Assessment initially then follow the protocol. Repeat RT Therapy Protocol Assessment PRN for score 0-3 or on second treatment, BID, and PRN for scores above 3. No Indications  adjust the frequency to every 6 hours PRN wheezing or bronchospasm, if no treatments needed after 48 hours then discontinue using Per Protocol order mode. If indication present, adjust the RT bronchodilator orders based on the Bronchodilator Assessment Score as indicated below. Use Inhaler orders unless patient has one or more of the following: on home nebulizer, not able to hold breath for 10 seconds, is not alert and oriented, cannot activate and use MDI correctly, or respiratory rate 25 breaths per minute or more, then use the equivalent nebulizer order(s) with same Frequency and PRN reasons based on the score. If a patient is on this medication at home then do not decrease Frequency below that used at home.     0-3  enter or revise RT bronchodilator order(s) to equivalent RT Bronchodilator order with Frequency of every 4 hours PRN for wheezing or increased work of breathing using Per Protocol order mode. 4-6  enter or revise RT Bronchodilator order(s) to two equivalent RT bronchodilator orders with one order with BID Frequency and one order with Frequency of every 4 hours PRN wheezing or increased work of breathing using Per Protocol order mode. 7-10  enter or revise RT Bronchodilator order(s) to two equivalent RT bronchodilator orders with one order with TID Frequency and one order with Frequency of every 4 hours PRN wheezing or increased work of breathing using Per Protocol order mode. 11-13  enter or revise RT Bronchodilator order(s) to one equivalent RT bronchodilator order with QID Frequency and an Albuterol order with Frequency of every 4 hours PRN wheezing or increased work of breathing using Per Protocol order mode. Greater than 13  enter or revise RT Bronchodilator order(s) to one equivalent RT bronchodilator order with every 4 hours Frequency and an Albuterol order with Frequency of every 2 hours PRN wheezing or increased work of breathing using Per Protocol order mode.          Electronically signed by River Banks RCP on 2/12/2022 at 8:58 PM

## 2022-02-13 NOTE — PROGRESS NOTES
Shift assessment completed, see flow sheet. RASS -3. Intubated and sedated on AC # 8 ETT, at 23 LL. 20 / 350 / 55 %/ +8. SpO2 93%. Respirations are easy, even, and unlabored. Bilateral lung sounds diminished. VSS  NSR on the monitor  OG in place at 58, with TF at 60 mL/hr, 400 residual.      PICC/PIV, WNL with levophed infusing at 5 mcg/min, fentanyl 175 mcg/hr, precedex 1.8 mcg/kg/hr    All lines and monitoring devices in place. Mcguire is patent and secured. Bilateral soft wrist restraints in place for patient safety. Bed in lowest position with wheels locked. No needs expressed at this time. Will continue to monitor.

## 2022-02-13 NOTE — PROGRESS NOTES
FiO2 decraesed to 50%         02/13/22 1457   Vent Information   Vent Mode AC/VC   Vt Ordered 350 mL   Rate Set 20 bmp   Peak Flow 55 L/min   FiO2  50 %   SpO2 94 %   SpO2/FiO2 ratio 188   Sensitivity 2   PEEP/CPAP 8   Humidification Source Heated wire   Humidification Temp Measured 37   Vent Patient Data   Peak Inspiratory Pressure 29 cmH2O   Mean Airway Pressure 11 cmH20   Rate Measured 21 br/min   Vt Exhaled 380 mL   Minute Volume 8 Liters   I:E Ratio 1:2.2   Cough/Sputum   Sputum How Obtained Endotracheal;Suctioned   Cough Productive   Sputum Amount Moderate   Sputum Color Yellow   Tenacity Thick   Spontaneous Breathing Trial (SBT) RT Doc   Pulse 88   Breath Sounds   Right Upper Lobe Diminished   Right Middle Lobe Diminished   Right Lower Lobe Diminished   Left Upper Lobe Diminished   Left Lower Lobe Diminished   Additional Respiratory  Assessments   Resp 20   Position Reverse Trendelenburg   Oral Care Completed? Yes   Oral Care Mouth suctioned   Subglottic Suction Done?  Yes   Alarm Settings   High Pressure Alarm 45 cmH2O   Low Minute Volume Alarm 5 L/min   Apnea (secs) 20 secs   High Respiratory Rate 45 br/min   Low Exhaled Vt  250 mL   ETT (adult)   Placement Date/Time: 01/21/22 0330   Tube Size: 8 mm  Location: Oral  Secured at: 23 cm  Measured From: Lips   Secured at 23 cm   Measured From Lips   ET Placement Right   Secured By [employer] tube nicole

## 2022-02-13 NOTE — PROGRESS NOTES
Pt remains intubated and sedated, ETT # 8 in place. LL 22. Vent settings AC/VC 20/350/60%/+8. Fentanyl infusing at 150 mcg/hr. Precedex at 1.7 mcg/kg/hr. OG present at 58 cm, TF running at 60 ml/hr with water flush 30 ml Q 4 H. Mcguire draining adequate amount of urine. Bilateral wrist restraints in place. Right PICC dressing dry and intact. Pt in supine position.

## 2022-02-13 NOTE — PROGRESS NOTES
02/13/22 0251   Vent Information   Vent Type 840   Vent Mode AC/VC   Vt Ordered 350 mL   Rate Set 20 bmp   Peak Flow 70 L/min   FiO2  55 %   SpO2 93 %   SpO2/FiO2 ratio 169.09   Sensitivity 2   PEEP/CPAP 8   Humidification Source Heated wire   Humidification Temp 37   Humidification Temp Measured 37   Circuit Condensation Drained   Vent Patient Data   Peak Inspiratory Pressure 23 cmH2O   Mean Airway Pressure 9.5 cmH20   Rate Measured 23 br/min   Vt Exhaled 374 mL   Minute Volume 7.85 Liters   I:E Ratio 1:3.0   Plateau Pressure 28 QVS90   Cough/Sputum   Sputum How Obtained Endotracheal;Suctioned   Cough Productive   Sputum Amount Small   Sputum Color Creamy   Tenacity Thick   Spontaneous Breathing Trial (SBT) RT Doc   Pulse 90   Breath Sounds   Right Upper Lobe Diminished   Right Middle Lobe Diminished   Right Lower Lobe Diminished   Left Upper Lobe Diminished   Left Lower Lobe Diminished   Additional Respiratory  Assessments   Resp 20   Position Semi-Mohamud's   Alarm Settings   High Pressure Alarm 45 cmH2O   Low Minute Volume Alarm 5 L/min   Apnea (secs) 20 secs   High Respiratory Rate 45 br/min   Low Exhaled Vt  250 mL   Patient Observation   Observations ETT SIZE 8.0, SECURED AT 22 LIP LINE. AMBU BAG AT HEAD OF BED. WATER GOOD.     ETT (adult)   Placement Date/Time: 01/21/22 0330   Tube Size: 8 mm  Location: Oral  Secured at: 23 cm  Measured From: Lips   Secured at 22 cm   Measured From [address] By [employer] tube nicole   Site Condition Dry

## 2022-02-13 NOTE — PROGRESS NOTES
Hospitalist Progress Note      PCP: Gus Guerin DO    Date of Admission: 1/12/2022     resp failure on vent , covid pna, unvaccinated  High fevers S.p bronch with BAL   Ongoing weaning trials    Subjective:    Ms Mitchell Maya seen sedated , not on breathing trial today   Worsening hypoxemia  Off chest tube today     No fevers     Consulted surgery for trach and PEG      2/12  Tentative preop Monday for Trach and Peg Tuesday per surgery eval.   Remains sedated on vent 50% PEEP 8  Fentanyl and precedex. Fever low grade 100.2 last night. 2/13  Tentative preop Monday for Trach and Peg Tuesday per surgery eval.   Levophed at 6  Sedated on vent.        Medications:  Reviewed    Infusion Medications    dexmedetomidine HCl in NaCl 1.8 mcg/kg/hr (02/13/22 1339)    norepinephrine 6 mcg/min (02/13/22 0837)    fentaNYL 175 mcg/hr (02/13/22 1213)    propofol Stopped (02/09/22 1204)    dextrose      sodium chloride 5 mL/hr at 02/13/22 0339     Scheduled Medications    vancomycin  750 mg IntraVENous Q12H    albuterol  2.5 mg Nebulization Q4H    insulin lispro  0-18 Units SubCUTAneous Q4H    carboxymethylcellulose PF  1 drop Both Eyes 4x Daily    QUEtiapine  25 mg Oral BID    chlorhexidine  15 mL Mouth/Throat BID    famotidine (PEPCID) injection  20 mg IntraVENous BID    enoxaparin  30 mg SubCUTAneous BID    [Held by provider] aspirin  81 mg Oral Daily    [Held by provider] clopidogrel  75 mg Oral Daily    ascorbic acid  1,000 mg Oral Daily    atorvastatin  10 mg Oral Nightly    sodium chloride flush  5-40 mL IntraVENous 2 times per day    Vitamin D  2,000 Units Oral Daily     PRN Meds: albuterol, magnesium sulfate, potassium chloride, fentanNYL, midazolam, glucose, glucagon (rDNA), dextrose, dextrose bolus (hypoglycemia) **OR** dextrose bolus (hypoglycemia), guaiFENesin-dextromethorphan, sodium chloride flush, sodium chloride, ondansetron **OR** ondansetron, polyethylene glycol, acetaminophen **OR** acetaminophen      Intake/Output Summary (Last 24 hours) at 2/13/2022 1607  Last data filed at 2/13/2022 1123  Gross per 24 hour   Intake 2691.42 ml   Output 610 ml   Net 2081.42 ml       Physical Exam Performed:    BP (!) 104/52   Pulse 94   Temp 100.6 °F (38.1 °C) (Bladder)   Resp 20   Ht 5' 3\" (1.6 m)   Wt 168 lb 6.9 oz (76.4 kg)   SpO2 94%   BMI 29.84 kg/m²       Patient seen in droplet plus precautions  General:  Elderly female, on vent. supine  Oral ETT and OG noted   Mucous Membranes:  Pink , anicteric  Neck: No JVD, no carotid bruit, no thyromegaly  Chest: diminished in bases, bilateral mild crackles present. Cardiovascular:  RRR S1S2 heard, no murmurs or gallops  Abdomen: Soft, undistended, non tender, no organomegaly, BS present  Extremities: bilateral UE edema worse on right   No edema or cyanosis. Distal pulses well felt  Neurological :  Sedated    Labs:   Recent Labs     02/11/22 0427 02/12/22  0240 02/13/22  0415   WBC 7.7 8.4 7.2   HGB 8.6* 8.4* 8.1*   HCT 25.9* 25.0* 24.5*    347 293     Recent Labs     02/11/22 0427 02/12/22  0240 02/13/22  0415    133* 132*   K 3.6 4.1 4.3   CL 97* 93* 94*   CO2 32 33* 31   BUN 22* 28* 34*   CREATININE <0.5* 0.7 <0.5*   CALCIUM 9.1 8.8 8.6     No results for input(s): AST, ALT, BILIDIR, BILITOT, ALKPHOS in the last 72 hours. No results for input(s): INR in the last 72 hours. No results for input(s): Earnie Lent in the last 72 hours. Urinalysis:      Lab Results   Component Value Date    NITRU Negative 02/12/2022    WBCUA 3-5 02/12/2022    BACTERIA 1+ 02/12/2022    RBCUA 21-50 02/12/2022    BLOODU LARGE 02/12/2022    SPECGRAV 1.020 02/12/2022    GLUCOSEU Negative 02/12/2022    GLUCOSEU Neg 08/29/2010       Radiology:  XR CHEST PORTABLE   Final Result   No significant change in the multifocal opacities of both lungs. The   pulmonary inflation is stable to 02/12/2022 and improved from 02/10/2022.          XR CHEST PORTABLE   Final Result   Slightly improved aeration in the lungs. XR CHEST PORTABLE   Final Result   No evidence of pneumothorax status post left chest tube removal.      Remainder of support apparatus and extensive diffuse bilateral airspace   disease are similar in appearance. VL Extremity Venous Right   Final Result      XR CHEST 1 VIEW   Final Result   1. Unchanged position of support devices. Kinked appearance of left chest   tube again demonstrated without evidence for pneumothorax. 2. No significant change in bilateral airspace disease. XR CHEST PORTABLE   Final Result   Stable support apparatus. The left-sided chest tube catheter tubing appears   kinked. Persistent diffuse bilateral airspace opacities. Findings on the left have   progressed compared to prior. XR CHEST PORTABLE   Final Result   1. Unchanged position of support devices. 2. No significant change in bilateral airspace disease. XR CHEST PORTABLE   Final Result   Stable examination with stable lines and tubes. Stable pneumonia. XR CHEST PORTABLE   Final Result   Stable lines and tubes. Stable examination. Extensive airspace disease   noted in the lungs. XR CHEST PORTABLE   Final Result   Stable examination with stable lines and tubes. Stable findings of COVID   pneumonia. XR CHEST PORTABLE   Final Result   1. No significant change. XR CHEST PORTABLE   Final Result   Worsening multifocal airspace opacities. XR CHEST PORTABLE   Final Result   Interval placement of a left-sided chest tube with significant decrease seen   in size of the left pneumothorax only with a small apical component   remaining. Patchy airspace disease within the lung fields is stable and   unchanged. XR CHEST PORTABLE   Final Result   New onset moderate to large left pneumothorax with mild tension. Endotracheal, nasogastric tubes and PICC line are stable.   Redemonstration of bilateral infiltrates. Findings communicated to the referring physician on February 1, 2022      RECOMMENDATION:   Stat left chest tube. XR CHEST PORTABLE   Final Result   1. Stable lines, tubes, and support devices. 2. Stable cardiopulmonary status including bilateral airspace opacities and   small effusions. XR CHEST PORTABLE   Final Result   No significant change. Continued follow-up recommended. XR CHEST PORTABLE   Final Result   Mild worsening of bilateral infiltrates likely due to pneumonia. Continued   follow-up recommended. XR CHEST PORTABLE   Final Result   Moderate persistent multifocal airspace disease compatible with multifocal   pneumonia including COVID pneumonia. While similar to recent studies, there   has been gradual progression and worsening since the early study of   01/12/2022. XR CHEST PORTABLE   Final Result   Extensive infiltrates within the lungs bilaterally, worsening on the right   with increasing consolidation. XR CHEST PORTABLE   Final Result   No significant interval change multifocal airspace disease. Stable lines and tubes. XR CHEST PORTABLE   Final Result   Mildly improved parenchymal infiltrates at the right lung base, otherwise   similar appearing chest.         XR CHEST PORTABLE   Final Result   Extensive bilateral airspace opacities are seen without significant change. XR CHEST PORTABLE   Final Result   1. Recommend retraction of endotracheal tube 1.0 cm.   2. Stable severe ill-defined lung consolidation, greatest at the lung bases   consistent with pneumonia versus alveolar edema. 3. Suspected mild bilateral pleural effusion, unchanged. XR CHEST PORTABLE   Final Result   1. No significant change. XR CHEST PORTABLE   Final Result   Appropriate positioning of the endotracheal tube. Enteric tube courses   beneath the diaphragm; tip is excluded by collimation inferiorly.       No significant change in extensive bilateral airspace disease. IR PICC WO SQ PORT/PUMP > 5 YEARS   Final Result   Successful placement of PICC line. XR CHEST PORTABLE   Final Result   New multifocal moderate to severe pneumonia. Clinical and imaging follow-up   to resolution recommended. CTA HEAD NECK W CONTRAST   Final Result   Unremarkable CTA of the head and neck. Findings consistent with COVID pneumonia. This scan was analyzed using Viz. ai contact LVO. Identification of suspected   findings is not for diagnostic use beyond notification. Viz LVO is limited to   analysis of imaging data and should not be used in-lieu of full patient   evaluation or relied upon to make or confirm diagnosis. CT HEAD WO CONTRAST   Final Result   No acute intracranial abnormality. XR CHEST PORTABLE   Final Result   Multifocal bilateral atypical pneumonia. XR CHEST PORTABLE    (Results Pending)   VL Extremity Venous Bilateral    (Results Pending)           Assessment/Plan:      COVID PNA  Acute hypoxic respiratory Failure   - CXR: noted with multifocal PNA  - no home O2 at baseline  - had vaccine J &J in March 2021 - no booster   -progressively worsening hypoxia, was on vapotherm with full support   - eventually placed on vent support 1/21  - persistent severe hypoxemia -she was proned on 1/20/2022.    - Decadron weaned and stopped after 25 days   -  finished 7 days levaquin   -- CRP elevated - Started on Baricitinib - finished 14 days .   - Has MRSA in her sputums and elevated WBC. Stopped cefepime. continue Vancomycin day #12  Valium and Seroquel added for weaning IV sedation   precedex caused bradycardia  - sp bronch with BAL for fevers - BAL with MRSA again   - trach and peg planned - surgery consulted - tentative pre op Monday, Procedures Tuesday per surgery eval.        Left pneumothorax. - left chest tube placed. CT flushed and it is patent. Placed to suction.    - resolved pneumothorax and off chest tube 2/10       Hypotension  -weaned off pressors       Episodes of expressive aphasia   - ? TIA, vs mental status changes related to covid   - cont ASA, Plavix        Thrombocytopenia resolved  - likely with covid  - continue Lovenox and monitor       Hyperglycemia  Secondary to steroids  On Sliding scale insulin        HLD  - Continue statin            DVT Prophylaxis: Lovenox bid  Diet: Diet NPO  ADULT TUBE FEEDING; Orogastric; Other Tube Feeding (specify);  Glucerna 1.2; Continuous; 20; Yes; 20; Q 4 hours; 60; 30; Q 4 hours  Code Status: Full Code      Dispo - cont care    Trinidad Gonzales MD, 2/13/2022 4:07 PM

## 2022-02-13 NOTE — PROGRESS NOTES
02/12/22 1917   Vent Information   Vent Mode AC/VC   Vt Ordered 350 mL   Rate Set 20 bmp   Peak Flow 70 L/min   FiO2  60 %   SpO2 96 %   Sensitivity 2   PEEP/CPAP 8   Vent Patient Data   Peak Inspiratory Pressure 13 cmH2O   Mean Airway Pressure 8 cmH20   Rate Measured 21 br/min   Vt Exhaled 381 mL   Minute Volume 8.32 Liters   I:E Ratio 1:2.5   Plateau Pressure 27 [id number]   Static Compliance 20 mL/cmH2O   Dynamic Compliance 76 mL/cmH2O   Cough/Sputum   Sputum How Obtained Suctioned;Endotracheal   Cough Productive   Sputum Amount Small   Sputum Color Creamy   Tenacity Thick

## 2022-02-13 NOTE — PROGRESS NOTES
02/13/22 0700   Vent Information   Vent Type 840   Vent Mode AC/VC   Vt Ordered 350 mL   Rate Set 20 bmp   Peak Flow 70 L/min   FiO2  55 %   SpO2 95 %   SpO2/FiO2 ratio 172.73   Sensitivity 2   PEEP/CPAP 8   I Time/ I Time % 0.2 s   Humidification Source Heated wire   Humidification Temp Measured 37   Vent Patient Data   Peak Inspiratory Pressure 23 cmH2O   Mean Airway Pressure 11 cmH20   Rate Measured 25 br/min   Vt Exhaled 384 mL   Minute Volume 9.33 Liters   I:E Ratio 1:2   Plateau Pressure 27 AAA88   Static Compliance 19 mL/cmH2O   Cough/Sputum   Sputum How Obtained Endotracheal;Suctioned   Cough Productive   Sputum Amount Large   Sputum Color Creamy   Tenacity Tenacious   Spontaneous Breathing Trial (SBT) RT Doc   Pulse 80   Breath Sounds   Right Upper Lobe Rhonchi   Right Middle Lobe Rhonchi   Right Lower Lobe Diminished   Left Upper Lobe Rhonchi   Left Lower Lobe Diminished   Additional Respiratory  Assessments   Resp 20   Position Reverse Trendelenburg   Oral Care Completed? Yes   Oral Care Mouth suctioned   Subglottic Suction Done?  Yes   Alarm Settings   High Pressure Alarm 45 cmH2O   Low Minute Volume Alarm 2 L/min   Apnea (secs) 20 secs   High Respiratory Rate 45 br/min   Low Exhaled Vt  250 mL   ETT (adult)   Placement Date/Time: 01/21/22 0330   Tube Size: 8 mm  Location: Oral  Secured at: 23 cm  Measured From: Lips   Secured at 23 cm   Measured From [address] By [employer] tube nicole   Site Condition Dry

## 2022-02-13 NOTE — PROGRESS NOTES
Pulmonary & Critical Care Medicine ICU Progress Note    CC: COVID in partially vaccinated patient     Events of Last 24 hours: fever 101 last night; more agitation  Levo 6  Fent 175  Precedex 1.8  Invasive Lines: PICC 1/20/2022    MV: 1/21/2022  Vent Mode: AC/VC Rate Set: 20 bmp/Vt Ordered: 350 mL/ /FiO2 : 55 %  Recent Labs     02/12/22  0330 02/13/22  0415   PHART 7.521* 7.417   WVO0HCV 41.7 51.5*   PO2ART 57.4* 66.9*       IV:   dexmedetomidine HCl in NaCl 1.8 mcg/kg/hr (02/13/22 0758)    norepinephrine 6 mcg/min (02/13/22 0837)    fentaNYL 175 mcg/hr (02/13/22 0524)    propofol Stopped (02/09/22 1204)    dextrose      sodium chloride 5 mL/hr at 02/13/22 0339       Vitals:  Blood pressure (!) 96/48, pulse 91, temperature 99.4 °F (37.4 °C), temperature source Bladder, resp. rate 19, height 5' 3\" (1.6 m), weight 168 lb 6.9 oz (76.4 kg), SpO2 92 %, not currently breastfeeding. on vent    Intake/Output Summary (Last 24 hours) at 2/13/2022 0902  Last data filed at 2/13/2022 0755  Gross per 24 hour   Intake 2915.11 ml   Output 950 ml   Net 1965.11 ml     General: intubated, ill appearing    ENT: Pharynx with ETT. Eyes with some crusting   Resp: No crackles. No wheezing. CV: S1, S2. Trace edema  GI: NT, ND, +BS  Skin: Warm and dry. RUE swelling   Neuro: PERRL. Sedated, not following commands.      Scheduled Meds:   vancomycin  750 mg IntraVENous Q12H    albuterol  2.5 mg Nebulization Q4H    insulin lispro  0-18 Units SubCUTAneous Q4H    carboxymethylcellulose PF  1 drop Both Eyes 4x Daily    QUEtiapine  25 mg Oral BID    chlorhexidine  15 mL Mouth/Throat BID    famotidine (PEPCID) injection  20 mg IntraVENous BID    enoxaparin  30 mg SubCUTAneous BID    [Held by provider] aspirin  81 mg Oral Daily    [Held by provider] clopidogrel  75 mg Oral Daily    ascorbic acid  1,000 mg Oral Daily    atorvastatin  10 mg Oral Nightly    sodium chloride flush  5-40 mL IntraVENous 2 times per day    Vitamin D 2,000 Units Oral Daily     PRN Meds:  albuterol, magnesium sulfate, potassium chloride, fentanNYL, midazolam, glucose, glucagon (rDNA), dextrose, dextrose bolus (hypoglycemia) **OR** dextrose bolus (hypoglycemia), guaiFENesin-dextromethorphan, sodium chloride flush, sodium chloride, ondansetron **OR** ondansetron, polyethylene glycol, acetaminophen **OR** acetaminophen    Results:  CBC:   Recent Labs     02/11/22 0427 02/12/22  0240 02/13/22 0415   WBC 7.7 8.4 7.2   HGB 8.6* 8.4* 8.1*   HCT 25.9* 25.0* 24.5*   MCV 89.4 87.8 88.7    347 293     BMP:   Recent Labs     02/11/22 0427 02/12/22 0240 02/13/22 0415    133* 132*   K 3.6 4.1 4.3   CL 97* 93* 94*   CO2 32 33* 31   BUN 22* 28* 34*   CREATININE <0.5* 0.7 <0.5*     LIVER PROFILE: No results for input(s): AST, ALT, LIPASE, BILIDIR, BILITOT, ALKPHOS in the last 72 hours. Invalid input(s): AMYLASE,  ALB    Micro:  1/17/2022 SARS-CoV-2 positive at urgent care  1/12/2022 SARS-CoV-2 detected   1/31/2022 tracheal aspirate MRSA  2/1/2022 blood NG  2/6/22 BAL: MRSA  2/12/22 Resp cx   blood cx    Imaging:   CXR 2/13/2022: stable support devices and air space disease    ASSESSMENT:  · Acute hypoxemic respiratory failure, severe and life-threatening  · COVID-19 pneumonia in a partially vaccinated patient, s/p J&J vaccination 3/2021  · ARDS  · MRSA pneumonia  · Now with new fever and new leukocytosis    · Left pneumothorax, chest tube placed 2/1/22 and removed 2/10/22  · Former smoker    PLAN:  Droplet Plus Airborne Precautions   Mechanical ventilation as per my orders.  The ventilator was adjusted by me at the bedside for unstable, life threatening respiratory failure  Precedex gtt and Fentanyl gtt  Repeat cultures  Seroquel 25 BID   Completed 7 days Levaquin   Vanc D#13/14,  Stopped Cefepime    Completed Decadron D#25  Completed 14 days baricitinib   H-SSI for FSBS goal 150-180  Planning for trach next week  RUE doppler tomorrow  Prophylaxis: Pepcid, Lovenox 40 BID  Total critical care time caring for this patient with life threatening, unstable organ failure, including direct patient contact, management of life support systems, review of data including imaging and labs, discussions with other team members and physicians is 31 minutes so far today, excluding procedures.

## 2022-02-14 NOTE — PROGRESS NOTES
4 Eyes Skin Assessment     The patient is being assess for   Shift Handoff    I agree that 2 RN's have performed a thorough Head to Toe Skin Assessment on the patient. ALL assessment sites listed below have been assessed. Areas assessed for pressure by both nurses:   [x]   Head, Face, and Ears   [x]   Shoulders, Back, and Chest, Abdomen  [x]   Arms, Elbows, and Hands   [x]   Coccyx, Sacrum, and Ischium  [x]   Legs, Feet, and Heels        Skin Assessed Under all Medical Devices by both nurses:  ETT, O2 device tubing, lynch, OG, securement devices and heel boots              All Mepilex Borders were peeled back and area peeked at: No: not present  Please list where Mepilex Borders are located:  n/a            Does the Patient have Skin Breakdown related to pressure? Yes [facility] WOUND CARE was Initiated documentation include the Maria Victoria-wound, Wound Assessment, Measurements, Dressing Treatment, Drainage, and Color\",          Anton Prevention initiated:  Yes   Wound Care Orders initiated:  Yes      [id number]  nurse consulted for Pressure Injury (Stage 3,4, Unstageable, DTI, NWPT, Complex wounds)and New or Established Ostomies:  Yes      Primary Nurse eSignature: Electronically signed by Lucie Arriola RN on 2/14/22 at 5:43 PM EST    Wound care RN, Lea Gaviria, rounding on pt today also.

## 2022-02-14 NOTE — PROGRESS NOTES
Hospitalist Progress Note      PCP: Kathryn Cid DO    Date of Admission: 1/12/2022     resp failure on vent , covid pna, unvaccinated  High fevers S.p bronch with BAL   Ongoing weaning trials    Subjective:   On the vent 50% FiO2 8 of PEEP  Fevers overnight  Continues to be on Levophed  On Precedex            Medications:  Reviewed    Infusion Medications    dexmedetomidine HCl in NaCl 1.8 mcg/kg/hr (02/14/22 1113)    norepinephrine 7 mcg/min (02/14/22 0451)    fentaNYL 175 mcg/hr (02/14/22 1115)    propofol Stopped (02/09/22 1204)    dextrose      sodium chloride 5 mL/hr at 02/13/22 1819     Scheduled Medications    linezolid  600 mg IntraVENous Q12H    albuterol  2.5 mg Nebulization Q4H    insulin lispro  0-18 Units SubCUTAneous Q4H    carboxymethylcellulose PF  1 drop Both Eyes 4x Daily    QUEtiapine  25 mg Oral BID    chlorhexidine  15 mL Mouth/Throat BID    famotidine (PEPCID) injection  20 mg IntraVENous BID    [Held by provider] aspirin  81 mg Oral Daily    [Held by provider] clopidogrel  75 mg Oral Daily    ascorbic acid  1,000 mg Oral Daily    atorvastatin  10 mg Oral Nightly    sodium chloride flush  5-40 mL IntraVENous 2 times per day    Vitamin D  2,000 Units Oral Daily     PRN Meds: albuterol, magnesium sulfate, potassium chloride, fentanNYL, midazolam, glucose, glucagon (rDNA), dextrose, dextrose bolus (hypoglycemia) **OR** dextrose bolus (hypoglycemia), guaiFENesin-dextromethorphan, sodium chloride flush, sodium chloride, ondansetron **OR** ondansetron, polyethylene glycol, acetaminophen **OR** acetaminophen      Intake/Output Summary (Last 24 hours) at 2/14/2022 1354  Last data filed at 2/14/2022 1059  Gross per 24 hour   Intake 4098.06 ml   Output 1075 ml   Net 3023.06 ml       Physical Exam Performed:    /61   Pulse 108   Temp 102.3 °F (39.1 °C) (Bladder)   Resp 22   Ht 5' 3\" (1.6 m)   Wt 164 lb 8 oz (74.6 kg)   SpO2 91%   BMI 29.14 kg/m²       Patient seen in droplet plus precautions  General:  Elderly female, on vent. supine  Oral ETT and OG noted   Mucous Membranes:  Pink , anicteric  Neck: No JVD, no carotid bruit, no thyromegaly  Chest: diminished in bases, bilateral mild crackles present. Cardiovascular:  RRR S1S2 heard, no murmurs or gallops  Abdomen: Soft, undistended, non tender, no organomegaly, BS present  Extremities: bilateral UE edema worse on right   No edema or cyanosis. Distal pulses well felt  Neurological :  Sedated    Labs:   Recent Labs     02/12/22  0240 02/13/22  0415 02/14/22  0403   WBC 8.4 7.2 8.0   HGB 8.4* 8.1* 8.3*   HCT 25.0* 24.5* 24.8*    293 294     Recent Labs     02/12/22  0240 02/13/22 0415 02/14/22  0403   * 132* 131*   K 4.1 4.3 4.8   CL 93* 94* 93*   CO2 33* 31 31   BUN 28* 34* 41*   CREATININE 0.7 <0.5* 0.9   CALCIUM 8.8 8.6 8.7     No results for input(s): AST, ALT, BILIDIR, BILITOT, ALKPHOS in the last 72 hours. No results for input(s): INR in the last 72 hours. No results for input(s): Myrla Greulich in the last 72 hours. Urinalysis:      Lab Results   Component Value Date    NITRU Negative 02/12/2022    WBCUA 3-5 02/12/2022    BACTERIA 1+ 02/12/2022    RBCUA 21-50 02/12/2022    BLOODU LARGE 02/12/2022    SPECGRAV 1.020 02/12/2022    GLUCOSEU Negative 02/12/2022    GLUCOSEU Neg 08/29/2010       Radiology:  XR CHEST PORTABLE   Final Result   Stable support apparatus. Persistent diffuse bilateral airspace disease. Findings on the right have   slightly progressed compared to prior. VL Extremity Venous Bilateral   Final Result      XR CHEST PORTABLE   Final Result   No significant change in the multifocal opacities of both lungs. The   pulmonary inflation is stable to 02/12/2022 and improved from 02/10/2022. XR CHEST PORTABLE   Final Result   Slightly improved aeration in the lungs.          XR CHEST PORTABLE   Final Result   No evidence of pneumothorax status post left chest tube removal.      Remainder of support apparatus and extensive diffuse bilateral airspace   disease are similar in appearance. VL Extremity Venous Right   Final Result      XR CHEST 1 VIEW   Final Result   1. Unchanged position of support devices. Kinked appearance of left chest   tube again demonstrated without evidence for pneumothorax. 2. No significant change in bilateral airspace disease. XR CHEST PORTABLE   Final Result   Stable support apparatus. The left-sided chest tube catheter tubing appears   kinked. Persistent diffuse bilateral airspace opacities. Findings on the left have   progressed compared to prior. XR CHEST PORTABLE   Final Result   1. Unchanged position of support devices. 2. No significant change in bilateral airspace disease. XR CHEST PORTABLE   Final Result   Stable examination with stable lines and tubes. Stable pneumonia. XR CHEST PORTABLE   Final Result   Stable lines and tubes. Stable examination. Extensive airspace disease   noted in the lungs. XR CHEST PORTABLE   Final Result   Stable examination with stable lines and tubes. Stable findings of COVID   pneumonia. XR CHEST PORTABLE   Final Result   1. No significant change. XR CHEST PORTABLE   Final Result   Worsening multifocal airspace opacities. XR CHEST PORTABLE   Final Result   Interval placement of a left-sided chest tube with significant decrease seen   in size of the left pneumothorax only with a small apical component   remaining. Patchy airspace disease within the lung fields is stable and   unchanged. XR CHEST PORTABLE   Final Result   New onset moderate to large left pneumothorax with mild tension. Endotracheal, nasogastric tubes and PICC line are stable. Redemonstration of   bilateral infiltrates. Findings communicated to the referring physician on February 1, 2022      RECOMMENDATION:   Stat left chest tube.          XR CHEST PORTABLE   Final Result   1. Stable lines, tubes, and support devices. 2. Stable cardiopulmonary status including bilateral airspace opacities and   small effusions. XR CHEST PORTABLE   Final Result   No significant change. Continued follow-up recommended. XR CHEST PORTABLE   Final Result   Mild worsening of bilateral infiltrates likely due to pneumonia. Continued   follow-up recommended. XR CHEST PORTABLE   Final Result   Moderate persistent multifocal airspace disease compatible with multifocal   pneumonia including COVID pneumonia. While similar to recent studies, there   has been gradual progression and worsening since the early study of   01/12/2022. XR CHEST PORTABLE   Final Result   Extensive infiltrates within the lungs bilaterally, worsening on the right   with increasing consolidation. XR CHEST PORTABLE   Final Result   No significant interval change multifocal airspace disease. Stable lines and tubes. XR CHEST PORTABLE   Final Result   Mildly improved parenchymal infiltrates at the right lung base, otherwise   similar appearing chest.         XR CHEST PORTABLE   Final Result   Extensive bilateral airspace opacities are seen without significant change. XR CHEST PORTABLE   Final Result   1. Recommend retraction of endotracheal tube 1.0 cm.   2. Stable severe ill-defined lung consolidation, greatest at the lung bases   consistent with pneumonia versus alveolar edema. 3. Suspected mild bilateral pleural effusion, unchanged. XR CHEST PORTABLE   Final Result   1. No significant change. XR CHEST PORTABLE   Final Result   Appropriate positioning of the endotracheal tube. Enteric tube courses   beneath the diaphragm; tip is excluded by collimation inferiorly. No significant change in extensive bilateral airspace disease. IR PICC WO SQ PORT/PUMP > 5 YEARS   Final Result   Successful placement of PICC line. XR CHEST PORTABLE   Final Result   New multifocal moderate to severe pneumonia. Clinical and imaging follow-up   to resolution recommended. CTA HEAD NECK W CONTRAST   Final Result   Unremarkable CTA of the head and neck. Findings consistent with COVID pneumonia. This scan was analyzed using Viz. ai contact LVO. Identification of suspected   findings is not for diagnostic use beyond notification. Viz LVO is limited to   analysis of imaging data and should not be used in-lieu of full patient   evaluation or relied upon to make or confirm diagnosis. CT HEAD WO CONTRAST   Final Result   No acute intracranial abnormality. XR CHEST PORTABLE   Final Result   Multifocal bilateral atypical pneumonia. XR CHEST PORTABLE    (Results Pending)   XR CHEST PORTABLE    (Results Pending)           Assessment/Plan:      COVID PNA  Acute hypoxic respiratory Failure   - CXR: noted with multifocal PNA  - no home O2 at baseline  - had vaccine J &J in March 2021 - no booster   -progressively worsening hypoxia, was on vapotherm with full support   - eventually placed on vent support 1/21  - persistent severe hypoxemia -she was proned on 1/20/2022.    - Decadron weaned and stopped after 25 days   -  finished 7 days levaquin   -- CRP elevated - Started on Baricitinib - finished 14 days .   - Has MRSA in her sputums and elevated WBC. Stopped cefepime. Finished 14 days of Vanco.  Now new fever with leukocytosis. Heavy growth of MRSA and tracheal aspirate culture. Start Zyvox day #1  Valium and Seroquel added for weaning IV sedation   precedex caused bradycardia  - sp bronch with BAL for fevers - BAL with MRSA again   - trach and peg planned -likely tomorrow     Left pneumothorax. - left chest tube placed. CT flushed and it is patent. Placed to suction. - resolved pneumothorax and off chest tube 2/10     Hypotension  -weaned off pressors     Episodes of expressive aphasia   - ?  TIA, vs mental status changes related to covid   - cont ASA, Plavix     Thrombocytopenia resolved  - likely with covid  - continue Lovenox and monitor     Hyperglycemia  Secondary to steroids  On Sliding scale insulin      HLD  - Continue statin        DVT Prophylaxis: Lovenox bid  Diet: Diet NPO  ADULT TUBE FEEDING; Orogastric; Other Tube Feeding (specify);  Glucerna 1.2; Continuous; 20; Yes; 20; Q 4 hours; 60; 30; Q 4 hours  Diet NPO  Code Status: Full Code      Kacie Bowling MD, 2/14/2022 1:54 PM

## 2022-02-14 NOTE — PROCEDURES
Procedure: Nontunneled central venous access, right IJ    Indication: invasive hemodynamic monitoring, frequent blood draws, ensure stable IV access    Informed Consent: was obtained from family     Time Out: taken    Procedure: Sterile prep with chlorhexidine. Full maximum sterile field/barrier technique was followed (with cap and mask and sterile gown and large sterile sheet and hand hygeine and 2% chlorhexidine). Aqueous lidocaine anesthetic. Direct ultrasound visualization of the right internal jugular vein, with placement of central venous catheter using modified seldinger technique. Good dark venous blood from all 3 lumens. CXR confirmed appropriate placement in mid to distal SVC. No immediate complication.     Recommendation: remove central line at earliest time feasible to mitigate infectious risks

## 2022-02-14 NOTE — CARE COORDINATION
INTERDISCIPLINARY PLAN OF CARE CONFERENCE    Date/Time: 2/14/2022 11:27 AM  Completed by:  MEMO Perez Case Management      Patient Name:  Randall Dick  YOB: 1950  Admitting Diagnosis: Acute respiratory failure with hypoxia ([facility] [address] [J96.01]  Pneumonia due to COVID-19 virus [U07.1, J12.82]  COVID-19 [U07.1]     Admit Date/Time:  1/12/2022  9:40 AM    Chart reviewed. Interdisciplinary team contacted or reviewed plan related to patient progress and discharge plans. Disciplines included Case Management, Nursing, and Dietitian. Current Status:Ongoing. Trach and Peg planned for tomorrow  PT/OT recommendation for discharge plan of care: TBD    Expected D/C Disposition:  TBD; pt will likely need LTACH  Confirmed plan with patient and/or family Yes confirmed with: (name) Pt's Al Dey via phone call at [phone]    Discharge Plan Comments: Chart review completed. Pt remains in the ICU and is on a Vent. Completed Interdisciplinary rounds with ICU staff;   Spoke with Dr. Maria Alejandra Day who states okay to discuss [address] with family. Called and spoke with pt's daughter Gladys Rg. Discussed [address] with Gladys Rg and reviewed the list with her via phone call. Julietkelly Rg requested referral to [facility] but is open to [facility] and [facility] location if needed. Gladys Rg states first opening on transportation when writer inquired. No further questions or concerns expressed. Emailed LTACH list to Gladys Rg at [email or web address]    Referral called to [facility] at [address] [place] [address]. Pt will require a precert for LTACH    CM will continue to follow and assist. Please notify CM if needs or concerns arise.     Home O2 in place on admit: No

## 2022-02-14 NOTE — PROGRESS NOTES
Pt daughter Francisco Casillas has been in visiting pt, asking a lot of good questions, states she has been texting sister about choosing a facility for pt to transfer to when she can.

## 2022-02-14 NOTE — CONSULTS
[id number] [facility] Wound Ostomy Continence Nurse  Consult Note       NAME:  Rom Matias  MEDICAL RECORD NUMBER:  [id number]  AGE: 71 y.o.    GENDER: female  : 1950  TODAY'S DATE:  2022    Subjective Pt is intubtaed and sedated in ICU setting   Reason for WOCN Evaluation and Assessment: joseph Hadley is a 71 y.o. female referred by:   [x] Physician  [x] Nursing  [] Other:     Wound Identification:  Wound Type: pressure  Contributing Factors: chronic pressure and decreased mobility      Patient Goal of Care: EDUARDO  [] Wound Healing  [] Odor Control  [] Palliative Care  [] Pain Control   [] Other:         PAST MEDICAL HISTORY        Diagnosis Date    Arthritis     Asthma     DDD (degenerative disc disease)     Diabetes mellitus ([address]     Hyperlipidemia        PAST SURGICAL HISTORY    Past Surgical History:   Procedure Laterality Date    COLONOSCOPY      COLONOSCOPY  2016    diverticulosis    HYSTERECTOMY      KNEE ARTHROSCOPY      x2    TONSILLECTOMY      TOTAL KNEE ARTHROPLASTY  10/05/2012    RIGHT    TUBAL LIGATION      WRIST SURGERY         FAMILY HISTORY    Family History   Problem Relation Age of Onset    Cancer Mother         breast and lung    Diabetes Mother     Breast Cancer Mother 79    Heart Disease Father     Emphysema Father     Cancer Brother         lung    Cancer Maternal Uncle        SOCIAL HISTORY    Social History     Tobacco Use    Smoking status: Former Smoker     Packs/day: 1.00     Years: 15.00     Pack years: 15.00     Types: Cigarettes    Smokeless tobacco: Never Used    Tobacco comment: Quit in    Vaping Use    Vaping Use: Never used   Substance Use Topics    Alcohol use: Yes     Comment: social    Drug use: No       ALLERGIES    Allergies   Allergen Reactions    Hydrocodone-Acetaminophen Nausea Only    Morphine Nausea Only    Morphine And Related Nausea Only    Vicodin [Hydrocodone-Acetaminophen] Nausea Only    Pcn [Penicillins] Rash       MEDICATIONS    No current facility-administered medications on file prior to encounter. Current Outpatient Medications on File Prior to Encounter   Medication Sig Dispense Refill    simvastatin (ZOCOR) 20 MG tablet TAKE 1 TABLET AT BEDTIME FOR HIGH AMOUNT OF FATS IN THE BLOOD      fluticasone (FLONASE) 50 MCG/ACT nasal spray USE 1 SPRAY IN EACH NOSTRIL TWICE A DAY AS NEEDED      Cholecalciferol (VITAMIN D) 10 MCG (400 UNIT) CAPS Capsule Take 1 tablet by mouth daily      meloxicam (MOBIC) 15 MG tablet Take 15 mg by mouth daily      aspirin 81 MG tablet Take 81 mg by mouth daily.            Objective    BP (!) 112/38   Pulse 110   Temp 102.3 °F (39.1 °C) (Bladder)   Resp 23   Ht 5' 3\" (1.6 m)   Wt 164 lb 8 oz (74.6 kg)   SpO2 91%   BMI 29.14 kg/m²     LABS:  WBC:    Lab Results   Component Value Date    WBC 8.0 02/14/2022     H/H:    Lab Results   Component Value Date    HGB 8.3 02/14/2022    HCT 24.8 02/14/2022     PTT:    Lab Results   Component Value Date    APTT 26.7 09/22/2012   [APTT}  PT/INR:    Lab Results   Component Value Date    PROTIME 12.3 01/18/2022    INR 1.08 01/18/2022     HgBA1c:    Lab Results   Component Value Date    LABA1C 6.3 10/02/2021       Assessment   Anton Risk Score: Anton Scale Score: 11    Patient Active Problem List   Diagnosis Code    Primary localized osteoarthrosis, lower leg M17.10    COVID-19 U07.1    Acute respiratory failure with hypoxia (HCC) J96.01    Thrombocytopenia (HCC) D69.6    Anxiety F41.9    Hyperlipidemia E78.5    Arthritis M19.90    Brain fog R41.89    Moderate protein-calorie malnutrition ([address] E44.0    Pneumonia due to COVID-19 virus U07.1, J12.82    ARDS (adult respiratory distress syndrome) (HCC) J80    Hyperglycemia R73.9    Leukocytosis D72.829    Hypotension I95.9    Mucus plugging of bronchi T17.500A       Measurements:  Wound 02/12/22 Sacrum (Active)   Number of days: 1        sacrum:  4x3.5x0.1cm, 100% red and dusky maroon tissue, evolving DTI. Plan  Triad Paste twice daily and prn to sacrum   Plan of Care:      Specialty Bed Required : Yes icu surface  [x] Low Air Loss   [] Pressure Redistribution  [] Fluid Immersion  [] Bariatric  [] Total Pressure Relief  [] Other:     Current Diet: Diet NPO  ADULT TUBE FEEDING; Orogastric; Other Tube Feeding (specify);  Glucerna 1.2; Continuous; 20; Yes; 20; Q 4 hours; 60; 30; Q 4 hours  Diet NPO  Dietician consult:  Yes    Discharge Plan:  Placement for patient upon discharge: TBD   Patient appropriate for Outpatient [address]: TBD    Referrals:  [x]   [] [facility]  [] Supplies  [] Other    Patient/Caregiver Teaching:  Level of patient/caregiver understanding able to:   [] Indicates understanding       [] Needs reinforcement  [] Unsuccessful      [] Verbal Understanding  [] Demonstrated understanding       [] No evidence of learning  [] Refused teaching         [x] N/A       Electronically signed by Olga Ware RN, CWOCN on 2/14/2022 at 12:45 PM

## 2022-02-14 NOTE — PROGRESS NOTES
02/13/22 1926   Vent Information   Vent Mode AC/VC   Vt Ordered 350 mL   Rate Set 20 bmp   Peak Flow 55 L/min   FiO2  50 %   SpO2 95 %   Sensitivity 2   PEEP/CPAP 8   Vent Patient Data   Peak Inspiratory Pressure 25 cmH2O   Mean Airway Pressure 13 cmH20   Rate Measured 22 br/min   Vt Exhaled 382 mL   Minute Volume 7.63 Liters   I:E Ratio 1:2.3   Cough/Sputum   Sputum How Obtained Suctioned;Endotracheal   Cough Productive   Sputum Amount Small   Sputum Color Yellow   Tenacity Thick

## 2022-02-14 NOTE — PROGRESS NOTES
Daughter called and was updated. Pt had line placed by Dr Laina Hammond. Giovanny Collins with wound care rounded on pt= continue with triad cream.   Will place pt on heparin gtt. Paging Cari Frame at this time. Will add Zyvox.

## 2022-02-14 NOTE — PROGRESS NOTES
Pharmacy to Manage Heparin Infusion per [facility]    Dx:bilateral UE SVT  Pt wt = __61_ (will use adjusted wt if actual body weight > 120% ideal body weight). Baseline anti-Xa and/or aPTT =0.04    Oral factor Xa-inhibitors may alter and elevate anti-Xa levels used for unfractionated heparin monitoring. As a result, anti-Xa monitoring is not accurate while Xa-inhibitor activity is detectable. Utilize aPTT monitoring when patient received an oral factor Xa-inhibitor (apixaban, betrixaban, edoxaban or rivaroxaban) within 72 hours prior to admission (please document last administration time). The goal is to allow a washout of oral factor Xa-inhibitors by using aPTT for 72 hours, then change to ant-Xa levels for UFH. High Dose Heparin Infusion  Heparin 80 units/kg IVP bolus  (4880 units) followed by Heparin infusion at 18 units/kg/hr (1100 units/hr)  (recommended initial max dose 2100 units/hr). Recheck Heparin Xa @ 2200.   Goal anti-Xa 0.3-0.7 IU/mL  Katlyn Browne Pharm D 2/14/20222:13 PM  .

## 2022-02-14 NOTE — PROGRESS NOTES
Blood pressure (!) 108/54, pulse 89, temperature 99.6 °F (37.6 °C), temperature source Bladder, resp. rate 20, height 5' 3\" (1.6 m), weight 168 lb 6.9 oz (76.4 kg), SpO2 93 %, not currently breastfeeding. Patient currently intubated with ETT at 25. Vent settings are 20/320/+8/50%. Precedex infusing at 1.8 mcg/kg/h. Fentanyl infusing at 175 mcg//h. LEVOPHED infusing at a rate of 6 mcg/min. Shift assessment completed. Pt guppy breathing on vent. Unresponsive. Lungs diminished. Thick secretions noted. TF at goal. Mcguire patent. Restraints in place. Repositioned. DTI to coccyx dressed and covered. No further needs known at this time.  Electronically signed by Manolo Peterson RN on 2/13/2022 at 8:55 PM

## 2022-02-14 NOTE — PROGRESS NOTES
Pt with prolonged vent support 2/2 COVID/MRSA PNA. Pt is partially vaccinated with J&J, no booster. She has been unable to be extubated. Plavix has been on hold since 2/10 (last dose 2/9). Vent: 50% FIo2, PEEP of 8    ABD: soft, no distention, tolerating TF via OG    VS: on Levo at 7 mcg, temp 102.3    Resp culture + MRSA 2/12: On Vanc, fevers after bronch with BAL    Pt did have left pneumothorax: s/p chest tube placement on 2/1/22, chest tube was removed on 2/10. RUE doppler: results pending. (pt's Plavix has been on hold, she has been getting Lovenox 30 mg sub q BID)    Plan:     All the risks, benefits and alternatives of tracheostomy and percutaneous endoscopic gastrostomy tube placement with Dr. Patrick Rojas were discussed with patient's daughter, Al Dey (at [phone]), all questions were answered and she agreed to proceed with the procedure. NPO after midnight. UE venous duplex: preliminary report: Right and left arms with acute partially occluding superficial venous thrombosis of cephalic vein. Await final results. If anticoagulation remains Lovenox; will hold am dose. Planning for trach and PEG with Dr. Patrick Rojas tomorrow at 3113.     Electronically signed by ANGEL Graf CNP on 2/14/2022 at 10:37 AM

## 2022-02-14 NOTE — PROGRESS NOTES
RT Inhaler-Nebulizer Bronchodilator Protocol Note    There is a bronchodilator order in the chart from a provider indicating to follow the RT Bronchodilator Protocol and there is an Initiate RT Inhaler-Nebulizer Bronchodilator Protocol order as well (see protocol at bottom of note). CXR Findings:  XR CHEST PORTABLE    Result Date: 2/13/2022  No significant change in the multifocal opacities of both lungs. The pulmonary inflation is stable to 02/12/2022 and improved from 02/10/2022. XR CHEST PORTABLE    Result Date: 2/12/2022  Slightly improved aeration in the lungs. The findings from the last RT Protocol Assessment were as follows:   History Pulmonary Disease: (P) Chronic pulmonary disease  Respiratory Pattern: (P) Use of accessory muscles, prolonged exhalation, or RR 26-30 bpm  Breath Sounds: (P) Intermittent or unilateral wheezes  Cough: (P) Weak, productive  Indication for Bronchodilator Therapy: (P) Wheezing associated with pulm disorder  Bronchodilator Assessment Score: (P) 14    Aerosolized bronchodilator medication orders have been revised according to the RT Inhaler-Nebulizer Bronchodilator Protocol below. Respiratory Therapist to perform RT Therapy Protocol Assessment initially then follow the protocol. Repeat RT Therapy Protocol Assessment PRN for score 0-3 or on second treatment, BID, and PRN for scores above 3. No Indications  adjust the frequency to every 6 hours PRN wheezing or bronchospasm, if no treatments needed after 48 hours then discontinue using Per Protocol order mode. If indication present, adjust the RT bronchodilator orders based on the Bronchodilator Assessment Score as indicated below.   Use Inhaler orders unless patient has one or more of the following: on home nebulizer, not able to hold breath for 10 seconds, is not alert and oriented, cannot activate and use MDI correctly, or respiratory rate 25 breaths per minute or more, then use the equivalent nebulizer order(s) with same Frequency and PRN reasons based on the score. If a patient is on this medication at home then do not decrease Frequency below that used at home. 0-3  enter or revise RT bronchodilator order(s) to equivalent RT Bronchodilator order with Frequency of every 4 hours PRN for wheezing or increased work of breathing using Per Protocol order mode. 4-6  enter or revise RT Bronchodilator order(s) to two equivalent RT bronchodilator orders with one order with BID Frequency and one order with Frequency of every 4 hours PRN wheezing or increased work of breathing using Per Protocol order mode. 7-10  enter or revise RT Bronchodilator order(s) to two equivalent RT bronchodilator orders with one order with TID Frequency and one order with Frequency of every 4 hours PRN wheezing or increased work of breathing using Per Protocol order mode. 11-13  enter or revise RT Bronchodilator order(s) to one equivalent RT bronchodilator order with QID Frequency and an Albuterol order with Frequency of every 4 hours PRN wheezing or increased work of breathing using Per Protocol order mode. Greater than 13  enter or revise RT Bronchodilator order(s) to one equivalent RT bronchodilator order with every 4 hours Frequency and an Albuterol order with Frequency of every 2 hours PRN wheezing or increased work of breathing using Per Protocol order mode.          Electronically signed by Penelope Bach RCP on 2/13/2022 at 8:41 PM

## 2022-02-14 NOTE — PROGRESS NOTES
Pulmonary & Critical Care Medicine ICU Progress Note    CC: COVID in partially vaccinated patient     Events of Last 24 hours:   Fever overnight   Levophed 7   Fentanyl 175  Precedex 1.8  PEEP 8  FiO2 50%        Invasive Lines:   PICC 1/20/2022    MV: 1/21/2022  Vent Mode: AC/VC Rate Set: 20 bmp/Vt Ordered: 350 mL/ /FiO2 : 50 %  Recent Labs     02/13/22  0415 02/14/22  0407   PHART 7.417 7.386   BOF0TVT 51.5* 50.6*   PO2ART 66.9* 63.1*       IV:   dexmedetomidine HCl in NaCl 1.8 mcg/kg/hr (02/14/22 0451)    norepinephrine 7 mcg/min (02/14/22 0451)    fentaNYL 175 mcg/hr (02/14/22 0535)    propofol Stopped (02/09/22 1204)    dextrose      sodium chloride 5 mL/hr at 02/13/22 1819       Vitals:  Blood pressure (!) 107/50, pulse 93, temperature 100.8 °F (38.2 °C), temperature source Bladder, resp. rate 23, height 5' 3\" (1.6 m), weight 164 lb 8 oz (74.6 kg), SpO2 95 %, not currently breastfeeding. on vent    Intake/Output Summary (Last 24 hours) at 2/14/2022 0757  Last data filed at 2/14/2022 2224  Gross per 24 hour   Intake 4018.06 ml   Output 990 ml   Net 3028.06 ml     EXAM:  General: ill appearing. Intubated sedated. Eyes: PERRL. No sclera icterus. No conjunctival injection. ENT: No discharge. Pharynx clear. ET tube in place  Neck: Trachea midline. Normal thyroid. Resp: No accessory muscle use. No crackles. No wheezing. No rhonchi. CV: Regular rate. Regular rhythm. No mumur or rub. No edema. GI: Non-tender. Non-distended. No masses. Skin: Warm and dry. No nodule on exposed extremities. No rash on exposed extremities. Lymph: No cervical LAD. No supraclavicular LAD. M/S: No cyanosis. No joint deformity. No clubbing. Neuro: Intubated sedated. + response to painful stimuli.  + following commands.   Psych: Noncommunicative unable to obtain    Scheduled Meds:   vancomycin  750 mg IntraVENous Q12H    albuterol  2.5 mg Nebulization Q4H    insulin lispro  0-18 Units SubCUTAneous Q4H    carboxymethylcellulose PF  1 drop Both Eyes 4x Daily    QUEtiapine  25 mg Oral BID    chlorhexidine  15 mL Mouth/Throat BID    famotidine (PEPCID) injection  20 mg IntraVENous BID    enoxaparin  30 mg SubCUTAneous BID    [Held by provider] aspirin  81 mg Oral Daily    [Held by provider] clopidogrel  75 mg Oral Daily    ascorbic acid  1,000 mg Oral Daily    atorvastatin  10 mg Oral Nightly    sodium chloride flush  5-40 mL IntraVENous 2 times per day    Vitamin D  2,000 Units Oral Daily     PRN Meds:  albuterol, magnesium sulfate, potassium chloride, fentanNYL, midazolam, glucose, glucagon (rDNA), dextrose, dextrose bolus (hypoglycemia) **OR** dextrose bolus (hypoglycemia), guaiFENesin-dextromethorphan, sodium chloride flush, sodium chloride, ondansetron **OR** ondansetron, polyethylene glycol, acetaminophen **OR** acetaminophen    Results:  CBC:   Recent Labs     02/12/22 0240 02/13/22 0415 02/14/22  0403   WBC 8.4 7.2 8.0   HGB 8.4* 8.1* 8.3*   HCT 25.0* 24.5* 24.8*   MCV 87.8 88.7 88.4    293 294     BMP:   Recent Labs     02/12/22 0240 02/13/22 0415 02/14/22  0403   * 132* 131*   K 4.1 4.3 4.8   CL 93* 94* 93*   CO2 33* 31 31   BUN 28* 34* 41*   CREATININE 0.7 <0.5* 0.9     LIVER PROFILE: No results for input(s): AST, ALT, LIPASE, BILIDIR, BILITOT, ALKPHOS in the last 72 hours. Invalid input(s):   AMYLASE,  ALB    Micro:  1/17/2022 SARS-CoV-2 positive at urgent care  1/12/2022 SARS-CoV-2 detected   1/31/2022 tracheal aspirate MRSA  2/1/2022 blood NG  2/6/22 BAL: MRSA  2/12/22 Resp cx  2/12 Resp MRSA     Imaging:   CXR 2/14/2022  images reviewed by me and showed:   Satisfactory ETT position  Satisfactory PICC line position   Diffuse bilateral ASD     ASSESSMENT:  · Acute hypoxemic respiratory failure, severe and life-threatening  · COVID-19 pneumonia in a partially vaccinated patient, s/p J&J vaccination 3/2021  · ARDS  · MRSA pneumonia  · Bilateral UE SVT   · Now with new fever and

## 2022-02-14 NOTE — PROGRESS NOTES
02/14/22 0307   Vent Information   Vent Type 840   Vent Mode AC/VC   Vt Ordered 350 mL   Rate Set 20 bmp   Peak Flow 55 L/min   FiO2  50 %   SpO2 96 %   SpO2/FiO2 ratio 192   Sensitivity 2   PEEP/CPAP 8   Humidification Source Heated wire   Humidification Temp Measured 37   Circuit Condensation Drained   Vent Patient Data   Peak Inspiratory Pressure 33 cmH2O   Mean Airway Pressure 13 cmH20   Rate Measured 22 br/min   Vt Exhaled 372 mL   Minute Volume 7.9 Liters   I:E Ratio 1:2   Cough/Sputum   Sputum How Obtained Endotracheal   Cough Productive   Sputum Amount Small   Sputum Color Creamy   Tenacity Thick   Spontaneous Breathing Trial (SBT) RT Doc   Pulse 80   Breath Sounds   Right Upper Lobe Diminished   Right Middle Lobe Diminished   Right Lower Lobe Diminished   Left Upper Lobe Diminished   Left Lower Lobe Diminished   Additional Respiratory  Assessments   Resp 21   Alarm Settings   High Pressure Alarm 45 cmH2O   Low Minute Volume Alarm 5 L/min   Apnea (secs) 20 secs   High Respiratory Rate 45 br/min   Low Exhaled Vt  250 mL   Patient Observation   Observations 8ett 23 at lip

## 2022-02-15 NOTE — PROGRESS NOTES
Patient with heparin running at 11ml/hr. There is no anti Xa lab ordered for this evening. Called Vinay in pharmacy to clarify and he confirmed there is no check until 2200 on 2/15. Heparin will be stopped at 0700 on 2/15 for trach / peg.

## 2022-02-15 NOTE — PROGRESS NOTES
Hospitalist Progress Note      PCP: Kierra Mcgowan DO    Date of Admission: 1/12/2022     resp failure on vent , covid pna, unvaccinated  High fevers S.p bronch with BAL   Ongoing weaning trials    Subjective:   On the vent 50% FiO2 8 of PEEP  Fevers overnight  Continues to be on Levophed  On Precedex    2/15  High fevers overnight  Status post bronc with BAL 2/14  On Levophed  Continues to be on Precedex          Medications:  Reviewed    Infusion Medications    sodium chloride 125 mL/hr at 02/15/22 1039    sodium chloride      dexmedetomidine HCl in NaCl 1.8 mcg/kg/hr (02/15/22 0951)    norepinephrine 2 mcg/min (02/15/22 0601)    fentaNYL 175 mcg/hr (02/15/22 0950)    dextrose      sodium chloride 5 mL/hr at 02/14/22 3415     Scheduled Medications    [START ON 2/16/2022] famotidine (PEPCID) injection  20 mg IntraVENous Daily    sodium zirconium cyclosilicate  10 g Oral TID    linezolid  600 mg IntraVENous Q12H    albuterol  2.5 mg Nebulization Q4H    insulin lispro  0-18 Units SubCUTAneous Q4H    carboxymethylcellulose PF  1 drop Both Eyes 4x Daily    QUEtiapine  25 mg Oral BID    chlorhexidine  15 mL Mouth/Throat BID    [Held by provider] aspirin  81 mg Oral Daily    [Held by provider] clopidogrel  75 mg Oral Daily    ascorbic acid  1,000 mg Oral Daily    atorvastatin  10 mg Oral Nightly    sodium chloride flush  5-40 mL IntraVENous 2 times per day    Vitamin D  2,000 Units Oral Daily     PRN Meds: sodium chloride, heparin (porcine), heparin (porcine), albuterol, magnesium sulfate, potassium chloride, fentanNYL, midazolam, glucose, glucagon (rDNA), dextrose, dextrose bolus (hypoglycemia) **OR** dextrose bolus (hypoglycemia), guaiFENesin-dextromethorphan, sodium chloride flush, sodium chloride, ondansetron **OR** ondansetron, polyethylene glycol, acetaminophen **OR** acetaminophen      Intake/Output Summary (Last 24 hours) at 2/15/2022 1138  Last data filed at 2/15/2022 0800  Gross per 24 hour   Intake 2300.64 ml   Output 358 ml   Net 1942.64 ml       Physical Exam Performed:    BP (!) 117/57   Pulse 89   Temp 100.1 °F (37.8 °C) (Bladder)   Resp 24   Ht 5' 3\" (1.6 m)   Wt 164 lb 8 oz (74.6 kg)   SpO2 94%   BMI 29.14 kg/m²       Patient seen in droplet plus precautions  General:  Elderly female, on vent. supine  Oral ETT and OG noted   Mucous Membranes:  Pink , anicteric  Neck: No JVD, no carotid bruit, no thyromegaly  Chest: diminished in bases, bilateral mild crackles present. Cardiovascular:  RRR S1S2 heard, no murmurs or gallops  Abdomen: Soft, undistended, non tender, no organomegaly, BS present  Extremities: bilateral UE edema worse on right   No edema or cyanosis. Distal pulses well felt  Neurological :  Sedated    Labs:   Recent Labs     02/13/22  0415 02/14/22  0403 02/15/22  0254   WBC 7.2 8.0 9.9   HGB 8.1* 8.3* 7.2*   HCT 24.5* 24.8* 21.8*    294 263     Recent Labs     02/13/22  0415 02/14/22  0403 02/15/22  0254   * 131* 129*   K 4.3 4.8 5.6*   CL 94* 93* 94*   CO2 31 31 30   BUN 34* 41* 48*   CREATININE <0.5* 0.9 1.4*   CALCIUM 8.6 8.7 8.4     No results for input(s): AST, ALT, BILIDIR, BILITOT, ALKPHOS in the last 72 hours. No results for input(s): INR in the last 72 hours. No results for input(s): Evonnie Montane in the last 72 hours. Urinalysis:      Lab Results   Component Value Date    NITRU Negative 02/12/2022    WBCUA 3-5 02/12/2022    BACTERIA 1+ 02/12/2022    RBCUA 21-50 02/12/2022    BLOODU LARGE 02/12/2022    SPECGRAV 1.020 02/12/2022    GLUCOSEU Negative 02/12/2022    GLUCOSEU Neg 08/29/2010       Radiology:  XR CHEST PORTABLE   Final Result   Right PICC line has been removed. ET, NG, and right jugular line remain. Multifocal opacities are not significantly changed. XR CHEST PORTABLE   Final Result   Right IJ central venous catheter terminating in the lower SVC in satisfactory   position. No pneumothorax.       Diffuse bilateral airspace disease, not significantly changed. XR CHEST PORTABLE   Final Result   Stable support apparatus. Persistent diffuse bilateral airspace disease. Findings on the right have   slightly progressed compared to prior. VL Extremity Venous Bilateral   Final Result      XR CHEST PORTABLE   Final Result   No significant change in the multifocal opacities of both lungs. The   pulmonary inflation is stable to 02/12/2022 and improved from 02/10/2022. XR CHEST PORTABLE   Final Result   Slightly improved aeration in the lungs. XR CHEST PORTABLE   Final Result   No evidence of pneumothorax status post left chest tube removal.      Remainder of support apparatus and extensive diffuse bilateral airspace   disease are similar in appearance. VL Extremity Venous Right   Final Result      XR CHEST 1 VIEW   Final Result   1. Unchanged position of support devices. Kinked appearance of left chest   tube again demonstrated without evidence for pneumothorax. 2. No significant change in bilateral airspace disease. XR CHEST PORTABLE   Final Result   Stable support apparatus. The left-sided chest tube catheter tubing appears   kinked. Persistent diffuse bilateral airspace opacities. Findings on the left have   progressed compared to prior. XR CHEST PORTABLE   Final Result   1. Unchanged position of support devices. 2. No significant change in bilateral airspace disease. XR CHEST PORTABLE   Final Result   Stable examination with stable lines and tubes. Stable pneumonia. XR CHEST PORTABLE   Final Result   Stable lines and tubes. Stable examination. Extensive airspace disease   noted in the lungs. XR CHEST PORTABLE   Final Result   Stable examination with stable lines and tubes. Stable findings of COVID   pneumonia. XR CHEST PORTABLE   Final Result   1. No significant change.          XR CHEST PORTABLE   Final Result Worsening multifocal airspace opacities. XR CHEST PORTABLE   Final Result   Interval placement of a left-sided chest tube with significant decrease seen   in size of the left pneumothorax only with a small apical component   remaining. Patchy airspace disease within the lung fields is stable and   unchanged. XR CHEST PORTABLE   Final Result   New onset moderate to large left pneumothorax with mild tension. Endotracheal, nasogastric tubes and PICC line are stable. Redemonstration of   bilateral infiltrates. Findings communicated to the referring physician on February 1, 2022      RECOMMENDATION:   Stat left chest tube. XR CHEST PORTABLE   Final Result   1. Stable lines, tubes, and support devices. 2. Stable cardiopulmonary status including bilateral airspace opacities and   small effusions. XR CHEST PORTABLE   Final Result   No significant change. Continued follow-up recommended. XR CHEST PORTABLE   Final Result   Mild worsening of bilateral infiltrates likely due to pneumonia. Continued   follow-up recommended. XR CHEST PORTABLE   Final Result   Moderate persistent multifocal airspace disease compatible with multifocal   pneumonia including COVID pneumonia. While similar to recent studies, there   has been gradual progression and worsening since the early study of   01/12/2022. XR CHEST PORTABLE   Final Result   Extensive infiltrates within the lungs bilaterally, worsening on the right   with increasing consolidation. XR CHEST PORTABLE   Final Result   No significant interval change multifocal airspace disease. Stable lines and tubes. XR CHEST PORTABLE   Final Result   Mildly improved parenchymal infiltrates at the right lung base, otherwise   similar appearing chest.         XR CHEST PORTABLE   Final Result   Extensive bilateral airspace opacities are seen without significant change.          XR CHEST PORTABLE   Final Result 1. Recommend retraction of endotracheal tube 1.0 cm.   2. Stable severe ill-defined lung consolidation, greatest at the lung bases   consistent with pneumonia versus alveolar edema. 3. Suspected mild bilateral pleural effusion, unchanged. XR CHEST PORTABLE   Final Result   1. No significant change. XR CHEST PORTABLE   Final Result   Appropriate positioning of the endotracheal tube. Enteric tube courses   beneath the diaphragm; tip is excluded by collimation inferiorly. No significant change in extensive bilateral airspace disease. IR PICC WO SQ PORT/PUMP > 5 YEARS   Final Result   Successful placement of PICC line. XR CHEST PORTABLE   Final Result   New multifocal moderate to severe pneumonia. Clinical and imaging follow-up   to resolution recommended. CTA HEAD NECK W CONTRAST   Final Result   Unremarkable CTA of the head and neck. Findings consistent with COVID pneumonia. This scan was analyzed using Viz. ai contact LVO. Identification of suspected   findings is not for diagnostic use beyond notification. Viz LVO is limited to   analysis of imaging data and should not be used in-lieu of full patient   evaluation or relied upon to make or confirm diagnosis. CT HEAD WO CONTRAST   Final Result   No acute intracranial abnormality. XR CHEST PORTABLE   Final Result   Multifocal bilateral atypical pneumonia. XR CHEST PORTABLE    (Results Pending)           Assessment/Plan:      COVID PNA  Acute hypoxic respiratory Failure   - CXR: noted with multifocal PNA  - no home O2 at baseline  - had vaccine J &J in March 2021 - no booster   -progressively worsening hypoxia, was on vapotherm with full support   - eventually placed on vent support 1/21  - persistent severe hypoxemia -she was proned on 1/20/2022.    - Decadron weaned and stopped after 25 days   -  finished 7 days levaquin   -- CRP elevated - Started on Baricitinib - finished 14 days . - Has MRSA in her sputums and elevated WBC. Stopped cefepime. Finished 14 days of Vanco.  Now new fever with leukocytosis. Heavy growth of MRSA and tracheal aspirate culture. Start Zyvox day #2  Valium and Seroquel added for weaning IV sedation   precedex caused bradycardia  - sp bronch with BAL for fevers - BAL with MRSA again   - trach and peg planned for today     Left pneumothorax. - left chest tube placed. CT flushed and it is patent. Placed to suction. - resolved pneumothorax and off chest tube 2/10     Hypotension  -weaned off pressors  Back on Levophed. IV fluid bolus today as CVP is low.     Episodes of expressive aphasia   - ?  TIA, vs mental status changes related to covid   - cont ASA, Plavix     Thrombocytopenia resolved  - likely with covid  - continue Lovenox and monitor     Hyperglycemia  Secondary to steroids  On Sliding scale insulin    Acute kidney injury 2/15  Hyperkalemia  IV fluid bolus  Lokelma     HLD  - Continue statin        DVT Prophylaxis: Lovenox bid  Diet: Diet NPO  Code Status: Full Code      Dispo - cont care    Loretta Munoz MD, 2/15/2022 11:38 AM

## 2022-02-15 NOTE — ANESTHESIA PRE PROCEDURE
Department of Anesthesiology  Preprocedure Note       Name:  [place]   Age:  71 y.o.  :  1950                                          MRN:  [id number]         Date:  2/15/2022      Surgeon: Ana M Tanner):  Marely Camargo MD    Procedure: Procedure(s):  TRACHEOTOMY WITH PERCUTANEOUS ENDOSCOPIC GASTROSTOMY TUBE PLACEMENT    Medications prior to admission:   Prior to Admission medications    Medication Sig Start Date End Date Taking? Authorizing Provider   simvastatin (ZOCOR) 20 MG tablet TAKE 1 TABLET AT BEDTIME FOR HIGH AMOUNT OF FATS IN THE BLOOD 21  Yes Historical Provider, MD   fluticasone (FLONASE) 50 MCG/ACT nasal spray USE 1 SPRAY IN EACH NOSTRIL TWICE A DAY AS NEEDED 21  Yes Historical Provider, MD   Cholecalciferol (VITAMIN D) 10 MCG (400 UNIT) CAPS Capsule Take 1 tablet by mouth daily    Historical Provider, MD   meloxicam (MOBIC) 15 MG tablet Take 15 mg by mouth daily 19   Historical Provider, MD   aspirin 81 MG tablet Take 81 mg by mouth daily.       Historical Provider, MD       Current medications:    Current Facility-Administered Medications   Medication Dose Route Frequency Provider Last Rate Last Admin    linezolid (ZYVOX) IVPB 600 mg  600 mg IntraVENous Q12H Alana Gurrola MD   Stopped at 02/15/22 0118    heparin (porcine) injection 4,880 Units  80 Units/kg (Order-Specific) IntraVENous PRN Alana uGrrola MD        heparin (porcine) injection 2,440 Units  40 Units/kg (Order-Specific) IntraVENous PRN Alana Gurrola MD        albuterol (PROVENTIL) nebulizer solution 2.5 mg  2.5 mg Nebulization Q4H Julio Webster MD   2.5 mg at 02/15/22 0223    albuterol (PROVENTIL) nebulizer solution 2.5 mg  2.5 mg Nebulization Q2H PRN Octavia Cueto MD        dexmedetomidine (PRECEDEX) 400 mcg in sodium chloride 0.9 % 100 mL infusion  0.2-1.4 mcg/kg/hr IntraVENous Continuous Octavia Cueto MD 34.5 mL/hr at 02/15/22 0633 1.8 mcg/kg/hr at 02/15/22 0633    insulin lispro (HUMALOG) injection vial 0-18 Units  0-18 Units SubCUTAneous Q4H Ijeoma Brown MD   3 Units at 02/15/22 0431    magnesium sulfate 1000 mg in dextrose 5% 100 mL IVPB  1,000 mg IntraVENous PRN Nataliia Fajardo MD        potassium chloride 20 mEq/50 mL IVPB (Central Line)  20 mEq IntraVENous PRN Nataliia Fajardo MD   Stopped at 02/10/22 0827    carboxymethylcellulose PF (THERATEARS) 1 % ophthalmic gel 1 drop  1 drop Both Eyes 4x Daily Ijeoma Brown MD   1 drop at 02/14/22 1957    fentaNYL (SUBLIMAZE) injection 50 mcg  50 mcg IntraVENous Q1H PRN Ijeoma Brown MD   50 mcg at 02/12/22 1737    norepinephrine (LEVOPHED) 16 mg in dextrose 5 % 250 mL infusion  2-100 mcg/min IntraVENous Continuous Nataliia Fajardo MD 1.9 mL/hr at 02/15/22 0601 2 mcg/min at 02/15/22 0601    fentaNYL (SUBLIMAZE) 1,000 mcg in sodium chloride 0.9% 100 mL infusion  12.5-300 mcg/hr IntraVENous Continuous Ijeoma Brown MD 17.5 mL/hr at 02/15/22 0435 175 mcg/hr at 02/15/22 0435    QUEtiapine (SEROQUEL) tablet 25 mg  25 mg Oral BID Georgina Carrion MD   25 mg at 02/15/22 1474    propofol injection  5-20 mcg/kg/min IntraVENous Titrated Ijeoma Brown MD   Stopped at 02/09/22 1204    chlorhexidine (PERIDEX) 0.12 % solution 15 mL  15 mL Mouth/Throat BID Nataliia Fajardo MD   15 mL at 02/14/22 1958    midazolam (VERSED) injection 2 mg  2 mg IntraVENous Q1H PRN Nataliia Fajardo MD   2 mg at 02/13/22 0358    famotidine (PEPCID) injection 20 mg  20 mg IntraVENous BID Georgina Carrion MD   20 mg at 02/15/22 0803    glucose (GLUTOSE) 40 % oral gel 15 g  15 g Oral PRN Georgina Carrion MD        glucagon (rDNA) injection 1 mg  1 mg IntraMUSCular PRN Georgina Carrion MD        dextrose 5 % solution  100 mL/hr IntraVENous PRN Georgina Carrion MD        dextrose bolus (hypoglycemia) 10% 125 mL  125 mL IntraVENous PRN Georgina Carrion MD        Or    dextrose bolus (hypoglycemia) 10% 250 mL  250 mL IntraVENous PRN Georgina Carrion MD        [Held by provider] aspirin chewable tablet Acute respiratory failure with hypoxia ([facility]) J96.01    Thrombocytopenia ([facility]) D69.6    Anxiety F41.9    Hyperlipidemia E78.5    Arthritis M19.90    Brain fog R41.89    Moderate protein-calorie malnutrition ([facility]) E44.0    Pneumonia due to COVID-19 virus U07.1, J12.82    ARDS (adult respiratory distress syndrome) ([facility]) J80    Hyperglycemia R73.9    Leukocytosis D72.829    Hypotension I95.9    Mucus plugging of bronchi T17.500A    Pneumonia due to methicillin resistant Staphylococcus aureus (MRSA) ([facility]) J15.212    Pneumothorax J93.9    MRSA bacteremia R78.81, B95.62    Fever R50.9       Past Medical History:        Diagnosis Date    Arthritis     Asthma     DDD (degenerative disc disease)     Diabetes mellitus ([facility] [address]     Hyperlipidemia        Past Surgical History:        Procedure Laterality Date    COLONOSCOPY  2001    COLONOSCOPY  08/17/2016    diverticulosis    HYSTERECTOMY      KNEE ARTHROSCOPY      x2    TONSILLECTOMY      TOTAL KNEE ARTHROPLASTY  10/05/2012    RIGHT    TUBAL LIGATION      WRIST SURGERY         Social History:    Social History     Tobacco Use    Smoking status: Former Smoker     Packs/day: 1.00     Years: 15.00     Pack years: 15.00     Types: Cigarettes    Smokeless tobacco: Never Used    Tobacco comment: Quit in 1979   Substance Use Topics    Alcohol use: Yes     Comment: social                                Counseling given: Not Answered  Comment: Quit in 1979      Vital Signs (Current):   Vitals:    02/15/22 0435 02/15/22 0500 02/15/22 0615 02/15/22 0700   BP:  (!) 113/56 (!) 97/52 (!) 96/52   Pulse:  89 90 91   Resp:  25 22 23   Temp: 100 °F (37.8 °C)   101.1 °F (38.4 °C)   TempSrc: Bladder   Bladder   SpO2:  95% 95% 95%   Weight:       Height:                                                  BP Readings from Last 3 Encounters:   02/15/22 (!) 96/52   06/27/19 119/66   08/17/16 133/72       NPO Status:  MN+,  SEE MAR BMI:   Wt Readings from Last 3 Encounters:   02/14/22 164 lb 8 oz (74.6 kg)   06/27/19 138 lb (62.6 kg)   08/17/16 203 lb (92.1 kg)     Body mass index is 29.14 kg/m².     CBC:   Lab Results   Component Value Date    WBC 9.9 02/15/2022    RBC 2.47 02/15/2022    HGB 7.2 02/15/2022    HCT 21.8 02/15/2022    MCV 88.3 02/15/2022    RDW 14.0 02/15/2022     02/15/2022       CMP:   Lab Results   Component Value Date     02/15/2022    K 5.6 02/15/2022    K 4.5 01/20/2022    CL 94 02/15/2022    CO2 30 02/15/2022    BUN 48 02/15/2022    CREATININE 1.4 02/15/2022    GFRAA 45 02/15/2022    GFRAA >60 01/14/2013    AGRATIO 0.8 01/20/2022    LABGLOM 37 02/15/2022    GLUCOSE 169 02/15/2022    PROT 5.9 01/25/2022    PROT 6.5 01/14/2013    CALCIUM 8.4 02/15/2022    BILITOT 0.3 01/25/2022    ALKPHOS 50 01/25/2022    AST 18 01/25/2022    ALT 16 01/25/2022       POC Tests:   Recent Labs     02/15/22  0418   POCGLU 196*       Coags:   Lab Results   Component Value Date    PROTIME 12.3 01/18/2022    INR 1.08 01/18/2022    APTT 26.7 09/22/2012       HCG (If Applicable): No results found for: PREGTESTUR, PREGSERUM, HCG, HCGQUANT     ABGs:   Lab Results   Component Value Date    PHART 7.314 02/15/2022    PO2ART 73.2 02/15/2022    FRI6KNJ 64.9 02/15/2022    TNV6FCR 32.2 02/15/2022    BEART 5.1 02/15/2022    X5CKRWUJ 93.0 02/15/2022        Type & Screen (If Applicable):  No results found for: LABABO, LABRH    Drug/Infectious Status (If Applicable):  No results found for: HIV, HEPCAB    COVID-19 Screening (If Applicable):   Lab Results   Component Value Date    COVID19 DETECTED 01/12/2022           Anesthesia Evaluation  Patient summary reviewed and Nursing notes reviewed  Airway: Mallampati: Unable to assess / NA       Comment: INTUBATED   Dental:          Pulmonary:   (+) pneumonia (B , COVID, S/P CHEST TUBE, OUT / Diane Christians):  rhonchi (SCATTERED),  asthma (PRE HOSPITALIZATION):     (-) not a current smoker          Patient did not smoke on day of surgery. Cardiovascular:    (+) hyperlipidemia    (-) hypertension, past MI, CAD, CABG/stent, dysrhythmias,  angina and  CHF    ECG reviewed  Rhythm: regular  Rate: normal                    Neuro/Psych:   (+) depression/anxiety  (HX ANX.)   (-) seizures, TIA and CVA           GI/Hepatic/Renal:        (-) no renal disease, bowel prep and no morbid obesity       Endo/Other:    (+) DiabetesType II DM, using insulin, blood dyscrasia (PLAVIX ON HOLD): anemia and anticoagulation therapy, arthritis:., electrolyte abnormalities, . Abdominal:       Abdomen: soft. Vascular: Other Findings: ETT 7.0,  OGT, YOUNG, R IJ  VENT: 350/20/50%/8  INF: [id number] MCG/HR, LEVO 2 MCG/MINDEX 1.8 MCG/KG/HR            Anesthesia Plan      general     ASA 4     (DNR SUSPENDED)  Induction: intravenous. MIPS: Prophylactic antiemetics administered. Anesthetic plan and risks discussed with healthcare power of  (DAUGHTER, BIENVENIDO). Plan discussed with CRNA. This pre-anesthesia assessment may be used as a history and physical.    DOS STAFF ADDENDUM:    Pt seen and examined, chart reviewed (including anesthesia, drug and allergy history). No interval changes to history and physical examination. Anesthetic plan, risks, benefits, alternatives, and personnel involved discussed with patient. Patient verbalized an understanding and agrees to proceed.       Kelly Lovell MD  February 15, 2022  8:12 AM      Kelly Lovell MD   2/15/2022

## 2022-02-15 NOTE — OP NOTE
[address]                 [address] [address]                                OPERATIVE REPORT    PATIENT NAME: ALLIE MCMAHON                    :        1950  MED REC NO:   [id number]                          ROOM:       7721  ACCOUNT NO:   [de-identified]                           ADMIT DATE: 2022  PROVIDER:     Camron Alejandre. MD [place]    DATE OF PROCEDURE:  02/15/2022    PREOPERATIVE DIAGNOSES:  Acute hypoxia and respiratory failure secondary  to COVID. POSTOPERATIVE DIAGNOSES:  Acute hypoxia and respiratory failure  secondary to COVID. OPERATION PERFORMED:  EGD with PEG tube placement and tracheostomy tube  insertion. SURGEON:  Franky Cantu MD    ANESTHESIA:  General.    ESTIMATED BLOOD LOSS:  Less than 50 mL. INDICATIONS:  The patient is a 73-year-old woman, who has been admitted  with COVID pneumonia and cannot be extubated. She is brought for trach  and PEG. OPERATIVE SUMMARY:  After preoperative evaluation, the patient was  brought into the operating suite and placed in a comfortable supine  position on the operating room table. Monitoring equipment was attached  and general anesthesia was induced. The Olympus flexible endoscope was  inserted into oropharynx and passed down through her esophagus under  direct visualization. The stomach was entered and insufflated. A site  was chosen where minimal palpation promoted broad excursion internally  and where there was ready transillumination of the abdominal wall. This  area was sterilely prepped and a small incision was made. A needle and  catheter were passed through the abdominal wall into the stomach and  grasped internally with a snare. The needle was then removed and a wire  was passed through the catheter, and the catheter was grasped through  the snare. The snare was then pulled out the patient's oropharynx and  attached to the PEG tube.   The wire was then

## 2022-02-15 NOTE — PROGRESS NOTES
Reassessment complete. Patient continues intubated and sedated. Physical assessment unchanged. Patient repositioned for comfort. Bilateral soft wrist restraints remain in place for patient safety.

## 2022-02-15 NOTE — PROGRESS NOTES
02/14/22 2253   Vent Information   Vent Type 840   Vent Mode AC/VC   Vt Ordered 350 mL   Rate Set 20 bmp   Peak Flow 55 L/min   FiO2  50 %   SpO2 95 %   SpO2/FiO2 ratio 190   Sensitivity 2   PEEP/CPAP 8   Humidification Source Heated wire   Humidification Temp 37   Humidification Temp Measured 37   Circuit Condensation Drained   Vent Patient Data   Peak Inspiratory Pressure 27 cmH2O   Mean Airway Pressure 13 cmH20   Rate Measured 24 br/min   Vt Exhaled 364 mL   Spontaneous VT 7.42 mL   I:E Ratio 1:2.1   Plateau Pressure 27 [id number]   Cough/Sputum   Sputum How Obtained Suctioned;Endotracheal   Cough Non-productive   Spontaneous Breathing Trial (SBT) RT Doc   Pulse 89   RSBI Calculated 3234.5   Breath Sounds   Right Upper Lobe Diminished   Right Middle Lobe Diminished   Right Lower Lobe Diminished   Left Upper Lobe Diminished   Left Lower Lobe Diminished   Additional Respiratory  Assessments   Resp 21   Position Semi-Mohamud's   Oral Care Completed? Yes   Oral Care Mouth suctioned   Subglottic Suction Done?  Yes   Alarm Settings   High Pressure Alarm 45 cmH2O   Low Minute Volume Alarm 5 L/min   Apnea (secs) 20 secs   High Respiratory Rate 45 br/min   Low Exhaled Vt  250 mL   ETT (adult)   Placement Date/Time: 01/21/22 0330   Tube Size: 8 mm  Location: Oral  Secured at: 23 cm  Measured From: Lips   Secured at 23 cm   Measured From Lips   ET Placement Left   Secured By [employer] tube nicole   Site Condition Dry

## 2022-02-15 NOTE — PROGRESS NOTES
Patient off unit to OR with [facility] and surgery RNs. Patient connected to transport vent by RT. Will await return.

## 2022-02-15 NOTE — BRIEF OP NOTE
Brief Postoperative Note      Patient: [place]  YOB: 1950  MRN: [id number]    Date of Procedure: 2/15/2022    Pre-Op Diagnosis: COVID    Post-Op Diagnosis: Same       Procedure(s):  TRACHEOTOMY WITH PERCUTANEOUS ENDOSCOPIC GASTROSTOMY TUBE PLACEMENT    Surgeon(s):  Eduardo Adamson MD    Assistant:  First Assistant: Elyssa Aguillon    Anesthesia: General    Estimated Blood Loss (mL): Minimal    Complications: None    Specimens:   * No specimens in log *    Implants:  * No implants in log *      Drains:   NG/OG/NJ/NE Tube Orogastric 16 fr (Active)   Surrounding Skin Dry; Intact 02/14/22 1955   Securement device Yes 02/14/22 1955   Status Clamped 02/14/22 2349   Placement Verified by Gastric Contents 02/14/22 1955   NG/OG/NJ/NE External Measurement (cm) 58 cm 02/14/22 1955   Drainage Appearance Milky 02/14/22 1955   Tube Feeding Diabetic 02/14/22 1955   Tube Feeding Status Continuous 02/14/22 1955   Rate/Schedule 0 mL/hr 02/15/22 0446   Tube Feeding Supplement (Product) Protein Modular 02/12/22 0407   Tube Feeding Supplement Amount (mL) 174 02/12/22 1424   Tube Feeding Intake (mL) 0 ml 02/15/22 0446   Free Water Flush (mL) 30 mL 02/14/22 1847   Free Water Rate 30q3 02/14/22 1955   Residual Volume (ml) 600 ml 02/14/22 1058   Output (mL) 0 ml 02/03/22 0503       Gastrostomy/Enterostomy/Jejunostomy Gastrostomy Umbilicus 1 20 fr (Active)       Urethral Catheter (Active)   Catheter Indications Need for fluid volume management of the critically ill patient in a critical care setting 02/15/22 1215   Securement Device Date Changed 02/15/22 02/15/22 0340   Site Assessment Pink;Moist 02/15/22 1215   Urine Color Yellow 02/15/22 1215   Urine Appearance Sediment 02/15/22 1215   Output (mL) 38 mL 02/15/22 1215       [REMOVED] Chest Tube 1 Left; Anterior (Removed)   $ Chest tube insertion $ Yes 02/01/22 1200   Suction To water seal 02/08/22 1200   Chest Tube Airleak No 02/10/22 0445   Drainage Description Other (Comment) 02/08/22 0800   Dressing Status Clean;Dry; Intact 02/10/22 0445   Dressing Type Dry dressing 02/10/22 0445   Site Assessment Clean;Dry; Intact 02/10/22 0445   Surrounding Skin Unable to view 02/10/22 0445   Patency Intervention Tip/Tilt;Stripped 02/08/22 0800   Output (ml) 0 ml 02/10/22 9106       [REMOVED] Urethral Catheter (Removed)   Catheter Indications Need for fluid volume management of the critically ill patient in a critical care setting 02/01/22 1200   Securement Device Date Changed 01/19/22 01/19/22 2142   Site Assessment Pink;Moist 02/01/22 1200   Urine Color Yellow 02/01/22 1200   Urine Appearance Clear 02/01/22 1200   Urine Odor Other (Comment) 01/31/22 1954   Output (mL) 30 mL 02/01/22 1600       [REMOVED] Urethral Catheter Temperature probe 16 fr (Removed)   $ Urethral catheter insertion $ Not inserted for procedure 02/03/22 2000   Catheter Indications Need for fluid volume management of the critically ill patient in a critical care setting 02/12/22 1234   Site Assessment Pink 02/12/22 1234   Urine Color Yellow 02/12/22 1234   Urine Appearance Clear 02/12/22 1234   Urine Odor Other (Comment) 02/12/22 1234   Output (mL) 360 mL 02/12/22 1109       [REMOVED] Urethral Catheter (Removed)   Catheter Indications Need for fluid volume management of the critically ill patient in a critical care setting 02/14/22 1955   Site Assessment Pink 02/14/22 1955   Urine Color Yellow 02/14/22 1955   Urine Appearance Clear 02/14/22 1955   Output (mL) 85 mL 02/14/22 2349       Findings: as above    Electronically signed by Mónica Venegas MD on 2/15/2022 at 3:37 PM

## 2022-02-15 NOTE — PROGRESS NOTES
RT Inhaler-Nebulizer Bronchodilator Protocol Note    There is a bronchodilator order in the chart from a provider indicating to follow the RT Bronchodilator Protocol and there is an Initiate RT Inhaler-Nebulizer Bronchodilator Protocol order as well (see protocol at bottom of note). CXR Findings:  XR CHEST PORTABLE    Result Date: 2/14/2022  Right IJ central venous catheter terminating in the lower SVC in satisfactory position. No pneumothorax. Diffuse bilateral airspace disease, not significantly changed. XR CHEST PORTABLE    Result Date: 2/14/2022  Stable support apparatus. Persistent diffuse bilateral airspace disease. Findings on the right have slightly progressed compared to prior. XR CHEST PORTABLE    Result Date: 2/13/2022  No significant change in the multifocal opacities of both lungs. The pulmonary inflation is stable to 02/12/2022 and improved from 02/10/2022. The findings from the last RT Protocol Assessment were as follows:   History Pulmonary Disease: (P) Chronic pulmonary disease  Respiratory Pattern: (P) Use of accessory muscles, prolonged exhalation, or RR 26-30 bpm  Breath Sounds: (P) Intermittent or unilateral wheezes  Cough: (P) Weak, productive  Indication for Bronchodilator Therapy: (P) Wheezing associated with pulm disorder  Bronchodilator Assessment Score: (P) 14    Aerosolized bronchodilator medication orders have been revised according to the RT Inhaler-Nebulizer Bronchodilator Protocol below. Respiratory Therapist to perform RT Therapy Protocol Assessment initially then follow the protocol. Repeat RT Therapy Protocol Assessment PRN for score 0-3 or on second treatment, BID, and PRN for scores above 3. No Indications  adjust the frequency to every 6 hours PRN wheezing or bronchospasm, if no treatments needed after 48 hours then discontinue using Per Protocol order mode.      If indication present, adjust the RT bronchodilator orders based on the Bronchodilator Assessment Score as indicated below. Use Inhaler orders unless patient has one or more of the following: on home nebulizer, not able to hold breath for 10 seconds, is not alert and oriented, cannot activate and use MDI correctly, or respiratory rate 25 breaths per minute or more, then use the equivalent nebulizer order(s) with same Frequency and PRN reasons based on the score. If a patient is on this medication at home then do not decrease Frequency below that used at home. 0-3  enter or revise RT bronchodilator order(s) to equivalent RT Bronchodilator order with Frequency of every 4 hours PRN for wheezing or increased work of breathing using Per Protocol order mode. 4-6  enter or revise RT Bronchodilator order(s) to two equivalent RT bronchodilator orders with one order with BID Frequency and one order with Frequency of every 4 hours PRN wheezing or increased work of breathing using Per Protocol order mode. 7-10  enter or revise RT Bronchodilator order(s) to two equivalent RT bronchodilator orders with one order with TID Frequency and one order with Frequency of every 4 hours PRN wheezing or increased work of breathing using Per Protocol order mode. 11-13  enter or revise RT Bronchodilator order(s) to one equivalent RT bronchodilator order with QID Frequency and an Albuterol order with Frequency of every 4 hours PRN wheezing or increased work of breathing using Per Protocol order mode. Greater than 13  enter or revise RT Bronchodilator order(s) to one equivalent RT bronchodilator order with every 4 hours Frequency and an Albuterol order with Frequency of every 2 hours PRN wheezing or increased work of breathing using Per Protocol order mode.          Electronically signed by Elisabeth Aleman RCP on 2/14/2022 at 8:18 PM

## 2022-02-15 NOTE — PROGRESS NOTES
Morning assessment complete. Patient seen intubated and sedated. RASS - 3, patient withdraws from pain. Patient intubated with a # 8.0 ETT at the 23 LL. Ventilator settings are currently AC 20/350/50/5. Patient with no s/s of distress noted at this time. Physcial assessment as charted in flow sheets. Scheduled medications given per orders. PRN Tylenol given for temp 101.4. Precedex infusing at 1.8 mcg/kg/h. Fentanyl infusing at 175 mcg//h. LEVOPHED infusing at a rate of 2 mcg/min. Central line dressing is clean, dry and intact with no signs of drainage. All tubing is current and dated. IPA caps applied to all access hubs. Mcguire intact and secure, yellow urine with sediment draining. OG intact and secure, clamped at this time for surgery today. Patient repositioned for comfort. Lateral rotation therapy utilized. Bilateral soft wrist restraints remain in place for patient safety.

## 2022-02-15 NOTE — PROGRESS NOTES
02/14/22 1921   Vent Information   Vent Mode AC/VC   Vt Ordered 350 mL   Rate Set 20 bmp   Peak Flow 55 L/min   FiO2  50 %   SpO2 93 %   Sensitivity 2   PEEP/CPAP 8   Vent Patient Data   Peak Inspiratory Pressure 31 cmH2O   Mean Airway Pressure 13 cmH20   Rate Measured 22 br/min   Vt Exhaled 401 mL   Minute Volume 8 Liters   I:E Ratio 1:2.3   Plateau Pressure 22 [id number]   Static Compliance 29 mL/cmH2O   Dynamic Compliance 17 mL/cmH2O   Cough/Sputum   Sputum How Obtained Suctioned;Endotracheal   Cough Non-productive   Sputum Amount None

## 2022-02-15 NOTE — PROGRESS NOTES
Return call from Dr. Wilian Nugent ok to restart heparin gtt at 2200. Per pharmacy can restart at previous rate.

## 2022-02-15 NOTE — PROGRESS NOTES
Pulmonary & Critical Care Medicine ICU Progress Note    CC: COVID in partially vaccinated patient     Events of Last 24 hours:   101.4 fever this am   Levophed 2  Fentanyl 175  Precedex 1.8  PEEP 5  FiO2 50%        Invasive Lines:   PICC 1/20/2022-2/14  R IJ 2/14     MV: 1/21/2022  Vent Mode: AC/VC Rate Set: 20 bmp/Vt Ordered: 350 mL/ /FiO2 : 50 %  Recent Labs     02/14/22  0407 02/15/22  0249   PHART 7.386 7.314*   [id number] 50.6* 64.9*   PO2ART 63.1* 73.2*       IV:   dexmedetomidine HCl in NaCl 1.8 mcg/kg/hr (02/15/22 0633)    norepinephrine 2 mcg/min (02/15/22 0601)    fentaNYL 175 mcg/hr (02/15/22 0435)    propofol Stopped (02/09/22 1204)    dextrose      sodium chloride 5 mL/hr at 02/14/22 1855       Vitals:  Blood pressure (!) 97/52, pulse 90, temperature 100 °F (37.8 °C), temperature source Bladder, resp. rate 22, height 5' 3\" (1.6 m), weight 164 lb 8 oz (74.6 kg), SpO2 95 %, not currently breastfeeding. on vent 20/350    Intake/Output Summary (Last 24 hours) at 2/15/2022 0727  Last data filed at 2/15/2022 0618  Gross per 24 hour   Intake 2995.64 ml   Output 510 ml   Net 2485.64 ml     EXAM:  General: ill appearing. Intubated sedated. Eyes: PERRL. No sclera icterus. No conjunctival injection. ENT: No discharge. Pharynx clear. ET tube in place  Neck: Trachea midline. Normal thyroid. Resp: No accessory muscle use. Few crackles. No wheezing. No rhonchi. CV: Regular rate. Regular rhythm. No mumur or rub. No edema. GI: Non-tender. Non-distended. No masses. Skin: Warm and dry. No nodule on exposed extremities. No rash on exposed extremities. Lymph: No cervical LAD. No supraclavicular LAD. M/S: No cyanosis. No joint deformity. No clubbing. Neuro: Intubated sedated. + response to painful stimuli. Not following commands.   Psych: Noncommunicative unable to obtain    Scheduled Meds:   linezolid  600 mg IntraVENous Q12H    albuterol  2.5 mg Nebulization Q4H    insulin lispro  0-18 Units SubCUTAneous Q4H    carboxymethylcellulose PF  1 drop Both Eyes 4x Daily    QUEtiapine  25 mg Oral BID    chlorhexidine  15 mL Mouth/Throat BID    famotidine (PEPCID) injection  20 mg IntraVENous BID    [Held by provider] aspirin  81 mg Oral Daily    [Held by provider] clopidogrel  75 mg Oral Daily    ascorbic acid  1,000 mg Oral Daily    atorvastatin  10 mg Oral Nightly    sodium chloride flush  5-40 mL IntraVENous 2 times per day    Vitamin D  2,000 Units Oral Daily     PRN Meds:  heparin (porcine), heparin (porcine), albuterol, magnesium sulfate, potassium chloride, fentanNYL, midazolam, glucose, glucagon (rDNA), dextrose, dextrose bolus (hypoglycemia) **OR** dextrose bolus (hypoglycemia), guaiFENesin-dextromethorphan, sodium chloride flush, sodium chloride, ondansetron **OR** ondansetron, polyethylene glycol, acetaminophen **OR** acetaminophen    Results:  CBC:   Recent Labs     02/13/22 0415 02/14/22  0403 02/15/22  0254   WBC 7.2 8.0 9.9   HGB 8.1* 8.3* 7.2*   HCT 24.5* 24.8* 21.8*   MCV 88.7 88.4 88.3    294 263     BMP:   Recent Labs     02/13/22 0415 02/14/22  0403 02/15/22  0254   * 131* 129*   K 4.3 4.8 5.6*   CL 94* 93* 94*   CO2 31 31 30   BUN 34* 41* 48*   CREATININE <0.5* 0.9 1.4*     LIVER PROFILE: No results for input(s): AST, ALT, LIPASE, BILIDIR, BILITOT, ALKPHOS in the last 72 hours. Invalid input(s):   AMYLASE,  ALB    Micro:  1/17/2022 SARS-CoV-2 positive at urgent care  1/12/2022 SARS-CoV-2 detected   1/31/2022 tracheal aspirate MRSA  2/1/2022 blood NG  2/6/22 BAL: MRSA  2/12/22 Resp cx  2/12 Resp MRSA     Imaging:   CXR 2/15/2022  images reviewed by me and showed:   Satisfactory ETT position  Satisfactory CVC line position   Diffuse bilateral ASD - same     ASSESSMENT:  · Acute hypoxemic respiratory failure, severe and life-threatening  · MRSA PNA   · Acute kidney injury   · Hyperkalemia   · COVID-19 pneumonia in a partially vaccinated patient, s/p J&J vaccination 3/2021  · ARDS  · Bilateral UE SVT   · Persistent fever -probably noninfectious, could be related to venous thrombosis  · Left pneumothorax, chest tube placed 2/1/22 and removed 2/10/22  · Former smoker    PLAN:  Droplet Plus Airborne Precautions   Mechanical ventilation as per my orders. The ventilator was adjusted by me at the bedside for unstable, life threatening respiratory failure  Increase RR 24--> ABG 2 hrs   Precedex gtt   Wean off Fentanyl gtt  D/C Propofol   Seroquel 25 BID   Zyvox D#2. Completed 14 days Vanc and 7 days Levaquin    cc/hr    IVF  cc bolus PRN target CVP 12-15   NS bolus 1 L x 1   Lokelma packet 10 g TID x 1 day  Completed Decadron D#25  Completed 14 days baricitinib   H-SSI for FSBS goal 150-180  Planning for trach today   Prophylaxis: Pepcid and add Heparin drip           Total critical care time caring for this patient with life threatening, unstable organ failure, including direct patient contact, management of life support systems, review of data including imaging and labs, discussions with other team members and physicians is 31 minutes so far today, excluding procedures.

## 2022-02-15 NOTE — CARE COORDINATION
INTERDISCIPLINARY PLAN OF CARE CONFERENCE    Date/Time: 2/15/2022 9:04 AM  Completed by: MEMO Andino Case Management      Patient Name:  Felipa Martinez  YOB: 1950  Admitting Diagnosis: Acute respiratory failure with hypoxia ([facility] [address] [J96.01]  Pneumonia due to COVID-19 virus [U07.1, J12.82]  COVID-19 [U07.1]     Admit Date/Time:  1/12/2022  9:40 AM    Chart reviewed. Interdisciplinary team contacted or reviewed plan related to patient progress and discharge plans. Disciplines included Case Management, Nursing, and Dietitian. Current Status:Ongoing. Trach and Peg planned for today   PT/OT recommendation for discharge plan of care: TBD    Expected D/C Disposition:  LTACH  Confirmed plan with patient and/or family Yes confirmed with: (name) pt's daughter Justice Rowell via phone call    Discharge Plan Comments: Chart review completed. Received a call from pt's daughter Justice Rowell. Justice Rowell stated they now want [facility] (not [facility]) or [facility] for [facility]. Answered questions and no further questions expressed. Message left for Kenyatta at [facility] to update her. Home O2 in place on admit: No    Addendum at 9:42am: Kenyatta with [facility] called stating she received the voicemail. Addendum at 1:32pm: Spoke with Kenyatta at [facility] who states currently there is a waiting list for the [facility] location and the vents are discharge pending. Called and spoke with pt's daughter Justice Rowell who was updated on the above. She requested to have [facility] look at pt. Referral called to Vikas Grider at [facility] and [address] [place] [address] faxed via [employer].

## 2022-02-15 NOTE — PROGRESS NOTES
Patient returned to room from [address] [place] [address] with CRNA. Patient connected to room ventilator. Reassessment complete. Patient sedated, new trach in place and secure. PEG intact and clamped, per Dr. Uzma Good ok to resume tube feeds at 2200. Changes to physical assessment as charted in flow sheets. Patient repositioned for comfort. Bilateral soft wrist restraints in place for patient safety.

## 2022-02-15 NOTE — PROGRESS NOTES
02/15/22 0223   Vent Information   Vent Type 840   Vent Mode AC/VC   Vt Ordered 350 mL   Rate Set 20 bmp   Peak Flow 55 L/min   FiO2  50 %   SpO2 94 %   SpO2/FiO2 ratio 188   Sensitivity 2   PEEP/CPAP 8   Humidification Source Heated wire   Humidification Temp 37   Humidification Temp Measured 36   Circuit Condensation Drained   Vent Patient Data   Peak Inspiratory Pressure 32 cmH2O   Mean Airway Pressure 12 cmH20   Rate Measured 20 br/min   Vt Exhaled 360 mL   Minute Volume 7.36 Liters   I:E Ratio 1:2.3   Cough/Sputum   Sputum How Obtained Suctioned;Endotracheal   Cough Non-productive   Spontaneous Breathing Trial (SBT) RT Doc   Pulse 89   Breath Sounds   Right Upper Lobe Diminished   Right Middle Lobe Diminished   Right Lower Lobe Diminished   Left Upper Lobe Diminished   Left Lower Lobe Diminished   Additional Respiratory  Assessments   Resp 20   Position Semi-Mohamud's   Oral Care Completed? Yes   Oral Care Mouth suctioned   Subglottic Suction Done?  Yes   Alarm Settings   High Pressure Alarm 45 cmH2O   Low Minute Volume Alarm 5 L/min   Apnea (secs) 20 secs   High Respiratory Rate 45 br/min   Low Exhaled Vt  250 mL   ETT (adult)   Placement Date/Time: 01/21/22 0330   Tube Size: 8 mm  Location: Oral  Secured at: 23 cm  Measured From: Lips   Secured at 23 cm   Measured From [address] [employer] tube nicole   Site Condition Dry

## 2022-02-15 NOTE — ANESTHESIA POSTPROCEDURE EVALUATION
Department of Anesthesiology  Postprocedure Note    Patient: Jay Peterson  MRN: [id number]  YOB: 1950  Date of evaluation: 2/15/2022  Time:  4:08 PM     Procedure Summary     Date: 02/15/22 Room / Location: [address] [place] [address] / [facility]    Anesthesia Start: 0215 Anesthesia Stop: 3351    Procedure: TRACHEOTOMY WITH PERCUTANEOUS ENDOSCOPIC GASTROSTOMY TUBE PLACEMENT (N/A ) Diagnosis: (COVID)    Surgeons: Maria Teresa Medina MD Responsible Provider: Ngoc Umaña MD    Anesthesia Type: General ASA Status: 4          Anesthesia Type: General    Sudarshan Phase I:      Sudarshan Phase II:      Last vitals: Reviewed and per EMR flowsheets.    Vitals:    02/15/22 1100 02/15/22 1200 02/15/22 1300 02/15/22 1400   BP: (!) 117/57 132/60 (!) 117/55 (!) 104/58   Pulse: 89 86 92 90   Resp: 24 21 24 24   Temp:    99.7 °F (37.6 °C)   TempSrc:    Bladder   SpO2: 94% 99% 93% 93%   Weight:       Height:           Anesthesia Post Evaluation    Patient location during evaluation: ICU  Patient participation: complete - patient cannot participate  Level of consciousness: sedated and ventilated  Airway patency: patent  Nausea & Vomiting: no nausea  Complications: no  Cardiovascular status: hemodynamically stable  Respiratory status: acceptable  Hydration status: euvolemic

## 2022-02-16 NOTE — CONSULTS
2-16-22 (0504) anti-Xa 0.15 IU/ml. Please increase heparin drip to 1220 units/hr. Please recheck anti-Xa at 1300 on 2-16-22. [address] [place] [address]. .  2/16/2022 6:39 AM

## 2022-02-16 NOTE — PROGRESS NOTES
Hospitalist Progress Note      PCP: Shauna Nunez DO    Date of Admission: 1/12/2022     resp failure on vent , covid pna, unvaccinated  High fevers S.p bronch with BAL   Ongoing weaning trials    Subjective: On the vent 50% FiO2 8 of PEEP  Fevers overnight  Continues to be on Levophed  On Precedex    2/15  High fevers overnight  Status post bronc with BAL 2/14  On Levophed  Continues to be on Precedex    2/16  Continues to run fevers. Trach and PEG placement yesterday. Off Levophed.   Medications:  Reviewed    Infusion Medications    sodium chloride 125 mL/hr at 02/15/22 1822    sodium chloride      heparin 25,000 units in dextrose 5% 250 mL infusion 1,460 Units/hr (02/16/22 1421)    dexmedetomidine HCl in NaCl 1.4 mcg/kg/hr (02/16/22 1207)    norepinephrine Stopped (02/16/22 0500)    fentaNYL 50 mcg/hr (02/16/22 1421)    dextrose      sodium chloride 5 mL/hr at 02/15/22 1754     Scheduled Medications    famotidine (PEPCID) injection  20 mg IntraVENous Daily    linezolid  600 mg IntraVENous Q12H    albuterol  2.5 mg Nebulization Q4H    insulin lispro  0-18 Units SubCUTAneous Q4H    carboxymethylcellulose PF  1 drop Both Eyes 4x Daily    QUEtiapine  25 mg Oral BID    chlorhexidine  15 mL Mouth/Throat BID    [Held by provider] aspirin  81 mg Oral Daily    [Held by provider] clopidogrel  75 mg Oral Daily    ascorbic acid  1,000 mg Oral Daily    atorvastatin  10 mg Oral Nightly    sodium chloride flush  5-40 mL IntraVENous 2 times per day    Vitamin D  2,000 Units Oral Daily     PRN Meds: sodium chloride, heparin (porcine), heparin (porcine), albuterol, magnesium sulfate, potassium chloride, fentanNYL, midazolam, glucose, glucagon (rDNA), dextrose, dextrose bolus (hypoglycemia) **OR** dextrose bolus (hypoglycemia), guaiFENesin-dextromethorphan, sodium chloride flush, sodium chloride, ondansetron **OR** ondansetron, polyethylene glycol, acetaminophen **OR** acetaminophen      Intake/Output Summary (Last 24 hours) at 2/16/2022 1445  Last data filed at 2/16/2022 1143  Gross per 24 hour   Intake 4192.36 ml   Output 545 ml   Net 3647.36 ml       Physical Exam Performed:    BP (!) 144/70   Pulse 123   Temp 100.8 °F (38.2 °C) (Bladder)   Resp 24   Ht 5' 3\" (1.6 m)   Wt 198 lb 6.6 oz (90 kg)   SpO2 91%   BMI 35.15 kg/m²       Patient seen in droplet plus precautions  General:  Elderly female, on vent. supine  Oral ETT and OG noted   Mucous Membranes:  Pink , anicteric  Neck: No JVD, no carotid bruit, no thyromegaly  Chest: diminished in bases, bilateral mild crackles present. Cardiovascular:  RRR S1S2 heard, no murmurs or gallops  Abdomen: Soft, undistended, non tender, no organomegaly, BS present  Extremities: bilateral UE edema worse on right   No edema or cyanosis. Distal pulses well felt  Neurological :  Sedated    Labs:   Recent Labs     02/14/22  0403 02/15/22  0254 02/16/22  0504   WBC 8.0 9.9 7.7   HGB 8.3* 7.2* 7.2*   HCT 24.8* 21.8* 21.8*    263 281     Recent Labs     02/14/22  0403 02/15/22  0254 02/16/22  0504   * 129* 129*   K 4.8 5.6* 4.7   CL 93* 94* 97*   CO2 31 30 26   BUN 41* 48* 51*   CREATININE 0.9 1.4* 1.6*   CALCIUM 8.7 8.4 7.6*     No results for input(s): AST, ALT, BILIDIR, BILITOT, ALKPHOS in the last 72 hours. No results for input(s): INR in the last 72 hours. No results for input(s): Joseline Peek in the last 72 hours. Urinalysis:      Lab Results   Component Value Date    NITRU Negative 02/12/2022    WBCUA 3-5 02/12/2022    BACTERIA 1+ 02/12/2022    RBCUA 21-50 02/12/2022    BLOODU LARGE 02/12/2022    SPECGRAV 1.020 02/12/2022    GLUCOSEU Negative 02/12/2022    GLUCOSEU Neg 08/29/2010       Radiology:  XR CHEST PORTABLE   Final Result   Persistent moderate bilateral multifocal airspace disease presumably   pneumonia, likely representing residual COVID pneumonia. Developing ARDS is   also a consideration.          XR CHEST PORTABLE   Final Result Right PICC line has been removed. ET, NG, and right jugular line remain. Multifocal opacities are not significantly changed. XR CHEST PORTABLE   Final Result   Right IJ central venous catheter terminating in the lower SVC in satisfactory   position. No pneumothorax. Diffuse bilateral airspace disease, not significantly changed. XR CHEST PORTABLE   Final Result   Stable support apparatus. Persistent diffuse bilateral airspace disease. Findings on the right have   slightly progressed compared to prior. VL Extremity Venous Bilateral   Final Result      XR CHEST PORTABLE   Final Result   No significant change in the multifocal opacities of both lungs. The   pulmonary inflation is stable to 02/12/2022 and improved from 02/10/2022. XR CHEST PORTABLE   Final Result   Slightly improved aeration in the lungs. XR CHEST PORTABLE   Final Result   No evidence of pneumothorax status post left chest tube removal.      Remainder of support apparatus and extensive diffuse bilateral airspace   disease are similar in appearance. VL Extremity Venous Right   Final Result      XR CHEST 1 VIEW   Final Result   1. Unchanged position of support devices. Kinked appearance of left chest   tube again demonstrated without evidence for pneumothorax. 2. No significant change in bilateral airspace disease. XR CHEST PORTABLE   Final Result   Stable support apparatus. The left-sided chest tube catheter tubing appears   kinked. Persistent diffuse bilateral airspace opacities. Findings on the left have   progressed compared to prior. XR CHEST PORTABLE   Final Result   1. Unchanged position of support devices. 2. No significant change in bilateral airspace disease. XR CHEST PORTABLE   Final Result   Stable examination with stable lines and tubes. Stable pneumonia. XR CHEST PORTABLE   Final Result   Stable lines and tubes.   Stable examination. Extensive airspace disease   noted in the lungs. XR CHEST PORTABLE   Final Result   Stable examination with stable lines and tubes. Stable findings of COVID   pneumonia. XR CHEST PORTABLE   Final Result   1. No significant change. XR CHEST PORTABLE   Final Result   Worsening multifocal airspace opacities. XR CHEST PORTABLE   Final Result   Interval placement of a left-sided chest tube with significant decrease seen   in size of the left pneumothorax only with a small apical component   remaining. Patchy airspace disease within the lung fields is stable and   unchanged. XR CHEST PORTABLE   Final Result   New onset moderate to large left pneumothorax with mild tension. Endotracheal, nasogastric tubes and PICC line are stable. Redemonstration of   bilateral infiltrates. Findings communicated to the referring physician on February 1, 2022      RECOMMENDATION:   Stat left chest tube. XR CHEST PORTABLE   Final Result   1. Stable lines, tubes, and support devices. 2. Stable cardiopulmonary status including bilateral airspace opacities and   small effusions. XR CHEST PORTABLE   Final Result   No significant change. Continued follow-up recommended. XR CHEST PORTABLE   Final Result   Mild worsening of bilateral infiltrates likely due to pneumonia. Continued   follow-up recommended. XR CHEST PORTABLE   Final Result   Moderate persistent multifocal airspace disease compatible with multifocal   pneumonia including COVID pneumonia. While similar to recent studies, there   has been gradual progression and worsening since the early study of   01/12/2022. XR CHEST PORTABLE   Final Result   Extensive infiltrates within the lungs bilaterally, worsening on the right   with increasing consolidation. XR CHEST PORTABLE   Final Result   No significant interval change multifocal airspace disease. Stable lines and tubes. XR CHEST PORTABLE   Final Result   Mildly improved parenchymal infiltrates at the right lung base, otherwise   similar appearing chest.         XR CHEST PORTABLE   Final Result   Extensive bilateral airspace opacities are seen without significant change. XR CHEST PORTABLE   Final Result   1. Recommend retraction of endotracheal tube 1.0 cm.   2. Stable severe ill-defined lung consolidation, greatest at the lung bases   consistent with pneumonia versus alveolar edema. 3. Suspected mild bilateral pleural effusion, unchanged. XR CHEST PORTABLE   Final Result   1. No significant change. XR CHEST PORTABLE   Final Result   Appropriate positioning of the endotracheal tube. Enteric tube courses   beneath the diaphragm; tip is excluded by collimation inferiorly. No significant change in extensive bilateral airspace disease. IR PICC WO SQ PORT/PUMP > 5 YEARS   Final Result   Successful placement of PICC line. XR CHEST PORTABLE   Final Result   New multifocal moderate to severe pneumonia. Clinical and imaging follow-up   to resolution recommended. CTA HEAD NECK W CONTRAST   Final Result   Unremarkable CTA of the head and neck. Findings consistent with COVID pneumonia. This scan was analyzed using Viz. ai contact LVO. Identification of suspected   findings is not for diagnostic use beyond notification. Viz LVO is limited to   analysis of imaging data and should not be used in-lieu of full patient   evaluation or relied upon to make or confirm diagnosis. CT HEAD WO CONTRAST   Final Result   No acute intracranial abnormality. XR CHEST PORTABLE   Final Result   Multifocal bilateral atypical pneumonia.          XR CHEST PORTABLE    (Results Pending)           Assessment/Plan:      COVID PNA  Acute hypoxic respiratory Failure   - CXR: noted with multifocal PNA  - no home O2 at baseline  - had vaccine J &J in March 2021 - no booster   -progressively worsening hypoxia, was on vapotherm with full support   - eventually placed on vent support 1/21  - persistent severe hypoxemia -she was proned on 1/20/2022.    - Decadron weaned and stopped after 25 days   -  finished 7 days levaquin   -- CRP elevated - Started on Baricitinib - finished 14 days .   - Has MRSA in her sputums and elevated WBC. Stopped cefepime. Finished 14 days of Vanco.  Now new fever with leukocytosis. Heavy growth of MRSA and tracheal aspirate culture. Start Zyvox day #3  Valium and Seroquel added for weaning IV sedation   precedex caused bradycardia  - sp bronch with BAL for fevers - BAL with MRSA again   Trach and PEG 2/15     Left pneumothorax. - left chest tube placed. CT flushed and it is patent. Placed to suction. - resolved pneumothorax and off chest tube 2/10     Hypotension  -weaned off pressors  Back on Levophed. IV fluid bolus today as CVP is low.     Episodes of expressive aphasia   - ? TIA, vs mental status changes related to covid   - cont ASA, Plavix     Thrombocytopenia resolved  - likely with covid  - continue Lovenox and monitor     Hyperglycemia  Secondary to steroids  On Sliding scale insulin    Acute kidney injury 2/15  Hyperkalemia  IV fluid bolus--> IV normal saline 125 an hour  [facility]     HLD  - Continue statin        DVT Prophylaxis: Lovenox bid  Diet: Diet NPO  ADULT TUBE FEEDING; PEG; Other Tube Feeding (specify);  Glucerna 1.2; Continuous; 20; Yes; 20; Q 4 hours; 60; 30; Q 4 hours  Code Status: Full Code      Mesfin Norman MD, 2/16/2022 2:45 PM

## 2022-02-16 NOTE — PROGRESS NOTES
General Surgery Lázaro Pool, APRN - CNP, CNP  Daily Progress Note    Pt Name: Jennifer Temple Record Number: [id number]  Date of Birth 1950   Today's Date: 2/16/2022    ASSESSMENT  1. POD #1 s/p trach and peg   2. Trach: no leak, oxygenating well, very small amount of bloody drainage on dressing  3. PEG: dressing intact, looks good, pt tolerating TF  4. Hep gtt  5. H&H stable at 7.2/21.8  6. Cr 0.9->1.4->1.6  7. VS: tachy, temp 100.8     PLAN  1. Trach care daily and as needed  2. PEG care: can advance TF to goal   3. Will sign off, please call for any additional surgical needs. Lorena Yasmany is unchanged from yesterday. Pain appears well controlled. She has no vomiting. She has not had a bowel movement. She is tolerating tube feeds via her PEG tube. Current activity is up with assistance    OBJECTIVE  VITALS:  height is 5' 3\" (1.6 m) and weight is 198 lb 6.6 oz (90 kg). Her bladder temperature is 100.8 °F (38.2 °C). Her blood pressure is 144/70 (abnormal) and her pulse is 123. Her respiration is 24 and oxygen saturation is 91%. VITALS:  BP (!) 144/70   Pulse 123   Temp 100.8 °F (38.2 °C) (Bladder)   Resp 24   Ht 5' 3\" (1.6 m)   Wt 198 lb 6.6 oz (90 kg)   SpO2 91%   BMI 35.15 kg/m²   INTAKE/OUTPUT:    Intake/Output Summary (Last 24 hours) at 2/16/2022 1417  Last data filed at 2/16/2022 1143  Gross per 24 hour   Intake 4192.36 ml   Output 545 ml   Net 3647.36 ml     GENERAL: no purposeful response   I/O last 3 completed shifts:   In: 2403 [I.V.:3117.4; NG/GT:301; IV Piggyback:1824.6]  Out: 566 [Urine:561; Blood:5]  I/O this shift:  In: -   Out: 200 [Urine:200]    LABS  Recent Labs     02/16/22  0504   WBC 7.7   HGB 7.2*   HCT 21.8*      *   K 4.7   CL 97*   CO2 26   BUN 51*   CREATININE 1.6*   CALCIUM 7.6*     CBC with Differential:    Lab Results   Component Value Date    WBC 7.7 02/16/2022    RBC 2.48 02/16/2022    HGB 7.2 02/16/2022    HCT 21.8 02/16/2022  02/16/2022    MCV 88.0 02/16/2022    MCH 28.9 02/16/2022    MCHC 32.8 02/16/2022    RDW 14.4 02/16/2022    NRBC 1 02/06/2022    SEGSPCT 53.0 09/22/2012    BANDSPCT 4 02/11/2022    METASPCT 2 02/10/2022    LYMPHOPCT 13.1 02/16/2022    MONOPCT 6.3 02/16/2022    MYELOPCT 2 02/10/2022    EOSPCT 2.6 08/29/2010    BASOPCT 0.5 02/16/2022    MONOSABS 0.5 02/16/2022    LYMPHSABS 1.0 02/16/2022    EOSABS 0.3 02/16/2022    BASOSABS 0.0 02/16/2022    DIFFTYPE Auto-K 09/22/2012     CMP:    Lab Results   Component Value Date     02/16/2022    K 4.7 02/16/2022    K 4.5 01/20/2022    CL 97 02/16/2022    CO2 26 02/16/2022    BUN 51 02/16/2022    CREATININE 1.6 02/16/2022    GFRAA 38 02/16/2022    GFRAA >60 01/14/2013    AGRATIO 0.8 01/20/2022    LABGLOM 32 02/16/2022    GLUCOSE 131 02/16/2022    PROT 5.9 01/25/2022    PROT 6.5 01/14/2013    LABALBU 2.1 02/06/2022    CALCIUM 7.6 02/16/2022    BILITOT 0.3 01/25/2022    ALKPHOS 50 01/25/2022    AST 18 01/25/2022    ALT 16 01/25/2022         Cari Lopez, APRN - CNP  Electronically signed 2/16/2022 at 2:09 PM

## 2022-02-16 NOTE — PROGRESS NOTES
Reasessment complete. Patient continues intubated and sedated. RASS - 1. Physical assessment unchanged from previous. Patient repositioned for comfort. Bilateral soft wrist restraints remain in place for patient safety. Precedex infusing at 1.4 mcg/kg/h. Fentanyl infusing at 75 mcg//h. IVF infusing at 125 mL/hr. Heparin infusing at 12.2 mL/hr.

## 2022-02-16 NOTE — PROGRESS NOTES
PATIENT REMAINS ON THE VENT WITH CURRENT SETTING , FIO2 40, PEEP 5 RATE 24, RESP EASY, TRACH SITE HAS SCANT AMOUNT OF DRAINAGE. SR/ST ON TELE. TEMP 100.0  SEE FLOW SHEET FOR VITAL SIGNS, LEVOPHED WAS WEANED OFF DURING THE NIGHT, PRECEDEX AT 1.2 MCGS, FENTANYL GTT  MCGS, HEPARIN GTT AT 1100 UNITS/HR. Clement Fly RIGHT TLC IJ CATHETER C/D/I. TUBE FEED \"DIABETA SOURCE 1.2 ANNA\"  INFUSING VIA PEG TUBE.  PEG TUBE SITE C/D/I.    FAMILY UPDATED \"BIENVENIDO\"

## 2022-02-16 NOTE — PROGRESS NOTES
Morning assessment complete. Patient seen sedated. New trach in place. RASS - 2, patient with eye opening to voice. # 8 portex trach in place, ventilator settings currently AC 24/320/40/5. Patient with no s/s of distress noted at this time. Physical assessment as charted in flow sheets. Scheduled medications given per orders. Fentanyl infusing at 100 mcg//h. Precedex infusing at 1.2 mcg/kg/h. Heparin infusing at 12.2 mL/hr.  IVF infusing at 125 mL/hr. Central line dressing is clean, dry and intact with no signs of drainage. All tubing is current and dated. IPA caps applied to all access hubs. Mcguire intact and secure, clear yellow urine draining. PEG intact and secure, tube feed running. Patient repositioned for comfort. Bilateral soft wrist restraints in place for patient safety.

## 2022-02-16 NOTE — PROGRESS NOTES
02/16/22 0300   Vent Information   Vent Type 840   Vent Mode AC/VC   Vt Ordered 350 mL   Rate Set 24 bmp   Peak Flow 55 L/min   FiO2  40 %   SpO2 93 %   SpO2/FiO2 ratio 232.5   Sensitivity 3   PEEP/CPAP 5   Humidification Source Heated wire   Humidification Temp 37   Humidification Temp Measured 37   Circuit Condensation Drained   Vent Patient Data   Peak Inspiratory Pressure 31 cmH2O   Mean Airway Pressure 14 cmH20   Rate Measured 38 br/min   Vt Exhaled 410 mL   Minute Volume 12 Liters   I:E Ratio 1:1.4   Plateau Pressure 30 [id number]   Spontaneous Breathing Trial (SBT) RT Doc   Pulse 119   Additional Respiratory  Assessments   Resp (!) 36   Position Semi-Mohaumd's   Alarm Settings   High Pressure Alarm 45 cmH2O   Low Minute Volume Alarm 5 L/min   Apnea (secs) 20 secs   High Respiratory Rate 45 br/min   Low Exhaled Vt  250 mL   Surgical Airway (trach) Portex Cuffed   Placement Date/Time: 02/15/22 1516   Timeout: Patient;Procedure;Site/Side;Appropriate Equipment;Sterile Technique using full body drape; Consent Confirmed; Correct Position  Placed By: In surgery  Surgical Airway Type: Tracheostomy  Brand: Portex  Style. .. Status Secured   Site Assessment Clean;Dry   Ties Assessment Clean;Dry; Intact

## 2022-02-16 NOTE — PROGRESS NOTES
Pharmacy: High Dose Heparin Drip  Current rate :1220 units/hr  [email or web address]  Goal: 0.3-0.7  Per protocol, Heparin Bolus 4880 units and increase to 1460 units/hr  Next Anti-Xa @ [facility] D 2/16/20221:56 PM  .

## 2022-02-16 NOTE — CARE COORDINATION
INTERDISCIPLINARY PLAN OF CARE CONFERENCE    Date/Time: 2/16/2022 12:55 PM  Completed by:  PHILLIP Nair Case Management      Patient Name:  Abran Gilliam  YOB: 1950  Admitting Diagnosis: Acute respiratory failure with hypoxia ([facility] [address] [J96.01]  Pneumonia due to COVID-19 virus [U07.1, J12.82]  COVID-19 [U07.1]     Admit Date/Time:  1/12/2022  9:40 AM    Chart reviewed. Interdisciplinary team contacted or reviewed plan related to patient progress and discharge plans. Disciplines included Case Management, Nursing, and Dietitian. Current Status:Ongoing  PT/OT recommendation for discharge plan of care: TBD    Expected D/C Disposition:  [address]   Confirmed plan with patient and/or family Yes confirmed with: (name) pt's daughter Jaya Smith    Discharge Plan Comments: Chart review completed. Pt remains in the ICU. Completed Interdisciplinary rounds with ICU staff. Dr. Gunjan Martinez aware pt needs precert for [facility] and he states not currently ready for transfer. MD also aware pt may have to be off some of the drips before they start cert. Spoke with Sonido Baez at [facility] who states they can accept pt pending insurance authorization and pt being off of the drips; the only drip they can take is heparin and pt must be fever free for 72hrs which she had a fever yesterday. Sonido Baez stated they currently have beds but this changes daily with the waiting list, etc.     Called and spoke with pt's daughter Jaya Smith who was updated on the above. encouraged her to to go Medicare. gov to look for the ratings. She stated she will call writer back to let CM know their decision for location. CM will continue to follow and assist. Please notify CM if needs or concerns arise. Home O2 in place on admit: No    Addendum at 1:21pm:  Received call from daughter Jaya Smith who states she have reviewed the facilities and wants [facility]. She is aware writer will call [facility] liaison and update [employer]. Message left for Kahlil Motley at [facility] and message left for Manjula at [facility]    Addendum at 1:37pm: Kahlil Motley at [facility] stated she will continue to follow and review pt to look for when pt is off the drips to start precert

## 2022-02-16 NOTE — PROGRESS NOTES
02/15/22 1953   Vent Information   Vent Type 840   Vent Mode AC/VC   Vt Ordered 350 mL   Rate Set 24 bmp   Peak Flow 55 L/min   FiO2  50 %   SpO2 94 %   SpO2/FiO2 ratio 188   Sensitivity 2   PEEP/CPAP 5   Humidification Source Heated wire   Humidification Temp 37   Humidification Temp Measured 36.8   Circuit Condensation Drained   Vent Patient Data   Peak Inspiratory Pressure 32 cmH2O   Mean Airway Pressure 15 cmH20   Rate Measured 24 br/min   Vt Exhaled 368 mL   Minute Volume 8.77 Liters   I:E Ratio 1:1.8   Plateau Pressure 29 [id number]   Static Compliance 15 mL/cmH2O   Dynamic Compliance 14 mL/cmH2O   Cough/Sputum   Sputum How Obtained Tracheal;Suctioned   Cough Productive   Sputum Amount Moderate   Sputum Color Creamy; Tan   Tenacity Thick   Spontaneous Breathing Trial (SBT) RT Doc   Pulse 82   Breath Sounds   Right Upper Lobe Expiratory Wheezes   Right Middle Lobe Expiratory Wheezes   Right Lower Lobe Diminished   Left Upper Lobe Expiratory Wheezes   Left Lower Lobe Diminished   Additional Respiratory  Assessments   Resp 24   Position Semi-Mohamud's   Alarm Settings   High Pressure Alarm 45 cmH2O   Low Minute Volume Alarm 5 L/min   Apnea (secs) 20 secs   High Respiratory Rate 45 br/min   Low Exhaled Vt  250 mL   Patient Observation   Observations #8 PORTEX. AMBU BAG AT HEAD OF BED. WATER GOOD. Surgical Airway (trach) Portex Cuffed   Placement Date/Time: 02/15/22 1516   Timeout: Patient;Procedure;Site/Side;Appropriate Equipment;Sterile Technique using full body drape; Consent Confirmed; Correct Position  Placed By: In surgery  Surgical Airway Type: Tracheostomy  Brand: Portex  Style. ..    Status Secured   Site Assessment   [facility]

## 2022-02-16 NOTE — PROGRESS NOTES
FiO2 incraesed to 55%   Peep incraesed to 8cwp  For desat to 85%           02/16/22 1209   Vent Information   Vent Mode AC/VC   Vt Ordered 350 mL   Rate Set 24 bmp   Peak Flow 50 L/min   FiO2  55 %   SpO2 (!) 89 %   SpO2/FiO2 ratio 161.82   Sensitivity 3   I Time/ I Time % 8 s   Humidification Source Heated wire   Humidification Temp Measured 37   Vent Patient Data   Peak Inspiratory Pressure 26 cmH2O   Mean Airway Pressure 13 cmH20   Rate Measured 24 br/min   Vt Exhaled 366 mL   Minute Volume 6.91 Liters   Spontaneous Breathing Trial (SBT) RT Doc   Pulse 130   Additional Respiratory  Assessments   Resp 26

## 2022-02-16 NOTE — PROGRESS NOTES
Pulmonary & Critical Care Medicine ICU Progress Note    CC: COVID in partially vaccinated patient     Events of Last 24 hours:   Fever is better   Levophed off  Fentanyl 100  Precedex 1.2  PEEP 5  FiO2 45%  Making good urine       Invasive Lines:   PICC 1/20/2022-2/14  R IJ 2/14     MV: 1/21/2022  Vent Mode: AC/VC Rate Set: 24 bmp/Vt Ordered: 350 mL/ /FiO2 : 40 %  Recent Labs     02/15/22  1102 02/16/22  0504   PHART 7.377 7.363   DDU0YNJ 50.8* 47.9*   PO2ART 67.5* 55.4*       IV:   sodium chloride 125 mL/hr at 02/15/22 1822    sodium chloride      heparin 25,000 units in dextrose 5% 250 mL infusion 1,220 Units/hr (02/16/22 0710)    dexmedetomidine HCl in NaCl 1.199 mcg/kg/hr (02/16/22 0334)    norepinephrine Stopped (02/16/22 0500)    fentaNYL 125 mcg/hr (02/16/22 0554)    dextrose      sodium chloride 5 mL/hr at 02/15/22 1754       Vitals:  Blood pressure 125/66, pulse 104, temperature 100 °F (37.8 °C), temperature source Bladder, resp. rate 23, height 5' 3\" (1.6 m), weight 198 lb 6.6 oz (90 kg), SpO2 93 %, not currently breastfeeding. on vent 24/350    Intake/Output Summary (Last 24 hours) at 2/16/2022 0722  Last data filed at 2/16/2022 0500  Gross per 24 hour   Intake 4192.36 ml   Output 411 ml   Net 3781.36 ml     EXAM:  General: ill appearing. Intubated sedated. Eyes: PERRL. No sclera icterus. No conjunctival injection. ENT: No discharge. Pharynx clear. ET tube in place  Neck: Trachea midline. Normal thyroid. Resp: No accessory muscle use. Few crackles. No wheezing. No rhonchi. CV: Regular rate. Regular rhythm. No mumur or rub. UE edema. GI: Non-tender. Non-distended. No masses. Skin: Warm and dry. No nodule on exposed extremities. No rash on exposed extremities. Lymph: No cervical LAD. No supraclavicular LAD. M/S: No cyanosis. No joint deformity. No clubbing. Neuro: Intubated sedated. + response to painful stimuli. Not following commands.   Psych: Noncommunicative unable to obtain    Scheduled Meds:   famotidine (PEPCID) injection  20 mg IntraVENous Daily    linezolid  600 mg IntraVENous Q12H    albuterol  2.5 mg Nebulization Q4H    insulin lispro  0-18 Units SubCUTAneous Q4H    carboxymethylcellulose PF  1 drop Both Eyes 4x Daily    QUEtiapine  25 mg Oral BID    chlorhexidine  15 mL Mouth/Throat BID    [Held by provider] aspirin  81 mg Oral Daily    [Held by provider] clopidogrel  75 mg Oral Daily    ascorbic acid  1,000 mg Oral Daily    atorvastatin  10 mg Oral Nightly    sodium chloride flush  5-40 mL IntraVENous 2 times per day    Vitamin D  2,000 Units Oral Daily     PRN Meds:  sodium chloride, heparin (porcine), heparin (porcine), albuterol, magnesium sulfate, potassium chloride, fentanNYL, midazolam, glucose, glucagon (rDNA), dextrose, dextrose bolus (hypoglycemia) **OR** dextrose bolus (hypoglycemia), guaiFENesin-dextromethorphan, sodium chloride flush, sodium chloride, ondansetron **OR** ondansetron, polyethylene glycol, acetaminophen **OR** acetaminophen    Results:  CBC:   Recent Labs     02/14/22  0403 02/15/22  0254 02/16/22  0504   WBC 8.0 9.9 7.7   HGB 8.3* 7.2* 7.2*   HCT 24.8* 21.8* 21.8*   MCV 88.4 88.3 88.0    263 281     BMP:   Recent Labs     02/14/22  0403 02/15/22  0254 02/16/22  0504   * 129* 129*   K 4.8 5.6* 4.7   CL 93* 94* 97*   CO2 31 30 26   BUN 41* 48* 51*   CREATININE 0.9 1.4* 1.6*     LIVER PROFILE: No results for input(s): AST, ALT, LIPASE, BILIDIR, BILITOT, ALKPHOS in the last 72 hours. Invalid input(s):   AMYLASE,  ALB    Micro:  1/17/2022 SARS-CoV-2 positive at urgent care  1/12/2022 SARS-CoV-2 detected   1/31/2022 tracheal aspirate MRSA  2/1/2022 blood NG  2/6/22 BAL: MRSA  2/12/22 Resp cx  2/12 Resp MRSA     Imaging:   CXR 2/16/2022  images reviewed by me and showed:   Trach tube in place   Satisfactory CVC line position   Diffuse bilateral ASD -worse     ASSESSMENT:  · Acute hypoxemic respiratory failure, severe and life-threatening  · MRSA PNA   · Acute kidney injury   · Hyperkalemia   · COVID-19 pneumonia in a partially vaccinated patient, s/p J&J vaccination 3/2021  · ARDS  · Bilateral UE SVT   · Post cuffed Portex size 8 tracheostomy 2/15/2022 by Dr. Tabatha Valdez  · Persistent fever -probably noninfectious, could be related to venous thrombosis  · Left pneumothorax, chest tube placed 2/1/22 and removed 2/10/22  · Former smoker    PLAN:  Droplet Plus Airborne Precautions   Mechanical ventilation as per my orders. The ventilator was adjusted by me at the bedside for unstable, life threatening respiratory failure  Precedex gtt   Wean off Fentanyl gtt  Seroquel 25 BID   Zyvox D#3. Completed 14 days Vanc and 7 days Levaquin    cc/hr    IVF  cc bolus PRN target CVP 12-15   Completed Decadron D#25  Completed 14 days baricitinib   H-SSI for FSBS goal 150-180  Planning for trach today   Prophylaxis: Pepcid and Heparin drip           Total critical care time caring for this patient with life threatening, unstable organ failure, including direct patient contact, management of life support systems, review of data including imaging and labs, discussions with other team members and physicians is 31 minutes so far today, excluding procedures.

## 2022-02-16 NOTE — PLAN OF CARE
Nutrition Problem #1: Inadequate oral intake  Intervention: Food and/or Nutrient Delivery: Continue NPO,Start Tube Feeding  Nutritional Goals: patient will tolerate Glucerna 1.2 with a goal rate of 60 ml/hr x 20 hours without GI distress, without s/s of aspiration, and without additional lab/fluid disturbances

## 2022-02-16 NOTE — PROGRESS NOTES
Reassessment complete. Patient continues sedated with trach vent. RASS - 2, patient not following commands. Physical assessment unchanged at this time. Patient repositioned for comfort. Bilateral soft wrist restraints remain in place. Precedex infusing at 1.4 mcg/kg/h. Fentanyl infusing at 0 mcg//h. Heparin infusing at 14.6 mL/hr.  IVF infusing at 125 mL/hr.

## 2022-02-16 NOTE — PROGRESS NOTES
02/16/22 1453   Vent Information   Vent Type 840   Vent Mode AC/VC   Vt Ordered 350 mL   Rate Set 24 bmp   Peak Flow 50 L/min   FiO2  55 %   SpO2 94 %   SpO2/FiO2 ratio 170.91   Sensitivity 3   PEEP/CPAP 8   Humidification Source Heated wire   Humidification Temp Measured 37   Vent Patient Data   Peak Inspiratory Pressure 29 cmH2O   Mean Airway Pressure 14 cmH20   Rate Measured 26 br/min   Vt Exhaled 376 mL   Minute Volume 9.2 Liters   I:E Ratio 1:2.6   Cough/Sputum   Sputum How Obtained Tracheal   Cough Productive   Sputum Amount Small   Sputum Color Creamy   Tenacity Thick   Spontaneous Breathing Trial (SBT) RT Doc   Pulse 112   Breath Sounds   Right Upper Lobe Diminished   Right Middle Lobe Diminished   Right Lower Lobe Diminished   Left Upper Lobe Diminished   Left Lower Lobe Diminished   Additional Respiratory  Assessments   Resp 18   Position Semi-Mohamud's   Oral Care Completed? Yes   Oral Care Mouth suctioned   Subglottic Suction Done? Yes   Cuff Pressure (cm H2O) 28 cm H2O   Alarm Settings   High Pressure Alarm 50 cmH2O   Low Minute Volume Alarm 5 L/min   Apnea (secs) 20 secs   High Respiratory Rate 45 br/min   Low Exhaled Vt  250 mL   Patient Observation   Observations #8 PORTEX. AMBU BAG AT HEAD OF BED. WATER GOOD. Surgical Airway (trach) Portex Cuffed   Placement Date/Time: 02/15/22 1516   Timeout: Patient;Procedure;Site/Side;Appropriate Equipment;Sterile Technique using full body drape; Consent Confirmed; Correct Position  Placed By: In surgery  Surgical Airway Type: Tracheostomy  Brand: Portex  Style. ..    Status Secured   Site Assessment Dry

## 2022-02-16 NOTE — PROGRESS NOTES
Care rounds complete with Dr. Zion Alexis. New medication orders entered by pharmacist. Sedation to be decreased.

## 2022-02-16 NOTE — PROGRESS NOTES
02/15/22 2324   Vent Information   Vent Type 840   Vent Mode AC/VC   Vt Ordered 350 mL   Rate Set 24 bmp   Peak Flow 55 L/min   FiO2  50 %   SpO2 98 %   SpO2/FiO2 ratio 196   Sensitivity 3   PEEP/CPAP 5   Humidification Source Heated wire   Humidification Temp 37   Humidification Temp Measured 37   Circuit Condensation Drained   Vent Patient Data   Peak Inspiratory Pressure 28 cmH2O   Mean Airway Pressure 12 cmH20   Rate Measured 24 br/min   Vt Exhaled 380 mL   Minute Volume 8.6 Liters   I:E Ratio 1:1.8   Plateau Pressure 31 [id number]   Cough/Sputum   Sputum How Obtained Endotracheal;Suctioned   Cough Productive   Sputum Amount Small   Sputum Color Creamy   Tenacity Thick   Breath Sounds   Right Upper Lobe Expiratory Wheezes   Right Middle Lobe Expiratory Wheezes   Right Lower Lobe Diminished   Left Upper Lobe Expiratory Wheezes   Left Lower Lobe Diminished   Additional Respiratory  Assessments   Resp 23   Position Semi-Mohamud's   Alarm Settings   High Pressure Alarm 45 cmH2O   Low Minute Volume Alarm 5 L/min   Apnea (secs) 20 secs   High Respiratory Rate 45 br/min   Low Exhaled Vt  250 mL   Surgical Airway (trach) Portex Cuffed   Placement Date/Time: 02/15/22 1516   Timeout: Patient;Procedure;Site/Side;Appropriate Equipment;Sterile Technique using full body drape; Consent Confirmed; Correct Position  Placed By: In surgery  Surgical Airway Type: Tracheostomy  Brand: Portex  Style. .. Status Secured   Site Assessment Bleeding   Site Care Cleansed;Dressing applied   Ties Assessment Clean;Dry; Intact

## 2022-02-16 NOTE — PROGRESS NOTES
Comprehensive Nutrition Assessment    Type and Reason for Visit:  Reassess    Nutrition Recommendations/Plan:   1. Re-start TF order - ADULT TUBE FEEDING; PEG tube; Other formula - Glucerna 1.2 with a goal rate of 60 ml/hr x 20 hours. Start with 20 ml/hr and increase by 20 ml every 4 hours, as tolerated by patient, until goal rate can be achieved and maintained. Water flushes, 30 ml every 4 hours for tube patency. 2. Monitor TF rate, intake, and tolerance + water flushes. 3. Monitor respiratory status and plan of care. 4. Monitor nutrition-related labs, bowel function, and weight trends. Nutrition Assessment:  patient had declined from a nutritional standpoint AEB she was NPO for trach/PEG placement on 2/15/22 but it appears that TF was re-started via PEG tube but new TF order was not placed; she remains at risk for further compromise d/t need for EN as sole source of nutrition, altered nutrition-related labs, and evolving DTI on sacrum; will re-start TF order - Glucerna 1.2 with a goal rate of 60 ml/hr x 20 hours + 30 ml every 4 hours for tube patency    Malnutrition Assessment:  Malnutrition Status: At risk for malnutrition  Context:  Acute Illness     Findings of the 6 clinical characteristics of malnutrition:  Energy Intake:  1 - 75% or less of estimated energy requirements for 7 or more days  Weight Loss:  Unable to assess (patient is + 16L fluid since admission)     Body Fat Loss:  Unable to assess (COVID-19 +)     Muscle Mass Loss:  Unable to assess (COVID-19 +)    Fluid Accumulation:  No significant fluid accumulation     Strength:  Not Performed    Estimated Daily Nutrient Needs:  Energy (kcal):  1540 - 1771 kcals based on 20-23 kcals/kg/CBW; Weight Used for Energy Requirements:  Current     Protein (g):  68 - 78 g protein based on 1.3-1.5 g/kg/IBW;  Weight Used for Protein Requirements:  Ideal        Fluid (ml/day):  1540 - 1771 ml; Method Used for Fluid Requirements:  1 ml/kcal Nutrition Related Findings:  patient had trach/PEG placed on 2/15/22; patient responds to voice; last documented BM was on 2/14/22; abdomen is soft, round, non-tender, and bowel sounds are present; TF is infusing at 60 ml/hr currently but TF is not ordered at this time; Na, h/h, Cl, and Ca are low; BUN/Cr are elevated; GFR = 32; patient has vitamin C, lipitor, peridex, pepcid, high-dose SSI, seroquel, vitamin D, NS at 125 ml/hr, precedex, fentanyl, heparin in D5, and levo in D5 ordered at this time      Wounds:  Deep Tissue Injury,Pressure Injury (evolving DTI on sacrum)       Current Nutrition Therapies:    Current Tube Feeding (TF) Orders:  · Feeding Route: PEG  · Formula:  Other Tube Feeding (Glucerna 1.2)  · Schedule: Continuous  · Additives/Modulars:  (none)  · Water Flushes: 30 ml every 4 hours for tube patency  · Current TF & Flush Orders Provides: TF is infusing at goal rate and patient is tolerating well  · Goal TF & Flush Orders Provides: Glucerna 1.2 with a goal rate of 60 ml/hr x 20 hours = 1200 ml TV, 1440 kcals, 72 g protein, and 966 ml free water + 30 ml water flushes every 4 hours for tube patency      Anthropometric Measures:  · Height: 5' 3\" (160 cm)  · Current Body Weight: 198 lb 6.6 oz (90 kg) (obtained on 2/16/22; actual weight)   · Admission Body Weight: 183 lb 9.6 oz (83.3 kg) (obtained on 1/16/22; actual weight)    · Usual Body Weight: 183 lb 9.6 oz (83.3 kg) (obtained on 1/16/22; actual weight)     · Ideal Body Weight: 115 lbs; % Ideal Body Weight 172.5 %   · BMI: 35.2  · BMI Categories: Obese Class 2 (BMI 35.0 -39.9)       Nutrition Diagnosis:   · Inadequate oral intake related to inadequate protein-energy intake,impaired respiratory function,increase demand for energy/nutrients as evidenced by NPO or clear liquid status due to medical condition,intubation,nutrition support - enteral nutrition,lab values      Nutrition Interventions:   Food and/or Nutrient Delivery:  Continue NPO,Start Tube Feeding  Nutrition Education/Counseling:  No recommendation at this time   Coordination of Nutrition Care:  Continue to monitor while inpatient,Interdisciplinary Rounds    Goals:  patient will tolerate Glucerna 1.2 with a goal rate of 60 ml/hr x 20 hours without GI distress, without s/s of aspiration, and without additional lab/fluid disturbances       Nutrition Monitoring and Evaluation:   Behavioral-Environmental Outcomes:  None Identified   Food/Nutrient Intake Outcomes:  Enteral Nutrition Intake/Tolerance,IVF Intake,Vitamin/Mineral Intake  Physical Signs/Symptoms Outcomes:  Biochemical Data,Fluid Status or Edema,Hemodynamic Status,Weight,Skin     Discharge Planning:    Enteral Nutrition     Electronically signed by Ryan Siemens, RD, LD on 2/16/22 at 9:34 AM EST    Contact: [phone]

## 2022-02-17 NOTE — CONSULTS
2-17-22 (0400) anti-Xa 0.14 IU/ml. Please give heparin 2440 unit bolus and increase heparin drip to 1580 units/hr ( 15.8 ml/hr ). Please recheck anti-Xa at 1300 on 2-17-22. [facility]. .  2/17/2022 6:36 AM

## 2022-02-17 NOTE — PROGRESS NOTES
Rounding completed with Dr. Claudeen Curl and interdisciplinary team.  POC, labs, lines and assessment reviewed.       - cont heparin start ASA Plavix  -RT to increase RR to 28  -TF flush to be stopped  -one time dose lasix  - spontaneous trial initiated at 911 by RT

## 2022-02-17 NOTE — PROGRESS NOTES
02/17/22 0300   Vent Information   Vent Type 840   Vent Mode AC/VC   Vt Ordered 350 mL   Rate Set 24 bmp   Peak Flow 50 L/min   FiO2  55 %   SpO2 98 %   SpO2/FiO2 ratio 178.18   Sensitivity 3   PEEP/CPAP 8   Humidification Source Heated wire   Humidification Temp 37   Humidification Temp Measured 37   Circuit Condensation Drained   Vent Patient Data   Peak Inspiratory Pressure 26 cmH2O   Mean Airway Pressure 12 cmH20   Rate Measured 29 br/min   Vt Exhaled 553 mL   Minute Volume 8.3 Liters   I:E Ratio 1:1.6   Plateau Pressure 18 KDN22   Cough/Sputum   Sputum How Obtained Tracheal;Suctioned   Cough Productive   Sputum Amount Moderate   Sputum Color Creamy   Tenacity Thick   Spontaneous Breathing Trial (SBT) RT Doc   Pulse 101   Breath Sounds   Right Upper Lobe Diminished   Right Middle Lobe Diminished   Right Lower Lobe Diminished   Left Upper Lobe Diminished   Left Lower Lobe Diminished   Additional Respiratory  Assessments   Resp 21   Position Semi-Mohamud's   Alarm Settings   High Pressure Alarm 50 cmH2O   Low Minute Volume Alarm 5 L/min   Apnea (secs) 20 secs   High Respiratory Rate 45 br/min   Low Exhaled Vt  250 mL   Surgical Airway (trach) Portex Cuffed   Placement Date/Time: 02/15/22 1516   Timeout: Patient;Procedure;Site/Side;Appropriate Equipment;Sterile Technique using full body drape; Consent Confirmed; Correct Position  Placed By: In surgery  Surgical Airway Type: Tracheostomy  Brand: Portex  Style. .. Status Secured   Site Assessment Bleeding   Site Care Cleansed;Dressing applied   Ties Assessment Clean;Dry; Intact

## 2022-02-17 NOTE — PROGRESS NOTES
Shift assessment completed, see flow sheet. RASS-3     Trach/vent AC. 24 / 350 / 55 %/ +8. SpO2 95%. Respirations are easy, even, and unlabored. Bilateral lung sounds diminished. VSS  ST on the monitor  PEG in place TF at 60 mL/hr, 10 residual.      CVC/PIV, WNL with precedex infusing, heparin, and NaCL    All lines and monitoring devices in place. Mcguire is patent and secured. Bed in lowest position with wheels locked. No needs expressed at this time. Will continue to monitor.

## 2022-02-17 NOTE — PROGRESS NOTES
Hospitalist Progress Note      PCP: Anselmo Woodard DO    Date of Admission: 1/12/2022     resp failure on vent , covid pna, unvaccinated  High fevers S.p bronch with BAL   Ongoing weaning trials    Subjective: On the vent 50% FiO2 8 of PEEP  Fevers overnight  Continues to be on Levophed  On Precedex    2/15  High fevers overnight  Status post bronc with BAL 2/14  On Levophed  Continues to be on Precedex    2/16  Continues to run fevers. Trach and PEG placement yesterday. Off Levophed.     2/17  Off Levophed  On Precedex  Fevers resolving  On SBT this morning    Medications:  Reviewed    Infusion Medications    sodium chloride      sodium chloride 125 mL/hr at 02/17/22 1100    sodium chloride      heparin 25,000 units in dextrose 5% 250 mL infusion 1,580 Units/hr (02/17/22 1100)    dexmedetomidine HCl in NaCl 1.4 mcg/kg/hr (02/17/22 1258)    fentaNYL Stopped (02/16/22 1525)    dextrose      sodium chloride Stopped (02/17/22 1005)     Scheduled Medications    famotidine (PEPCID) injection  20 mg IntraVENous Daily    linezolid  600 mg IntraVENous Q12H    albuterol  2.5 mg Nebulization Q4H    insulin lispro  0-18 Units SubCUTAneous Q4H    carboxymethylcellulose PF  1 drop Both Eyes 4x Daily    QUEtiapine  25 mg Oral BID    chlorhexidine  15 mL Mouth/Throat BID    aspirin  81 mg Oral Daily    clopidogrel  75 mg Oral Daily    ascorbic acid  1,000 mg Oral Daily    atorvastatin  10 mg Oral Nightly    sodium chloride flush  5-40 mL IntraVENous 2 times per day    Vitamin D  2,000 Units Oral Daily     PRN Meds: sodium chloride, sodium chloride, heparin (porcine), heparin (porcine), albuterol, magnesium sulfate, potassium chloride, fentanNYL, midazolam, glucose, glucagon (rDNA), dextrose, dextrose bolus (hypoglycemia) **OR** dextrose bolus (hypoglycemia), guaiFENesin-dextromethorphan, sodium chloride flush, sodium chloride, ondansetron **OR** ondansetron, polyethylene glycol, acetaminophen **OR** acetaminophen      Intake/Output Summary (Last 24 hours) at 2/17/2022 1403  Last data filed at 2/17/2022 1300  Gross per 24 hour   Intake 33244.95 ml   Output 857 ml   Net 9308.95 ml       Physical Exam Performed:    BP (!) 161/76   Pulse 126   Temp 99.9 °F (37.7 °C) (Bladder)   Resp 27   Ht 5' 3\" (1.6 m)   Wt 216 lb 0.8 oz (98 kg)   SpO2 90%   BMI 38.27 kg/m²       Patient seen in droplet plus precautions  General:  Elderly female, on vent. supine  Oral ETT and OG noted   Mucous Membranes:  Pink , anicteric  Neck: No JVD, no carotid bruit, no thyromegaly  Chest: diminished in bases, bilateral mild crackles present. Cardiovascular:  RRR S1S2 heard, no murmurs or gallops  Abdomen: Soft, undistended, non tender, no organomegaly, BS present  Extremities: bilateral UE edema worse on right   No edema or cyanosis. Distal pulses well felt  Neurological :  Sedated    Labs:   Recent Labs     02/15/22  0254 02/15/22  0254 02/16/22  0504 02/17/22  0400 02/17/22  1131   WBC 9.9  --  7.7 7.2  --    HGB 7.2*   < > 7.2* 6.7* 7.9*   HCT 21.8*   < > 21.8* 20.7* 24.0*     --  281 257  --     < > = values in this interval not displayed. Recent Labs     02/15/22  0254 02/16/22  0504 02/17/22  0400   * 129* 131*   K 5.6* 4.7 4.6   CL 94* 97* 99   CO2 30 26 25   BUN 48* 51* 51*   CREATININE 1.4* 1.6* 1.8*   CALCIUM 8.4 7.6* 7.5*     No results for input(s): AST, ALT, BILIDIR, BILITOT, ALKPHOS in the last 72 hours. No results for input(s): INR in the last 72 hours. No results for input(s): Paramus Sims in the last 72 hours. Urinalysis:      Lab Results   Component Value Date    NITRU Negative 02/12/2022    WBCUA 3-5 02/12/2022    BACTERIA 1+ 02/12/2022    RBCUA 21-50 02/12/2022    BLOODU LARGE 02/12/2022    SPECGRAV 1.020 02/12/2022    GLUCOSEU Negative 02/12/2022    GLUCOSEU Neg 08/29/2010       Radiology:  XR CHEST PORTABLE   Final Result   Stable chest x-ray with multifocal opacities in both lungs. XR CHEST PORTABLE   Final Result   Persistent moderate bilateral multifocal airspace disease presumably   pneumonia, likely representing residual COVID pneumonia. Developing ARDS is   also a consideration. XR CHEST PORTABLE   Final Result   Right PICC line has been removed. ET, NG, and right jugular line remain. Multifocal opacities are not significantly changed. XR CHEST PORTABLE   Final Result   Right IJ central venous catheter terminating in the lower SVC in satisfactory   position. No pneumothorax. Diffuse bilateral airspace disease, not significantly changed. XR CHEST PORTABLE   Final Result   Stable support apparatus. Persistent diffuse bilateral airspace disease. Findings on the right have   slightly progressed compared to prior. VL Extremity Venous Bilateral   Final Result      XR CHEST PORTABLE   Final Result   No significant change in the multifocal opacities of both lungs. The   pulmonary inflation is stable to 02/12/2022 and improved from 02/10/2022. XR CHEST PORTABLE   Final Result   Slightly improved aeration in the lungs. XR CHEST PORTABLE   Final Result   No evidence of pneumothorax status post left chest tube removal.      Remainder of support apparatus and extensive diffuse bilateral airspace   disease are similar in appearance. VL Extremity Venous Right   Final Result      XR CHEST 1 VIEW   Final Result   1. Unchanged position of support devices. Kinked appearance of left chest   tube again demonstrated without evidence for pneumothorax. 2. No significant change in bilateral airspace disease. XR CHEST PORTABLE   Final Result   Stable support apparatus. The left-sided chest tube catheter tubing appears   kinked. Persistent diffuse bilateral airspace opacities. Findings on the left have   progressed compared to prior. XR CHEST PORTABLE   Final Result   1.  Unchanged position of support devices. 2. No significant change in bilateral airspace disease. XR CHEST PORTABLE   Final Result   Stable examination with stable lines and tubes. Stable pneumonia. XR CHEST PORTABLE   Final Result   Stable lines and tubes. Stable examination. Extensive airspace disease   noted in the lungs. XR CHEST PORTABLE   Final Result   Stable examination with stable lines and tubes. Stable findings of COVID   pneumonia. XR CHEST PORTABLE   Final Result   1. No significant change. XR CHEST PORTABLE   Final Result   Worsening multifocal airspace opacities. XR CHEST PORTABLE   Final Result   Interval placement of a left-sided chest tube with significant decrease seen   in size of the left pneumothorax only with a small apical component   remaining. Patchy airspace disease within the lung fields is stable and   unchanged. XR CHEST PORTABLE   Final Result   New onset moderate to large left pneumothorax with mild tension. Endotracheal, nasogastric tubes and PICC line are stable. Redemonstration of   bilateral infiltrates. Findings communicated to the referring physician on February 1, 2022      RECOMMENDATION:   Stat left chest tube. XR CHEST PORTABLE   Final Result   1. Stable lines, tubes, and support devices. 2. Stable cardiopulmonary status including bilateral airspace opacities and   small effusions. XR CHEST PORTABLE   Final Result   No significant change. Continued follow-up recommended. XR CHEST PORTABLE   Final Result   Mild worsening of bilateral infiltrates likely due to pneumonia. Continued   follow-up recommended. XR CHEST PORTABLE   Final Result   Moderate persistent multifocal airspace disease compatible with multifocal   pneumonia including COVID pneumonia. While similar to recent studies, there   has been gradual progression and worsening since the early study of   01/12/2022.          XR CHEST PORTABLE pneumonia. XR CHEST PORTABLE    (Results Pending)           Assessment/Plan:      COVID PNA  Acute hypoxic respiratory Failure   - CXR: noted with multifocal PNA  - no home O2 at baseline  - had vaccine J [employer]J in March 2021 - no booster   -progressively worsening hypoxia, was on vapotherm with full support   - eventually placed on vent support 1/21  - persistent severe hypoxemia -she was proned on 1/20/2022.    - Decadron weaned and stopped after 25 days   -  finished 7 days levaquin   -- CRP elevated - Started on Baricitinib - finished 14 days .   - Has MRSA in her sputums and elevated WBC. Stopped cefepime. Finished 14 days of Vanco.  Now new fever with leukocytosis. Heavy growth of MRSA and tracheal aspirate culture. Start Zyvox day #4  Valium and Seroquel added for weaning IV sedation   precedex caused bradycardia  - sp bronch with BAL for fevers - BAL with MRSA again   Trach and PEG 2/15  Back on Precedex     Left pneumothorax. - left chest tube placed. CT flushed and it is patent. Placed to suction. - resolved pneumothorax and off chest tube 2/10     Hypotension  -weaned off pressors  Back on Levophed. Now off pressors again. Continue IV normal saline.     Episodes of expressive aphasia   - ? TIA, vs mental status changes related to covid   - cont ASA, Plavix     Thrombocytopenia resolved  - likely with covid  - continue Lovenox and monitor    Anemia  Secondary to chronic illness and repeated blood draws  Transfuse 1 unit of red cells 2/17     Hyperglycemia  Secondary to steroids  On Sliding scale insulin    Acute kidney injury 2/15  Hyperkalemia  IV fluid bolus--> IV normal saline 125 an hour  Lokelma     HLD  - Continue statin        DVT Prophylaxis: Lovenox bid  Diet: Diet NPO  ADULT TUBE FEEDING; PEG; Other Tube Feeding (specify);  Glucerna 1.2; Continuous; 20; Yes; 20; Q 4 hours; 60; 30; Q 4 hours  Code Status: Full Code      Dispo - cont care    Jaskaran Salgado MD, 2/17/2022 2:03 PM

## 2022-02-17 NOTE — PROGRESS NOTES
02/16/22 1939   Vent Information   Vent Type 840   Vent Mode AC/VC   Vt Ordered 350 mL   Rate Set 24 bmp   Peak Flow 50 L/min   FiO2  55 %   SpO2 92 %   SpO2/FiO2 ratio 167.27   Sensitivity 3   PEEP/CPAP 8   Humidification Source Heated wire   Humidification Temp 37   Humidification Temp Measured 36.7   Circuit Condensation Drained   Vent Patient Data   Peak Inspiratory Pressure 38 cmH2O   Mean Airway Pressure 18 cmH20   Rate Measured 31 br/min   Vt Exhaled 390 mL   Minute Volume 12.1 Liters   I:E Ratio 1:3.1   Plateau Pressure 18 [id number]   Static Compliance 39 mL/cmH2O   Dynamic Compliance 13 mL/cmH2O   Cough/Sputum   Sputum How Obtained Tracheal;Suctioned   Cough Productive   Sputum Amount Small   Sputum Color Creamy   Tenacity Thick   Spontaneous Breathing Trial (SBT) RT Doc   Pulse 95   Breath Sounds   Right Upper Lobe Diminished   Right Middle Lobe Diminished   Right Lower Lobe Diminished   Left Upper Lobe Diminished   Left Lower Lobe Diminished   Additional Respiratory  Assessments   Resp 27   Position Semi-Mohamud's   Alarm Settings   High Pressure Alarm 50 cmH2O   Low Minute Volume Alarm 5 L/min   Apnea (secs) 20 secs   High Respiratory Rate 45 br/min   Low Exhaled Vt  250 mL   Patient Observation   Observations #8 PORTEX. AMBU BAG AT HEAD OF BED. WATER GOOD. Surgical Airway (trach) Portex Cuffed   Placement Date/Time: 02/15/22 1516   Timeout: Patient;Procedure;Site/Side;Appropriate Equipment;Sterile Technique using full body drape; Consent Confirmed; Correct Position  Placed By: In surgery  Surgical Airway Type: Tracheostomy  Brand: Portex  Style. ..    Status Secured

## 2022-02-17 NOTE — PROGRESS NOTES
Pharmacy: High Dose Heparin Drip  Current rate :1580 units/hr  [email or web address] 0.19  Goal: 0.3-0.7  Per protocol, Heparin Bolus 2440units and increase to 1700units/hr  Next Anti-Xa @ [address] [place] [address] D 2/17/20223:10 PM  .      .

## 2022-02-17 NOTE — PROGRESS NOTES
02/16/22 2328   Vent Information   Vent Type 840   Vent Mode AC/VC   Vt Ordered 350 mL   Rate Set 24 bmp   Peak Flow 50 L/min   FiO2  55 %   SpO2 92 %   SpO2/FiO2 ratio 167.27   Sensitivity 3   PEEP/CPAP 8   Humidification Source Heated wire   Humidification Temp 37   Humidification Temp Measured 36.7   Circuit Condensation Drained   Vent Patient Data   Peak Inspiratory Pressure 30 cmH2O   Mean Airway Pressure 17 cmH20   Rate Measured 25 br/min   Vt Exhaled 379 mL   Minute Volume 9.4 Liters   I:E Ratio 1:1.6   Plateau Pressure 20 [id number]   Cough/Sputum   Sputum How Obtained Tracheal;Suctioned   Cough Productive   Sputum Amount Small   Sputum Color Creamy   Tenacity Thick   Spontaneous Breathing Trial (SBT) RT Doc   Pulse 97   Breath Sounds   Right Upper Lobe Diminished   Right Middle Lobe Diminished   Right Lower Lobe Diminished   Left Upper Lobe Diminished   Left Lower Lobe Diminished   Additional Respiratory  Assessments   Resp 21   Position Semi-Mohamud's   Alarm Settings   High Pressure Alarm 50 cmH2O   Low Minute Volume Alarm 5 L/min   Apnea (secs) 20 secs   High Respiratory Rate 45 br/min   Low Exhaled Vt  250 mL   Patient Observation   Observations #8 PORTEX. AMBU BAG AT HEAD OF BED. WATER GOOD. Surgical Airway (trach) Portex Cuffed   Placement Date/Time: 02/15/22 1516   Timeout: Patient;Procedure;Site/Side;Appropriate Equipment;Sterile Technique using full body drape; Consent Confirmed; Correct Position  Placed By: In surgery  Surgical Airway Type: Tracheostomy  Brand: Portex  Style. ..    Status Secured

## 2022-02-17 NOTE — PROGRESS NOTES
Pulmonary & Critical Care Medicine ICU Progress Note    CC: COVID in partially vaccinated patient     Events of Last 24 hours:   Fever is better   Levophed off  Fentanyl off  Precedex 1.4  PEEP 8  FiO2 55%   cc/hr       Invasive Lines:   PICC 1/20/2022-2/14  R IJ 2/14     MV: 1/21/2022  Vent Mode: AC/VC Rate Set: 24 bmp/Vt Ordered: 350 mL/ /FiO2 : 55 %  Recent Labs     02/16/22  0504 02/17/22  0400   PHART 7.363 7.275*   VLS4EDV 47.9* 50.4*   PO2ART 55.4* 134.8*       IV:   sodium chloride      sodium chloride 125 mL/hr at 02/16/22 1739    sodium chloride      heparin 25,000 units in dextrose 5% 250 mL infusion 1,580 Units/hr (02/17/22 0637)    dexmedetomidine HCl in NaCl 1.398 mcg/kg/hr (02/17/22 0444)    norepinephrine Stopped (02/15/22 2258)    fentaNYL Stopped (02/16/22 1525)    dextrose      sodium chloride 5 mL/hr at 02/16/22 1739       Vitals:  Blood pressure 124/66, pulse 110, temperature 98.7 °F (37.1 °C), temperature source Bladder, resp. rate 28, height 5' 3\" (1.6 m), weight 216 lb 0.8 oz (98 kg), SpO2 100 %, not currently breastfeeding. on vent 24/350    Intake/Output Summary (Last 24 hours) at 2/17/2022 0737  Last data filed at 2/17/2022 0444  Gross per 24 hour   Intake 7203.7 ml   Output 657 ml   Net 6546.7 ml     EXAM:  General: ill appearing. Intubated sedated. Eyes: PERRL. No sclera icterus. No conjunctival injection. ENT: No discharge. Pharynx clear. Trach tube in place  Neck: Trachea midline. Normal thyroid. Resp: No accessory muscle use. Few crackles. No wheezing. No rhonchi. CV: Regular rate. Regular rhythm. No mumur or rub. UE edema. GI: Non-tender. Non-distended. No masses. Skin: Warm and dry. No nodule on exposed extremities. No rash on exposed extremities. Lymph: No cervical LAD. No supraclavicular LAD. M/S: No cyanosis. No joint deformity. No clubbing. Neuro: Intubated sedated. + response to painful stimuli. Not following commands.   Psych: Noncommunicative unable to obtain    Scheduled Meds:   famotidine (PEPCID) injection  20 mg IntraVENous Daily    linezolid  600 mg IntraVENous Q12H    albuterol  2.5 mg Nebulization Q4H    insulin lispro  0-18 Units SubCUTAneous Q4H    carboxymethylcellulose PF  1 drop Both Eyes 4x Daily    QUEtiapine  25 mg Oral BID    chlorhexidine  15 mL Mouth/Throat BID    [Held by provider] aspirin  81 mg Oral Daily    [Held by provider] clopidogrel  75 mg Oral Daily    ascorbic acid  1,000 mg Oral Daily    atorvastatin  10 mg Oral Nightly    sodium chloride flush  5-40 mL IntraVENous 2 times per day    Vitamin D  2,000 Units Oral Daily     PRN Meds:  sodium chloride, sodium chloride, heparin (porcine), heparin (porcine), albuterol, magnesium sulfate, potassium chloride, fentanNYL, midazolam, glucose, glucagon (rDNA), dextrose, dextrose bolus (hypoglycemia) **OR** dextrose bolus (hypoglycemia), guaiFENesin-dextromethorphan, sodium chloride flush, sodium chloride, ondansetron **OR** ondansetron, polyethylene glycol, acetaminophen **OR** acetaminophen    Results:  CBC:   Recent Labs     02/15/22  0254 02/16/22  0504 02/17/22  0400   WBC 9.9 7.7 7.2   HGB 7.2* 7.2* 6.7*   HCT 21.8* 21.8* 20.7*   MCV 88.3 88.0 89.1    281 257     BMP:   Recent Labs     02/15/22  0254 02/16/22  0504 02/17/22  0400   * 129* 131*   K 5.6* 4.7 4.6   CL 94* 97* 99   CO2 30 26 25   BUN 48* 51* 51*   CREATININE 1.4* 1.6* 1.8*     LIVER PROFILE: No results for input(s): AST, ALT, LIPASE, BILIDIR, BILITOT, ALKPHOS in the last 72 hours. Invalid input(s):   AMYLASE,  ALB    Micro:  1/17/2022 SARS-CoV-2 positive at urgent care  1/12/2022 SARS-CoV-2 detected   1/31/2022 tracheal aspirate MRSA  2/1/2022 blood NG  2/6/22 BAL: MRSA  2/12/22 Resp cx  2/12 Resp MRSA     Imaging:   CXR 2/17/2022  images reviewed by me and showed:   Trach tube in place   Satisfactory CVC line position   Diffuse bilateral ASD - same     ASSESSMENT:  · Acute hypoxemic respiratory failure, severe and life-threatening  · MRSA PNA   · Acute kidney injury/Hyperkalemia   · COVID-19 pneumonia in a partially vaccinated patient, s/p J&J vaccination 3/2021  · ARDS  · Bilateral UE SVT   · Anemia- no active bleed   · Post cuffed Portex size 8 tracheostomy 2/15/2022 by Dr. Livia Hubbard  · Persistent fever -probably noninfectious, could be related to venous thrombosis  · Left pneumothorax, chest tube placed 2/1/22 and removed 2/10/22  · Former smoker    PLAN:  Droplet Plus Airborne Precautions   Mechanical ventilation as per my orders. The ventilator was adjusted by me at the bedside for unstable, life threatening respiratory failure  Increase RR 28   PS 15/5 as tolerated during the day with AC at night   Precedex gtt   Seroquel 25 BID   Zyvox D#4. Completed 14 days Vanc and 7 days Levaquin   Continue  cc/hr    Completed Decadron D#25  Completed 14 days baricitinib   H-SSI for FSBS goal 150-180  Resume ASA and Plavix   1 unit of PRBC  Monitor H&H and transfuse for Hb < 7   Lasix 40 mg IV x 1   TFs at goal   D/C free water TF flush   BM regiment   Prophylaxis: Pepcid and Heparin drip           Total critical care time caring for this patient with life threatening, unstable organ failure, including direct patient contact, management of life support systems, review of data including imaging and labs, discussions with other team members and physicians is 32 minutes so far today, excluding procedures.

## 2022-02-17 NOTE — PROGRESS NOTES
DR. Robert Sandhu NOTIFIED OF HGB 6.7, NEW ORDERS RECEIVED, TRANSFUSE 1 UNIT OF PRBCs, PATIENTS DAUGHTER \"BIENVENIDO\" NOTIFIED, BLOOD TRANSFUSION CONSENT OBTAINED.

## 2022-02-17 NOTE — PROGRESS NOTES
Pt placed on Ps 15/8, FiO2 55%         02/17/22 0911   Vent Information   Vent Mode PS   Pressure Support 15 cmH20   FiO2  55 %   SpO2 95 %   SpO2/FiO2 ratio 172.73   PEEP/CPAP 8   Spontaneous Breathing Trial (SBT) RT Doc   Pulse 100   Additional Respiratory  Assessments   Resp 22

## 2022-02-17 NOTE — PROGRESS NOTES
Pharmacy: High Dose Heparin Drip  Current rate :1460 units/hr  [email or web address] 0.35  Goal: 0.3-0.7  Per protocol, continue current drip rate of 1460 units/hr  Next Anti-Xa @ 3615

## 2022-02-17 NOTE — PROGRESS NOTES
RT Inhaler-Nebulizer Bronchodilator Protocol Note    There is a bronchodilator order in the chart from a provider indicating to follow the RT Bronchodilator Protocol and there is an Initiate RT Inhaler-Nebulizer Bronchodilator Protocol order as well (see protocol at bottom of note). CXR Findings:  XR CHEST PORTABLE    Result Date: 2/16/2022  Persistent moderate bilateral multifocal airspace disease presumably pneumonia, likely representing residual COVID pneumonia. Developing ARDS is also a consideration. XR CHEST PORTABLE    Result Date: 2/15/2022  Right PICC line has been removed. ET, NG, and right jugular line remain. Multifocal opacities are not significantly changed. The findings from the last RT Protocol Assessment were as follows:   History Pulmonary Disease: Chronic pulmonary disease  Respiratory Pattern: Mild dyspnea at rest, irregular pattern, or RR 21-25 bpm  Breath Sounds: Slightly diminished and/or crackles  Cough: Strong, productive  Indication for Bronchodilator Therapy: Decreased or absent breath sounds  Bronchodilator Assessment Score: 9    Aerosolized bronchodilator medication orders have been revised according to the RT Inhaler-Nebulizer Bronchodilator Protocol below. Respiratory Therapist to perform RT Therapy Protocol Assessment initially then follow the protocol. Repeat RT Therapy Protocol Assessment PRN for score 0-3 or on second treatment, BID, and PRN for scores above 3. No Indications  adjust the frequency to every 6 hours PRN wheezing or bronchospasm, if no treatments needed after 48 hours then discontinue using Per Protocol order mode. If indication present, adjust the RT bronchodilator orders based on the Bronchodilator Assessment Score as indicated below.   Use Inhaler orders unless patient has one or more of the following: on home nebulizer, not able to hold breath for 10 seconds, is not alert and oriented, cannot activate and use MDI correctly, or respiratory rate 25 breaths per minute or more, then use the equivalent nebulizer order(s) with same Frequency and PRN reasons based on the score. If a patient is on this medication at home then do not decrease Frequency below that used at home. 0-3  enter or revise RT bronchodilator order(s) to equivalent RT Bronchodilator order with Frequency of every 4 hours PRN for wheezing or increased work of breathing using Per Protocol order mode. 4-6  enter or revise RT Bronchodilator order(s) to two equivalent RT bronchodilator orders with one order with BID Frequency and one order with Frequency of every 4 hours PRN wheezing or increased work of breathing using Per Protocol order mode. 7-10  enter or revise RT Bronchodilator order(s) to two equivalent RT bronchodilator orders with one order with TID Frequency and one order with Frequency of every 4 hours PRN wheezing or increased work of breathing using Per Protocol order mode. 11-13  enter or revise RT Bronchodilator order(s) to one equivalent RT bronchodilator order with QID Frequency and an Albuterol order with Frequency of every 4 hours PRN wheezing or increased work of breathing using Per Protocol order mode. Greater than 13  enter or revise RT Bronchodilator order(s) to one equivalent RT bronchodilator order with every 4 hours Frequency and an Albuterol order with Frequency of every 2 hours PRN wheezing or increased work of breathing using Per Protocol order mode. PATIENT WILL BENEFIT FROM TREATMENTS.     Electronically signed by Erica Groves RCP on 2/17/2022 at 12:21 AM

## 2022-02-17 NOTE — PROGRESS NOTES
Pt remains on PS 15/8, FiO2 55%       02/17/22 1438   Vent Information   Skin Assessment Clean, dry, & intact   Vent Type 840   Vent Mode PS   Pressure Support 15 cmH20   FiO2  55 %   SpO2 97 %   SpO2/FiO2 ratio 176.36   Sensitivity 3   PEEP/CPAP 8   Humidification Source Heated wire   Humidification Temp Measured 37   Vent Patient Data   Peak Inspiratory Pressure 24 cmH2O   Mean Airway Pressure 11 cmH20   Rate Measured 30 br/min   Spontaneous  mL   Minute Volume 12 Liters   I:E Ratio 1:4   Cough/Sputum   Sputum How Obtained Endotracheal;Suctioned   Cough Productive   Sputum Amount Moderate   Sputum Color Cloudy   Tenacity Thick   Spontaneous Breathing Trial (SBT) RT Doc   Pulse 120   RSBI Calculated 75.76   Breath Sounds   Right Upper Lobe Coarse Crackles   Right Middle Lobe Coarse Crackles   Right Lower Lobe Diminished   Left Upper Lobe Coarse Crackles   Left Lower Lobe Diminished   Additional Respiratory  Assessments   Position Semi-Mohamud's   Oral Care Completed? Yes   Oral Care Mouth suctioned   Subglottic Suction Done? Yes   Alarm Settings   High Pressure Alarm 50 cmH2O   Low Minute Volume Alarm 5 L/min   Apnea (secs) 20 secs   High Respiratory Rate 45 br/min   Low Exhaled Vt  250 mL   Surgical Airway (trach) Portex Cuffed   Placement Date/Time: 02/15/22 1516   Timeout: Patient;Procedure;Site/Side;Appropriate Equipment;Sterile Technique using full body drape; Consent Confirmed; Correct Position  Placed By: In surgery  Surgical Airway Type: Tracheostomy  Brand: Portex  Style. ..    Status Secured   Site Assessment Clean;Dry

## 2022-02-17 NOTE — CARE COORDINATION
INTERDISCIPLINARY PLAN OF CARE CONFERENCE    Date/Time: 2/17/2022 11:28 AM  Completed by:  MEMO Franco Case Management      Patient Name:  Ever Lema  YOB: 1950  Admitting Diagnosis: Acute respiratory failure with hypoxia ([facility] [address] [J96.01]  Pneumonia due to COVID-19 virus [U07.1, J12.82]  COVID-19 [U07.1]     Admit Date/Time:  1/12/2022  9:40 AM    Chart reviewed. Interdisciplinary team contacted or reviewed plan related to patient progress and discharge plans. Disciplines included Case Management, Nursing, and Dietitian. Current Status:Ongoing   PT/OT recommendation for discharge plan of care: TBS    Expected D/C Disposition:  Long term acute care hospital     Discharge Plan Comments: Chart review completed.  Spoke with Padmini Becerril at [employer] who states they are still watching pt as she is still on 2 high drips and will continue to watch pt to start precert once medically stable for [facility] location    Home O2 in place on admit: No

## 2022-02-17 NOTE — PROGRESS NOTES
PATIENT REMAINS ON THE VENTILATOR, CURRENT SETTINGS ARE AS FOLLOW,  AC 24, , PEEP 8, FIO2 55%. TRACH HAS MINIMAL DRAINAGE FROM THE INSERTION SITE. NO ACTIVE BLEEDING NOTED. VITAL SIGNS REMAIN WNL. RIGHT TLC IJ CENTRAL LINE, CURRENTLY PRECEDEX AND HEPARIN INFUSING. CVP MONITORING CURRENTLY RESULTING 14. YOUNG CATHETER COLLECTING YELLOW URINE WITH SEDIMENT. SR/ST ON TELE MONITOR. PATIENT DOES NOT FOLLOW COMMANDS, HAS A COUGH. OPENS EYES OCCASIONALLY.

## 2022-02-17 NOTE — PROGRESS NOTES
02/17/22 0702   Vent Information   Vent Mode AC/VC   Vt Ordered 350 mL   Rate Set 24 bmp   Peak Flow 50 L/min   FiO2  55 %   SpO2 100 %   SpO2/FiO2 ratio 181.82   Sensitivity 3   PEEP/CPAP 8   Humidification Source Heated wire   Humidification Temp Measured 37   Vent Patient Data   Peak Inspiratory Pressure 29 cmH2O   Mean Airway Pressure 12 cmH20   Rate Measured 24 br/min   Vt Exhaled 405 mL   Minute Volume 9.2 Liters   I:E Ratio 1:1.6   Plateau Pressure 32 VBP41   Static Compliance 9 mL/cmH2O   Cough/Sputum   Sputum How Obtained Endotracheal;Suctioned;Oral   Cough Productive   Sputum Amount Moderate   Sputum Color Creamy   Tenacity Thick   Spontaneous Breathing Trial (SBT) RT Doc   Pulse 100   Breath Sounds   Right Upper Lobe Coarse Crackles   Right Middle Lobe Coarse Crackles   Right Lower Lobe Diminished   Left Upper Lobe Diminished   Left Lower Lobe Diminished   Additional Respiratory  Assessments   Resp 25   Position Reverse Trendelenburg   Oral Care Completed? Yes   Oral Care Mouth suctioned   Subglottic Suction Done?  Yes   Cuff Pressure (cm H2O) 27 cm H2O   Alarm Settings   High Pressure Alarm 50 cmH2O   Low Minute Volume Alarm 5 L/min   Apnea (secs) 20 secs   High Respiratory Rate 45 br/min   Low Exhaled Vt  250 mL

## 2022-02-18 NOTE — CONSULTS
[facility]  Nephrology Consult Note           Reason for Consult: TANIA  Requesting Physician:  Dr. Zara Latham    Chief Complaint:    Chief Complaint   Patient presents with    Positive For Covid-19     cough, SOB, chills and sweats     History of Present Illness on 2/18/2022:    71 y.o. yo female with PMH of diabetes, hyperlipidemia, asthma, arthritis who is admitted since 1/12/2022. On  2/15/2022 patient had tracheostomy done due to persistent respiratory failure from Covid. Patient has had intermittent hypotensive episodes in the last week or so. Blood pressure dropped in the 60s on 2/13 and multiple times down to the 80s during 2/15. Creatinine on 2/13 was less than 0.5, increased to 0.9 on 2/14 then to 1.4 on 2/15 then to 1.6 then 1.8 and increased to 2 on 2/18 when nephrology consult was called. Patient has been receiving IV fluid with normal saline at 125 cc an hour since 2/15. Vancomycin was discontinued after a morning dose on 2/14 and switched to Zyvox. Vancomycin level was 22.6 on 2/10, last trough level was drawn on 2/12 which was at 15.5    Patient was on Lasix 40 mg IV daily until 2/12 and was again given on 2/17  She has had intermittent hyperkalemia and needed Lokelma 3 doses on 2/15    Urine output seems to be picking up in the last several days, 1 L was charted in the last 24 hours, 650 prior 24 hours to it and 406 mL 24 hours ending on 2/16.     Past Medical History:        Diagnosis Date    Arthritis     Asthma     DDD (degenerative disc disease)     Diabetes mellitus ([facility] [address]     Hyperlipidemia      Past Surgical History:        Procedure Laterality Date    COLONOSCOPY  2001    COLONOSCOPY  08/17/2016    diverticulosis    HYSTERECTOMY      KNEE ARTHROSCOPY      x2    TONSILLECTOMY      TOTAL KNEE ARTHROPLASTY  10/05/2012    RIGHT    TRACHEOSTOMY N/A 2/15/2022    TRACHEOTOMY WITH PERCUTANEOUS ENDOSCOPIC GASTROSTOMY TUBE PLACEMENT performed by Ifeanyi Powell [facility] at [facility]  TUBAL LIGATION      WRIST SURGERY         Home Medications:    No current facility-administered medications on file prior to encounter. Current Outpatient Medications on File Prior to Encounter   Medication Sig Dispense Refill    simvastatin (ZOCOR) 20 MG tablet TAKE 1 TABLET AT BEDTIME FOR HIGH AMOUNT OF FATS IN THE BLOOD      fluticasone (FLONASE) 50 MCG/ACT nasal spray USE 1 SPRAY IN EACH NOSTRIL TWICE A DAY AS NEEDED      Cholecalciferol (VITAMIN D) 10 MCG (400 UNIT) CAPS Capsule Take 1 tablet by mouth daily      meloxicam (MOBIC) 15 MG tablet Take 15 mg by mouth daily      aspirin 81 MG tablet Take 81 mg by mouth daily. Allergies:  Hydrocodone-acetaminophen, Morphine, Morphine and related, Vicodin [hydrocodone-acetaminophen], and Pcn [penicillins]    Social History:    Social History     Socioeconomic History    Marital status:      Spouse name: Not on file    Number of children: Not on file    Years of education: Not on file    Highest education level: Not on file   Occupational History    Not on file   Tobacco Use    Smoking status: Former Smoker     Packs/day: 1.00     Years: 15.00     Pack years: 15.00     Types: Cigarettes    Smokeless tobacco: Never Used    Tobacco comment: Quit in 1979   Vaping Use    Vaping Use: Never used   Substance and Sexual Activity    Alcohol use: Yes     Comment: social    Drug use: No    Sexual activity: Not on file   Other Topics Concern    Not on file   Social History Narrative    Not on file     Social Determinants of Health     Financial Resource Strain:     Difficulty of Paying Living Expenses: Not on file   Food Insecurity:     Worried About [address] [employer] in the Last Year: Not on file    Carlos Matt in the Last Year: Not on file   Transportation Needs:     Lack of Transportation (Medical): Not on file    Lack of Transportation (Non-Medical):  Not on file   Physical Activity:     Days of Exercise per Week: Not on file    Minutes of Exercise per Session: Not on file   Stress:     Feeling of Stress : Not on file   Social Connections:     Frequency of Communication with Friends and Family: Not on file    Frequency of Social Gatherings with Friends and Family: Not on file    Attends Cheondoism Services: Not on file    Active Member of Clubs or Organizations: Not on file    Attends Club or Organization Meetings: Not on file    Marital Status: Not on file   Intimate Partner Violence:     Fear of Current or Ex-Partner: Not on file    Emotionally Abused: Not on file    Physically Abused: Not on file    Sexually Abused: Not on file   Housing Stability:     Unable to Pay for Housing in the Last Year: Not on file    Number of [place] in the Last Year: Not on file    Unstable Housing in the Last Year: Not on file       Family History:   Family History   Problem Relation Age of Onset    Cancer Mother         breast and lung    Diabetes Mother     Breast Cancer Mother 79    Heart Disease Father     Emphysema Father     Cancer Brother         lung    Cancer Maternal Uncle        Review of Systems:   UTO    Physical exam:   Constitutional:  VITALS:  /66   Pulse 92   Temp 98.5 °F (36.9 °C) (Bladder)   Resp (!) 40   Ht 5' 3\" (1.6 m)   Wt 216 lb 0.8 oz (98 kg)   SpO2 92%   BMI 38.27 kg/m²   Gen: On trach and vent  Cardiovascular:  S1, S2 without m/r/g 1 to 2+ lower extremity edema  Respiratory: Diminished, trach in situ  Abdomen:  soft, nt, nd,   Neuro/Psy: Sedated, minimally responsive to noxious stimuli    Data/  Recent Labs     02/16/22  0504 02/16/22  0504 02/17/22  0400 02/17/22  1131 02/17/22  2035 02/18/22  0505 02/18/22  0939   WBC 7.7  --  7.2  --   --  7.9  --    HGB 7.2*   < > 6.7*   < > 8.2* 7.6* 8.2*   HCT 21.8*   < > 20.7*   < > 25.0* 23.2* 25.0*   MCV 88.0  --  89.1  --   --  88.7  --      --  257  --   --  237  --     < > = values in this interval not displayed.      Recent Labs 02/16/22  0504 02/17/22  0400 02/18/22  0505   * 131* 130*   K 4.7 4.6 4.2   CL 97* 99 100   CO2 26 25 23   GLUCOSE 131* 137* 166*   BUN 51* 51* 52*   CREATININE 1.6* 1.8* 2.0*   LABGLOM 32* 28* 25*   GFRAA 38* 34* 30*     Urinalysis from 2/12 shows trace protein with risk of hematuria from [place] sample    Assessment  -TANIA likely related to hemodynamic compromise, it started worsening after an hypotensive episode on 2/13 and progressively got worse over the next several days. Patient's urine output has been increasing in the last 24 hours which is encouraging despite the creatinine increasing. This likely reflects pathophysiology of ATN. It is unclear if the vancomycin level was high because the last trough level was drawn on 2/12, the last dose was given on 2/14. Currently off of Levophed   CVP is at 19    -Acute respiratory failure from Covid pneumonia status post tracheostomy and on vent    -Hyponatremia with fluid overload    -Anemia    -COVID pneumonia with ARDS    -Hypertension   Was hypotensive and needed levophed which has been off now    Plan  -Stop IV fluid  -Check UA with micro, urine lites and creatinine  -Check albumin level, ck  -IV albumin x2 every 8 hours apart on 2/18  -With increasing urine output, will wait for further diuretics today, with anticipation of starting diuresis soon likely tomorrow  -Avoid hypotension  -Serial renal panel  -daily wts and strict i/o  -renal dose medications   -avoid nephrotoxins    35 min of critical care time used reviewing the chart, and managing/coordinating the care of this patient. Thank you for the consultation. Please do not hesitate to call with questions. Harsh Logan MD  Office: [phone]  Fax:    [phone] [address] [facility]

## 2022-02-18 NOTE — PROGRESS NOTES
Comprehensive Nutrition Assessment    Type and Reason for Visit:  Reassess    Nutrition Recommendations/Plan:   1. Continue TF order - ADULT TUBE FEEDING; PEG tube; Other formula - Glucerna 1.2 with a goal rate of 60 ml/hr x 20 hours + 30 ml water flushes every 4 hours for tube patency. 2. Monitor TF rate, intake, and tolerance + water flushes. 3. Monitor respiratory status, mental status, and plan of care. 4. Monitor nutrition-related labs, bowel function (last documented BM was on 2/14/22), and weight trends. Nutrition Assessment:  patient has improved from a nutritional standpoint AEB patient is tolerating TF at goal rate without issue, however, she remains at risk for further compromise d/t need for EN as sole source of nutrition, altered nutrition-related labs, and ongoing respiratory dysfunction; will continue Glucerna 1.2 at goal rate of 60 ml/hr x 20 hours + 30 ml water flushes every 4 hours for tube patency    Malnutrition Assessment:  Malnutrition Status: At risk for malnutrition   Context:  Acute Illness     Findings of the 6 clinical characteristics of malnutrition:  Energy Intake:  1 - 75% or less of estimated energy requirements for 7 or more days  Weight Loss:  Unable to assess (patient is + 29L fluid since admission)     Body Fat Loss:  Unable to assess (COVID-19 +)     Muscle Mass Loss:  Unable to assess (COVID-19 +)    Fluid Accumulation:  No significant fluid accumulation     Strength:  Not Performed    Estimated Daily Nutrient Needs:  Energy (kcal):  1540 - 1771 kcals based on 20-23 kcals/kg/CBW; Weight Used for Energy Requirements:  Current     Protein (g):  68 - 78 g protein based on 1.3-1.5 g/kg/IBW;  Weight Used for Protein Requirements:  Ideal        Fluid (ml/day):  1540 - 1771 ml; Method Used for Fluid Requirements:  1 ml/kcal      Nutrition Related Findings:  patient responds to voice; last documented BM was on 2/14/22; abdomen is soft, round, non-tender, and bowel sounds are present; TF is infusing at current goal rate and patient is tolerating well; h/h, Na and Ca are low; BUN/Cr are elevated; GFR = 25      Wounds:  Deep Tissue Injury,Pressure Injury (evolving DTI on sacrum)       Current Nutrition Therapies:    Current Tube Feeding (TF) Orders:  · Feeding Route: PEG  · Formula:  Other Tube Feeding (Comment) (Glucerna 1.2)  · Schedule: Continuous  · Additives/Modulars:  (none)  · Water Flushes: 30 ml every 4 hours for tube patency  · Current TF & Flush Orders Provides: TF continues to infuse at goal rate and patient is tolerating well  · Goal TF & Flush Orders Provides: Glucerna 1.2 with a goal rate of 60 ml/hr x 20 hours = 1200 ml TV, 1440 kcals, 72 g protein, and 966 ml free water + 30 ml water flushes every 4 hours for tube patency      Anthropometric Measures:  · Height: 5' 3\" (160 cm)  · Current Body Weight: 216 lb 0.8 oz (98 kg) (obtained on 2/17/22; actual weight)   · Admission Body Weight: 183 lb 9.6 oz (83.3 kg) (obtained on 1/16/22; actual weight)    · Usual Body Weight: 183 lb 9.6 oz (83.3 kg) (obtained on 1/16/22; actual weight)     · Ideal Body Weight: 115 lbs; % Ideal Body Weight 187.9 %   · BMI: 38.3  · BMI Categories: Obese Class 2 (BMI 35.0 -39.9)       Nutrition Diagnosis:   · Inadequate oral intake related to inadequate protein-energy intake,impaired respiratory function,increase demand for energy/nutrients as evidenced by NPO or clear liquid status due to medical condition,intubation,nutrition support - enteral nutrition,lab values      Nutrition Interventions:   Food and/or Nutrient Delivery:  Continue NPO,Continue Current Tube Feeding  Nutrition Education/Counseling:  No recommendation at this time   Coordination of Nutrition Care:  Continue to monitor while inpatient,Interdisciplinary Rounds    Goals:  patient will tolerate Glucerna 1.2 with a goal rate of 60 ml/hr x 20 hours without GI distress, without s/s of aspiration, and without additional lab/fluid disturbances       Nutrition Monitoring and Evaluation:   Behavioral-Environmental Outcomes:  None Identified   Food/Nutrient Intake Outcomes:  Enteral Nutrition Intake/Tolerance  Physical Signs/Symptoms Outcomes:  Biochemical Data,GI Status,Fluid Status or Edema,Hemodynamic Status,Weight,Skin     Discharge Planning:    Enteral Nutrition     Electronically signed by Rena Snow RD, LD on 2/18/22 at 1:04 PM EST    Contact: [phone]

## 2022-02-18 NOTE — PROGRESS NOTES
Pt noted to be tachypneic. RR 40's. PRN versed and fentanyl provided. RT made aware of intermittent desaturations to upper 80's SPO2. Peep Increased to 10 with improvement in SPO2. Normal saline decreased to 75 ml/hr per Nephro recs. Will continue to monitor.

## 2022-02-18 NOTE — PROGRESS NOTES
02/18/22 0311   Vent Information   Vent Type 840   Vent Mode AC/VC   Vt Ordered 350 mL   Rate Set 28 bmp   Peak Flow 55 L/min   FiO2  65 %   SpO2 91 %   SpO2/FiO2 ratio 140   Sensitivity 3   PEEP/CPAP 8   Humidification Source Heated wire   Humidification Temp 37   Humidification Temp Measured 37   Circuit Condensation Drained   Vent Patient Data   Peak Inspiratory Pressure 36 cmH2O   Mean Airway Pressure 20 cmH20   Rate Measured 33 br/min   Vt Exhaled 380 mL   Minute Volume 12.4 Liters   I:E Ratio 1:1.5   Plateau Pressure 20 [id number]   Cough/Sputum   Sputum How Obtained Endotracheal;Suctioned   Cough Productive   Sputum Amount Moderate   Sputum Color Creamy   Tenacity Thick   Breath Sounds   Right Upper Lobe Diminished   Right Middle Lobe Diminished   Right Lower Lobe Diminished   Left Upper Lobe Diminished   Left Lower Lobe Diminished   Additional Respiratory  Assessments   Resp 17   Position Semi-Mohamud's   Alarm Settings   High Pressure Alarm 50 cmH2O   Low Minute Volume Alarm 5 L/min   Apnea (secs) 20 secs   High Respiratory Rate 45 br/min   Low Exhaled Vt  250 mL   Surgical Airway (trach) Portex Cuffed   Placement Date/Time: 02/15/22 6936   Timeout: Patient;Procedure;Site/Side;Appropriate Equipment;Sterile Technique using full body drape; Consent Confirmed; Correct Position  Placed By: In surgery  Surgical Airway Type: Tracheostomy  Brand: Portex  Style. .. Status Secured   Site Assessment Clean;Dry   Ties Assessment Clean;Dry; Intact

## 2022-02-18 NOTE — PROGRESS NOTES
RT Inhaler-Nebulizer Bronchodilator Protocol Note    There is a bronchodilator order in the chart from a provider indicating to follow the RT Bronchodilator Protocol and there is an Initiate RT Inhaler-Nebulizer Bronchodilator Protocol order as well (see protocol at bottom of note). CXR Findings:  XR CHEST PORTABLE    Result Date: 2/17/2022  Stable chest x-ray with multifocal opacities in both lungs. XR CHEST PORTABLE    Result Date: 2/16/2022  Persistent moderate bilateral multifocal airspace disease presumably pneumonia, likely representing residual COVID pneumonia. Developing ARDS is also a consideration. The findings from the last RT Protocol Assessment were as follows:   History Pulmonary Disease: Chronic pulmonary disease  Respiratory Pattern: Use of accessory muscles, prolonged exhalation, or RR 26-30 bpm  Breath Sounds: Slightly diminished and/or crackles  Cough: Strong, productive  Indication for Bronchodilator Therapy: Decreased or absent breath sounds  Bronchodilator Assessment Score: 11    Aerosolized bronchodilator medication orders have been revised according to the RT Inhaler-Nebulizer Bronchodilator Protocol below. Respiratory Therapist to perform RT Therapy Protocol Assessment initially then follow the protocol. Repeat RT Therapy Protocol Assessment PRN for score 0-3 or on second treatment, BID, and PRN for scores above 3. No Indications  adjust the frequency to every 6 hours PRN wheezing or bronchospasm, if no treatments needed after 48 hours then discontinue using Per Protocol order mode. If indication present, adjust the RT bronchodilator orders based on the Bronchodilator Assessment Score as indicated below.   Use Inhaler orders unless patient has one or more of the following: on home nebulizer, not able to hold breath for 10 seconds, is not alert and oriented, cannot activate and use MDI correctly, or respiratory rate 25 breaths per minute or more, then use the equivalent nebulizer order(s) with same Frequency and PRN reasons based on the score. If a patient is on this medication at home then do not decrease Frequency below that used at home. 0-3  enter or revise RT bronchodilator order(s) to equivalent RT Bronchodilator order with Frequency of every 4 hours PRN for wheezing or increased work of breathing using Per Protocol order mode. 4-6  enter or revise RT Bronchodilator order(s) to two equivalent RT bronchodilator orders with one order with BID Frequency and one order with Frequency of every 4 hours PRN wheezing or increased work of breathing using Per Protocol order mode. 7-10  enter or revise RT Bronchodilator order(s) to two equivalent RT bronchodilator orders with one order with TID Frequency and one order with Frequency of every 4 hours PRN wheezing or increased work of breathing using Per Protocol order mode. 11-13  enter or revise RT Bronchodilator order(s) to one equivalent RT bronchodilator order with QID Frequency and an Albuterol order with Frequency of every 4 hours PRN wheezing or increased work of breathing using Per Protocol order mode. Greater than 13  enter or revise RT Bronchodilator order(s) to one equivalent RT bronchodilator order with every 4 hours Frequency and an Albuterol order with Frequency of every 2 hours PRN wheezing or increased work of breathing using Per Protocol order mode. Patient will benefit from treatments.     Electronically signed by Shyla Quigley RCP on 2/17/2022 at 10:11 PM

## 2022-02-18 NOTE — PROGRESS NOTES
PATIENT REMAINS ON THE VENT/ TRACH IN PLACE. VERY LITTLE DRAINAGE AROUND THE INSERTION SITE. FIO2 65%, PEEP 8. RIGHT TLC CENTRAL LINE, PRECEDEX GTT. NS  125 HR. SR/ST ON TELE. AFEBRILE, YOUNG CATHETER IN PLACE COLLECTING SYMONE URINE. SEE FLOW SHEET FOR VITAL SIGN RESULTS. PATIENT OPENS EYES, HOWEVER NOT FOLLOWING COMMANDS, THICK SECRETIONS SUCTIONED FROM THE TRACH IN LINE SUCTION CATHETER.

## 2022-02-18 NOTE — PROGRESS NOTES
Hospitalist Progress Note      PCP: Kathryn Cid DO    Date of Admission: 1/12/2022     resp failure on vent , covid pna, unvaccinated  High fevers S.p bronch with BAL   Ongoing weaning trials    Subjective: On the vent 50% FiO2 8 of PEEP  Fevers overnight  Continues to be on Levophed  On Precedex    2/15  High fevers overnight  Status post bronc with BAL 2/14  On Levophed  Continues to be on Precedex    2/16  Continues to run fevers. Trach and PEG placement yesterday. Off Levophed. 2/17  Off Levophed  On Precedex  Fevers resolving  On SBT this morning    2/18  Worsening of oxygenation when she was being bathed today.   Had to be switched to assist control currently at 65% 10 of PEEP  On Precedex    Medications:  Reviewed    Infusion Medications    sodium chloride      sodium chloride      heparin 25,000 units in dextrose 5% 250 mL infusion 1,940 Units/hr (02/18/22 0830)    dexmedetomidine HCl in NaCl 1.4 mcg/kg/hr (02/18/22 1425)    fentaNYL Stopped (02/16/22 1525)    dextrose      sodium chloride Stopped (02/17/22 1005)     Scheduled Medications    metoprolol tartrate  25 mg Oral BID    albumin human  25 g IntraVENous Q8H    famotidine (PEPCID) injection  20 mg IntraVENous Daily    linezolid  600 mg IntraVENous Q12H    albuterol  2.5 mg Nebulization Q4H    insulin lispro  0-18 Units SubCUTAneous Q4H    carboxymethylcellulose PF  1 drop Both Eyes 4x Daily    QUEtiapine  25 mg Oral BID    chlorhexidine  15 mL Mouth/Throat BID    aspirin  81 mg Oral Daily    clopidogrel  75 mg Oral Daily    ascorbic acid  1,000 mg Oral Daily    atorvastatin  10 mg Oral Nightly    sodium chloride flush  5-40 mL IntraVENous 2 times per day    Vitamin D  2,000 Units Oral Daily     PRN Meds: hydrALAZINE, sodium chloride, sodium chloride, heparin (porcine), heparin (porcine), albuterol, magnesium sulfate, potassium chloride, fentanNYL, midazolam, glucose, glucagon (rDNA), dextrose, dextrose bolus (hypoglycemia) **OR** dextrose bolus (hypoglycemia), guaiFENesin-dextromethorphan, sodium chloride flush, sodium chloride, ondansetron **OR** ondansetron, polyethylene glycol, acetaminophen **OR** acetaminophen      Intake/Output Summary (Last 24 hours) at 2/18/2022 1455  Last data filed at 2/18/2022 1200  Gross per 24 hour   Intake 4627.99 ml   Output 1255 ml   Net 3372.99 ml       Physical Exam Performed:    /72   Pulse 101   Temp 98.5 °F (36.9 °C) (Bladder)   Resp 30   Ht 5' 3\" (1.6 m)   Wt 216 lb 0.8 oz (98 kg)   SpO2 91%   BMI 38.27 kg/m²       Patient seen in droplet plus precautions  General:  Elderly female, on vent. supine  Oral ETT and OG noted   Mucous Membranes:  Pink , anicteric  Neck: No JVD, no carotid bruit, no thyromegaly  Chest: diminished in bases, bilateral mild crackles present. Cardiovascular:  RRR S1S2 heard, no murmurs or gallops  Abdomen: Soft, undistended, non tender, no organomegaly, BS present  Extremities: bilateral UE edema worse on right   No edema or cyanosis. Distal pulses well felt  Neurological :  Sedated    Labs:   Recent Labs     02/16/22  0504 02/16/22  0504 02/17/22  0400 02/17/22  1131 02/17/22  2035 02/18/22  0505 02/18/22  0939   WBC 7.7  --  7.2  --   --  7.9  --    HGB 7.2*   < > 6.7*   < > 8.2* 7.6* 8.2*   HCT 21.8*   < > 20.7*   < > 25.0* 23.2* 25.0*     --  257  --   --  237  --     < > = values in this interval not displayed. Recent Labs     02/16/22  0504 02/17/22  0400 02/18/22  0505   * 131* 130*   K 4.7 4.6 4.2   CL 97* 99 100   CO2 26 25 23   BUN 51* 51* 52*   CREATININE 1.6* 1.8* 2.0*   CALCIUM 7.6* 7.5* 8.2*     No results for input(s): AST, ALT, BILIDIR, BILITOT, ALKPHOS in the last 72 hours. No results for input(s): INR in the last 72 hours.   Recent Labs     02/18/22  0505   CKTOTAL 32       Urinalysis:      Lab Results   Component Value Date    NITRU Negative 02/18/2022    WBCUA 3-5 02/12/2022    BACTERIA 1+ 02/12/2022 RBCUA 21-50 02/12/2022    BLOODU LARGE 02/18/2022    SPECGRAV 1.020 02/18/2022    GLUCOSEU Negative 02/18/2022    GLUCOSEU Neg 08/29/2010       Radiology:  XR CHEST PORTABLE   Final Result   Multifocal opacities within both lungs are not significantly changed. Tracheostomy tube and right jugular line appears stable. XR CHEST PORTABLE   Final Result   Stable chest x-ray with multifocal opacities in both lungs. XR CHEST PORTABLE   Final Result   Persistent moderate bilateral multifocal airspace disease presumably   pneumonia, likely representing residual COVID pneumonia. Developing ARDS is   also a consideration. XR CHEST PORTABLE   Final Result   Right PICC line has been removed. ET, NG, and right jugular line remain. Multifocal opacities are not significantly changed. XR CHEST PORTABLE   Final Result   Right IJ central venous catheter terminating in the lower SVC in satisfactory   position. No pneumothorax. Diffuse bilateral airspace disease, not significantly changed. XR CHEST PORTABLE   Final Result   Stable support apparatus. Persistent diffuse bilateral airspace disease. Findings on the right have   slightly progressed compared to prior. VL Extremity Venous Bilateral   Final Result      XR CHEST PORTABLE   Final Result   No significant change in the multifocal opacities of both lungs. The   pulmonary inflation is stable to 02/12/2022 and improved from 02/10/2022. XR CHEST PORTABLE   Final Result   Slightly improved aeration in the lungs. XR CHEST PORTABLE   Final Result   No evidence of pneumothorax status post left chest tube removal.      Remainder of support apparatus and extensive diffuse bilateral airspace   disease are similar in appearance. VL Extremity Venous Right   Final Result      XR CHEST 1 VIEW   Final Result   1. Unchanged position of support devices.   Kinked appearance of left chest   tube again demonstrated without evidence for pneumothorax. 2. No significant change in bilateral airspace disease. XR CHEST PORTABLE   Final Result   Stable support apparatus. The left-sided chest tube catheter tubing appears   kinked. Persistent diffuse bilateral airspace opacities. Findings on the left have   progressed compared to prior. XR CHEST PORTABLE   Final Result   1. Unchanged position of support devices. 2. No significant change in bilateral airspace disease. XR CHEST PORTABLE   Final Result   Stable examination with stable lines and tubes. Stable pneumonia. XR CHEST PORTABLE   Final Result   Stable lines and tubes. Stable examination. Extensive airspace disease   noted in the lungs. XR CHEST PORTABLE   Final Result   Stable examination with stable lines and tubes. Stable findings of COVID   pneumonia. XR CHEST PORTABLE   Final Result   1. No significant change. XR CHEST PORTABLE   Final Result   Worsening multifocal airspace opacities. XR CHEST PORTABLE   Final Result   Interval placement of a left-sided chest tube with significant decrease seen   in size of the left pneumothorax only with a small apical component   remaining. Patchy airspace disease within the lung fields is stable and   unchanged. XR CHEST PORTABLE   Final Result   New onset moderate to large left pneumothorax with mild tension. Endotracheal, nasogastric tubes and PICC line are stable. Redemonstration of   bilateral infiltrates. Findings communicated to the referring physician on February 1, 2022      RECOMMENDATION:   Stat left chest tube. XR CHEST PORTABLE   Final Result   1. Stable lines, tubes, and support devices. 2. Stable cardiopulmonary status including bilateral airspace opacities and   small effusions. XR CHEST PORTABLE   Final Result   No significant change. Continued follow-up recommended.          XR CHEST PORTABLE Final Result   Mild worsening of bilateral infiltrates likely due to pneumonia. Continued   follow-up recommended. XR CHEST PORTABLE   Final Result   Moderate persistent multifocal airspace disease compatible with multifocal   pneumonia including COVID pneumonia. While similar to recent studies, there   has been gradual progression and worsening since the early study of   01/12/2022. XR CHEST PORTABLE   Final Result   Extensive infiltrates within the lungs bilaterally, worsening on the right   with increasing consolidation. XR CHEST PORTABLE   Final Result   No significant interval change multifocal airspace disease. Stable lines and tubes. XR CHEST PORTABLE   Final Result   Mildly improved parenchymal infiltrates at the right lung base, otherwise   similar appearing chest.         XR CHEST PORTABLE   Final Result   Extensive bilateral airspace opacities are seen without significant change. XR CHEST PORTABLE   Final Result   1. Recommend retraction of endotracheal tube 1.0 cm.   2. Stable severe ill-defined lung consolidation, greatest at the lung bases   consistent with pneumonia versus alveolar edema. 3. Suspected mild bilateral pleural effusion, unchanged. XR CHEST PORTABLE   Final Result   1. No significant change. XR CHEST PORTABLE   Final Result   Appropriate positioning of the endotracheal tube. Enteric tube courses   beneath the diaphragm; tip is excluded by collimation inferiorly. No significant change in extensive bilateral airspace disease. IR PICC WO SQ PORT/PUMP > 5 YEARS   Final Result   Successful placement of PICC line. XR CHEST PORTABLE   Final Result   New multifocal moderate to severe pneumonia. Clinical and imaging follow-up   to resolution recommended. CTA HEAD NECK W CONTRAST   Final Result   Unremarkable CTA of the head and neck. Findings consistent with COVID pneumonia.       This scan was analyzed using Viz. ai contact LVO. Identification of suspected   findings is not for diagnostic use beyond notification. Viz LVO is limited to   analysis of imaging data and should not be used in-lieu of full patient   evaluation or relied upon to make or confirm diagnosis. CT HEAD WO CONTRAST   Final Result   No acute intracranial abnormality. XR CHEST PORTABLE   Final Result   Multifocal bilateral atypical pneumonia. Assessment/Plan:      COVID PNA  Acute hypoxic respiratory Failure   - CXR: noted with multifocal PNA  - no home O2 at baseline  - had vaccine J &J in March 2021 - no booster   -progressively worsening hypoxia, was on vapotherm with full support   - eventually placed on vent support 1/21  - persistent severe hypoxemia -she was proned on 1/20/2022.    - Decadron weaned and stopped after 25 days   -  finished 7 days levaquin   -- CRP elevated - Started on Baricitinib - finished 14 days .   - Has MRSA in her sputums and elevated WBC. Stopped cefepime. Finished 14 days of Vanco.  Now new fever with leukocytosis. Heavy growth of MRSA and tracheal aspirate culture. Start Zyvox day #5/10  Valium and Seroquel added for weaning IV sedation   precedex caused bradycardia  - sp bronch with BAL for fevers - BAL with MRSA again   Trach and PEG 2/15  Back on Precedex     Left pneumothorax. - left chest tube placed. CT flushed and it is patent. Placed to suction. - resolved pneumothorax and off chest tube 2/10     Hypotension  -weaned off pressors  Back on Levophed. Now off pressors again. Continue IV normal saline.     Episodes of expressive aphasia   - ?  TIA, vs mental status changes related to covid   - cont ASA, Plavix     Thrombocytopenia resolved  - likely with covid  - continue Lovenox and monitor    Anemia  Secondary to chronic illness and repeated blood draws  Transfuse 1 unit of red cells 2/17     Hyperglycemia  Secondary to steroids  On Sliding scale insulin    Acute kidney injury 2/15  Hyperkalemia  IV fluid bolus--> IV normal saline 125 an hour  Lokelma  Dc IVF      HLD  - Continue statin        DVT Prophylaxis: Lovenox bid  Diet: Diet NPO  ADULT TUBE FEEDING; PEG; Other Tube Feeding (specify);  Glucerna 1.2; Continuous; 20; Yes; 20; Q 4 hours; 60; 30; Q 4 hours  Code Status: Full Code      Dispo - cont care    Cassandra Marshall MD, 2/18/2022 2:55 PM

## 2022-02-18 NOTE — CARE COORDINATION
INTERDISCIPLINARY PLAN OF CARE CONFERENCE    Date/Time: 2/18/2022 10:34 AM  Completed by:  MEMO Costa Case Management      Patient Name:  Alexis Amaya  YOB: 1950  Admitting Diagnosis: Acute respiratory failure with hypoxia ([facility] [address] [J96.01]  Pneumonia due to COVID-19 virus [U07.1, J12.82]  COVID-19 [U07.1]     Admit Date/Time:  1/12/2022  9:40 AM    Chart reviewed. Interdisciplinary team contacted or reviewed plan related to patient progress and discharge plans. Disciplines included Case Management, Nursing, and Dietitian. Current Status:Ongoing. Vent/Trach/Peg  PT/OT recommendation for discharge plan of care: TBD    Expected D/C Disposition:  [address] [facility] location     Discharge Plan Comments: Chart review completed. Pt remains in the ICU. Completed Interdisciplinary rounds with ICU staff. Consult to Nephrology. Dr. Mickie Valenzuela states not ready to start precert for LTACH and to start it Monday. [place] at [facility] stating she will review pt on Monday and start precert if getting closer to being medically ready for discharge. CM will continue to follow and assist. Please notify CM if needs or concerns arise.     Home O2 in place on admit: No    Addendum at 3:15pm: Adan Zhou at [facility] aware to wait to start cert until Monday per Dr. Mickie Valenzuela

## 2022-02-18 NOTE — PROGRESS NOTES
Pulmonary & Critical Care Medicine ICU Progress Note    CC: COVID in partially vaccinated patient     Events of Last 24 hours:   Did ok PS 15/5 for 6 hr yesterday   No fever    Levophed off  Fentanyl off  Precedex 1.4  PEEP 8  FiO2 65%   cc/hr       Invasive Lines:   PICC 1/20/2022-2/14  R IJ 2/14     MV: 1/21/2022  Vent Mode: AC/VC Rate Set: 28 bmp/Vt Ordered: 350 mL/ /FiO2 : 65 %  Recent Labs     02/17/22  1205 02/18/22  0505   PHART 7.288* 7.279*   OZH0DQB 50.1* 54.0*   PO2ART 61.2* 75.1       IV:   sodium chloride      sodium chloride 125 mL/hr at 02/17/22 1800    sodium chloride      heparin 25,000 units in dextrose 5% 250 mL infusion 1,820 Units/hr (02/17/22 2247)    dexmedetomidine HCl in NaCl 1.398 mcg/kg/hr (02/18/22 0555)    fentaNYL Stopped (02/16/22 1525)    dextrose      sodium chloride Stopped (02/17/22 1005)       Vitals:  Blood pressure (!) 168/76, pulse 107, temperature 98.3 °F (36.8 °C), temperature source Bladder, resp. rate 28, height 5' 3\" (1.6 m), weight 216 lb 0.8 oz (98 kg), SpO2 92 %, not currently breastfeeding. on vent 24/350    Intake/Output Summary (Last 24 hours) at 2/18/2022 0740  Last data filed at 2/18/2022 0400  Gross per 24 hour   Intake 7145.24 ml   Output 1055 ml   Net 6090.24 ml     EXAM:  General: ill appearing. Intubated sedated. Eyes: PERRL. No sclera icterus. No conjunctival injection. ENT: No discharge. Pharynx clear. Trach tube in place  Neck: Trachea midline. Normal thyroid. Resp: No accessory muscle use. + crackles. No wheezing. Few rhonchi. CV: Regular rate. Regular rhythm. No mumur or rub. UE and LE edema- worse   GI: Non-tender. Non-distended. No masses. Skin: Warm and dry. No nodule on exposed extremities. No rash on exposed extremities. Lymph: No cervical LAD. No supraclavicular LAD. M/S: No cyanosis. No joint deformity. No clubbing. Neuro: Intubated sedated. + response to painful stimuli. Not following commands.   Psych: Noncommunicative unable to obtain    Scheduled Meds:   famotidine (PEPCID) injection  20 mg IntraVENous Daily    linezolid  600 mg IntraVENous Q12H    albuterol  2.5 mg Nebulization Q4H    insulin lispro  0-18 Units SubCUTAneous Q4H    carboxymethylcellulose PF  1 drop Both Eyes 4x Daily    QUEtiapine  25 mg Oral BID    chlorhexidine  15 mL Mouth/Throat BID    aspirin  81 mg Oral Daily    clopidogrel  75 mg Oral Daily    ascorbic acid  1,000 mg Oral Daily    atorvastatin  10 mg Oral Nightly    sodium chloride flush  5-40 mL IntraVENous 2 times per day    Vitamin D  2,000 Units Oral Daily     PRN Meds:  sodium chloride, sodium chloride, heparin (porcine), heparin (porcine), albuterol, magnesium sulfate, potassium chloride, fentanNYL, midazolam, glucose, glucagon (rDNA), dextrose, dextrose bolus (hypoglycemia) **OR** dextrose bolus (hypoglycemia), guaiFENesin-dextromethorphan, sodium chloride flush, sodium chloride, ondansetron **OR** ondansetron, polyethylene glycol, acetaminophen **OR** acetaminophen    Results:  CBC:   Recent Labs     02/16/22  0504 02/16/22  0504 02/17/22  0400 02/17/22  1131 02/17/22  1631 02/17/22  2035 02/18/22  0505   WBC 7.7  --  7.2  --   --   --  7.9   HGB 7.2*   < > 6.7*   < > 8.2* 8.2* 7.6*   HCT 21.8*   < > 20.7*   < > 24.9* 25.0* 23.2*   MCV 88.0  --  89.1  --   --   --  88.7     --  257  --   --   --  237    < > = values in this interval not displayed. BMP:   Recent Labs     02/16/22  0504 02/17/22  0400 02/18/22  0505   * 131* 130*   K 4.7 4.6 4.2   CL 97* 99 100   CO2 26 25 23   BUN 51* 51* 52*   CREATININE 1.6* 1.8* 2.0*     LIVER PROFILE: No results for input(s): AST, ALT, LIPASE, BILIDIR, BILITOT, ALKPHOS in the last 72 hours. Invalid input(s):   AMYLASE,  ALB    Micro:  1/17/2022 SARS-CoV-2 positive at urgent care  1/12/2022 SARS-CoV-2 detected   1/31/2022 tracheal aspirate MRSA  2/1/2022 blood NG  2/6/22 BAL: MRSA  2/12/22 Resp cx  2/12 Resp MRSA Imaging:   CXR 2/18/2022  images reviewed by me and showed:   Trach tube in place   Satisfactory CVC line position   Diffuse bilateral ASD - worse     ASSESSMENT:  · Acute hypoxemic respiratory failure, severe and life-threatening  · MRSA PNA   · Acute kidney injury- worsening   · ARDS  · Uncontrolled HTN   · COVID-19 pneumonia in a partially vaccinated patient, s/p J&J vaccination 3/2021  · Bilateral UE SVT   · Anemia- no active bleed. 1 unit PRBC 2/17/22   · Post cuffed Portex size 8 tracheostomy 2/15/2022 by Dr. Livia Hubbard  · Persistent fever -probably noninfectious, could be related to venous thrombosis  · Left pneumothorax, chest tube placed 2/1/22 and removed 2/10/22  · Former smoker    PLAN:  Droplet Plus Airborne Precautions   Mechanical ventilation as per my orders. The ventilator was adjusted by me at the bedside for unstable, life threatening respiratory failure  Increase RR 32 --> ABG in 2 hrs    Precedex gtt   Seroquel 25 BID   Zyvox D#5/10. Completed 14 days Vanc and 7 days Levaquin   Continue  cc/hr    Completed Decadron D#25  Completed 14 days baricitinib   H-SSI for FSBS goal 150-180  Lopressor 25 BID and Hydralazine PRN   Resumed ASA and Plavix   Monitor H&H and transfuse for Hb < 7   Nephrology consult- will likely need lasix, she is getting fluid overloaded   TFs at goal   BM regiment   Prophylaxis: Pepcid and Heparin drip   Tried to call [address] [facility] [place] [phone] with no answer today. Total critical care time caring for this patient with life threatening, unstable organ failure, including direct patient contact, management of life support systems, review of data including imaging and labs, discussions with other team members and physicians is 37 minutes so far today, excluding procedures.

## 2022-02-18 NOTE — PROGRESS NOTES
Dr. Maxx Shah made aware of Pt's frequent need for PRN sedation. Orders received to initiate fentanyl gtt.

## 2022-02-18 NOTE — PROGRESS NOTES
2-17-22 (2200) anti-Xa 0.29 IU/ml. .   Please increase heparin drip to 1820 units/hr. Next anti-Xa at 0600 on 2-18-22. [address]. Ph.  2/17/2022 10:45 PM

## 2022-02-18 NOTE — PLAN OF CARE
Nutrition Problem #1: Inadequate oral intake  Intervention: Food and/or Nutrient Delivery: Continue NPO,Continue Current Tube Feeding  Nutritional Goals: patient will tolerate Glucerna 1.2 with a goal rate of 60 ml/hr x 20 hours without GI distress, without s/s of aspiration, and without additional lab/fluid disturbances

## 2022-02-18 NOTE — PROGRESS NOTES
High Dose Heparin Drip  Current rate: 1940 units/hr  [email or web address] 0.22  Goal: 0.3-0.7  Per protocol, Heparin bolus 2440 units and increase drip to 2060 units/hr  Next Anti-Xa @ [address] [place] [address] D 0/84/56332:68 PM  .

## 2022-02-18 NOTE — PROGRESS NOTES
02/17/22 2020   Vent Information   Vent Type 840   Vent Mode AC/VC   Vt Ordered 350 mL   Rate Set 28 bmp   Peak Flow 55 L/min   FiO2  55 %   SpO2 91 %   SpO2/FiO2 ratio 165.45   Sensitivity 3   PEEP/CPAP 8   Humidification Source Heated wire   Humidification Temp 37   Humidification Temp Measured 37   Circuit Condensation Drained   Vent Patient Data   Peak Inspiratory Pressure 24 cmH2O   Mean Airway Pressure 12 cmH20   Rate Measured 30 br/min   Vt Exhaled 376 mL   Minute Volume 11 Liters   I:E Ratio 1:2.1   Plateau Pressure 24 QFI10   Static Compliance 24 mL/cmH2O   Dynamic Compliance 24 mL/cmH2O   Cough/Sputum   Sputum How Obtained Tracheal;Suctioned   Cough Productive   Sputum Amount Small   Sputum Color Creamy   Tenacity Thick   Spontaneous Breathing Trial (SBT) RT Doc   Pulse 103   Breath Sounds   Right Upper Lobe Diminished   Right Middle Lobe Diminished   Right Lower Lobe Diminished   Left Upper Lobe Diminished   Left Lower Lobe Diminished   Additional Respiratory  Assessments   Resp 30   Position Semi-Mohamud's   Alarm Settings   High Pressure Alarm 50 cmH2O   Low Minute Volume Alarm 5 L/min   Apnea (secs) 20 secs   High Respiratory Rate 45 br/min   Low Exhaled Vt  250 mL   Patient Observation   Observations #8 PORTEX. AMBU BAG AT HEAD OF BED. WATER GOOD. Surgical Airway (trach) Portex Cuffed   Placement Date/Time: 02/15/22 1516   Timeout: Patient;Procedure;Site/Side;Appropriate Equipment;Sterile Technique using full body drape; Consent Confirmed; Correct Position  Placed By: In surgery  Surgical Airway Type: Tracheostomy  Brand: Portex  Style. ..    Status Secured

## 2022-02-18 NOTE — PROGRESS NOTES
2-18-22 (0505) anti-Xa 0.25 IU/ml. Please increase heparin drip to 1940 units/hr. Please recheck anti-Xa level at 1400 on 2-18-22. [address] [facility]. .  2/18/2022 7:34 AM

## 2022-02-18 NOTE — PROGRESS NOTES
2-17-22 (2200) anti-Xa 0.29 IU/ml. .   Please increase heparin drip to 1820 units/hr. Next anti-Xa at 0600 on 2-18-22. [address]. Ph.  2/17/2022 10:45 PM

## 2022-02-18 NOTE — PROGRESS NOTES
02/17/22 2350   Vent Information   Vent Type 840   Vent Mode AC/VC   Vt Ordered 350 mL   Rate Set 28 bmp   Peak Flow 55 L/min   FiO2  65 %   SpO2 91 %   SpO2/FiO2 ratio 140   Sensitivity 3   PEEP/CPAP 8   Humidification Source Heated wire   Humidification Temp 37   Humidification Temp Measured 37   Circuit Condensation Drained   Vent Patient Data   Peak Inspiratory Pressure 27 cmH2O   Mean Airway Pressure 17 cmH20   Rate Measured 30 br/min   Vt Exhaled 380 mL   Minute Volume 11.6 Liters   I:E Ratio 1:2.2   Plateau Pressure 20 [id number]   Cough/Sputum   Sputum How Obtained Suctioned;Tracheal   Cough Productive   Sputum Amount Small   Sputum Color Creamy   Tenacity Thick   Breath Sounds   Right Upper Lobe Diminished   Right Middle Lobe Diminished   Right Lower Lobe Diminished   Left Upper Lobe Diminished   Left Lower Lobe Diminished   Additional Respiratory  Assessments   Resp 25   Position Semi-Mohamud's   Alarm Settings   High Pressure Alarm 50 cmH2O   Low Minute Volume Alarm 5 L/min   Apnea (secs) 20 secs   High Respiratory Rate 45 br/min   Low Exhaled Vt  250 mL   Surgical Airway (trach) Portex Cuffed   Placement Date/Time: 02/15/22 1516   Timeout: Patient;Procedure;Site/Side;Appropriate Equipment;Sterile Technique using full body drape; Consent Confirmed; Correct Position  Placed By: In surgery  Surgical Airway Type: Tracheostomy  Brand: Portex  Style. .. Status Secured   Site Assessment Dry;Bleeding   Ties Assessment Clean;Dry; Intact

## 2022-02-19 NOTE — PROGRESS NOTES
Call to [facility] at this time and they ask that we call back when pt able to be picked up. They area aware waiting [facility]. Family leaving at this time.

## 2022-02-19 NOTE — PROGRESS NOTES
High dose Heparin:  Anti-Xa at 1237 is 0.19IU/mL. Give a 2,400 unit IV Bolus dose and increase the infusion to 23mL/hr. Check an Anti-Xa at 2000.   Rosalino Carrillo [facility] PharmD 2/19/2022 1:07 PM

## 2022-02-19 NOTE — PROGRESS NOTES
02/18/22 1933   Vent Information   Vent Type 840   Vent Mode AC/PC   Pressure Ordered 16   Rate Set 38 bmp   FiO2  65 %   SpO2 90 %   SpO2/FiO2 ratio 138.46   Sensitivity 3   PEEP/CPAP 10   I Time/ I Time % 0.6 s   Humidification Source Heated wire   Humidification Temp Measured 37   Circuit Condensation Drained   Vent Patient Data   Peak Inspiratory Pressure 28 cmH2O   Mean Airway Pressure 18 cmH20   Rate Measured 39 br/min   Vt Exhaled 326 mL   Minute Volume 12.5 Liters   I:E Ratio 1:1.6   Plateau Pressure 22 [id number]   Static Compliance 30 mL/cmH2O   Dynamic Compliance 12 mL/cmH2O   Cough/Sputum   Sputum How Obtained Tracheal   Cough Non-productive   Sputum Amount None   Spontaneous Breathing Trial (SBT) RT Doc   Pulse 100   Breath Sounds   Right Upper Lobe Diminished   Right Middle Lobe Diminished   Right Lower Lobe Diminished   Left Upper Lobe Diminished   Left Lower Lobe Diminished   Additional Respiratory  Assessments   Resp (!) 34   Alarm Settings   High Pressure Alarm 50 cmH2O   Low Minute Volume Alarm 5 L/min   Apnea (secs) 20 secs   High Respiratory Rate 45 br/min   Low Exhaled Vt  250 mL   Patient Observation   Observations 8 portex

## 2022-02-19 NOTE — PROGRESS NOTES
02/18/22 2323   Vent Information   Vent Type 840   Vent Mode AC/PC   Pressure Ordered 16   Rate Set 38 bmp   FiO2  65 %   SpO2 97 %   SpO2/FiO2 ratio 149.23   Sensitivity 3   PEEP/CPAP 10   Vent Patient Data   Peak Inspiratory Pressure 31 cmH2O   Mean Airway Pressure 18 cmH20   Rate Measured 45 br/min   Vt Exhaled 380 mL   Minute Volume 12.1 Liters   I:E Ratio 1:2.1   Cough/Sputum   Sputum How Obtained Suctioned;Tracheal   Sputum Amount Small   Sputum Color Creamy   Tenacity Thick   Spontaneous Breathing Trial (SBT) RT Doc   Pulse 88   Breath Sounds   Right Upper Lobe Diminished   Right Middle Lobe Diminished   Right Lower Lobe Diminished   Left Upper Lobe Diminished   Left Lower Lobe Diminished   Additional Respiratory  Assessments   Resp (!) 33   Position Semi-Mohamud's

## 2022-02-19 NOTE — PROGRESS NOTES
Pt daughter calling in [place]). Discussed pt not doing well this morning and discussed decreased urine output, increased vet settings, overall appearance and lab work. Discussed pt with pt daughter code status and discussed options briefly including what CPR includes. States she would need to disccuss with her sister prior to making any decisions. Ask that she call this RN back. States that she did not get to call from Dr Zara Latham.

## 2022-02-19 NOTE — PROGRESS NOTES
Spoke with pt daughters and offered them to speak with Dr Hayley Carrillo on phone. Daughters decline. They state that they do not want to see pt suffer any longer and wish to withdrawal care at this time. Page to Dr Hayley Carrillo per family request for comfort medications. Dr Hayley Carrillo calling back and discussed daughters wishesand giving orders for Morphine 4mg every 4 hours PRN and Ativan 1mg every 4 hours PRN.

## 2022-02-19 NOTE — PROGRESS NOTES
Multiple family members in to see pt. Daughters expressing that they are ready to withdrawal care at this time. RT phoned and on the way.

## 2022-02-19 NOTE — PROGRESS NOTES
Hospitalist Progress Note      PCP: Gerard Paulino DO    Date of Admission: 1/12/2022     resp failure on vent , covid pna, unvaccinated  High fevers S.p bronch with BAL   Ongoing weaning trials    Subjective: On the vent 50% FiO2 8 of PEEP  Fevers overnight  Continues to be on Levophed  On Precedex    2/15  High fevers overnight  Status post bronc with BAL 2/14  On Levophed  Continues to be on Precedex    2/16  Continues to run fevers. Trach and PEG placement yesterday. Off Levophed. 2/17  Off Levophed  On Precedex  Fevers resolving  On SBT this morning    2/18  Worsening of oxygenation when she was being bathed today.   Had to be switched to assist control currently at 65% 10 of PEEP  On Precedex    2/19  Worsening hypoxemia  FiO2 75% PEEP of 10    Medications:  Reviewed    Infusion Medications    sodium chloride      sodium chloride      heparin 25,000 units in dextrose 5% 250 mL infusion 2,300 Units/hr (02/19/22 1324)    dexmedetomidine HCl in NaCl 1.4 mcg/kg/hr (02/19/22 1215)    fentaNYL 12.5 mcg/hr (02/18/22 1549)    dextrose      sodium chloride Stopped (02/17/22 1005)     Scheduled Medications    sodium zirconium cyclosilicate  5 g Oral TID    metoprolol tartrate  25 mg Oral BID    famotidine (PEPCID) injection  20 mg IntraVENous Daily    linezolid  600 mg IntraVENous Q12H    albuterol  2.5 mg Nebulization Q4H    insulin lispro  0-18 Units SubCUTAneous Q4H    carboxymethylcellulose PF  1 drop Both Eyes 4x Daily    QUEtiapine  25 mg Oral BID    chlorhexidine  15 mL Mouth/Throat BID    aspirin  81 mg Oral Daily    clopidogrel  75 mg Oral Daily    ascorbic acid  1,000 mg Oral Daily    atorvastatin  10 mg Oral Nightly    sodium chloride flush  5-40 mL IntraVENous 2 times per day    Vitamin D  2,000 Units Oral Daily     PRN Meds: hydrALAZINE, sodium chloride, sodium chloride, heparin (porcine), heparin (porcine), albuterol, magnesium sulfate, potassium chloride, fentanNYL, midazolam, glucose, glucagon (rDNA), dextrose, dextrose bolus (hypoglycemia) **OR** dextrose bolus (hypoglycemia), guaiFENesin-dextromethorphan, sodium chloride flush, sodium chloride, ondansetron **OR** ondansetron, polyethylene glycol, acetaminophen **OR** acetaminophen      Intake/Output Summary (Last 24 hours) at 2/19/2022 1347  Last data filed at 2/19/2022 1320  Gross per 24 hour   Intake 3393.57 ml   Output 450 ml   Net 2943.57 ml       Physical Exam Performed:    /73   Pulse 93   Temp 99.5 °F (37.5 °C) (Bladder)   Resp (!) 32   Ht 5' 3\" (1.6 m)   Wt 240 lb 4.8 oz (109 kg)   SpO2 94%   BMI 42.57 kg/m²       Patient seen in droplet plus precautions  General:  Elderly female, on vent. supine  Oral ETT and OG noted   Mucous Membranes:  Pink , anicteric  Neck: No JVD, no carotid bruit, no thyromegaly  Chest: diminished in bases, bilateral mild crackles present. Cardiovascular:  RRR S1S2 heard, no murmurs or gallops  Abdomen: Soft, undistended, non tender, no organomegaly, BS present  Extremities: bilateral UE edema worse on right   No edema or cyanosis. Distal pulses well felt  Neurological :  Sedated    Labs:   Recent Labs     02/17/22  0400 02/17/22  1131 02/18/22  0505 02/18/22  0939 02/18/22  1511 02/18/22  2035 02/19/22  0410   WBC 7.2  --  7.9  --   --   --  9.0   HGB 6.7*   < > 7.6*   < > 7.5* 7.3* 7.5*   HCT 20.7*   < > 23.2*   < > 23.1* 23.1* 23.0*     --  237  --   --   --  201    < > = values in this interval not displayed. Recent Labs     02/18/22  0505 02/18/22  1825 02/19/22  0410   * 133* 131*   K 4.2 5.3* 5.4*    102 99   CO2 23 24 23   BUN 52* 52* 56*   CREATININE 2.0* 2.1* 2.1*   CALCIUM 8.2* 8.4 8.7     No results for input(s): AST, ALT, BILIDIR, BILITOT, ALKPHOS in the last 72 hours. No results for input(s): INR in the last 72 hours.   Recent Labs     02/18/22  0505   CKTOTAL 32       Urinalysis:      Lab Results   Component Value Date    NITRU Negative 02/18/2022    WBCUA 6-9 02/18/2022    BACTERIA 1+ 02/18/2022    RBCUA 21-50 02/18/2022    BLOODU LARGE 02/18/2022    SPECGRAV 1.020 02/18/2022    GLUCOSEU Negative 02/18/2022    GLUCOSEU Neg 08/29/2010       Radiology:  XR CHEST PORTABLE   Final Result   1. No significant change. XR CHEST PORTABLE   Final Result   Multifocal opacities within both lungs are not significantly changed. Tracheostomy tube and right jugular line appears stable. XR CHEST PORTABLE   Final Result   Stable chest x-ray with multifocal opacities in both lungs. XR CHEST PORTABLE   Final Result   Persistent moderate bilateral multifocal airspace disease presumably   pneumonia, likely representing residual COVID pneumonia. Developing ARDS is   also a consideration. XR CHEST PORTABLE   Final Result   Right PICC line has been removed. ET, NG, and right jugular line remain. Multifocal opacities are not significantly changed. XR CHEST PORTABLE   Final Result   Right IJ central venous catheter terminating in the lower SVC in satisfactory   position. No pneumothorax. Diffuse bilateral airspace disease, not significantly changed. XR CHEST PORTABLE   Final Result   Stable support apparatus. Persistent diffuse bilateral airspace disease. Findings on the right have   slightly progressed compared to prior. VL Extremity Venous Bilateral   Final Result      XR CHEST PORTABLE   Final Result   No significant change in the multifocal opacities of both lungs. The   pulmonary inflation is stable to 02/12/2022 and improved from 02/10/2022. XR CHEST PORTABLE   Final Result   Slightly improved aeration in the lungs. XR CHEST PORTABLE   Final Result   No evidence of pneumothorax status post left chest tube removal.      Remainder of support apparatus and extensive diffuse bilateral airspace   disease are similar in appearance.          VL Extremity Venous Right   Final Result      XR CHEST 1 VIEW   Final Result   1. Unchanged position of support devices. Kinked appearance of left chest   tube again demonstrated without evidence for pneumothorax. 2. No significant change in bilateral airspace disease. XR CHEST PORTABLE   Final Result   Stable support apparatus. The left-sided chest tube catheter tubing appears   kinked. Persistent diffuse bilateral airspace opacities. Findings on the left have   progressed compared to prior. XR CHEST PORTABLE   Final Result   1. Unchanged position of support devices. 2. No significant change in bilateral airspace disease. XR CHEST PORTABLE   Final Result   Stable examination with stable lines and tubes. Stable pneumonia. XR CHEST PORTABLE   Final Result   Stable lines and tubes. Stable examination. Extensive airspace disease   noted in the lungs. XR CHEST PORTABLE   Final Result   Stable examination with stable lines and tubes. Stable findings of COVID   pneumonia. XR CHEST PORTABLE   Final Result   1. No significant change. XR CHEST PORTABLE   Final Result   Worsening multifocal airspace opacities. XR CHEST PORTABLE   Final Result   Interval placement of a left-sided chest tube with significant decrease seen   in size of the left pneumothorax only with a small apical component   remaining. Patchy airspace disease within the lung fields is stable and   unchanged. XR CHEST PORTABLE   Final Result   New onset moderate to large left pneumothorax with mild tension. Endotracheal, nasogastric tubes and PICC line are stable. Redemonstration of   bilateral infiltrates. Findings communicated to the referring physician on February 1, 2022      RECOMMENDATION:   Stat left chest tube. XR CHEST PORTABLE   Final Result   1. Stable lines, tubes, and support devices.    2. Stable cardiopulmonary status including bilateral airspace opacities and   small effusions. XR CHEST PORTABLE   Final Result   No significant change. Continued follow-up recommended. XR CHEST PORTABLE   Final Result   Mild worsening of bilateral infiltrates likely due to pneumonia. Continued   follow-up recommended. XR CHEST PORTABLE   Final Result   Moderate persistent multifocal airspace disease compatible with multifocal   pneumonia including COVID pneumonia. While similar to recent studies, there   has been gradual progression and worsening since the early study of   01/12/2022. XR CHEST PORTABLE   Final Result   Extensive infiltrates within the lungs bilaterally, worsening on the right   with increasing consolidation. XR CHEST PORTABLE   Final Result   No significant interval change multifocal airspace disease. Stable lines and tubes. XR CHEST PORTABLE   Final Result   Mildly improved parenchymal infiltrates at the right lung base, otherwise   similar appearing chest.         XR CHEST PORTABLE   Final Result   Extensive bilateral airspace opacities are seen without significant change. XR CHEST PORTABLE   Final Result   1. Recommend retraction of endotracheal tube 1.0 cm.   2. Stable severe ill-defined lung consolidation, greatest at the lung bases   consistent with pneumonia versus alveolar edema. 3. Suspected mild bilateral pleural effusion, unchanged. XR CHEST PORTABLE   Final Result   1. No significant change. XR CHEST PORTABLE   Final Result   Appropriate positioning of the endotracheal tube. Enteric tube courses   beneath the diaphragm; tip is excluded by collimation inferiorly. No significant change in extensive bilateral airspace disease. IR PICC WO SQ PORT/PUMP > 5 YEARS   Final Result   Successful placement of PICC line. XR CHEST PORTABLE   Final Result   New multifocal moderate to severe pneumonia. Clinical and imaging follow-up   to resolution recommended.          CTA HEAD NECK W CONTRAST   Final Result   Unremarkable CTA of the head and neck. Findings consistent with COVID pneumonia. This scan was analyzed using Viz. ai contact LVO. Identification of suspected   findings is not for diagnostic use beyond notification. Viz LVO is limited to   analysis of imaging data and should not be used in-lieu of full patient   evaluation or relied upon to make or confirm diagnosis. CT HEAD WO CONTRAST   Final Result   No acute intracranial abnormality. XR CHEST PORTABLE   Final Result   Multifocal bilateral atypical pneumonia. XR CHEST PORTABLE    (Results Pending)           Assessment/Plan:      COVID PNA  Acute hypoxic respiratory Failure   - CXR: noted with multifocal PNA  - no home O2 at baseline  - had vaccine J &J in March 2021 - no booster   -progressively worsening hypoxia, was on vapotherm with full support   - eventually placed on vent support 1/21  - persistent severe hypoxemia -she was proned on 1/20/2022.    - Decadron weaned and stopped after 25 days   -  finished 7 days levaquin   -- CRP elevated - Started on Baricitinib - finished 14 days .   - Has MRSA in her sputums and elevated WBC. Stopped cefepime. Finished 14 days of Vanco.  Now new fever with leukocytosis. Heavy growth of MRSA and tracheal aspirate culture. Start Zyvox day #6/10  Valium and Seroquel added for weaning IV sedation   precedex caused bradycardia  - sp bronch with BAL for fevers - BAL with MRSA again   Trach and PEG 2/15  Back on Precedex  IV Lasix 80 mg with the albumin-no significant increase in urine output     Left pneumothorax. - left chest tube placed. CT flushed and it is patent. Placed to suction. - resolved pneumothorax and off chest tube 2/10     Hypotension  -weaned off pressors  Back on Levophed. Now off pressors again. Resolved  Now on Lopressor and as needed hydralazine for elevated blood pressure    Episodes of expressive aphasia   - ?  TIA, vs mental status changes related to covid   - cont ASA, Plavix     Thrombocytopenia resolved  - likely with covid  - continue Lovenox and monitor    Anemia  Secondary to chronic illness and repeated blood draws  Transfuse 1 unit of red cells 2/17     Hyperglycemia  Secondary to steroids  On Sliding scale insulin    Acute kidney injury 2/15  Hyperkalemia  IV fluid bolus--> IV normal saline 125 an hour  Lokelma  Dc IVF   Iv lasix      HLD  - Continue statin        DVT Prophylaxis: Lovenox bid  Diet: Diet NPO  ADULT TUBE FEEDING; PEG; Other Tube Feeding (specify);  Glucerna 1.2; Continuous; 20; Yes; 20; Q 4 hours; 60; 30; Q 4 hours  Code Status: Full Ольга Jones MD, 2/19/2022 1:47 PM

## 2022-02-19 NOTE — PROGRESS NOTES
Waste 40ml fentanyl.   Luisito Dexter RN     Electronically signed by Mike Mahajan RN on 2/19/2022 at 6:55 PM

## 2022-02-19 NOTE — PROGRESS NOTES
[facility]  Nephrology follow-up note           Reason for Consult: TANIA  Requesting Physician:  Dr. Breana Jennings history  Patient remains intubated  Blood pressure high  CVP 22    Last 24 h uop 975 ml    ROS: UTO  PSFH: No visitor    Scheduled Meds:   sodium zirconium cyclosilicate  5 g Oral TID    metoprolol tartrate  25 mg Oral BID    famotidine (PEPCID) injection  20 mg IntraVENous Daily    linezolid  600 mg IntraVENous Q12H    albuterol  2.5 mg Nebulization Q4H    insulin lispro  0-18 Units SubCUTAneous Q4H    carboxymethylcellulose PF  1 drop Both Eyes 4x Daily    QUEtiapine  25 mg Oral BID    chlorhexidine  15 mL Mouth/Throat BID    aspirin  81 mg Oral Daily    clopidogrel  75 mg Oral Daily    ascorbic acid  1,000 mg Oral Daily    atorvastatin  10 mg Oral Nightly    sodium chloride flush  5-40 mL IntraVENous 2 times per day    Vitamin D  2,000 Units Oral Daily     Continuous Infusions:   sodium chloride      sodium chloride      heparin 25,000 units in dextrose 5% 250 mL infusion 2,300 Units/hr (02/19/22 1324)    dexmedetomidine HCl in NaCl 1.4 mcg/kg/hr (02/19/22 1215)    fentaNYL 12.5 mcg/hr (02/18/22 1549)    dextrose      sodium chloride Stopped (02/17/22 1005)     PRN Meds:.hydrALAZINE, sodium chloride, sodium chloride, heparin (porcine), heparin (porcine), albuterol, magnesium sulfate, potassium chloride, fentanNYL, midazolam, glucose, glucagon (rDNA), dextrose, dextrose bolus (hypoglycemia) **OR** dextrose bolus (hypoglycemia), guaiFENesin-dextromethorphan, sodium chloride flush, sodium chloride, ondansetron **OR** ondansetron, polyethylene glycol, acetaminophen **OR** acetaminophen    History of Present Illness on 2/18/2022:    71 y.o. yo female with PMH of diabetes, hyperlipidemia, asthma, arthritis who is admitted since 1/12/2022. On  2/15/2022 patient had tracheostomy done due to persistent respiratory failure from Covid.     Patient has had intermittent hypotensive episodes in the last week or so. Blood pressure dropped in the 60s on 2/13 and multiple times down to the 80s during 2/15. Creatinine on 2/13 was less than 0.5, increased to 0.9 on 2/14 then to 1.4 on 2/15 then to 1.6 then 1.8 and increased to 2 on 2/18 when nephrology consult was called. Patient has been receiving IV fluid with normal saline at 125 cc an hour since 2/15. Vancomycin was discontinued after a morning dose on 2/14 and switched to Zyvox. Vancomycin level was 22.6 on 2/10, last trough level was drawn on 2/12 which was at 15.5    Patient was on Lasix 40 mg IV daily until 2/12 and was again given on 2/17  She has had intermittent hyperkalemia and needed Lokelma 3 doses on 2/15    Urine output seems to be picking up in the last several days, 1 L was charted in the last 24 hours, 650 prior 24 hours to it and 406 mL 24 hours ending on 2/16. Physical exam:   Constitutional:  VITALS:  /65   Pulse 87   Temp 99.2 °F (37.3 °C)   Resp 20   Ht 5' 3\" (1.6 m)   Wt 240 lb 4.8 oz (109 kg)   SpO2 95%   BMI 42.57 kg/m²   Gen: On trach and vent  Cardiovascular:  S1, S2 without m/r/g 1 to 2+ lower extremity edema  Respiratory: Diminished, trach in situ  Abdomen:  soft, nt, nd,   Neuro/Psy: Sedated, minimally responsive to noxious stimuli    Data/  Recent Labs     02/17/22  0400 02/17/22  1131 02/18/22  0505 02/18/22  0939 02/18/22  1511 02/18/22  2035 02/19/22  0410   WBC 7.2  --  7.9  --   --   --  9.0   HGB 6.7*   < > 7.6*   < > 7.5* 7.3* 7.5*   HCT 20.7*   < > 23.2*   < > 23.1* 23.1* 23.0*   MCV 89.1  --  88.7  --   --   --  89.9     --  237  --   --   --  201    < > = values in this interval not displayed.      Recent Labs     02/18/22  0505 02/18/22  1825 02/19/22  0410   * 133* 131*   K 4.2 5.3* 5.4*    102 99   CO2 23 24 23   GLUCOSE 166* 189* 147*   MG  --   --  2.40   BUN 52* 52* 56*   CREATININE 2.0* 2.1* 2.1*   LABGLOM 25* 23* 23*   GFRAA 30* 28* 28* Urinalysis from 2/12 shows trace protein with risk of hematuria from [place] sample  UA with 30 protein 21-50 RBCs 6-9 WBCs 6-10 coarse casts  Urine sodium 25 creatinine 52  CK 32    Assessment  -TANIA likely related to hemodynamic compromise, it started worsening after an hypotensive episode on 2/13 and progressively got worse over the next several days. Patient's urine output has been increasing in the last 24 hours which is encouraging despite the creatinine increasing. This likely reflects pathophysiology of ATN. It is unclear if the vancomycin level was high because the last trough level was drawn on 2/12, the last dose was given on 2/14. Currently off of Levophed   CVP is at 19    -Acute respiratory failure from Covid pneumonia status post tracheostomy and on vent    -Hyponatremia with fluid overload    -Anemia    -COVID pneumonia with ARDS    -Hypertension   Was hypotensive and needed levophed which has been off now    Plan  -IV albumin with Lasix as ordered  -IV bicarb as ordered  -Avoid hypotension  -Serial renal panel  -daily wts and strict i/o  -renal dose medications   -avoid nephrotoxins    Family has decided to withdraw care, agree with the plans      Thank you for the consultation. Please do not hesitate to call with questions. Norm Costa MD  Office: [phone]  Fax:    [phone]  [facility]

## 2022-02-19 NOTE — PROGRESS NOTES
Bedside report and Pt care transferred to [facility]. Pt denies any assistance at this time.

## 2022-02-19 NOTE — PROGRESS NOTES
DR. Sanket Whalen CALLED BACK AND NEW ORDERS RECEIVED. INCREASE RATE ON THE VENTILATOR TO 38. BHARATHI RT NOTIFIED OF CHANGES.

## 2022-02-19 NOTE — PROGRESS NOTES
Pulmonary & Critical Care Medicine ICU Progress Note    CC: COVID in partially vaccinated patient     Events of Last 24 hours:   PCV ventilation overnight   No fever    Levophed off  Fentanyl 125  Precedex 1.4  Worsening hypoxemia  PEEP 10  FiO2 75%        Invasive Lines:   PICC 1/20/2022-2/14  R IJ 2/14     MV: 1/21/2022  Vent Mode: AC/PC Rate Set: 38 bmp/Vt Ordered: 350 mL/ /FiO2 : 65 %  Recent Labs     02/18/22  1825 02/19/22  0410   PHART 7.202* 7.204*   LXC4IQC 61.1* 58.6*   PO2ART 79.6 73.9*       IV:   sodium chloride      sodium chloride      heparin 25,000 units in dextrose 5% 250 mL infusion 2,180 Units/hr (02/18/22 2315)    dexmedetomidine HCl in NaCl 1.4 mcg/kg/hr (02/19/22 0726)    fentaNYL 12.5 mcg/hr (02/18/22 1549)    dextrose      sodium chloride Stopped (02/17/22 1005)       Vitals:  Blood pressure (!) 173/73, pulse 91, temperature 98.8 °F (37.1 °C), temperature source Bladder, resp. rate (!) 33, height 5' 3\" (1.6 m), weight 240 lb 4.8 oz (109 kg), SpO2 92 %, not currently breastfeeding. on vent 38/16/+10    Intake/Output Summary (Last 24 hours) at 2/19/2022 0732  Last data filed at 2/19/2022 0600  Gross per 24 hour   Intake 3134.57 ml   Output 975 ml   Net 2159.57 ml     EXAM:  General: ill appearing. Intubated sedated. Eyes: PERRL. No sclera icterus. No conjunctival injection. ENT: No discharge. Pharynx clear. Trach tube in place  Neck: Trachea midline. Normal thyroid. Resp: No accessory muscle use. Few crackles. No wheezing. Few rhonchi. CV: Regular rate. Regular rhythm. No mumur or rub. UE and LE edema- worse   GI: Non-tender. Non-distended. No masses. Skin: Warm and dry. No nodule on exposed extremities. No rash on exposed extremities. Lymph: No cervical LAD. No supraclavicular LAD. M/S: No cyanosis. No joint deformity. No clubbing. Neuro: Intubated sedated. No response to painful stimuli. Not following commands.   Psych: Noncommunicative unable to obtain    Scheduled Meds:   metoprolol tartrate  25 mg Oral BID    famotidine (PEPCID) injection  20 mg IntraVENous Daily    linezolid  600 mg IntraVENous Q12H    albuterol  2.5 mg Nebulization Q4H    insulin lispro  0-18 Units SubCUTAneous Q4H    carboxymethylcellulose PF  1 drop Both Eyes 4x Daily    QUEtiapine  25 mg Oral BID    chlorhexidine  15 mL Mouth/Throat BID    aspirin  81 mg Oral Daily    clopidogrel  75 mg Oral Daily    ascorbic acid  1,000 mg Oral Daily    atorvastatin  10 mg Oral Nightly    sodium chloride flush  5-40 mL IntraVENous 2 times per day    Vitamin D  2,000 Units Oral Daily     PRN Meds:  hydrALAZINE, sodium chloride, sodium chloride, heparin (porcine), heparin (porcine), albuterol, magnesium sulfate, potassium chloride, fentanNYL, midazolam, glucose, glucagon (rDNA), dextrose, dextrose bolus (hypoglycemia) **OR** dextrose bolus (hypoglycemia), guaiFENesin-dextromethorphan, sodium chloride flush, sodium chloride, ondansetron **OR** ondansetron, polyethylene glycol, acetaminophen **OR** acetaminophen    Results:  CBC:   Recent Labs     02/17/22  0400 02/17/22  1131 02/18/22  0505 02/18/22  0939 02/18/22  1511 02/18/22  2035 02/19/22  0410   WBC 7.2  --  7.9  --   --   --  9.0   HGB 6.7*   < > 7.6*   < > 7.5* 7.3* 7.5*   HCT 20.7*   < > 23.2*   < > 23.1* 23.1* 23.0*   MCV 89.1  --  88.7  --   --   --  89.9     --  237  --   --   --  201    < > = values in this interval not displayed. BMP:   Recent Labs     02/18/22  0505 02/18/22  1825 02/19/22  0410   * 133* 131*   K 4.2 5.3* 5.4*    102 99   CO2 23 24 23   BUN 52* 52* 56*   CREATININE 2.0* 2.1* 2.1*     LIVER PROFILE: No results for input(s): AST, ALT, LIPASE, BILIDIR, BILITOT, ALKPHOS in the last 72 hours. Invalid input(s):   AMYLASE,  ALB    Micro:  1/17/2022 SARS-CoV-2 positive at urgent care  1/12/2022 SARS-CoV-2 detected   1/31/2022 tracheal aspirate MRSA  2/1/2022 blood NG  2/6/22 BAL: MRSA  2/12/22 Resp cx  2/12 Resp MRSA     Imaging:   CXR 2/19/2022  images reviewed by me and showed:   Trach tube in place   Satisfactory CVC line position   Diffuse bilateral ASD -worse     ASSESSMENT:  · Acute hypoxemic respiratory failure, severe and life-threatening  · MRSA PNA   · Acute kidney injury- worsening   · ARDS  · Uncontrolled HTN   · COVID-19 pneumonia in a partially vaccinated patient, s/p J&J vaccination 3/2021  · Bilateral UE SVT   · Anemia- no active bleed. 1 unit PRBC 2/17/22   · Post cuffed Portex size 8 tracheostomy 2/15/2022 by Dr. Tristan Brandon  · Persistent fever -probably noninfectious, could be related to venous thrombosis  · Left pneumothorax, chest tube placed 2/1/22 and removed 2/10/22  · Former smoker    PLAN:  Droplet Plus Airborne Precautions   Mechanical ventilation as per my orders. The ventilator was adjusted by me at the bedside for unstable, life threatening respiratory failure  Precedex/Fenatnyl drip target RASS -2   Seroquel 25 BID   Zyvox D#6/10. Completed 14 days Vanc and 7 days Levaquin   Completed Decadron D#25  Completed 14 days baricitinib   H-SSI for FSBS goal 150-180  Lopressor 25 BID and Hydralazine PRN   Agree with diuresis-plan per nephrology  Resumed ASA and Plavix   Monitor H&H and transfuse for Hb < 7   TFs at goal   BM regiment   GI prophylaxis: Pepcid   Heparin drip given superficial thrombosis of the setting of Covid and risk of developing deep thrombosis. Tried to call Ray Almendarez, [id number] [facility] with no answer yesterday and today. Total critical care time caring for this patient with life threatening, unstable organ failure, including direct patient contact, management of life support systems, review of data including imaging and labs, discussions with other team members and physicians is 38 minutes so far today, excluding procedures.

## 2022-02-19 NOTE — PROGRESS NOTES
Daughter, Alcides Gupta, calling back speaking to this RN regarding code status. States that she spoke with her sister and they at this time wish to make pt a limited code. They do not want any chest compressions, no  medications, no shock or cardioversion. They also state at this time they would not want pt to have CRRT or dialysis. Discussed with daughter that she could come in at this time and see pt to make further decisions as daughter tearful and states she is unsure what to do further at this time with pt care moving forward. At this time pt also has been laying high right side at this time due to the fact pt is unable to be prone with fresh trach.

## 2022-02-19 NOTE — PROGRESS NOTES
Pt daughters at this time deciding to withdrawal care while visiting pt bedside. Both daughters stating to this RN that they \"think she has lost her fight and we dont want her to suffer any more. It is time for her to go be with our dad\". Call placed to Dr Mcmillan Sit. They request that their  be present and area calling him at this time. Waiting call back.

## 2022-02-19 NOTE — PROGRESS NOTES
High dose Heparin:  Anti-Xa ay 041 is 0.4IU/mL which is therapeutic. No changes. Continue infusion at 21.8mL/hr and recheck the Anti-Xa at noon.   Iftikhar Cantu, [id number] PharmD 2/19/2022 6:46 AM

## 2022-02-19 NOTE — PROGRESS NOTES
High dose Heparin:  Anti-Xa at 2035 was 0.29. Will order a 2,440 unit IV bolus dose and increase the infusion to 21.8mL/hr. Recheck the Anti-Xa at 0500 2/19/22. Desired Anti-Xa range is 0.3-0.7 IU/mL.   ITZEL Alonso SARAH [facility] PharmD 2/18/2022 11:01 PM

## 2022-02-19 NOTE — PROGRESS NOTES
Call to [facility] at this time for referral.  Referral initials OS. 3:02 PM   Spoke with Sly Leal at [facility] who states that we can call at time of death. Pt medicated, removed from vent by RT, family at bedside.

## 2022-02-19 NOTE — PROGRESS NOTES
PATIENT REMAINS ON THE VENTILATOR, TRACH, PATIENT RESPIRATIONS EASY, VSS, AFEBRILE SEE FLOW SHEET FOR RESULTS. RIGHT TLC IJ, PRECEDEX GTT, HEPARIN GTT, FENTANYL GTT. INFUSING WITHOUT DIFFICULTIES. SERIAL H/H SENT TO LAB FOR TESTING. SR/ST ON CARDIAC MONITOR. TF INFUSING VIA PEG TUBE.

## 2022-02-19 NOTE — PROGRESS NOTES
02/19/22 0500   RT Protocol   History Pulmonary Disease 2   Respiratory pattern 6   Breath sounds 2   Cough 1   Indications for Bronchodilator Therapy Decreased or absent breath sounds   Bronchodilator Assessment Score 11   RT Inhaler-Nebulizer Bronchodilator Protocol Note    There is a bronchodilator order in the chart from a provider indicating to follow the RT Bronchodilator Protocol and there is an Initiate RT Inhaler-Nebulizer Bronchodilator Protocol order as well (see protocol at bottom of note). CXR Findings:  XR CHEST PORTABLE    Result Date: 2/18/2022  Multifocal opacities within both lungs are not significantly changed. Tracheostomy tube and right jugular line appears stable. XR CHEST PORTABLE    Result Date: 2/17/2022  Stable chest x-ray with multifocal opacities in both lungs. The findings from the last RT Protocol Assessment were as follows:   History Pulmonary Disease: Chronic pulmonary disease  Respiratory Pattern: Use of accessory muscles, prolonged exhalation, or RR 26-30 bpm  Breath Sounds: Slightly diminished and/or crackles  Cough: Strong, productive  Indication for Bronchodilator Therapy: Decreased or absent breath sounds  Bronchodilator Assessment Score: 11    Aerosolized bronchodilator medication orders have been revised according to the RT Inhaler-Nebulizer Bronchodilator Protocol below. Respiratory Therapist to perform RT Therapy Protocol Assessment initially then follow the protocol. Repeat RT Therapy Protocol Assessment PRN for score 0-3 or on second treatment, BID, and PRN for scores above 3. No Indications  adjust the frequency to every 6 hours PRN wheezing or bronchospasm, if no treatments needed after 48 hours then discontinue using Per Protocol order mode. If indication present, adjust the RT bronchodilator orders based on the Bronchodilator Assessment Score as indicated below.   Use Inhaler orders unless patient has one or more of the following: on home nebulizer, not able to hold breath for 10 seconds, is not alert and oriented, cannot activate and use MDI correctly, or respiratory rate 25 breaths per minute or more, then use the equivalent nebulizer order(s) with same Frequency and PRN reasons based on the score. If a patient is on this medication at home then do not decrease Frequency below that used at home. 0-3  enter or revise RT bronchodilator order(s) to equivalent RT Bronchodilator order with Frequency of every 4 hours PRN for wheezing or increased work of breathing using Per Protocol order mode. 4-6  enter or revise RT Bronchodilator order(s) to two equivalent RT bronchodilator orders with one order with BID Frequency and one order with Frequency of every 4 hours PRN wheezing or increased work of breathing using Per Protocol order mode. 7-10  enter or revise RT Bronchodilator order(s) to two equivalent RT bronchodilator orders with one order with TID Frequency and one order with Frequency of every 4 hours PRN wheezing or increased work of breathing using Per Protocol order mode. 11-13  enter or revise RT Bronchodilator order(s) to one equivalent RT bronchodilator order with QID Frequency and an Albuterol order with Frequency of every 4 hours PRN wheezing or increased work of breathing using Per Protocol order mode. Greater than 13  enter or revise RT Bronchodilator order(s) to one equivalent RT bronchodilator order with every 4 hours Frequency and an Albuterol order with Frequency of every 2 hours PRN wheezing or increased work of breathing using Per Protocol order mode.      Electronically signed by Rose Mary Lomeli RCP on 2/19/2022 at 5:18 AM

## 2022-02-20 NOTE — PROGRESS NOTES
POST MORTEM CARE COMPETED, BODY WAS RELEASED TO  [facility]. [address] \"CUBA'S\" NOTIFIED, STATED REPRESENTATIVE WILL BE ENROUTE TO  AND RECEIVE CARE FOR PATIENT.

## 2022-02-21 NOTE — PROGRESS NOTES
Death within 24hrs/7days of removal of 2 point soft wrist restraints only. No reporting required to CMS.        Farzana Bear,  RN

## 2022-02-24 NOTE — DISCHARGE SUMMARY
Name:  Gilmer Enamorado  Room:  3016/3016-01  MRN:    [id number]    Death Summary      This discharge summary is in conjunction with a complete physical exam done on the day of discharge. Discharging Physician: Isabella Quintero MD      Admit: 1/12/2022  Death :  2/19/2022    Diagnoses this Admission    Active Problems:    COVID-19    Anxiety    Hyperlipidemia    Arthritis    Brain fog    Moderate protein-calorie malnutrition ([address]    Pneumonia due to COVID-19 virus    ARDS (adult respiratory distress syndrome) ([facility])    Hyperglycemia    Leukocytosis    Hypotension    Mucus plugging of bronchi    Pneumonia due to methicillin resistant Staphylococcus aureus (MRSA) ([facility])    Pneumothorax    MRSA bacteremia    Fever    Acute kidney injury (HCC)    Hyperkalemia    Superficial venous thrombosis of upper extremity    Uncontrolled hypertension  Resolved Problems:    * No resolved hospital problems. *          Procedures (Please Review Full Report for Details)  Bronch   Chest tube placement   Consults    IP CONSULT TO PULMONOLOGY  IP CONSULT TO DIETITIAN  IP CONSULT TO DIETITIAN  IP CONSULT TO DIETITIAN  IP CONSULT TO GENERAL SURGERY  IP CONSULT TO NEPHROLOGY      HPI:    The patient is a 80 y.o. female with PMH hypercholesterolemia, generalized anxiety disorder, impaired glucose tolerance, and vitamin D deficiency who presents to [facility] with ncreased shortness of brat   History obtained from the patient and review of EMR. Pt stated that symptoms started familia 1/5. Started with runny nose. She then started with shortness of breath and cough. +fever as high as 102.1 and chills at home. She stated that she has been spking fever at night, she stated she wakes up soaking wet at night only. Daughter at bedside stated she has been wheezing some at home. She went to urgent care on Friday where she was diagnosed with COVID they started her on Z-Sal, prednisone, and gave her Tessalon Perles.   She came back to the ED today because she was not feeling any better had increased shortness of breath and cough. Patient also said she has had a decrease in appetite and p.o. intake over the last couple days. She does have some nausea. No vomiting or diarrhea. Denies hemoptysis. Upon my assessment today patient sitting in chair O2 saturations dropped to 83 to 87% on room air. She was placed on 2 L of oxygen. Sats now mid 90s. Patient and daughter both had multiple questions regarding plan of care. All questions answered. Patient stated that she has arthritis and without her Mobic she is unable to move. We will reorder Mobic. Patient also stated since she got her [employer] vaccine in March her left arm has been very painful and tender to touch at the injection site. She stated this is the reason why she has not gotten booster. Patient will be admitted for further care. Hospital Course        COVID PNA  Acute hypoxic respiratory Failure   - CXR: noted with multifocal PNA  - no home O2 at baseline  - had vaccine [employer] in March 2021 - no booster   -progressively worsening hypoxia, was on vapotherm with full support   - eventually placed on vent support 1/21  - persistent severe hypoxemia -she was proned on 1/20/2022.    - Decadron weaned and stopped after 25 days   -  finished 7 days levaquin   -- CRP elevated - Started on Baricitinib - finished 14 days .   - Has MRSA in her sputums and elevated WBC. Stopped cefepime. Finished 14 days of Vanco.  Now new fever with leukocytosis. Heavy growth of MRSA and tracheal aspirate culture. Start Zyvox day #6/10  Valium and Seroquel added for weaning IV sedation   precedex caused bradycardia  - sp bronch with BAL for fevers - BAL with MRSA again   Trach and PEG 2/15  Back on Precedex  IV Lasix 80 mg with the albumin-no significant increase in urine output  Worsening oxygenation    Family decided to withdraw care      Left pneumothorax. - left chest tube placed.  CT flushed and it is patent. Placed to suction. - resolved pneumothorax and off chest tube 2/10     Hypotension  -weaned off pressors  Back on Levophed. Now off pressors again. Resolved  Now on Lopressor and as needed hydralazine for elevated blood pressure     Episodes of expressive aphasia   - ? TIA, vs mental status changes related to covid   - cont ASA, Plavix      Thrombocytopenia resolved  - likely with covid  - continue Lovenox and monitor     Anemia  Secondary to chronic illness and repeated blood draws  Transfuse 1 unit of red cells 2/17     Hyperglycemia  Secondary to steroids  On Sliding scale insulin     Acute kidney injury 2/15  Hyperkalemia  IV fluid bolus--> IV normal saline 125 an hour  Lokelma  Dc IVF   Iv lasix     Family decided to withdraw care       XR CHEST PORTABLE   Final Result   1. No significant change. XR CHEST PORTABLE   Final Result   Multifocal opacities within both lungs are not significantly changed. Tracheostomy tube and right jugular line appears stable. XR CHEST PORTABLE   Final Result   Stable chest x-ray with multifocal opacities in both lungs. XR CHEST PORTABLE   Final Result   Persistent moderate bilateral multifocal airspace disease presumably   pneumonia, likely representing residual COVID pneumonia. Developing ARDS is   also a consideration. XR CHEST PORTABLE   Final Result   Right PICC line has been removed. ET, NG, and right jugular line remain. Multifocal opacities are not significantly changed. XR CHEST PORTABLE   Final Result   Right IJ central venous catheter terminating in the lower SVC in satisfactory   position. No pneumothorax. Diffuse bilateral airspace disease, not significantly changed. XR CHEST PORTABLE   Final Result   Stable support apparatus. Persistent diffuse bilateral airspace disease. Findings on the right have   slightly progressed compared to prior.          VL Extremity Venous Bilateral   Final Result      XR CHEST PORTABLE   Final Result   No significant change in the multifocal opacities of both lungs. The   pulmonary inflation is stable to 02/12/2022 and improved from 02/10/2022. XR CHEST PORTABLE   Final Result   Slightly improved aeration in the lungs. XR CHEST PORTABLE   Final Result   No evidence of pneumothorax status post left chest tube removal.      Remainder of support apparatus and extensive diffuse bilateral airspace   disease are similar in appearance. VL Extremity Venous Right   Final Result      XR CHEST 1 VIEW   Final Result   1. Unchanged position of support devices. Kinked appearance of left chest   tube again demonstrated without evidence for pneumothorax. 2. No significant change in bilateral airspace disease. XR CHEST PORTABLE   Final Result   Stable support apparatus. The left-sided chest tube catheter tubing appears   kinked. Persistent diffuse bilateral airspace opacities. Findings on the left have   progressed compared to prior. XR CHEST PORTABLE   Final Result   1. Unchanged position of support devices. 2. No significant change in bilateral airspace disease. XR CHEST PORTABLE   Final Result   Stable examination with stable lines and tubes. Stable pneumonia. XR CHEST PORTABLE   Final Result   Stable lines and tubes. Stable examination. Extensive airspace disease   noted in the lungs. XR CHEST PORTABLE   Final Result   Stable examination with stable lines and tubes. Stable findings of COVID   pneumonia. XR CHEST PORTABLE   Final Result   1. No significant change. XR CHEST PORTABLE   Final Result   Worsening multifocal airspace opacities. XR CHEST PORTABLE   Final Result   Interval placement of a left-sided chest tube with significant decrease seen   in size of the left pneumothorax only with a small apical component   remaining.   Patchy airspace disease within the lung fields is stable and   unchanged. XR CHEST PORTABLE   Final Result   New onset moderate to large left pneumothorax with mild tension. Endotracheal, nasogastric tubes and PICC line are stable. Redemonstration of   bilateral infiltrates. Findings communicated to the referring physician on February 1, 2022      RECOMMENDATION:   Stat left chest tube. XR CHEST PORTABLE   Final Result   1. Stable lines, tubes, and support devices. 2. Stable cardiopulmonary status including bilateral airspace opacities and   small effusions. XR CHEST PORTABLE   Final Result   No significant change. Continued follow-up recommended. XR CHEST PORTABLE   Final Result   Mild worsening of bilateral infiltrates likely due to pneumonia. Continued   follow-up recommended. XR CHEST PORTABLE   Final Result   Moderate persistent multifocal airspace disease compatible with multifocal   pneumonia including COVID pneumonia. While similar to recent studies, there   has been gradual progression and worsening since the early study of   01/12/2022. XR CHEST PORTABLE   Final Result   Extensive infiltrates within the lungs bilaterally, worsening on the right   with increasing consolidation. XR CHEST PORTABLE   Final Result   No significant interval change multifocal airspace disease. Stable lines and tubes. XR CHEST PORTABLE   Final Result   Mildly improved parenchymal infiltrates at the right lung base, otherwise   similar appearing chest.         XR CHEST PORTABLE   Final Result   Extensive bilateral airspace opacities are seen without significant change. XR CHEST PORTABLE   Final Result   1. Recommend retraction of endotracheal tube 1.0 cm.   2. Stable severe ill-defined lung consolidation, greatest at the lung bases   consistent with pneumonia versus alveolar edema. 3. Suspected mild bilateral pleural effusion, unchanged.          XR CHEST PORTABLE Final Result   1. No significant change. XR CHEST PORTABLE   Final Result   Appropriate positioning of the endotracheal tube. Enteric tube courses   beneath the diaphragm; tip is excluded by collimation inferiorly. No significant change in extensive bilateral airspace disease. IR PICC WO SQ PORT/PUMP > 5 YEARS   Final Result   Successful placement of PICC line. XR CHEST PORTABLE   Final Result   New multifocal moderate to severe pneumonia. Clinical and imaging follow-up   to resolution recommended. CTA HEAD NECK W CONTRAST   Final Result   Unremarkable CTA of the head and neck. Findings consistent with COVID pneumonia. This scan was analyzed using [employer]. ai contact LVO. Identification of suspected   findings is not for diagnostic use beyond notification. Viz LVO is limited to   analysis of imaging data and should not be used in-lieu of full patient   evaluation or relied upon to make or confirm diagnosis. CT HEAD WO CONTRAST   Final Result   No acute intracranial abnormality. XR CHEST PORTABLE   Final Result   Multifocal bilateral atypical pneumonia.                 Renata Concepcion MD 2/24/2022 12:56 PM

## 2022-09-08 NOTE — PROGRESS NOTES
Addended by: DRU ERVIN on: 9/8/2022 03:32 PM     Modules accepted: Orders     Tried to call family, Alonso Caceres, and she did not answer.

## 2023-10-04 NOTE — PROGRESS NOTES
Pt is awake and will look at you. SBT of 10/5 started. F/V is 66, VTs in mid 400s, sp02 is 91% and RR is 26. Continuing to monitor pt. Speaking Coherently

## 2023-10-19 NOTE — FLOWSHEET NOTE
01/12/22 1700   Vital Signs   Temp 99.4 °F (37.4 °C)   Temp Source Oral   Pulse 81   Heart Rate Source Monitor   Resp 18   /65   BP Location Left upper arm   Patient Position High fowlers   Level of Consciousness Alert (0)   MEWS Score 1   Patient Currently in Pain No   Oxygen Therapy   SpO2 91 %   O2 Device Nasal cannula   O2 Flow Rate (L/min) 2 L/min       Patient arrived to the unit in stable condition via wheelchair on 2L of O2. Patient was oriented to the room/unit. Oxygen tubing added,  socks placed on patient. Dinner offered, patient deferred, requested crackers and juice. Telemetry placed on patient and admission questions started.
01/14/22 1855   Vital Signs   Temp 98.1 °F (36.7 °C)   Temp Source Oral   Pulse 72   Resp 18   /66   BP Location Left upper arm   Patient Position Semi fowlers   Level of Consciousness Alert (0)   MEWS Score 1   Patient Currently in Pain No   Pain Assessment   Pain Assessment 0-10   Pain Level 0   Oxygen Therapy   SpO2 92 %   O2 Device High flow nasal cannula   O2 Flow Rate (L/min) 8 L/min   Pt given evening meds. Pt awake/alert and oriented times 4. Refilled pt's water pitcher. No other needs voiced.   Pacheco Gibson RN
01/15/22 0300   Vital Signs   Temp 97.5 °F (36.4 °C)   Temp Source Oral   Pulse 82   Resp 16   BP (!) 147/84   BP Location Right upper arm   Level of Consciousness Alert (0)   MEWS Score 1   Oxygen Therapy   SpO2 92 %   O2 Device High flow nasal cannula   O2 Flow Rate (L/min) 8 L/min   Pt resting in bed, no acute distress. Denies any needs.    Crissie Estimable, RN
01/15/22 0345   Oxygen Therapy   SpO2 (!) 86 %   O2 Device High flow nasal cannula   O2 Flow Rate (L/min) 8 L/min   Pt resting in bed, awake/alert. Increased 02 to 10 liters. Pt is doing breathing exercises. Now 02 saturation is 91% on 10 liters.
01/15/22 0600   Oxygen Therapy   SpO2 (!) 82 %   O2 Device High flow nasal cannula   O2 Flow Rate (L/min) 10 L/min   Increased 02 to 15 liters, saturation 90%. Pt awake/alert resting in bed. Perfectserve sent to nocturnist regarding increasing 02 needs. Encouraged pt to conserve her 02 by not getting OOB as much. Offered to use purewick catheter in order to decrease the times she has to get up to the [facility]. Pt refused to have that done and stated she wanted to get up.   Carly Henry, RN
01/15/22 2028   Vital Signs   Temp 97.8 °F (36.6 °C)   Temp Source Oral   Pulse 84   Resp 18   BP (!) 148/66   BP Location Right upper arm   Level of Consciousness Alert (0)   MEWS Score 1   Oxygen Therapy   SpO2 90 %   O2 Device Heated high flow cannula   O2 Flow Rate (L/min) 25 L/min   Pt given evening meds. Pt aware of transfer to PCU room 321 tonight due to increasing 02 needs.  Ye Aguilar RN
01/18/22 0005   Neurological   NIHSS Stroke Scale Assessed Yes   NIHSS Stroke Scale   Interval Other (Comment)   Level of Consciousness (1a. ) 0   LOC Questions (1b. ) 0   LOC Commands (1c. ) 0   Best Gaze (2. ) 0   Visual (3. ) 0   Facial Palsy (4. ) 0   Motor Arm, Left (5a. ) 0   Motor Arm, Right (5b. ) 0   Motor Leg, Left (6a. ) 0   Motor Leg, Right (6b. ) 0   Limb Ataxia (7. ) 0   Sensory (8. ) 0   Best Language (9. ) 1   Dysarthria (10. ) 1   Extinction and Inattention (11) 0   Total 2   FS completed with 186  Nocturnist updated of NIH concerns  Eliane Joyce Clinical     YEVGENIY 2023 with evening med pass
01/18/22 0852   Vital Signs   Temp 99 °F (37.2 °C)   Temp Source Oral   Pulse 82   Resp 20   /62   BP Location Right upper arm   Level of Consciousness Alert (0)   MEWS Score 1   Oxygen Therapy   SpO2 90 %   O2 Device Heated high flow cannula   FiO2  90 %   O2 Flow Rate (L/min) 35 L/min     Patient resting quietly in bed. No s/s of distress noted. Shift assessment complete, see flow sheet. Denies needs at this time. Call light in reach. Will monitor.
01/21/22 1006   Vitals   BP (!) 79/42   MAP (mmHg) (!) 52   Started Liter bolus, Call to Dr Timo Teixeira in ICU. Pt Position changed from Reverse trendelenburg to Supine. 10:12 AM    Per Dr Timo Teixeira give 1L bolus now, order for Levophed to keep Systolic greater than 90. Orders placed.    11:42 AM  Vitals:    01/21/22 1140   BP: (!) 100/51   Pulse:    Resp:    Temp:    SpO2:
01/21/22 2136   Vital Signs   Pulse 92   Resp 23   BP (!) 168/78   MAP (mmHg) 105   Oxygen Therapy   SpO2 97 %   Patient assessment complete, lung sounds clear, patient proned, vent settings FiO2 80% with PEEP 14.  Patient awakens eyes open, moving hands and legs, pushing face into pillow. RASS +1 to +2, propofol at 50mcg/kg/min, fentanyl at 200mcg/hr, only PRN doses available. Will give Versed PRN dose.
01/22/22 2106   Vital Signs   Temp 97.7 °F (36.5 °C)   Temp Source Axillary   Pulse 59   Heart Rate Source Monitor   Resp 30   BP (!) 94/59   BP Location Left upper arm   MAP (mmHg) 70   Patient Position Semi fowlers; Reverse trendelenberg   Pain Assessment   Pain Assessment CPOT   CPOT (Critical Patient)   CPOT (Critical Patient) Facial Expression 0   CPOT (Critical Patient) Body Movement 0   CPOT (Critical Patient) Muscle Tension 0   CPOT (Critical Patient) Compiance with Vent 0   Score CPOT (Vent) 0   CPOT (Critial Patient) Vent Status Intubated   Oxygen Therapy   SpO2 95 %   FiO2  55 %   PEEP/CPAP (cm H2O) 10 cm H20   Assessment complete, patient's RASS -4, decreased versed dose, supine position, tube feeds now running, lung sounds clear, scant secretions when suctioned, no s/s distress noted, will continue to try and wean sedation as tolerated.
01/26/22 0700   Vitals   Temp 97.6 °F (36.4 °C)   Temp Source Axillary   Pulse 55   Heart Rate Source Monitor   Resp 22   /62   MAP (mmHg) 78   BP Method Automatic   Pain Assessment   RASS Score +1   Oxygen Therapy   SpO2 91 %   O2 Device Ventilator   FiO2  60 %   Vital signs stable. Shift assessment, completed, see flow sheet. RASS 0. Pt able to follow simple commands. Intubated and sedated with # 8 ETT, at 23 LL. 26 / 320 / 60 %/ 8. NSR, Respirations are easy, even, and unlabored. Bilateral lung sounds diminished. OG in place at 50, with 50 ml residual, Bowel sounds active. TF paused for SBT. ZAHIDA PICC, WNL precedex 1.9mcg/kg/hr. Mcguire in place and draining clear yellow urine. Call light within reach. Bed in lowest position. Bed alarm on.  Will continue to monitor
01/26/22 1200   Vitals   Temp 97.6 °F (36.4 °C)   Temp Source Axillary   Pulse 52   Heart Rate Source Monitor   Resp 26   BP (!) 137/117   MAP (mmHg) 125   BP Method Automatic   Oxygen Therapy   SpO2 92 %   O2 Device Ventilator   FiO2  65 %     Vital signs stable. Reassessment, completed, see flow sheet. RASS 0. Pt able to follow simple commands. Intubated and sedated with # 8 ETT, at 23 LL. 26 / 320 / 65 %/ 8. NSR/SB, Respirations are easy, even, and unlabored. Bilateral lung sounds diminished. OG in place with 50 ml residual, Bowel sounds active. ZAHIDA PICC, WNL precedex 1.2mcg/kg/hr, Propofol initiated at 10 mcg/kg/min. Will wean precedex as tolerated to prevent further bradycardia. Mcguire in place and draining clear yellow urine. Call light within reach. Bed in lowest position. Bed alarm on.  Will continue to monitor
01/26/22 1600   Vitals   Temp 96.7 °F (35.9 °C)   Temp Source Axillary   Pulse (!) 49   Heart Rate Source Monitor   Resp 26   /69   MAP (mmHg) 86   BP Method Automatic   Pain Assessment   RASS Score -2   Oxygen Therapy   SpO2 95 %   O2 Device Ventilator   FiO2  60 %     Vital signs stable. Reassessment, completed, see flow sheet. RASS -2. Pt able to follow simple commands. Intubated and sedated with # 8 ETT, at 23 LL. 26 / 320 / 60 %/ 8. SB, Respirations are easy, even, and unlabored. Bilateral lung sounds diminished. OG in place with 50 ml residual, Bowel sounds active.  ZAHIDA PICC, WNL precedex weaned off, Propofol continues at 20 mcg/kg/min. Mcguire in place and draining clear yellow urine. Call light within reach. Bed in lowest position. Bed alarm on. Family updated on plan of care at bedside.  Will continue to monitor             
01/27/22 0700   Vitals   Temp 98 °F (36.7 °C)   Temp Source Axillary   Pulse 71   Heart Rate Source Monitor   Resp 26   BP (!) 93/47   MAP (mmHg) (!) 61   BP Method Automatic   Oxygen Therapy   SpO2 90 %   O2 Device Ventilator   FiO2  55 %     Vital signs stable. BP's soft. Shift assessment, completed, see flow sheet. RASS +1.  PRN versed provided. Intubated and sedated with # 8 ETT, at 23 LL. 26 / 320 / 60 %/ 8. NSR, Respirations are easy, even, and unlabored. Bilateral lung sounds diminished. OG in place. Bowel sounds active. ZAHIDA PICC, WNL precedex 1.0mcg/kg/hr. Propofol 20 mcg/kg/hr. Mcguire in place and draining clear yellow urine. Call light within reach. Bed in lowest position. Bed alarm on.  Will continue to monitor
01/27/22 1100   Vitals   Pulse 80   Heart Rate Source Monitor   Resp 25   BP (!) 86/46   MAP (mmHg) (!) 58   BP Method Automatic   Oxygen Therapy   SpO2 93 %   O2 Device Ventilator   FiO2  70 %   BP's soft since Pt required more sedation. Dr. Zeny Mark made aware. Orders received for a 250 ml NaCL bolus.
01/27/22 1125   Vitals   Temp 99.2 °F (37.3 °C)   Temp Source Axillary   Pulse 78   Heart Rate Source Monitor   Resp 26   BP (!) 91/50   MAP (mmHg) (!) 62   Level of Consciousness Unresponsive (3)   MEWS Score 3   Oxygen Therapy   SpO2 96 %     Vital signs stable. BP's improving with initiation of levophed gtt. Reassessment, completed, see flow sheet. RASS -3.  Intubated and sedated with # 8 ETT, at 23 LL. 26 / 320 / 70 %/ 8. NSR, Respirations are easy, even, and unlabored. Bilateral lung sounds diminished. OG in place. Bowel sounds active. TF continues at goal rate. ZAHIDA PICC, WNL with Fentanyl 50 mcg/hr, Propofol 50 mcg/kg/hr, Levophed 3 mcg/hr. Mcguire in place and draining clear yellow urine. Call light within reach. Bed in lowest position. Bed alarm on.  Will continue to monitor
01/27/22 1600   Vitals   Temp 100.2 °F (37.9 °C)   Temp Source Axillary   Pulse 93   Heart Rate Source Monitor   Resp 23   BP (!) 109/50   MAP (mmHg) 66   BP Method Automatic   Pain Assessment   RASS Score -2   Oxygen Therapy   SpO2 93 %   O2 Device Ventilator   FiO2  50 %   Vital signs stable. BP's improving. Levophed weaned off. Reassessment, completed, see flow sheet. RASS -2.  Intubated and sedated with # 8 ETT, at 23 LL. 26 / 320 / 50 %/ 8. NSR, Respirations are easy, even, and unlabored. Bilateral lung sounds diminished. OG in place. Bowel sounds active. TF continues at goal rate with little residual. ZAHIDA PICC, WNL with Fentanyl 50 mcg/hr, Propofol 50 mcg/kg/hr, Mcguire in place and draining clear yellow urine. Call light within reach. Family updated on plan of care at bedside. Bed in lowest position. Bed alarm on.  Will continue to monitor
02/18/22 0800   Vitals   Temp 98 °F (36.7 °C)   Temp Source Bladder   Pulse 109   Heart Rate Source Monitor   Resp 27   BP (!) 170/79   MAP (mmHg) 101   BP Method Automatic   Level of Consciousness Responds to Voice (1)   MEWS Score 3   Cardiac Rhythm Sinus rhythm   Oxygen Therapy   SpO2 90 %   O2 Device Ventilator   FiO2  65 %   Vital signs stable. Intermittent desaturations to upper 80's Spo2 noted. Remains hypertensive. Shift assessment, completed, see flow sheet. RASS -3. Opens eyes to pain only. Unable to follow commands. Trached and sedated with # 8 portex ETT, AC/VC 28 / 350 /65 %/ 8. ST, Respirations are easy, even, and unlabored. Bilateral lung sounds diminished. PEG in place. TF at goal. 0 ml residual, Bowel sounds active. R triple lumen IJ CVC, WNL with precedex 1.4 mcg/kg/hr. Heparin increased to 19.4 ml/hr per pharmacy recs, NS continues at 125 ml/hr. CVP measured per nephrology recommendations. CVP is currently 18. Mcguire in place and draining clear yellow urine. Call light within reach. Bed in lowest position. Bed alarm on.  Will continue to monitor
22 2351   Vital Signs   Temp 98.2 °F (36.8 °C)   Temp Source Oral   Pulse 75   Resp 20   /68   BP Location Left upper arm   Level of Consciousness Alert (0)   MEWS Score 1   Patient Currently in Pain Denies   Oxygen Therapy   SpO2 94 %   O2 Device Heated high flow cannula   FiO2  90 %   O2 Flow Rate (L/min) 35 L/min     Pt A/o X4. Having some expressive aphasia. Able to tell me name and , yr, and location. Able to find words for some things, unable to find and explain others. No facial droop, tongue symmetrical. Clinical at bedside. ABG drawn at this time. Message sent to MD. Awaiting response. Oksana Trejo, MIKE      Pt able to get up to [facility], assist X1, able to follow commands. desat to 79% on vapo. Pt sitting on side of the bed at this time. RN at bedside with pt.
See ACP note for further detail.      02/14/22 1411   Encounter Summary   Services provided to: Patient not available   Referral/Consult From: Nurse   Continue Visiting   (2/14 - ACP referral)   Complexity of Encounter Low   Length of Encounter 15 minutes  (spoke with pt's nurse)   [address]   (Patient on vent and not available)
hydrALAZINE 10 MG IVP GIVEN
Topical recommendations:     Sacrum- Apply Liquid barrier film, cover with silicone foam with border. Change daily. Monitor for tissue type changes.     Continue low air loss bed therapy,  heel elevation with offloading boots, turn & reposition q2h with Z-flow fluidized pillow, continue moisture management with barrier creams as specified above & single breathable pad, continue measures to decrease friction/shear.   Plan discussed with patient and wife- educated on topical wound therapy to optimize wound healing. Questions answered.   Patients wife requesting [facility] bed for patient comfort.     Please contact Wound/Ostomy Care Service Line if we can be of further assistance (ext [phone]).

## 2024-12-02 NOTE — PROGRESS NOTES
7010  Shift assessment, completed, see flow sheet. Pt remains on ventilator at this time. Not following commands. RASS -4. Intubated and sedated on PC # 8 ETT, at 23 LL. 28 / 20 / 50% / 8. SR 78, /59 (74), SpO2 95%. Respirations are easy, even, and unlabored. Bilateral lung sounds are diminished with rhonchi noted to lower lobes. OG in place at 60, with TF at 50 mL/hr, 0ml residual.      PICC to RUE and PIV x1 remains patent with site and dressing C/D/I with Levophed, Propofol & Fentanyl infusing. Mcgiure in place and patent with STAT lock. Bilateral soft wrist restraints in place for pt safety. Bed in lowest position. Will continue to monitor. 8206  Care rounds completed with Dr. Nadeen Faust and multidisciplinary team. Reviewed labs, meds, VS, assessment, & plan of care for today. See dictated note and new orders for details. New orders received:  -give lasix 40mg x1.  -give K+ 20meq x1.  -add on magnesium level to this AM lab draw, report if < 2.0.  -add on triglyceride level.  -increase SSI to high dose coverage.  -decrease RR to 24. -RT to pull back ETT to 22cm. Spoke with daughter, Alcides Gupta, and updated her on pt's condition and new orders received this AM.    5078  Reassessment completed, see flowsheets, unchanged from prior. VSS. All ICU Lines and monitoring remain in place. Bed locked in lowest position. 1624  Reassessment completed, see flowsheets, unchanged from prior. VSS. All ICU Lines and monitoring remain in place. Bed locked in lowest position. Denies

## 2025-01-29 NOTE — PROGRESS NOTES
01/22/22 0048   Vent Information   Vent Type 840   Vent Mode AC/VC   Vt Ordered 320 mL   Rate Set 26 bmp   Peak Flow 50 L/min   FiO2  80 %   SpO2 94 %   SpO2/FiO2 ratio 117.5   Sensitivity 3   PEEP/CPAP 14   Humidification Source Heated wire   Humidification Temp Measured 37   Circuit Condensation Drained   Vent Patient Data   Peak Inspiratory Pressure 30 cmH2O   Mean Airway Pressure 19 cmH20   Rate Measured 26 br/min   Vt Exhaled 343 mL   Minute Volume 8.9 Liters   I:E Ratio 1:2.3   Cough/Sputum   Sputum How Obtained Endotracheal   Cough Non-productive   Sputum Amount None   Spontaneous Breathing Trial (SBT) RT Doc   Pulse 66   Breath Sounds   Right Upper Lobe Diminished   Right Middle Lobe Diminished   Right Lower Lobe Diminished   Left Upper Lobe Diminished   Left Lower Lobe Diminished   Additional Respiratory  Assessments   Resp 26   Alarm Settings   High Pressure Alarm 45 cmH2O   Low Minute Volume Alarm 3 L/min   Apnea (secs) 20 secs   High Respiratory Rate 50 br/min   Low Exhaled Vt  250 mL   Patient Observation   Observations 8ett 23 at lip Azalea Mercedes (: 1970) is a 54 y.o. female, Established patient patient, here for evaluation of the following chief complaint(s):  Follow-up Chronic Condition         ASSESSMENT/PLAN:  Below is the assessment and plan developed based on review of pertinent history, physical exam, labs, studies, and medications.      1. Essential hypertension  -     Comprehensive Metabolic Panel; Future  -     CBC with Auto Differential; Future  2. Mild intermittent asthma without complication  3. Gastroesophageal reflux disease without esophagitis  4. Anxiety attack  -     escitalopram (LEXAPRO) 5 MG tablet; Take 1 tablet by mouth daily, Disp-90 tablet, R-1Normal  5. Vitamin D deficiency  -     Vitamin D 25 Hydroxy; Future  6. Pure hypercholesterolemia  -     Lipid Panel; Future  -     Comprehensive Metabolic Panel; Future    Hypertension, controlled, continue current dose of HCTZ.  Diet low in sodium rich in potassium    Hypercholesteremia not taking statin and by misunderstanding did not call the office so discussed in length about low-carb low starch and low sugar and low saturated fat diet and she should avoid saturated fat trans fat and read the food labels and eat more baked food vegetables none sugary foods and avoid fast food and deep-fried food and she says she does not eat that much but she does not get time where she works that she just cannot have choice for food educated her educational brochures given and she wants to be on medicine after doing the labs and within next week she will be on medication if it is high and we will decide the dose of rosuvastatin or atorvastatin and she agreed.  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  And she agreed.  Discussed in length.  Deep breathing

## (undated) DEVICE — SOLUTION IV IRRIG 500ML 0.9% SODIUM CHL 2F7123

## (undated) DEVICE — APPLICATOR PREP 26ML 0.7% IOD POVACRYLEX 74% ISO ALC ST

## (undated) DEVICE — SYRINGE MED 10ML LUERLOCK TIP W/O SFTY DISP

## (undated) DEVICE — SUTURE PERMAHAND SZ 2-0 L18IN NONABSORBABLE BLK L26MM SH C012D

## (undated) DEVICE — Device: Brand: PORTEX

## (undated) DEVICE — GOWN,AURORA,NONREINF,RAGLAN,XXL,STERILE: Brand: MEDLINE

## (undated) DEVICE — SUTURE PROL 2-0 L48IN NONABSORBABLE BLU SH L26MM 1/2 CIR 8533H

## (undated) DEVICE — Device

## (undated) DEVICE — GLOVE ORANGE PI 7 1/2   MSG9075

## (undated) DEVICE — ELECTRODE PT RET AD L9FT HI MOIST COND ADH HYDRGEL CORDED

## (undated) DEVICE — MAJOR SET UP PK